# Patient Record
Sex: MALE | Race: BLACK OR AFRICAN AMERICAN | NOT HISPANIC OR LATINO | Employment: OTHER | URBAN - METROPOLITAN AREA
[De-identification: names, ages, dates, MRNs, and addresses within clinical notes are randomized per-mention and may not be internally consistent; named-entity substitution may affect disease eponyms.]

---

## 2022-12-05 ENCOUNTER — TELEPHONE (OUTPATIENT)
Dept: TRANSPLANT | Facility: CLINIC | Age: 52
End: 2022-12-05
Payer: MEDICARE

## 2022-12-05 ENCOUNTER — DOCUMENTATION ONLY (OUTPATIENT)
Dept: TRANSPLANT | Facility: CLINIC | Age: 52
End: 2022-12-05
Payer: MEDICARE

## 2022-12-05 ENCOUNTER — HOSPITAL ENCOUNTER (INPATIENT)
Facility: HOSPITAL | Age: 52
LOS: 18 days | Discharge: HOME HEALTH | DRG: 286 | End: 2022-12-23
Attending: INTERNAL MEDICINE | Admitting: INTERNAL MEDICINE
Payer: MEDICARE

## 2022-12-05 DIAGNOSIS — I50.43 ACUTE ON CHRONIC COMBINED SYSTOLIC AND DIASTOLIC CHF, NYHA CLASS 4: ICD-10-CM

## 2022-12-05 DIAGNOSIS — I50.20 HFREF (HEART FAILURE WITH REDUCED EJECTION FRACTION): ICD-10-CM

## 2022-12-05 DIAGNOSIS — I50.9 HEART FAILURE: ICD-10-CM

## 2022-12-05 DIAGNOSIS — F12.10 MARIJUANA ABUSE: ICD-10-CM

## 2022-12-05 DIAGNOSIS — I50.43 ACUTE ON CHRONIC COMBINED SYSTOLIC AND DIASTOLIC HEART FAILURE: ICD-10-CM

## 2022-12-05 DIAGNOSIS — Z12.11 COLON CANCER SCREENING: ICD-10-CM

## 2022-12-05 DIAGNOSIS — I50.9 DECOMPENSATED HEART FAILURE: ICD-10-CM

## 2022-12-05 DIAGNOSIS — I50.20 SYSTOLIC HF (HEART FAILURE): ICD-10-CM

## 2022-12-05 DIAGNOSIS — Z72.0 TOBACCO USE: ICD-10-CM

## 2022-12-05 DIAGNOSIS — I50.9 ACUTE DECOMPENSATED HEART FAILURE: ICD-10-CM

## 2022-12-05 DIAGNOSIS — Z95.810 ICD (IMPLANTABLE CARDIOVERTER-DEFIBRILLATOR), SINGLE, IN SITU: Primary | ICD-10-CM

## 2022-12-05 DIAGNOSIS — I50.9 CHF (CONGESTIVE HEART FAILURE): ICD-10-CM

## 2022-12-05 PROBLEM — D64.9 ANEMIA: Status: ACTIVE | Noted: 2022-12-05

## 2022-12-05 PROBLEM — N18.30 CKD (CHRONIC KIDNEY DISEASE), STAGE III: Status: ACTIVE | Noted: 2022-12-05

## 2022-12-05 PROBLEM — K21.9 GERD (GASTROESOPHAGEAL REFLUX DISEASE): Status: ACTIVE | Noted: 2022-12-05

## 2022-12-05 PROBLEM — D62 ACUTE BLOOD LOSS ANEMIA: Status: ACTIVE | Noted: 2022-12-05

## 2022-12-05 PROBLEM — I10 HTN (HYPERTENSION): Status: ACTIVE | Noted: 2022-12-05

## 2022-12-05 LAB
ABO + RH BLD: NORMAL
ALBUMIN SERPL BCP-MCNC: 3.8 G/DL (ref 3.5–5.2)
ALBUMIN SERPL BCP-MCNC: 4.1 G/DL (ref 3.5–5.2)
ALLENS TEST: ABNORMAL
ALP SERPL-CCNC: 196 U/L (ref 55–135)
ALP SERPL-CCNC: 196 U/L (ref 55–135)
ALT SERPL W/O P-5'-P-CCNC: 38 U/L (ref 10–44)
ALT SERPL W/O P-5'-P-CCNC: 42 U/L (ref 10–44)
ANION GAP SERPL CALC-SCNC: 10 MMOL/L (ref 8–16)
ANION GAP SERPL CALC-SCNC: 6 MMOL/L (ref 8–16)
APTT BLDCRRT: 38.2 SEC (ref 21–32)
AST SERPL-CCNC: 24 U/L (ref 10–40)
AST SERPL-CCNC: 31 U/L (ref 10–40)
BASOPHILS # BLD AUTO: 0.06 K/UL (ref 0–0.2)
BASOPHILS NFR BLD: 0.7 % (ref 0–1.9)
BILIRUB SERPL-MCNC: 5.3 MG/DL (ref 0.1–1)
BILIRUB SERPL-MCNC: 5.4 MG/DL (ref 0.1–1)
BLD GP AB SCN CELLS X3 SERPL QL: NORMAL
BUN SERPL-MCNC: 44 MG/DL (ref 6–20)
BUN SERPL-MCNC: 46 MG/DL (ref 6–20)
CALCIUM SERPL-MCNC: 8.6 MG/DL (ref 8.7–10.5)
CALCIUM SERPL-MCNC: 8.8 MG/DL (ref 8.7–10.5)
CHLORIDE SERPL-SCNC: 95 MMOL/L (ref 95–110)
CHLORIDE SERPL-SCNC: 99 MMOL/L (ref 95–110)
CHOLEST SERPL-MCNC: 78 MG/DL (ref 120–199)
CHOLEST/HDLC SERPL: 3.1 {RATIO} (ref 2–5)
CO2 SERPL-SCNC: 31 MMOL/L (ref 23–29)
CO2 SERPL-SCNC: 36 MMOL/L (ref 23–29)
CREAT SERPL-MCNC: 1.5 MG/DL (ref 0.5–1.4)
CREAT SERPL-MCNC: 1.7 MG/DL (ref 0.5–1.4)
DELSYS: ABNORMAL
DIFFERENTIAL METHOD: ABNORMAL
EOSINOPHIL # BLD AUTO: 0.2 K/UL (ref 0–0.5)
EOSINOPHIL NFR BLD: 2.3 % (ref 0–8)
ERYTHROCYTE [DISTWIDTH] IN BLOOD BY AUTOMATED COUNT: 20.4 % (ref 11.5–14.5)
ERYTHROCYTE [SEDIMENTATION RATE] IN BLOOD BY WESTERGREN METHOD: 17 MM/H
EST. GFR  (NO RACE VARIABLE): 47.3 ML/MIN/1.73 M^2
EST. GFR  (NO RACE VARIABLE): 55 ML/MIN/1.73 M^2
ESTIMATED AVG GLUCOSE: 100 MG/DL (ref 68–131)
FIO2: 21
GLUCOSE SERPL-MCNC: 142 MG/DL (ref 70–110)
GLUCOSE SERPL-MCNC: 86 MG/DL (ref 70–110)
HBA1C MFR BLD: 5.1 % (ref 4–5.6)
HCO3 UR-SCNC: 33.8 MMOL/L (ref 24–28)
HCT VFR BLD AUTO: 38.3 % (ref 40–54)
HDLC SERPL-MCNC: 25 MG/DL (ref 40–75)
HDLC SERPL: 32.1 % (ref 20–50)
HGB BLD-MCNC: 11.5 G/DL (ref 14–18)
IMM GRANULOCYTES # BLD AUTO: 0.07 K/UL (ref 0–0.04)
IMM GRANULOCYTES NFR BLD AUTO: 0.9 % (ref 0–0.5)
INR PPP: 1.4 (ref 0.8–1.2)
LACTATE SERPL-SCNC: 0.9 MMOL/L (ref 0.5–2.2)
LDLC SERPL CALC-MCNC: 39.6 MG/DL (ref 63–159)
LYMPHOCYTES # BLD AUTO: 0.7 K/UL (ref 1–4.8)
LYMPHOCYTES NFR BLD: 9 % (ref 18–48)
MAGNESIUM SERPL-MCNC: 2 MG/DL (ref 1.6–2.6)
MCH RBC QN AUTO: 27.6 PG (ref 27–31)
MCHC RBC AUTO-ENTMCNC: 30 G/DL (ref 32–36)
MCV RBC AUTO: 92 FL (ref 82–98)
MODE: ABNORMAL
MONOCYTES # BLD AUTO: 0.5 K/UL (ref 0.3–1)
MONOCYTES NFR BLD: 6.4 % (ref 4–15)
NEUTROPHILS # BLD AUTO: 6.5 K/UL (ref 1.8–7.7)
NEUTROPHILS NFR BLD: 80.7 % (ref 38–73)
NONHDLC SERPL-MCNC: 53 MG/DL
NRBC BLD-RTO: 0 /100 WBC
PCO2 BLDA: 42.3 MMHG (ref 35–45)
PH SMN: 7.51 [PH] (ref 7.35–7.45)
PLATELET # BLD AUTO: 136 K/UL (ref 150–450)
PMV BLD AUTO: 9.7 FL (ref 9.2–12.9)
PO2 BLDA: 26 MMHG (ref 40–60)
POC BE: 11 MMOL/L
POC SATURATED O2: 54 % (ref 95–100)
POC TCO2: 35 MMOL/L (ref 24–29)
POTASSIUM SERPL-SCNC: 4.2 MMOL/L (ref 3.5–5.1)
POTASSIUM SERPL-SCNC: 4.6 MMOL/L (ref 3.5–5.1)
PROT SERPL-MCNC: 6.7 G/DL (ref 6–8.4)
PROT SERPL-MCNC: 7.1 G/DL (ref 6–8.4)
PROTHROMBIN TIME: 14.4 SEC (ref 9–12.5)
RBC # BLD AUTO: 4.17 M/UL (ref 4.6–6.2)
SAMPLE: ABNORMAL
SITE: ABNORMAL
SODIUM SERPL-SCNC: 137 MMOL/L (ref 136–145)
SODIUM SERPL-SCNC: 140 MMOL/L (ref 136–145)
TRIGL SERPL-MCNC: 67 MG/DL (ref 30–150)
WBC # BLD AUTO: 8.11 K/UL (ref 3.9–12.7)

## 2022-12-05 PROCEDURE — 83605 ASSAY OF LACTIC ACID: CPT | Performed by: INTERNAL MEDICINE

## 2022-12-05 PROCEDURE — 83735 ASSAY OF MAGNESIUM: CPT | Performed by: INTERNAL MEDICINE

## 2022-12-05 PROCEDURE — 85610 PROTHROMBIN TIME: CPT | Performed by: INTERNAL MEDICINE

## 2022-12-05 PROCEDURE — 86901 BLOOD TYPING SEROLOGIC RH(D): CPT | Performed by: PHYSICIAN ASSISTANT

## 2022-12-05 PROCEDURE — 83036 HEMOGLOBIN GLYCOSYLATED A1C: CPT | Performed by: INTERNAL MEDICINE

## 2022-12-05 PROCEDURE — 93010 ELECTROCARDIOGRAM REPORT: CPT | Mod: ,,, | Performed by: INTERNAL MEDICINE

## 2022-12-05 PROCEDURE — 25000003 PHARM REV CODE 250: Performed by: INTERNAL MEDICINE

## 2022-12-05 PROCEDURE — 82803 BLOOD GASES ANY COMBINATION: CPT

## 2022-12-05 PROCEDURE — 85730 THROMBOPLASTIN TIME PARTIAL: CPT | Performed by: INTERNAL MEDICINE

## 2022-12-05 PROCEDURE — 20600001 HC STEP DOWN PRIVATE ROOM

## 2022-12-05 PROCEDURE — 36415 COLL VENOUS BLD VENIPUNCTURE: CPT | Performed by: INTERNAL MEDICINE

## 2022-12-05 PROCEDURE — 93010 EKG 12-LEAD: ICD-10-PCS | Mod: ,,, | Performed by: INTERNAL MEDICINE

## 2022-12-05 PROCEDURE — 99900035 HC TECH TIME PER 15 MIN (STAT)

## 2022-12-05 PROCEDURE — 80053 COMPREHEN METABOLIC PANEL: CPT | Performed by: INTERNAL MEDICINE

## 2022-12-05 PROCEDURE — 93005 ELECTROCARDIOGRAM TRACING: CPT

## 2022-12-05 PROCEDURE — 85025 COMPLETE CBC W/AUTO DIFF WBC: CPT | Performed by: INTERNAL MEDICINE

## 2022-12-05 PROCEDURE — 99223 PR INITIAL HOSPITAL CARE,LEVL III: ICD-10-PCS | Mod: ,,, | Performed by: INTERNAL MEDICINE

## 2022-12-05 PROCEDURE — 80061 LIPID PANEL: CPT | Performed by: INTERNAL MEDICINE

## 2022-12-05 PROCEDURE — 99223 1ST HOSP IP/OBS HIGH 75: CPT | Mod: ,,, | Performed by: INTERNAL MEDICINE

## 2022-12-05 PROCEDURE — 80053 COMPREHEN METABOLIC PANEL: CPT | Mod: 91 | Performed by: PHYSICIAN ASSISTANT

## 2022-12-05 PROCEDURE — 63600175 PHARM REV CODE 636 W HCPCS: Performed by: INTERNAL MEDICINE

## 2022-12-05 RX ORDER — FUROSEMIDE 10 MG/ML
80 INJECTION INTRAMUSCULAR; INTRAVENOUS ONCE
Status: DISCONTINUED | OUTPATIENT
Start: 2022-12-05 | End: 2022-12-07

## 2022-12-05 RX ORDER — DIGOXIN 125 MCG
0.12 TABLET ORAL DAILY
Status: DISCONTINUED | OUTPATIENT
Start: 2022-12-05 | End: 2022-12-15

## 2022-12-05 RX ORDER — SODIUM CHLORIDE 0.9 % (FLUSH) 0.9 %
10 SYRINGE (ML) INJECTION
Status: DISCONTINUED | OUTPATIENT
Start: 2022-12-05 | End: 2022-12-23 | Stop reason: HOSPADM

## 2022-12-05 RX ORDER — PANTOPRAZOLE SODIUM 40 MG/1
40 TABLET, DELAYED RELEASE ORAL DAILY
Status: DISCONTINUED | OUTPATIENT
Start: 2022-12-05 | End: 2022-12-23 | Stop reason: HOSPADM

## 2022-12-05 RX ORDER — ACETAMINOPHEN 325 MG/1
650 TABLET ORAL EVERY 4 HOURS PRN
Status: DISCONTINUED | OUTPATIENT
Start: 2022-12-05 | End: 2022-12-23

## 2022-12-05 RX ORDER — AMIODARONE HYDROCHLORIDE 200 MG/1
200 TABLET ORAL DAILY
Status: DISCONTINUED | OUTPATIENT
Start: 2022-12-05 | End: 2022-12-23 | Stop reason: HOSPADM

## 2022-12-05 RX ORDER — ENOXAPARIN SODIUM 100 MG/ML
40 INJECTION SUBCUTANEOUS EVERY 24 HOURS
Status: DISCONTINUED | OUTPATIENT
Start: 2022-12-06 | End: 2022-12-09

## 2022-12-05 RX ORDER — DOBUTAMINE HYDROCHLORIDE 400 MG/100ML
5 INJECTION INTRAVENOUS CONTINUOUS
Status: DISCONTINUED | OUTPATIENT
Start: 2022-12-05 | End: 2022-12-23 | Stop reason: HOSPADM

## 2022-12-05 RX ADMIN — APIXABAN 5 MG: 5 TABLET, FILM COATED ORAL at 09:12

## 2022-12-05 RX ADMIN — DOBUTAMINE HYDROCHLORIDE 5 MCG/KG/MIN: 400 INJECTION INTRAVENOUS at 06:12

## 2022-12-05 RX ADMIN — PANTOPRAZOLE SODIUM 40 MG: 40 TABLET, DELAYED RELEASE ORAL at 09:12

## 2022-12-05 RX ADMIN — FUROSEMIDE 20 MG/HR: 10 INJECTION, SOLUTION INTRAMUSCULAR; INTRAVENOUS at 10:12

## 2022-12-05 RX ADMIN — DOBUTAMINE HYDROCHLORIDE 5 MCG/KG/MIN: 400 INJECTION INTRAVENOUS at 01:12

## 2022-12-05 RX ADMIN — SACUBITRIL AND VALSARTAN 1 TABLET: 24; 26 TABLET, FILM COATED ORAL at 09:12

## 2022-12-05 RX ADMIN — DIGOXIN 0.12 MG: 125 TABLET ORAL at 09:12

## 2022-12-05 RX ADMIN — SACUBITRIL AND VALSARTAN 1 TABLET: 24; 26 TABLET, FILM COATED ORAL at 08:12

## 2022-12-05 RX ADMIN — AMIODARONE HYDROCHLORIDE 200 MG: 200 TABLET ORAL at 09:12

## 2022-12-05 RX ADMIN — FUROSEMIDE 20 MG/HR: 10 INJECTION, SOLUTION INTRAMUSCULAR; INTRAVENOUS at 01:12

## 2022-12-05 NOTE — SUBJECTIVE & OBJECTIVE
No past medical history on file.    No past surgical history on file.    Review of patient's allergies indicates:  Not on File    No current facility-administered medications on file prior to encounter.     No current outpatient medications on file prior to encounter.     Family History    None       Tobacco Use    Smoking status: Not on file    Smokeless tobacco: Not on file   Substance and Sexual Activity    Alcohol use: Not on file    Drug use: Not on file    Sexual activity: Not on file     Review of Systems   All other systems reviewed and are negative.  Objective:     Vital Signs (Most Recent):  Temp: 97.4 °F (36.3 °C) (12/05/22 0030)  Pulse: 88 (12/05/22 0030)  Resp: 20 (12/05/22 0030)  BP: 108/78 (12/05/22 0030)  SpO2: 99 % (12/05/22 0030) Vital Signs (24h Range):  Temp:  [97.4 °F (36.3 °C)-99.2 °F (37.3 °C)] 97.4 °F (36.3 °C)  Pulse:  [83-96] 88  Resp:  [17-20] 20  SpO2:  [98 %-100 %] 99 %  BP: (108-121)/(74-79) 108/78       Weight: 90.4 kg (199 lb 4.7 oz)  Body mass index is 32.17 kg/m².    SpO2: 99 %  O2 Device (Oxygen Therapy): room air    Physical Exam  General: NAD. AAO  HENT: EOMI  Neck: JVD  CV: RRR. S3 gallop. PICC in RUE  Resp: Rales. No increased work of breathing  Ext: cool. 3+ pitting edema      Significant Labs: All pertinent lab results from the last 24 hours have been reviewed.    Significant Imaging:  Reviewed

## 2022-12-05 NOTE — Clinical Note
The PA catheter was repositioned to the main pulmonary artery. Hemodynamics were performed. O2 saturation was measured at 47%. AO: 100%  CO: 2.87  CI: 1.55

## 2022-12-05 NOTE — PLAN OF CARE
See H & P from this morning    -will continue lasix infusion and monitor UOP.  Will likely increase to 40mg/hr of he is stable and UOP tolerates.  Will monitor UOP and change infusion this afternoon vs tomorrow am  -Patient lives in [place] with his wife and children.  His Cardiology is Dr. Miller Damon.    -He reports he has been hospitalized at least twice this year for ADHF  -Echo ordered  -will start pathway for advanced options workup.  Likely VAD only as current tobacco and THC use  -Will get PT/OT evaluation   -Will stop Eliquis (in event of procedures) On Eliquis for atrial fib. [id number] is 2 so will just use DVT prophylaxis for now.  If requires a hold for greater than 5-7 days then will consider heparin infusion

## 2022-12-05 NOTE — Clinical Note
The left neck was prepped. The site was prepped with ChloraPrep. The patient was draped. The patient was positioned supine.

## 2022-12-05 NOTE — PROGRESS NOTES
Admit Note     Met with patient and son to assess needs. Patient is a 52 y.o.  male, admitted for for heart failure.      Patient admitted from [facility] on 12/5/2022 .  At this time, patient presents as alert and oriented x 4, pleasant, good eye contact, calm, communicative, cooperative, and asking and answering questions appropriately.  At this time, patients caregiver presents as alert and oriented x 4, pleasant, good eye contact, well groomed, recall good, concentration/judgement good, average intelligence, calm, communicative, cooperative, and asking and answering questions appropriately.    Household/Family Systems     Patient resides with patient's wife and son, at [address] [place] [address].  Support system includes wife Julianne Garcia and son Bayron Garcia ([phone]).  Patient reports having 2 other adult sons who do not provide care or assistance. Patient does not have dependents that are need of being cared for.     Patients primary caregiver is Julianne Garcia, patients wife, phone number [phone].  Confirmed patients contact information is [phone] (home);   Telephone Information:   Mobile [phone]   .    During admission, patient's caregiver plans to stay in patient's room.  Confirmed patient and patients caregivers do have access to reliable transportation.    Cognitive Status/Learning     Patient reports reading ability as 12th grade and states patient does have difficulty with comprehension and memory which is recent with this extended hospitalization.  Patient reports patient learns best by written and verbal information, as well as hands on learning.   Needed: No.   Highest education level: High School (9-12) or GED    Vocation/Disability   .  Working for Income: No  If no, reason not working: Disability  Patient is disabled due to CHF since 12 years ago.  Prior to disability, patient  was employed as  for a [email or web address]    Adherence     Patient reports a medium level of adherence to patients health care regimen.  Adherence counseling and education provided. Patient verbalizes understanding.    Substance Use    Patient reports the following substance usage.    Tobacco:  Patient smoking cigarettes, down to 3-4 cigarettes a day in the last couple of months.  Pt reports cutting back.  Patient had been smoking more and admits he had never completely stopped, but was trying to stop.  Patient is worried when he is discharged he wont' be able to completely stop.  Patient voiced commitment to no tobacco use.  Patient offered smoking cessation resources, son aware .  Alcohol: none, patient denies any use. Patient reports in past a beer or daiquiri every blue moon.   Illicit Drugs/Non-prescribed Medications:  Patient reports no current drug use, but over 30 years ago he admits to trying cocaine and THC .  Patient states clear understanding of the potential impact of substance use.  Substance abstinence/cessation counseling, education and resources provided and reviewed.     Services Utilizing/ADLS    Infusion Service: Prior to admission, patient utilizing? no  Home Health: Prior to admission, patient utilizing? not current, but in past nursing with PT/OT.  Pt not sure of name of  agency.   DME: Prior to admission,  crutches and BP machine and pulse ox   Pulmonary/Cardiac Rehab: Prior to admission, no  Dialysis:  Prior to admission, no  Transplant Specialty Pharmacy:  Prior to admission, no.   Patient uses [email or web address]    Prior to admission, patient reports patient was independent with ADLS and was driving.  Patient reports patient is not able to care for self at this time due to compromised medical condition (as documented in medical record) and physical weakness..  Patient indicates a willingness to care for self once medically cleared to do so.    Insurance/Medications    Insured by   Payer/Plan Subscr  Sex Relation  Sub. Ins. ID Effective Group Num   1. [employer] MANAGE* SYLVIA ALVAREZ 1970 Male Self [id number] 1/1/20 [id number]                                   [address] [address]      Primary Insurance (for UNOS reporting): Public Insurance - Medicare FFS (Fee For Service)  Secondary Insurance (for UNOS reporting): None    Patient reports patient is able to obtain and afford medications at this time and at time of discharge.  Patient does voice concerns affording other medical out of pocket expenses and co-pays, due to multiple hospitalizations a year.     Living Will/Healthcare Power of     Patient states patient does not have a LW and/or HCPA.   provided education regarding LW and HCPA and the completion of forms.    Coping/Mental Health    Patient is coping adequately with the aid of  family members and mark.   Patient denies mental health difficulties.     Discharge Planning    At time of discharge, patient plans to return to patient's home under the care of self/son/wife.  Patients wife and son will transport patient.  Per rounds today, expected discharge date has not been medically determined at this time. Patient and patients caregiver  verbalize understanding and are involved in treatment planning and discharge process.    Additional Concerns    Patient's caretaker denies additional needs and/or concerns at this time. Patient is being followed for needs, education, resources, information, emotional support, supportive counseling, and for supportive and skilled discharge plan of care.  providing ongoing psychosocial support, education, resources and d/c planning as needed.  SW remains available. Patient's caregiver verbalizes understanding and agreement with information reviewed,  availability and how to access available resources as needed. Patient denies additional needs and/or concerns at this time. Patient verbalizes understanding and agreement with information reviewed,  social work availability, and how to access available resources as needed.

## 2022-12-05 NOTE — ASSESSMENT & PLAN NOTE
Cr baseline 1.6-2 per chart. Unable to view labs directly. Cr 2.2 on 12/2  - Check renal function  - Check electrolytes

## 2022-12-05 NOTE — H&P
[facility] - Cardiology Stepdown  Cardiac Electrophysiology  History and Physical     Admission Date: 12/5/2022  Code Status: Full Code   Attending Provider: Kelley Cruz MD   Principal Problem:Acute on chronic combined systolic and diastolic heart failure    Subjective:     Chief Complaint:  ADHF     HPI:  53 yo M who presents as a transfer from [facility] for advanced options. PMHx NICM, ICD in situ, CKD III (cr 1.6-2 per records), tobacco use, THC use, HTN, and GERD. Reports history of CHF in his 40s (around 10 years prior). LHC reportedly in 2011 negative. Multiple recent admission for ADHF. Was admitted 11/20 at [facility]. Placed on  and lasix with inability to wean  per records. LHC/RHC showed no obstructive CAD and RA 40, PA 72/38, PCWP 42, CI 1.4. He denies any history of ICD discharges (implanted 2018 for primary prevention). He currently smokes, but reports having cut back. Recreational THC use. TTE EF 10-15% (no full reports with records sent). Cr 2.2 on 12/2.      No past medical history on file.    No past surgical history on file.    Review of patient's allergies indicates:  Not on File    No current facility-administered medications on file prior to encounter.     No current outpatient medications on file prior to encounter.     Family History    None       Tobacco Use    Smoking status: Not on file    Smokeless tobacco: Not on file   Substance and Sexual Activity    Alcohol use: Not on file    Drug use: Not on file    Sexual activity: Not on file     Review of Systems   All other systems reviewed and are negative.  Objective:     Vital Signs (Most Recent):  Temp: 97.4 °F (36.3 °C) (12/05/22 0030)  Pulse: 88 (12/05/22 0030)  Resp: 20 (12/05/22 0030)  BP: 108/78 (12/05/22 0030)  SpO2: 99 % (12/05/22 0030) Vital Signs (24h Range):  Temp:  [97.4 °F (36.3 °C)-99.2 °F (37.3 °C)] 97.4 °F (36.3 °C)  Pulse:  [83-96] 88  Resp:  [17-20] 20  SpO2:  [98 %-100 %] 99 %  BP: (108-121)/(74-79) 108/78       Weight:  90.4 kg (199 lb 4.7 oz)  Body mass index is 32.17 kg/m².    SpO2: 99 %  O2 Device (Oxygen Therapy): room air    Physical Exam  General: NAD. AAO  HENT: EOMI  Neck: JVD  CV: RRR. S3 gallop. PICC in RUE  Resp: Rales. No increased work of breathing  Ext: cool. 3+ pitting edema      Significant Labs: All pertinent lab results from the last 24 hours have been reviewed.    Significant Imaging:  Reviewed    Assessment and Plan:     * Acute on chronic combined systolic and diastolic heart failure  Cool and wet on exam. On  and lasix at OSH.   - Continue  5 mcg/kg/min  - Lasix 80 mg IV x 1 then 20 mg/hr - titrate as needed  - Check CVP and SVO2 via PICC  - Follow urine output closely  - Afterload reduction    ICD (implantable cardioverter-defibrillator), single, in situ  Device check    CKD (chronic kidney disease), stage III  Cr baseline 1.6-2 per chart. Unable to view labs directly. Cr 2.2 on 12/2  - Check renal function  - Check electrolytes    Marijuana abuse  Counseled on cessation    Tobacco use  Counseled on cessation  Cessation program    GERD (gastroesophageal reflux disease)  Home PPI    HFrEF (heart failure with reduced ejection fraction)  NICM EF 10-15% (full report unavailable). Reports dx in his 40s with [facility] 2011 which was negative.   - Diuresis  - Will initiate on the advanced options pathway in the AM        Quinten Lee MD  Cardiac Electrophysiology  [facility] - Cardiology Stepdown

## 2022-12-05 NOTE — ASSESSMENT & PLAN NOTE
Cool and wet on exam. On  and lasix at OSH.   - Continue  5 mcg/kg/min  - Lasix 80 mg IV x 1 then 20 mg/hr - titrate as needed  - Check CVP and SVO2 via PICC  - Follow urine output closely  - Afterload reduction

## 2022-12-05 NOTE — PROGRESS NOTES
Rounds Report: I attended interdisciplinary rounds this afternoon with the transplant team including SW, physicians, fellows, mid-level providers, and transplant coordinators. Discussed plan of care pertaining to Advanced Heart Failure Options (VAD and/or OHT) .  Migel Garcia is a VAD only candidate at this time

## 2022-12-05 NOTE — Clinical Note
A percutaneous stick to the left internal jugular vein was performed. Ultrasound guidance was used to obtain access.

## 2022-12-05 NOTE — ASSESSMENT & PLAN NOTE
NICM EF 10-15% (full report unavailable). Reports dx in his 40s with [facility] 2011 which was negative.   - Diuresis  - Will initiate on the advanced options pathway in the AM

## 2022-12-05 NOTE — Clinical Note
The PA catheter was repositioned to the main pulmonary artery and secured in place for continuous hemodynamic monitoring. Hemodynamics were performed. Cardiac output was obtained at 3 L/min. O2 saturation was measured at 44%. C.O=2.57  C.I=1.43

## 2022-12-05 NOTE — LETTER
December 23, 2022    Migel Garcia  [address] [address]      RE: [id number]          Dear Mr.Dewayne Garcia:       Your transplant evaluation was reviewed at the Heart Transplant Selection Committee meeting on 12/19/2022.  It was the decision of the Committee to defer the option of heart transplantation at this time due to renal insufficiency and you will need to complete testing of your pulmonary nodule. Any testing not completed while hospitalized may be rescheduled as an outpatient.  You can be re-presented to the Committee once you complete your evaluation.      Attached is a letter from the [employer] Network for Organ Sharing (UNOS).  It describes the services and information offered to patients by UNOS and the Organ Procurement and Transplant Network.     Please feel free to call if you have any questions at [phone].     Sincerely,  Gin Mendes MD  Medical Director, Heart Transplant Program  Director, Advanced Heart Failure/Heart Transplant Fellowship Program  [facility]  [address] [place] [address] - [address] [place] [address]  [phone]                               The Organ Procurement and Transplantation Network   Toll-free patient services line: Your resource for organ transplant information      If you have a question regarding your own medical care, you always should call your transplant hospital first. However, for general organ transplant-related information, you can call the Organ Procurement and Transplantation Network (OPTN) toll-free patient services line at [phone].      Anyone, including potential transplant candidates, candidates, recipients, family members, friends, living donors, and donor family members, can call this number to:       Talk about organ donation, living donation, the transplant process, the donation process, and transplant policies.    Get a free patient information kit with helpful  booklets, waiting list and transplant information, and a list of all transplant hospitals.    Ask questions about the OPTN website (https://optn.transplant.hrsa.gov/), the United Network for Organ Sharings (UNOS) website (https://unos.org/), or the UNOS website for living donors and transplant recipients. (https://www.transplantliving.org/).    Learn how the OPTN can help you.    Talk about any concerns that you may have with a transplant hospital.      The nations transplant system, the OPTN, is managed under federal contract by the [employer] for Organ Sharing (UNOS), which is a non-profit charitable organization. The OPTN helps create and define organ sharing policies that make the best use of donated organs. This process continuously evaluating new advances and discoveries so policies can be adapted to best serve patients waiting for transplants. To do so, the OPTN works closely with transplant professionals, transplant patients, transplant candidates, donor families, living donors, and the public. All transplant programs and organ procurement organizations throughout the country are OPTN members and are obligated to follow the policies the OPTN creates for allocating organs.      The OPTN also is responsible for:    Providing educational material for patients, the public, and professionals.    Raising awareness of the need for donated organs and tissue.    Coordinating organ procurement, matching, and placement.    Collecting information about every organ transplant and donation that occurs in the [place].      Remember, you should contact your transplant hospital directly if you have questions or concerns about your own medical care including medical records, work-up progress, and test results.      We are not your transplant hospital, and our staff will not be able to answer questions about your case, so please keep your transplant hospitals phone number handy.   However, while you  research your transplant needs and learn as much as you can about transplantation and donation, we welcome your call to our toll-free patient services line at [phone].

## 2022-12-05 NOTE — Clinical Note
The right neck was prepped. The site was prepped with ChloraPrep. The site was clipped. The patient was draped. The patient was positioned supine.

## 2022-12-05 NOTE — PROGRESS NOTES
Patient arrive to floor , via stretcher, awake, alert and responsive. With no c/o pain or discomfort. Oriented to room surroundings and and and made comfortable. Vitals completed . Received Lasix and Debutamine drugs from ambulance staff and Charge nurse called MD to notify , he said he will be in room to see patient. MD arrived and will put in orders as soon as he finish assessing patient. Medication will be restarted after the order is placed.108/78, 88,20

## 2022-12-05 NOTE — HPI
53 yo M who presents as a transfer from [facility] for advanced options. PMHx NICM, ICD in situ, CKD III (cr 1.6-2 per records), tobacco use, THC use, HTN, and GERD. Reports history of CHF in his 40s (around 10 years prior). C reportedly in 2011 negative. Multiple recent admission for ADHF. Was admitted 11/20 at [facility]. Placed on  and lasix with inability to wean  per records. LHC/RHC showed no obstructive CAD and RA 40, PA 72/38, PCWP 42, CI 1.4. He denies any history of ICD discharges (implanted 2018 for primary prevention). He currently smokes, but reports having cut back. Recreational THC use. TTE EF 10-15% (no full reports with records sent). Cr 2.2 on 12/2.

## 2022-12-06 ENCOUNTER — TELEPHONE (OUTPATIENT)
Dept: TRANSPLANT | Facility: CLINIC | Age: 52
End: 2022-12-06
Payer: MEDICARE

## 2022-12-06 PROBLEM — I48.91 ATRIAL FIBRILLATION: Status: ACTIVE | Noted: 2022-12-06

## 2022-12-06 PROBLEM — R91.1 PULMONARY NODULE: Status: ACTIVE | Noted: 2022-12-06

## 2022-12-06 LAB
ALBUMIN SERPL BCP-MCNC: 3.8 G/DL (ref 3.5–5.2)
ALLENS TEST: ABNORMAL
ALP SERPL-CCNC: 225 U/L (ref 55–135)
ALT SERPL W/O P-5'-P-CCNC: 37 U/L (ref 10–44)
ANION GAP SERPL CALC-SCNC: 12 MMOL/L (ref 8–16)
ASCENDING AORTA: 2.79 CM
AST SERPL-CCNC: 30 U/L (ref 10–40)
AV INDEX (PROSTH): 0.65
AV MEAN GRADIENT: 3 MMHG
AV PEAK GRADIENT: 6 MMHG
AV VALVE AREA: 1.83 CM2
AV VELOCITY RATIO: 0.64
BASOPHILS # BLD AUTO: 0.05 K/UL (ref 0–0.2)
BASOPHILS NFR BLD: 0.8 % (ref 0–1.9)
BILIRUB SERPL-MCNC: 4.8 MG/DL (ref 0.1–1)
BSA FOR ECHO PROCEDURE: 2.06 M2
BUN SERPL-MCNC: 43 MG/DL (ref 6–20)
CALCIUM SERPL-MCNC: 9 MG/DL (ref 8.7–10.5)
CHLORIDE SERPL-SCNC: 94 MMOL/L (ref 95–110)
CO2 SERPL-SCNC: 34 MMOL/L (ref 23–29)
CREAT SERPL-MCNC: 1.4 MG/DL (ref 0.5–1.4)
CV ECHO LV RWT: 0.39 CM
DELSYS: ABNORMAL
DIFFERENTIAL METHOD: ABNORMAL
DOP CALC AO PEAK VEL: 1.25 M/S
DOP CALC AO VTI: 19.87 CM
DOP CALC LVOT AREA: 2.8 CM2
DOP CALC LVOT DIAMETER: 1.89 CM
DOP CALC LVOT PEAK VEL: 0.8 M/S
DOP CALC LVOT STROKE VOLUME: 36.4 CM3
DOP CALCLVOT PEAK VEL VTI: 12.98 CM
E/E' RATIO: 12.88 M/S
ECHO LV POSTERIOR WALL: 1.03 CM (ref 0.6–1.1)
EJECTION FRACTION: 25 %
EOSINOPHIL # BLD AUTO: 0.2 K/UL (ref 0–0.5)
EOSINOPHIL NFR BLD: 2.9 % (ref 0–8)
ERYTHROCYTE [DISTWIDTH] IN BLOOD BY AUTOMATED COUNT: 20.5 % (ref 11.5–14.5)
EST. GFR  (NO RACE VARIABLE): 59.7 ML/MIN/1.73 M^2
FRACTIONAL SHORTENING: 17 % (ref 28–44)
GLUCOSE SERPL-MCNC: 129 MG/DL (ref 70–110)
HCO3 UR-SCNC: 42.5 MMOL/L (ref 24–28)
HCT VFR BLD AUTO: 36.3 % (ref 40–54)
HGB BLD-MCNC: 11.1 G/DL (ref 14–18)
IMM GRANULOCYTES # BLD AUTO: 0.04 K/UL (ref 0–0.04)
IMM GRANULOCYTES NFR BLD AUTO: 0.7 % (ref 0–0.5)
INTERVENTRICULAR SEPTUM: 0.83 CM (ref 0.6–1.1)
IVRT: 57.09 MSEC
LA MAJOR: 6.24 CM
LA MINOR: 6.2 CM
LA WIDTH: 4.04 CM
LEFT ATRIUM SIZE: 4.71 CM
LEFT ATRIUM VOLUME INDEX MOD: 43.8 ML/M2
LEFT ATRIUM VOLUME INDEX: 50.3 ML/M2
LEFT ATRIUM VOLUME MOD: 87.62 CM3
LEFT ATRIUM VOLUME: 100.6 CM3
LEFT INTERNAL DIMENSION IN SYSTOLE: 4.45 CM (ref 2.1–4)
LEFT VENTRICLE DIASTOLIC VOLUME INDEX: 68.51 ML/M2
LEFT VENTRICLE DIASTOLIC VOLUME: 137.01 ML
LEFT VENTRICLE MASS INDEX: 92 G/M2
LEFT VENTRICLE SYSTOLIC VOLUME INDEX: 45.1 ML/M2
LEFT VENTRICLE SYSTOLIC VOLUME: 90.24 ML
LEFT VENTRICULAR INTERNAL DIMENSION IN DIASTOLE: 5.33 CM (ref 3.5–6)
LEFT VENTRICULAR MASS: 183.87 G
LV LATERAL E/E' RATIO: 10.3 M/S
LV SEPTAL E/E' RATIO: 17.17 M/S
LYMPHOCYTES # BLD AUTO: 0.7 K/UL (ref 1–4.8)
LYMPHOCYTES NFR BLD: 12.6 % (ref 18–48)
MCH RBC QN AUTO: 27.6 PG (ref 27–31)
MCHC RBC AUTO-ENTMCNC: 30.6 G/DL (ref 32–36)
MCV RBC AUTO: 90 FL (ref 82–98)
MONOCYTES # BLD AUTO: 0.4 K/UL (ref 0.3–1)
MONOCYTES NFR BLD: 7.5 % (ref 4–15)
MV PEAK E VEL: 1.03 M/S
NEUTROPHILS # BLD AUTO: 4.5 K/UL (ref 1.8–7.7)
NEUTROPHILS NFR BLD: 75.5 % (ref 38–73)
NRBC BLD-RTO: 0 /100 WBC
PCO2 BLDA: 64.8 MMHG (ref 35–45)
PH SMN: 7.42 [PH] (ref 7.35–7.45)
PISA TR MAX VEL: 2.85 M/S
PLATELET # BLD AUTO: 154 K/UL (ref 150–450)
PMV BLD AUTO: 10.1 FL (ref 9.2–12.9)
PO2 BLDA: 27 MMHG (ref 40–60)
POC BE: 18 MMOL/L
POC SATURATED O2: 48 % (ref 95–100)
POC TCO2: 44 MMOL/L (ref 24–29)
POTASSIUM SERPL-SCNC: 4.1 MMOL/L (ref 3.5–5.1)
PROT SERPL-MCNC: 6.7 G/DL (ref 6–8.4)
QEF: 27 %
RA MAJOR: 5.81 CM
RA PRESSURE: 15 MMHG
RA WIDTH: 5.22 CM
RBC # BLD AUTO: 4.02 M/UL (ref 4.6–6.2)
RIGHT VENTRICULAR END-DIASTOLIC DIMENSION: 4.75 CM
RV TISSUE DOPPLER FREE WALL SYSTOLIC VELOCITY 1 (APICAL 4 CHAMBER VIEW): 10.19 CM/S
SAMPLE: ABNORMAL
SINUS: 2.86 CM
SITE: ABNORMAL
SODIUM SERPL-SCNC: 140 MMOL/L (ref 136–145)
STJ: 2.43 CM
TDI LATERAL: 0.1 M/S
TDI SEPTAL: 0.06 M/S
TDI: 0.08 M/S
TR MAX PG: 32 MMHG
TRICUSPID ANNULAR PLANE SYSTOLIC EXCURSION: 1.78 CM
TV REST PULMONARY ARTERY PRESSURE: 47 MMHG
WBC # BLD AUTO: 5.89 K/UL (ref 3.9–12.7)

## 2022-12-06 PROCEDURE — 80053 COMPREHEN METABOLIC PANEL: CPT | Performed by: INTERNAL MEDICINE

## 2022-12-06 PROCEDURE — 25000003 PHARM REV CODE 250: Performed by: INTERNAL MEDICINE

## 2022-12-06 PROCEDURE — 20600001 HC STEP DOWN PRIVATE ROOM

## 2022-12-06 PROCEDURE — 99900035 HC TECH TIME PER 15 MIN (STAT)

## 2022-12-06 PROCEDURE — 63600175 PHARM REV CODE 636 W HCPCS: Performed by: PHYSICIAN ASSISTANT

## 2022-12-06 PROCEDURE — 82803 BLOOD GASES ANY COMBINATION: CPT

## 2022-12-06 PROCEDURE — 99233 PR SUBSEQUENT HOSPITAL CARE,LEVL III: ICD-10-PCS | Mod: ,,, | Performed by: INTERNAL MEDICINE

## 2022-12-06 PROCEDURE — 80307 DRUG TEST PRSMV CHEM ANLYZR: CPT | Performed by: INTERNAL MEDICINE

## 2022-12-06 PROCEDURE — 99233 SBSQ HOSP IP/OBS HIGH 50: CPT | Mod: ,,, | Performed by: INTERNAL MEDICINE

## 2022-12-06 PROCEDURE — 97530 THERAPEUTIC ACTIVITIES: CPT

## 2022-12-06 PROCEDURE — 80323 ALKALOIDS NOS: CPT | Performed by: INTERNAL MEDICINE

## 2022-12-06 PROCEDURE — 85025 COMPLETE CBC W/AUTO DIFF WBC: CPT | Performed by: INTERNAL MEDICINE

## 2022-12-06 PROCEDURE — 63600175 PHARM REV CODE 636 W HCPCS: Performed by: INTERNAL MEDICINE

## 2022-12-06 PROCEDURE — 99233 SBSQ HOSP IP/OBS HIGH 50: CPT | Mod: ,,, | Performed by: PHYSICIAN ASSISTANT

## 2022-12-06 PROCEDURE — 97165 OT EVAL LOW COMPLEX 30 MIN: CPT

## 2022-12-06 PROCEDURE — 97161 PT EVAL LOW COMPLEX 20 MIN: CPT

## 2022-12-06 PROCEDURE — 97116 GAIT TRAINING THERAPY: CPT

## 2022-12-06 PROCEDURE — 99233 PR SUBSEQUENT HOSPITAL CARE,LEVL III: ICD-10-PCS | Mod: ,,, | Performed by: PHYSICIAN ASSISTANT

## 2022-12-06 RX ORDER — MUPIROCIN 20 MG/G
OINTMENT TOPICAL 2 TIMES DAILY
Status: DISPENSED | OUTPATIENT
Start: 2022-12-06 | End: 2022-12-11

## 2022-12-06 RX ORDER — MUPIROCIN 20 MG/G
OINTMENT TOPICAL 2 TIMES DAILY
Status: CANCELLED | OUTPATIENT
Start: 2022-12-06 | End: 2022-12-11

## 2022-12-06 RX ADMIN — SACUBITRIL AND VALSARTAN 1 TABLET: 24; 26 TABLET, FILM COATED ORAL at 10:12

## 2022-12-06 RX ADMIN — MUPIROCIN: 20 OINTMENT TOPICAL at 08:12

## 2022-12-06 RX ADMIN — PANTOPRAZOLE SODIUM 40 MG: 40 TABLET, DELAYED RELEASE ORAL at 10:12

## 2022-12-06 RX ADMIN — ENOXAPARIN SODIUM 40 MG: 40 INJECTION SUBCUTANEOUS at 05:12

## 2022-12-06 RX ADMIN — FUROSEMIDE 20 MG/HR: 10 INJECTION, SOLUTION INTRAMUSCULAR; INTRAVENOUS at 12:12

## 2022-12-06 RX ADMIN — DIGOXIN 0.12 MG: 125 TABLET ORAL at 10:12

## 2022-12-06 RX ADMIN — SACUBITRIL AND VALSARTAN 1 TABLET: 24; 26 TABLET, FILM COATED ORAL at 08:12

## 2022-12-06 RX ADMIN — AMIODARONE HYDROCHLORIDE 200 MG: 200 TABLET ORAL at 10:12

## 2022-12-06 RX ADMIN — DOBUTAMINE HYDROCHLORIDE 5 MCG/KG/MIN: 400 INJECTION INTRAVENOUS at 12:12

## 2022-12-06 NOTE — CONSULTS
[facility] - Cardiology Stepdown  Cardiothoracic Surgery  Consult Note    Patient Name: iMgel Garcia  MRN: [id number]  Admission Date: 12/5/2022  Attending Physician: Brando Parada MD  Referring Provider: Miller Bell MD    Patient information was obtained from past medical records and ER records.     Inpatient consult to Cardiothoracic Surgery  Consult performed by: Mely Steward PA-C  Consult ordered by: JEFF Puentes        Subjective:     Principal Problem: Acute on chronic combined systolic and diastolic heart failure    History of Present Illness: 53 yo M who presents as a transfer from [facility] for advanced options. PMHx NICM, ICD in situ, CKD III (cr 1.6-2 per records), tobacco use, THC use, HTN, and GERD. Reports history of CHF in his 40s (around 10 years prior). C reportedly in 2011 negative. Multiple recent admission for ADHF. Was admitted 11/20 at [facility]. Placed on  and lasix with inability to wean  per records. LHC/RHC showed no obstructive CAD and RA 40, PA 72/38, PCWP 42, CI 1.4. He denies any history of ICD discharges (implanted 2018 for primary prevention). He currently smokes, but reports having cut back. Recreational THC use.  TTE EF 10-15% (no full reports with records sent). Cr 2.2 on 12/2.         No current facility-administered medications on file prior to encounter.     No current outpatient medications on file prior to encounter.       Review of patient's allergies indicates:  No Known Allergies    Past Medical History:   Diagnosis Date    A-fib     GERD (gastroesophageal reflux disease)     Heart failure, unspecified     Hypertension     V-tach      No past surgical history on file.  Family History    None       Tobacco Use    Smoking status: Not on file    Smokeless tobacco: Not on file   Substance and Sexual Activity    Alcohol use: Not Currently    Drug use: Not on file    Sexual activity: Not on file     Review of Systems   Constitutional:  Negative for activity  change, appetite change, fatigue and fever.   HENT:  Negative for nosebleeds.    Respiratory:  Negative for cough and shortness of breath.    Cardiovascular:  Positive for palpitations and leg swelling. Negative for chest pain.   Gastrointestinal:  Negative for abdominal distention, abdominal pain and nausea.   Genitourinary:  Negative for frequency.   Musculoskeletal:  Negative for arthralgias and myalgias.   Skin:  Negative for rash.   Neurological:  Negative for dizziness, seizures and numbness.   Hematological:  Does not bruise/bleed easily.   Objective:     Vital Signs (Most Recent):  Temp: 97.8 °F (36.6 °C) (12/06/22 0746)  Pulse: 90 (12/06/22 0746)  Resp: 20 (12/06/22 0746)  BP: 102/66 (12/06/22 0746)  SpO2: 99 % (12/06/22 0746) Vital Signs (24h Range):  Temp:  [97.6 °F (36.4 °C)-98.3 °F (36.8 °C)] 97.8 °F (36.6 °C)  Pulse:  [] 90  Resp:  [18-20] 20  SpO2:  [94 %-99 %] 99 %  BP: (100-118)/(56-77) 102/66     Weight: 90.7 kg (200 lb)  Body mass index is 32.28 kg/m².    SpO2: 99 %  O2 Device (Oxygen Therapy): nasal cannula     Intake/Output - Last 3 Shifts         12/04 0700  12/05 0659 12/05 0700  12/06 0659 12/06 0700  12/07 0659    P.O.  1147 118    Total Intake(mL/kg)  1147 (12.8) 118 (1.3)    Urine (mL/kg/hr) 1400 4975 (2.3) 600 (1.7)    Total Output 1400 4975 600    Net -1400 -3828 -482                    Lines/Drains/Airways       Peripherally Inserted Central Catheter Line  Duration             PICC Triple Lumen right basilic -- days                         Physical Exam  Vitals and nursing note reviewed.   Constitutional:       General: He is not in acute distress.  HENT:      Head: Normocephalic and atraumatic.   Eyes:      Pupils: Pupils are equal, round, and reactive to light.   Neck:      Vascular: JVD present.   Cardiovascular:      Rate and Rhythm: Normal rate.   Pulmonary:      Effort: Pulmonary effort is normal.   Abdominal:      General: Abdomen is flat.   Musculoskeletal:          General: Normal range of motion.      Cervical back: Normal range of motion.      Right lower leg: Edema present.      Left lower leg: Edema present.   Skin:     General: Skin is warm.      Coloration: Skin is not pale.   Neurological:      General: No focal deficit present.      Mental Status: He is alert.       Significant Labs:  ABGs:   Recent Labs   Lab 12/06/22  1044   PH 7.425   PCO2 64.8*   PO2 27*   HCO3 42.5*   POCSATURATED 48*   BE 18     Amylase: No results for input(s): AMYLASE in the last 48 hours.  BMP:   Recent Labs   Lab 12/05/22  0156 12/05/22  1600 12/06/22 0523   GLU 86   < > 129*      < > 140   K 4.6   < > 4.1   CL 99   < > 94*   CO2 31*   < > 34*   BUN 46*   < > 43*   CREATININE 1.7*   < > 1.4   CALCIUM 8.6*   < > 9.0   MG 2.0  --   --     < > = values in this interval not displayed.     Cardiac markers: No results for input(s): CKMB, CPKMB, TROPONINT, TROPONINI, MYOGLOBIN in the last 48 hours.  CBC:   Recent Labs   Lab 12/06/22 0523   WBC 5.89   RBC 4.02*   HGB 11.1*   HCT 36.3*      MCV 90   MCH 27.6   MCHC 30.6*     CMP:   Recent Labs   Lab 12/06/22  0523   *   CALCIUM 9.0   ALBUMIN 3.8   PROT 6.7      K 4.1   CO2 34*   CL 94*   BUN 43*   CREATININE 1.4   ALKPHOS 225*   ALT 37   AST 30   BILITOT 4.8*     Coagulation:   Recent Labs   Lab 12/05/22  0156   INR 1.4*   APTT 38.2*     Lactic Acid:   Recent Labs   Lab 12/05/22  0156   LACTATE 0.9     LFTs:   Recent Labs   Lab 12/06/22 0523   ALT 37   AST 30   ALKPHOS 225*   BILITOT 4.8*   PROT 6.7   ALBUMIN 3.8     Lipase: No results for input(s): LIPASE in the last 48 hours.    Significant Diagnostics: reviewed   I have reviewed and interpreted all pertinent imaging results/findings within the past 24 hours.  TTE 12/6/22  The left ventricle is mildly enlarged with severely decreased systolic function.  Moderate left atrial enlargement.  The estimated ejection fraction is 25%.  Left ventricular diastolic  dysfunction.  Small circumferential pericardial effusion.  Moderate mitral regurgitation.  Moderate tricuspid regurgitation.  Mild right ventricular enlargement with mildly reduced right ventricular systolic function.  Moderate right atrial enlargement.  The quantitatively derived ejection fraction is 27%.  Irregularly irregular rhythm  The estimated PA systolic pressure is 47 mmHg.  Elevated central venous pressure (15 mmHg).    CT c/a/p 12/5/22  Questionable 8 mm left lower lobe pulmonary nodule series 6, image 314.  This courses along the left lower lobe pulmonary artery and pulmonary aneurysm not excluded.  Dedicated CT chest with contrast suggested.     Cardiomegaly.     Large right pleural effusion.     Moderate volume abdominopelvic ascites.     Nonobstructive left nephrolithiasis.       Assessment/Plan:     NYHA Score: NYHA IV: inability to carry on any physical activity without discomfort    HFrEF (heart failure with reduced ejection fraction)  CTS consulted for advance heart failure option. 53 yo M who presents as a transfer from [facility] for advanced options. PMHx NICM, ICD in situ, CKD III (cr 1.6-2 per records), tobacco use, HTN, and GERD. Reports history of CHF in his 40s (around 10 years prior). Questionable cocaine and THC use, recommend tox screen.  Echo reviewed, small LV. Dr. Ba to review and staff.         Thank you for your consult. Please contact us if you have any additional questions.    Mely Steward PA-C  Cardiothoracic Surgery  [facility] - Cardiology Stepdown      Patient seen and pertinent studies were reviewed.  CT scan does not preclude the patient from advanced options of heart transplantation.  However, would recommend repeating the 2D echo after adequate diuresis was obtained to assess for LV size and RV function.  Currently the LV size appears to be on smaller size for MCS therapies.

## 2022-12-06 NOTE — PLAN OF CARE
Patient free from falls and injuries throughout shift.  AAO X 4 and VSS. Afib controlled on tele. Continues on  and lasix gtt. SCDs ordered. Patient denies chest pain and SOB. No acute events overnight. Patient resting well at this time.  Plan of care discussed with patient.  Patient verbalizes understanding.  Will continue to monitor.

## 2022-12-06 NOTE — PROGRESS NOTES
[facility] - Cardiology Stepdown  Heart Transplant  Progress Note    Patient Name: Migel Garcia  MRN: [id number]  Admission Date: 12/5/2022  Hospital Length of Stay: 1 days  Attending Physician: Brando Parada MD  Primary Care Provider: Anuj Banks MD  Principal Problem:Acute on chronic combined systolic and diastolic heart failure    Subjective:     Interval History: Patient responding well to IV Lasix infusion at 20mg/hr.  He is net negative 3.8L in the last 24 hours.  CVP: 20, SVO2: 48, CO: 4.13, CI: 2.0, SVR: 1142.  Will continue Lasix infusion at current rate.  He was started on pathway.  CT of chest found possible pulmonary nodule.  Will plan for CT with contrast once patient is euvolemic and creatinine stable.  Echo done and EF: 25%, LVEDD: 5.33        Continuous Infusions:   DOBUTamine IV infusion (non-titrating) 5 mcg/kg/min (12/06/22 1220)    furosemide (LASIX) 10 mg/mL infusion (non-titrating) 20 mg/hr (12/06/22 1221)     Scheduled Meds:   amiodarone  200 mg Oral Daily    digoxin  0.125 mg Oral Daily    enoxaparin  40 mg Subcutaneous Daily    furosemide (LASIX) injection  80 mg Intravenous Once    mupirocin   Nasal BID    pantoprazole  40 mg Oral Daily    sacubitriL-valsartan  1 tablet Oral BID     PRN Meds:acetaminophen, sodium chloride 0.9%    Review of patient's allergies indicates:  No Known Allergies  Objective:     Vital Signs (Most Recent):  Temp: 98.2 °F (36.8 °C) (12/06/22 1212)  Pulse: 98 (12/06/22 1212)  Resp: 18 (12/06/22 1212)  BP: 106/63 (12/06/22 1212)  SpO2: 96 % (12/06/22 1212) Vital Signs (24h Range):  Temp:  [97.6 °F (36.4 °C)-98.3 °F (36.8 °C)] 98.2 °F (36.8 °C)  Pulse:  [80-98] 98  Resp:  [18-20] 18  SpO2:  [94 %-99 %] 96 %  BP: (100-116)/(56-66) 106/63     Patient Vitals for the past 72 hrs (Last 3 readings):   Weight   12/06/22 1002 90.7 kg (200 lb)   12/06/22 0746 90.8 kg (200 lb 2.8 oz)   12/05/22 0758 89.9 kg (198 lb 3.1 oz)     Body mass index is 32.28  kg/m².      Intake/Output Summary (Last 24 hours) at 12/6/2022 1355  Last data filed at 12/6/2022 1218  Gross per 24 hour   Intake 935 ml   Output 5230 ml   Net -4295 ml       Hemodynamic Parameters:  CVP:  [20 mmHg-23 mmHg] 20 mmHg    Telemetry: reviewed  Physical Exam  Vitals reviewed.   Constitutional:       Appearance: Normal appearance.   HENT:      Head: Normocephalic.      Nose: Nose normal.      Mouth/Throat:      Mouth: Mucous membranes are moist.   Neck:      Comments: JVD elevated to jawline  Cardiovascular:      Rate and Rhythm: Tachycardia present. Rhythm irregular.      Heart sounds: Murmur heard.     Gallop present.   Pulmonary:      Effort: Pulmonary effort is normal.   Abdominal:      General: Abdomen is flat. There is no distension.      Palpations: Abdomen is soft.   Musculoskeletal:         General: Swelling present. Normal range of motion.   Skin:     General: Skin is warm.      Capillary Refill: Capillary refill takes 2 to 3 seconds.   Neurological:      Mental Status: He is alert and oriented to person, place, and time.       Significant Labs:  CBC:  Recent Labs   Lab 12/05/22  0156 12/06/22 0523   WBC 8.11 5.89   RBC 4.17* 4.02*   HGB 11.5* 11.1*   HCT 38.3* 36.3*   * 154   MCV 92 90   MCH 27.6 27.6   MCHC 30.0* 30.6*     BNP:  No results for input(s): BNP in the last 168 hours.    Invalid input(s): BNPTRIAGELBLO  CMP:  Recent Labs   Lab 12/05/22  0156 12/05/22  1600 12/06/22 0523   GLU 86 142* 129*   CALCIUM 8.6* 8.8 9.0   ALBUMIN 4.1 3.8 3.8   PROT 7.1 6.7 6.7    137 140   K 4.6 4.2 4.1   CO2 31* 36* 34*   CL 99 95 94*   BUN 46* 44* 43*   CREATININE 1.7* 1.5* 1.4   ALKPHOS 196* 196* 225*   ALT 42 38 37   AST 31 24 30   BILITOT 5.4* 5.3* 4.8*      Coagulation:   Recent Labs   Lab 12/05/22 0156   INR 1.4*   APTT 38.2*     LDH:  No results for input(s): LDH in the last 72 hours.  Microbiology:  Microbiology Results (last 7 days)       ** No results found for the last 168 hours.  **            I have reviewed all pertinent labs within the past 24 hours.    Estimated Creatinine Clearance: 63.6 mL/min (based on SCr of 1.4 mg/dL).    Diagnostic Results:  I have reviewed all pertinent imaging results/findings within the past 24 hours.    Assessment and Plan:     No notes on file    * Acute on chronic combined systolic and diastolic heart failure  -NICM  -Transferred from [place] for ADHF and possible workup.  During hospital stay there; he was briefly on Levo and started on  and they were unable to wean  off.  Presented on  5.   -Last 2D Echo 12/6/22: LVEF: 25% , LVEDD:  5.33cm  -Hypervolemic on examination today  -Current diuretic regimen: Furosemide gtt at 20mg/hr and patient is net negative 3.8L in the last 24 hours. Previous home diuretic dose was Lasix 80mg BID  -GDMT with home dose of Digoxin, Entresto 24-26.  Was previously on Toprol 100mg QHS but holding in the setting of   -Patient is currently being evaluated for OHTx/VAD  -Heart failure pathway Step 1  -2g Na dietary restriction, 1500 mL fluid restriction, strict I/Os      Pulmonary nodule  -Questionable 8 mm left lower lobe pulmonary nodule series 6, image 314.  This courses along the left lower lobe pulmonary artery and pulmonary aneurysm not excluded.  Dedicated CT chest with contrast suggested.  -Will plan for CT with contrast once patient is euvolemic and creatinine remains stable.     CKD (chronic kidney disease), stage III  -Reported baseline 1.6-2.0  -Creatinine 1.4 today and improving with diuresis  -Will monitor trend  -avoid nephrotoxins     Atrial fibrillation  -[id number] score is 2  -On Eliquis at home for anitcoagulation  -Stopped on 12/5 in anticipation of workup and possible procedures  -On Lovenox for DVT prophylaxis  -IF Eliquis remains off longer than 5-7 days then consider bridging with heparin     HTN (hypertension)  -BP controlled on current regimen      GERD (gastroesophageal reflux  disease)  -Protonix 40mg Qdaily    Tobacco use  -counseled on cessation    ICD (implantable cardioverter-defibrillator), single, in situ  -Will find out the brand of device and order interrogation         JEFF Puentes  Heart Transplant  [facility] - Cardiology Stepdown

## 2022-12-06 NOTE — HPI
53 yo M who presents as a transfer from [facility] for advanced options. PMHx NICM, ICD in situ, CKD III (cr 1.6-2 per records), tobacco use, THC use, HTN, and GERD. Reports history of CHF in his 40s (around 10 years prior). C reportedly in 2011 negative. Multiple recent admission for ADHF. Was admitted 11/20 at [facility]. Placed on  and lasix with inability to wean  per records. LHC/RHC showed no obstructive CAD and RA 40, PA 72/38, PCWP 42, CI 1.4. He denies any history of ICD discharges (implanted 2018 for primary prevention). He currently smokes, but reports having cut back. Recreational THC use.  TTE EF 10-15% (no full reports with records sent). Cr 2.2 on 12/2.

## 2022-12-06 NOTE — ASSESSMENT & PLAN NOTE
-NICM  -Transferred from [place] for ADHF and possible workup.  During hospital stay there; he was briefly on Levo and started on  and they were unable to wean  off.  Presented on  5.   -Last 2D Echo 12/6/22: LVEF: 25% , LVEDD:  5.33cm  -Hypervolemic on examination today  -Current diuretic regimen: Furosemide gtt at 20mg/hr and patient is net negative 3.8L in the last 24 hours. Previous home diuretic dose was Lasix 80mg BID  -GDMT with home dose of Digoxin, Entresto 24-26.  Was previously on Toprol 100mg QHS but holding in the setting of   -Patient is currently being evaluated for OHTx/VAD  -Heart failure pathway Step 1  -2g Na dietary restriction, 1500 mL fluid restriction, strict I/Os

## 2022-12-06 NOTE — PLAN OF CARE
POC reviewed with patient and son. Education on fluid intake. Dobutamine and lasix gtt continue at same rate. CVP 23. Denies pain or SOB. Call light in reach.  Problem: Adult Inpatient Plan of Care  Goal: Plan of Care Review  Outcome: Ongoing, Progressing  Goal: Patient-Specific Goal (Individualized)  Outcome: Ongoing, Progressing  Goal: Absence of Hospital-Acquired Illness or Injury  Outcome: Ongoing, Progressing  Goal: Optimal Comfort and Wellbeing  Outcome: Ongoing, Progressing  Goal: Readiness for Transition of Care  Outcome: Ongoing, Progressing     Problem: Infection  Goal: Absence of Infection Signs and Symptoms  Outcome: Ongoing, Progressing     Problem: Adjustment to Illness (Heart Failure)  Goal: Optimal Coping  Outcome: Ongoing, Progressing     Problem: Cardiac Output Decreased (Heart Failure)  Goal: Optimal Cardiac Output  Outcome: Ongoing, Progressing     Problem: Dysrhythmia (Heart Failure)  Goal: Stable Heart Rate and Rhythm  Outcome: Ongoing, Progressing     Problem: Fluid Imbalance (Heart Failure)  Goal: Fluid Balance  Outcome: Ongoing, Progressing     Problem: Functional Ability Impaired (Heart Failure)  Goal: Optimal Functional Ability  Outcome: Ongoing, Progressing     Problem: Oral Intake Inadequate (Heart Failure)  Goal: Optimal Nutrition Intake  Outcome: Ongoing, Progressing     Problem: Respiratory Compromise (Heart Failure)  Goal: Effective Oxygenation and Ventilation  Outcome: Ongoing, Progressing     Problem: Sleep Disordered Breathing (Heart Failure)  Goal: Effective Breathing Pattern During Sleep  Outcome: Ongoing, Progressing     Problem: Fall Injury Risk  Goal: Absence of Fall and Fall-Related Injury  Outcome: Ongoing, Progressing     Problem: Cardiac Output Decreased  Goal: Effective Cardiac Output  Outcome: Ongoing, Progressing

## 2022-12-06 NOTE — PLAN OF CARE
Problem: Occupational Therapy  Goal: Occupational Therapy Goal  Outcome: Met   OT eval completed; no goals established as pt is performing ADL safely and without A. JUAN MIGUEL Lugo

## 2022-12-06 NOTE — ASSESSMENT & PLAN NOTE
CTS consulted for advance heart failure option. 51 yo M who presents as a transfer from [facility] for advanced options. PMHx NICM, ICD in situ, CKD III (cr 1.6-2 per records), tobacco use, HTN, and GERD. Reports history of CHF in his 40s (around 10 years prior). Questionable cocaine and THC use, recommend tox screen.  Echo reviewed, small LV. Dr. Ba to review and staff.

## 2022-12-06 NOTE — PLAN OF CARE
Recommendations  1. Continue cardiac diet-fluid per MD   2. If po intake declines <50% meal consumption, add Boost TID for optimization of protein and calorie intake   3. RD following     Goals: Meet % of EEN/EPN by RD f/u date  Nutrition Goal Status: goal met  Communication of RD Recs: other (POC)

## 2022-12-06 NOTE — SUBJECTIVE & OBJECTIVE
Interval History: Patient responding well to IV Lasix infusion at 20mg/hr.  He is net negative 3.8L in the last 24 hours.  CVP: 20, SVO2: 48, CO: 4.13, CI: 2.0, SVR: 1142.  Will continue Lasix infusion at current rate.  He was started on pathway.  CT of chest found possible pulmonary nodule.  Will plan for CT with contrast once patient is euvolemic and creatinine stable.  Echo done and EF: 25%, LVEDD: 5.33        Continuous Infusions:   DOBUTamine IV infusion (non-titrating) 5 mcg/kg/min (12/06/22 1220)    furosemide (LASIX) 10 mg/mL infusion (non-titrating) 20 mg/hr (12/06/22 1221)     Scheduled Meds:   amiodarone  200 mg Oral Daily    digoxin  0.125 mg Oral Daily    enoxaparin  40 mg Subcutaneous Daily    furosemide (LASIX) injection  80 mg Intravenous Once    mupirocin   Nasal BID    pantoprazole  40 mg Oral Daily    sacubitriL-valsartan  1 tablet Oral BID     PRN Meds:acetaminophen, sodium chloride 0.9%    Review of patient's allergies indicates:  No Known Allergies  Objective:     Vital Signs (Most Recent):  Temp: 98.2 °F (36.8 °C) (12/06/22 1212)  Pulse: 98 (12/06/22 1212)  Resp: 18 (12/06/22 1212)  BP: 106/63 (12/06/22 1212)  SpO2: 96 % (12/06/22 1212) Vital Signs (24h Range):  Temp:  [97.6 °F (36.4 °C)-98.3 °F (36.8 °C)] 98.2 °F (36.8 °C)  Pulse:  [80-98] 98  Resp:  [18-20] 18  SpO2:  [94 %-99 %] 96 %  BP: (100-116)/(56-66) 106/63     Patient Vitals for the past 72 hrs (Last 3 readings):   Weight   12/06/22 1002 90.7 kg (200 lb)   12/06/22 0746 90.8 kg (200 lb 2.8 oz)   12/05/22 0758 89.9 kg (198 lb 3.1 oz)     Body mass index is 32.28 kg/m².      Intake/Output Summary (Last 24 hours) at 12/6/2022 1355  Last data filed at 12/6/2022 1218  Gross per 24 hour   Intake 935 ml   Output 5230 ml   Net -4295 ml       Hemodynamic Parameters:  CVP:  [20 mmHg-23 mmHg] 20 mmHg    Telemetry: reviewed  Physical Exam  Vitals reviewed.   Constitutional:       Appearance: Normal appearance.   HENT:      Head: Normocephalic.       Nose: Nose normal.      Mouth/Throat:      Mouth: Mucous membranes are moist.   Neck:      Comments: JVD elevated to jawline  Cardiovascular:      Rate and Rhythm: Tachycardia present. Rhythm irregular.      Heart sounds: Murmur heard.     Gallop present.   Pulmonary:      Effort: Pulmonary effort is normal.   Abdominal:      General: Abdomen is flat. There is no distension.      Palpations: Abdomen is soft.   Musculoskeletal:         General: Swelling present. Normal range of motion.   Skin:     General: Skin is warm.      Capillary Refill: Capillary refill takes 2 to 3 seconds.   Neurological:      Mental Status: He is alert and oriented to person, place, and time.       Significant Labs:  CBC:  Recent Labs   Lab 12/05/22  0156 12/06/22 0523   WBC 8.11 5.89   RBC 4.17* 4.02*   HGB 11.5* 11.1*   HCT 38.3* 36.3*   * 154   MCV 92 90   MCH 27.6 27.6   MCHC 30.0* 30.6*     BNP:  No results for input(s): BNP in the last 168 hours.    Invalid input(s): BNPTRIAGELBLO  CMP:  Recent Labs   Lab 12/05/22  0156 12/05/22  1600 12/06/22 0523   GLU 86 142* 129*   CALCIUM 8.6* 8.8 9.0   ALBUMIN 4.1 3.8 3.8   PROT 7.1 6.7 6.7    137 140   K 4.6 4.2 4.1   CO2 31* 36* 34*   CL 99 95 94*   BUN 46* 44* 43*   CREATININE 1.7* 1.5* 1.4   ALKPHOS 196* 196* 225*   ALT 42 38 37   AST 31 24 30   BILITOT 5.4* 5.3* 4.8*      Coagulation:   Recent Labs   Lab 12/05/22 0156   INR 1.4*   APTT 38.2*     LDH:  No results for input(s): LDH in the last 72 hours.  Microbiology:  Microbiology Results (last 7 days)       ** No results found for the last 168 hours. **            I have reviewed all pertinent labs within the past 24 hours.    Estimated Creatinine Clearance: 63.6 mL/min (based on SCr of 1.4 mg/dL).    Diagnostic Results:  I have reviewed all pertinent imaging results/findings within the past 24 hours.

## 2022-12-06 NOTE — PROGRESS NOTES
"[facility] - Cardiology Stepdown  Adult Nutrition  Progress Note    SUMMARY       Recommendations  1. Continue cardiac diet-fluid per MD   2. If po intake declines <50% meal consumption, add Boost TID for optimization of protein and calorie intake   3. RD following    Goals: Meet % of EEN/EPN by RD f/u date  Nutrition Goal Status: goal met  Communication of RD Recs: other (POC)    Assessment and Plan    Nutrition Problem  Increased protein/energy needs    Related to (etiology):   Physiological needs     Signs and Symptoms (as evidenced by):   HF    Interventions(treatment strategy):  Collaboration of nutrition care w/ other providers    Nutrition Diagnosis Status:   New        Reason for Assessment    Reason For Assessment: RD follow-up  Diagnosis: cardiac disease  Relevant Medical History: HTN, heart failure  Interdisciplinary Rounds: attended  General Information Comments: RD requested in rounds to see pt. Spoke w/ pt caregiver at bedside, reports a good appetite currently and PTA. Pt consuming 75% meal consumption at this time. Pt w/ no issues chewing/swallowing. No n/v/d/c. UBW unknown at this time. NFPE completed, pt w/ no s/s of malnutrition at this time. LBM noted-12/4/22  Nutrition Discharge Planning: cardiac diet    Nutrition Risk Screen    Nutrition Risk Screen: no indicators present    Nutrition/Diet History    Spiritual, Cultural Beliefs, Congregation Practices, Values that Affect Care: no  Food Allergies: NKFA    Anthropometrics    Temp: 98.2 °F (36.8 °C)  Height: 5' 6" (167.6 cm)  Height (inches): 66 in  Weight Method: Bed Scale  Weight: 90.7 kg (200 lb)  Weight (lb): 200 lb  Ideal Body Weight (IBW), Male: 142 lb  % Ideal Body Weight, Male (lb): 140.85 %  BMI (Calculated): 32.3  BMI Grade: 30 - 34.9- obesity - grade I       Lab/Procedures/Meds    Pertinent Labs Reviewed: reviewed  Pertinent Labs Comments: GFR 59.7, Glucose 129  Pertinent Medications Reviewed: reviewed  Pertinent Medications Comments: " lasix    Estimated/Assessed Needs    Weight Used For Calorie Calculations: 90.7 kg (199 lb 15.3 oz)  Energy Calorie Requirements (kcal): 1857  Energy Need Method: [place]-St Jeor (PAL 1.1)  Protein Requirements: 109 g (1.2 g/kg)  Weight Used For Protein Calculations: 90.7 kg (199 lb 15.3 oz)        RDA Method (mL): 1857         Nutrition Prescription Ordered    Current Diet Order: Cardiac diet    Evaluation of Received Nutrient/Fluid Intake    I/O: -5.2 L since admit  Energy Calories Required: meeting needs  Protein Required: meeting needs  Fluid Required: meeting needs  Total Fluid Intake (mL/kg): 1 ml or fluid per MD  Tolerance: tolerating  % Intake of Estimated Energy Needs: 75 - 100 %  % Meal Intake: 75 - 100 %    Nutrition Risk    Level of Risk/Frequency of Follow-up: low ((1 x/week))     Monitor and Evaluation    Food and Nutrient Intake: energy intake, food and beverage intake  Food and Nutrient Adminstration: diet order  Knowledge/Beliefs/Attitudes: food and nutrition knowledge/skill, beliefs and attitudes  Physical Activity and Function: nutrition-related ADLs and IADLs, factors affecting access to physical activity  Anthropometric Measurements: height/length, weight, weight change, body mass index, growth pattern indices/percentile ranks  Biochemical Data, Medical Tests and Procedures: electrolyte and renal panel, gastrointestinal profile, glucose/endocrine profile, inflammatory profile, lipid profile  Nutrition-Focused Physical Findings: overall appearance, head and eyes, extremities, muscles and bones, skin     Nutrition Follow-Up    RD Follow-up?: Yes  By Margo Cross, Registration Eligible, Provisional LDN

## 2022-12-06 NOTE — PT/OT/SLP EVAL
Occupational Therapy   Co-Evaluation and Discharge Note    Name: Migel Garcia  MRN: [id number]  Admitting Diagnosis: Acute on chronic combined systolic and diastolic heart failure  Recent Surgery: * No surgery found *      Recommendations:     Discharge Recommendations: home  Discharge Equipment Recommendations: none  Barriers to discharge:  None    Assessment:     Migel Garcia is a 52 y.o. male with a medical diagnosis of Acute on chronic combined systolic and diastolic heart failure. At this time, patient is functioning at their prior level of function and does not require further acute OT services. Pt benefits from co-treatment with PT to accommodate pt's activity tolerance and progression with therapy.    Plan:     During this hospitalization, patient does not require further acute OT services.  Please re-consult if situation changes.    Plan of Care Reviewed with: patient, spouse    Subjective     Chief Complaint: denies  Patient/Family Comments/goals: to go home    Occupational Profile:  Patient lives with their spouse and son in single story home, no steps, walk in shower.   Pt utilizes No AD for ambulation of all distances.    Prior to admission, patient was independent with ADLs.   DME owned: cane, straight.   DME not currently used: n/a.   Upon discharge, patient will have assistance from family with 24/7 assist.     Pain/Comfort:  Pain Rating 1: 0/10  Pain Rating Post-Intervention 1: 0/10    Patients cultural, spiritual, Congregation conflicts given the current situation: no    Objective:     Communicated with: RN prior to session.  Patient found sitting edge of bed with PICC line, telemetry upon OT entry to room.    General Precautions: Standard, fall  Orthopedic Precautions:    Braces:    Respiratory Status: Room air     Occupational Performance:    Bed Mobility:    NT    Functional Mobility/Transfers:  Patient completed Sit <> Stand Transfer with independence  with  no assistive device from EOB and  toilet  Functional Mobility: Supervision    Activities of Daily Living:  Feeding:  independence    Grooming: independence    Upper Body Dressing: independence    Lower Body Dressing: independence    Toileting: independence      Cognitive/Visual Perceptual:  Oriented to: Person, Place, Time and Situation  Follows Commands/attention: Follows multistep  commands  Communication: clear/fluent  Memory:  No Deficits noted  Safety awareness/insight to disability: intact  Coping skills/emotional control: Appropriate to situation    Physical Exam:  Postural examination/scapula alignment:    -       No postural abnormalities identified  Sensation:    -       Intact  Upper Extremity Range of Motion:     -       Right Upper Extremity: WFL  -       Left Upper Extremity: WFL  Upper Extremity Strength:    -       Right Upper Extremity: WFL  -       Left Upper Extremity: WFL   Strength:    -       Right Upper Extremity: WFL  -       Left Upper Extremity: WFL  Fine Motor Coordination:    -       Intact  Gross motor coordination:   WFL      AMPAC 6 Click ADL:  AMPAC Total Score: 24    Treatment & Education:  Pt ed on OT POC  Pt ed on continued ADL and mobility  Pt ed on potential sternal precautions during ADL    Patient left up in chair with all lines intact, call button in reach, RN notified, and family present    GOALS:   Multidisciplinary Problems       Occupational Therapy Goals       Not on file              Multidisciplinary Problems (Resolved)          Problem: Occupational Therapy    Goal Priority Disciplines Outcome Interventions   Occupational Therapy Goal   (Resolved)     OT, PT/OT Met                        History:     Past Medical History:   Diagnosis Date    A-fib     GERD (gastroesophageal reflux disease)     Heart failure, unspecified     Hypertension     V-tach        No past surgical history on file.    Time Tracking:     OT Date of Treatment: 12/06/22  OT Start Time: 0806  OT Stop Time: 0826  OT Total Time  (min): 20 min    Billable Minutes:Evaluation 10  Self Care/Home Management 10    12/6/2022

## 2022-12-06 NOTE — PT/OT/SLP EVAL
Physical Therapy  Evaluation and Co-Treatment    Patient Name:  Migel Garcia   MRN:  [id number]    Recommendations:     Discharge Recommendations:  home   Discharge Equipment Recommendations: none   Barriers to discharge: decreased functional mobility and fall risk    Assessment:     Migel Garcia is a 52 y.o. male admitted with a medical diagnosis of Acute on chronic combined systolic and diastolic heart failure.  Pt demonstrates the below listed impairments with decreased tolerance to functional mobility and impaired endurance being the most limiting.  Pt demonstrates fair tolerance to out of bed mobility and is willing to ambulate in the hallway. Pt willing to practice sternal precautions with 100% compliance noted.  Pt requires skilled PT due to patient's status as: a fall risk to allow safe d/c home.     Impairments and functional limitations:  impaired endurance, impaired cardiopulmonary response to activity, impaired functional mobility.  These deficits affect their roles and responsibilities in which they were able to complete prior to admit.  Rehab Prognosis:   Good ; patient would benefit from acute skilled PT services 2 x/week to address these deficits, improve quality of life, focus on recovery of impairments, provide patient/caregiver education, reduce fall risk, and reach maximum level of function.  Pt is highly  motivated to participated in skilled PT.    Recent Surgery:   * No surgery found *      Plan:     During this hospitalization, patient to be seen 2 x/week to address the identified rehab impairments via gait training, therapeutic activities, therapeutic exercises, neuromuscular re-education and progress toward the following goals:    Plan of Care Expires:  01/05/23    Subjective     Chief Complaint: decreased tolerance to functional mobility  Patient/Family Comments/Goals: Return home  Pain/Comfort:  Pain Rating 1: 0/10    Patients cultural, spiritual, Holiness conflicts given the current  situation: no    Living Environment:  Patient lives with their spouse and son in single story home, no steps, walk in shower.   Pt utilizes No AD for ambulation of all distances.    Prior to admission, patient was independent with ADLs.   DME owned: cane, straight.   DME not currently used: n/a.   Upon discharge, patient will have assistance from family with 24/7 assist.     Objective:     Communicated with nursing prior to session.  Patient found sitting EOB with telemetry, PICC line  upon PT entry to room.    General Precautions: Standard, fall   Orthopedic Precautions:N/A   Braces: N/A   Oxygen Device:      Exams:  Cognitive Exam:  Patient is oriented to Person, Place, Time, and Situation  Command following: Patient follows 100% of commands   RLE ROM: WFL  RLE Strength: WFL  LLE ROM: WFL  LLE Strength: WFL  Postural Exam:  Patient presented with the following abnormalities:    -       Rounded shoulders  Sensation:    -       Intact    Functional Mobility:  Bed Mobility:  Scooting: IND  Head of bed position: HOB elevated    Transfers:  Sit to Stand: IND with No AD    Gait: Patient ambulated 160' with No AD and Sup. Patient demonstrates decreased step length and decreased bill. All lines remained intact throughout ambulation trial, mask donned for out of room ambulation.  Some SOB noted     Balance:   Position Score Time   Static Sitting GOOD: Takes MODERATE challenges n/a   Dynamic Sitting GOOD-: Maintains balance through MODERATE excursions of active trunk motion, but inconstantly  n/a   Static Standing FAIR+: Takes MINIMAL challenges n/a   Dynamic Standing FAIR+: Maintains balance through MINIMAL excursions of active trunk motion n/a       Therapeutic Activities:  Patient educated on role of acute care PT and PT POC, safety while in hospital including calling nurse for mobility, call light usage, benefits of out of bed mobility, breathing technique, fall risk, bed mobility , transfers, gait technique,  positioning, posture, risks of prolonged bed rest, possible discharge disposition , sternal precautions, and benefits of continued PT by explanation and demonstration.    Patient demonstrates good understanding of education provided this day.   Whiteboard updated    Therapeutic Exercises:  n/a    AM-PAC 6 CLICK MOBILITY  Total Score:22     Patient left with transport with all lines intact, RN notified, and family present.    GOALS:   Multidisciplinary Problems       Physical Therapy Goals          Problem: Physical Therapy    Goal Priority Disciplines Outcome Goal Variances Interventions   Physical Therapy Goal     PT, PT/OT Ongoing, Progressing     Description: Goals to be met by: 22    Patient will increase functional independence with mobility by performin. Gait  x 300 feet with independence using LRAD as needed                         History:     Past Medical History:   Diagnosis Date    A-fib     GERD (gastroesophageal reflux disease)     Heart failure, unspecified     Hypertension     V-tach        No past surgical history on file.    Time Tracking:     PT Received On: 22  PT Start Time: 808     PT Stop Time: 826  PT Total Time (min): 18 min     Billable Minutes: Evaluation 10 and Gait Training 8    2022    Co-treatment performed for this visit due to patient need for two skilled therapists to ensure patient and staff safety, to accommodate for patient activity tolerance/pain management, and maximize functional potential.

## 2022-12-06 NOTE — TELEPHONE ENCOUNTER
Requested financial auth to proceed with LVAD evaluation.Once admission is approved, we will work on getting auth for evaluation.      12/7/2022:  Patient financially cleared for inpatient and o/p heart transplant/vad eval.

## 2022-12-06 NOTE — PLAN OF CARE
Problem: Physical Therapy  Goal: Physical Therapy Goal  Description: Goals to be met by: 22    Patient will increase functional independence with mobility by performin. Gait  x 300 feet with independence using LRAD as needed    Outcome: Ongoing, Progressing     Pt tolerated PT session well.      All needs met, all questions answered.

## 2022-12-06 NOTE — PROGRESS NOTES
At the request of Dr. Rasmussen, I have been asked to meet patient and provide VAD education. Introduced self and reason for visit. Pt and son AAAO.  Provided phase 1 written VAD education. Included in Phase 1 folder is the following:     Evaluation Eval for [facility]  [email or web address] Flyer  [employer] Text Message Education Videos  Pictures of examples of VADs     Explained the work up process including possible outcomes.     Explained to look over the entire contents and read Evaluation Eval for [facility] acknowledgement form. Encouraged patient to partake in [employer] Text Message Education Videos. Also explained that they should bring this folder with them to all clinic visits and if they are admitted to the hospital so that we can continue education as needed. Should there be any questions, please write them down and bring with you or feel free to call and we can talk on the phone. All questions answered to patients satisfaction as evidence by verbal acknowledgement.

## 2022-12-06 NOTE — ASSESSMENT & PLAN NOTE
-Questionable 8 mm left lower lobe pulmonary nodule series 6, image 314.  This courses along the left lower lobe pulmonary artery and pulmonary aneurysm not excluded.  Dedicated CT chest with contrast suggested.  -Will plan for CT with contrast once patient is euvolemic and creatinine remains stable.

## 2022-12-06 NOTE — PLAN OF CARE
Patient resting comfortably in room with wife. Patient appears in no acute distress and has no complaints at this time. Vital signs stable. Safety measures in place and call bell within reach.     Problem: Adult Inpatient Plan of Care  Goal: Plan of Care Review  Outcome: Ongoing, Progressing  Goal: Patient-Specific Goal (Individualized)  Outcome: Ongoing, Progressing  Goal: Absence of Hospital-Acquired Illness or Injury  Outcome: Ongoing, Progressing  Goal: Optimal Comfort and Wellbeing  Outcome: Ongoing, Progressing  Goal: Readiness for Transition of Care  Outcome: Ongoing, Progressing     Problem: Infection  Goal: Absence of Infection Signs and Symptoms  Outcome: Ongoing, Progressing     Problem: Adjustment to Illness (Heart Failure)  Goal: Optimal Coping  Outcome: Ongoing, Progressing     Problem: Cardiac Output Decreased (Heart Failure)  Goal: Optimal Cardiac Output  Outcome: Ongoing, Progressing     Problem: Dysrhythmia (Heart Failure)  Goal: Stable Heart Rate and Rhythm  Outcome: Ongoing, Progressing     Problem: Fluid Imbalance (Heart Failure)  Goal: Fluid Balance  Outcome: Ongoing, Progressing     Problem: Functional Ability Impaired (Heart Failure)  Goal: Optimal Functional Ability  Outcome: Ongoing, Progressing     Problem: Oral Intake Inadequate (Heart Failure)  Goal: Optimal Nutrition Intake  Outcome: Ongoing, Progressing     Problem: Respiratory Compromise (Heart Failure)  Goal: Effective Oxygenation and Ventilation  Outcome: Ongoing, Progressing     Problem: Sleep Disordered Breathing (Heart Failure)  Goal: Effective Breathing Pattern During Sleep  Outcome: Ongoing, Progressing     Problem: Fall Injury Risk  Goal: Absence of Fall and Fall-Related Injury  Outcome: Ongoing, Progressing     Problem: Cardiac Output Decreased  Goal: Effective Cardiac Output  Outcome: Ongoing, Progressing

## 2022-12-06 NOTE — ASSESSMENT & PLAN NOTE
-Reported baseline 1.6-2.0  -Creatinine 1.4 today and improving with diuresis  -Will monitor trend  -avoid nephrotoxins

## 2022-12-06 NOTE — SUBJECTIVE & OBJECTIVE
No current facility-administered medications on file prior to encounter.     No current outpatient medications on file prior to encounter.       Review of patient's allergies indicates:  No Known Allergies    Past Medical History:   Diagnosis Date    A-fib     GERD (gastroesophageal reflux disease)     Heart failure, unspecified     Hypertension     V-tach      No past surgical history on file.  Family History    None       Tobacco Use    Smoking status: Not on file    Smokeless tobacco: Not on file   Substance and Sexual Activity    Alcohol use: Not Currently    Drug use: Not on file    Sexual activity: Not on file     Review of Systems   Constitutional:  Negative for activity change, appetite change, fatigue and fever.   HENT:  Negative for nosebleeds.    Respiratory:  Negative for cough and shortness of breath.    Cardiovascular:  Positive for palpitations and leg swelling. Negative for chest pain.   Gastrointestinal:  Negative for abdominal distention, abdominal pain and nausea.   Genitourinary:  Negative for frequency.   Musculoskeletal:  Negative for arthralgias and myalgias.   Skin:  Negative for rash.   Neurological:  Negative for dizziness, seizures and numbness.   Hematological:  Does not bruise/bleed easily.   Objective:     Vital Signs (Most Recent):  Temp: 97.8 °F (36.6 °C) (12/06/22 0746)  Pulse: 90 (12/06/22 0746)  Resp: 20 (12/06/22 0746)  BP: 102/66 (12/06/22 0746)  SpO2: 99 % (12/06/22 0746) Vital Signs (24h Range):  Temp:  [97.6 °F (36.4 °C)-98.3 °F (36.8 °C)] 97.8 °F (36.6 °C)  Pulse:  [] 90  Resp:  [18-20] 20  SpO2:  [94 %-99 %] 99 %  BP: (100-118)/(56-77) 102/66     Weight: 90.7 kg (200 lb)  Body mass index is 32.28 kg/m².    SpO2: 99 %  O2 Device (Oxygen Therapy): nasal cannula     Intake/Output - Last 3 Shifts         12/04 0700 12/05 0659 12/05 0700 12/06 0659 12/06 0700 12/07 0659    P.O.  1147 118    Total Intake(mL/kg)  1147 (12.8) 118 (1.3)    Urine (mL/kg/hr) 1400 4975 (2.3)  600 (1.7)    Total Output 1400 3819 600    Net -1400 -3828 -482                    Lines/Drains/Airways       Peripherally Inserted Central Catheter Line  Duration             PICC Triple Lumen right basilic -- days                         Physical Exam  Vitals and nursing note reviewed.   Constitutional:       General: He is not in acute distress.  HENT:      Head: Normocephalic and atraumatic.   Eyes:      Pupils: Pupils are equal, round, and reactive to light.   Neck:      Vascular: JVD present.   Cardiovascular:      Rate and Rhythm: Normal rate.   Pulmonary:      Effort: Pulmonary effort is normal.   Abdominal:      General: Abdomen is flat.   Musculoskeletal:         General: Normal range of motion.      Cervical back: Normal range of motion.      Right lower leg: Edema present.      Left lower leg: Edema present.   Skin:     General: Skin is warm.      Coloration: Skin is not pale.   Neurological:      General: No focal deficit present.      Mental Status: He is alert.       Significant Labs:  ABGs:   Recent Labs   Lab 12/06/22  1044   PH 7.425   PCO2 64.8*   PO2 27*   HCO3 42.5*   POCSATURATED 48*   BE 18     Amylase: No results for input(s): AMYLASE in the last 48 hours.  BMP:   Recent Labs   Lab 12/05/22  0156 12/05/22  1600 12/06/22  0523   GLU 86   < > 129*      < > 140   K 4.6   < > 4.1   CL 99   < > 94*   CO2 31*   < > 34*   BUN 46*   < > 43*   CREATININE 1.7*   < > 1.4   CALCIUM 8.6*   < > 9.0   MG 2.0  --   --     < > = values in this interval not displayed.     Cardiac markers: No results for input(s): CKMB, CPKMB, TROPONINT, TROPONINI, MYOGLOBIN in the last 48 hours.  CBC:   Recent Labs   Lab 12/06/22  0523   WBC 5.89   RBC 4.02*   HGB 11.1*   HCT 36.3*      MCV 90   MCH 27.6   MCHC 30.6*     CMP:   Recent Labs   Lab 12/06/22  0523   *   CALCIUM 9.0   ALBUMIN 3.8   PROT 6.7      K 4.1   CO2 34*   CL 94*   BUN 43*   CREATININE 1.4   ALKPHOS 225*   ALT 37   AST 30   BILITOT  4.8*     Coagulation:   Recent Labs   Lab 12/05/22  0156   INR 1.4*   APTT 38.2*     Lactic Acid:   Recent Labs   Lab 12/05/22  0156   LACTATE 0.9     LFTs:   Recent Labs   Lab 12/06/22  0523   ALT 37   AST 30   ALKPHOS 225*   BILITOT 4.8*   PROT 6.7   ALBUMIN 3.8     Lipase: No results for input(s): LIPASE in the last 48 hours.    Significant Diagnostics: reviewed   I have reviewed and interpreted all pertinent imaging results/findings within the past 24 hours.  TTE 12/6/22  The left ventricle is mildly enlarged with severely decreased systolic function.  Moderate left atrial enlargement.  The estimated ejection fraction is 25%.  Left ventricular diastolic dysfunction.  Small circumferential pericardial effusion.  Moderate mitral regurgitation.  Moderate tricuspid regurgitation.  Mild right ventricular enlargement with mildly reduced right ventricular systolic function.  Moderate right atrial enlargement.  The quantitatively derived ejection fraction is 27%.  Irregularly irregular rhythm  The estimated PA systolic pressure is 47 mmHg.  Elevated central venous pressure (15 mmHg).    CT c/a/p 12/5/22  Questionable 8 mm left lower lobe pulmonary nodule series 6, image 314.  This courses along the left lower lobe pulmonary artery and pulmonary aneurysm not excluded.  Dedicated CT chest with contrast suggested.     Cardiomegaly.     Large right pleural effusion.     Moderate volume abdominopelvic ascites.     Nonobstructive left nephrolithiasis.

## 2022-12-06 NOTE — ASSESSMENT & PLAN NOTE
-[id number] score is 2  -On Eliquis at home for anitcoagulation  -Stopped on 12/5 in anticipation of workup and possible procedures  -On Lovenox for DVT prophylaxis  -IF Eliquis remains off longer than 5-7 days then consider bridging with heparin

## 2022-12-06 NOTE — PROGRESS NOTES
Interdisciplinary Rounds Report:   Attended interdisciplinary rounds with the [facility]/CTS services including the LVAD Coordinators, social workers, cardiologists, surgeons,  PT/OT/Speech, dietician, and unit charge nurses. Discussed patient status, plan of care, goals of care, including DC date, and post discharge needs. Plan of care will be discussed with the patient and/or family per the physician while rounding on the floor. This is a recurring meeting that is medically and socially necessary to collaborate with the interdisciplinary team to assist patient needs and safe discharge.

## 2022-12-07 ENCOUNTER — EDUCATION (OUTPATIENT)
Dept: TRANSPLANT | Facility: CLINIC | Age: 52
End: 2022-12-07
Payer: MEDICARE

## 2022-12-07 ENCOUNTER — TELEPHONE (OUTPATIENT)
Dept: TRANSPLANT | Facility: CLINIC | Age: 52
End: 2022-12-07
Payer: MEDICARE

## 2022-12-07 ENCOUNTER — TELEPHONE (OUTPATIENT)
Dept: ADMINISTRATIVE | Facility: HOSPITAL | Age: 52
End: 2022-12-07
Payer: MEDICARE

## 2022-12-07 LAB
ALBUMIN SERPL BCP-MCNC: 3.9 G/DL (ref 3.5–5.2)
ALLENS TEST: ABNORMAL
ALP SERPL-CCNC: 213 U/L (ref 55–135)
ALT SERPL W/O P-5'-P-CCNC: 35 U/L (ref 10–44)
ANION GAP SERPL CALC-SCNC: 9 MMOL/L (ref 8–16)
AST SERPL-CCNC: 28 U/L (ref 10–40)
BASOPHILS # BLD AUTO: 0.06 K/UL (ref 0–0.2)
BASOPHILS NFR BLD: 1 % (ref 0–1.9)
BILIRUB DIRECT SERPL-MCNC: 3 MG/DL (ref 0.1–0.3)
BILIRUB SERPL-MCNC: 5.2 MG/DL (ref 0.1–1)
BNP SERPL-MCNC: 1889 PG/ML (ref 0–99)
BUN SERPL-MCNC: 41 MG/DL (ref 6–20)
CALCIUM SERPL-MCNC: 9.7 MG/DL (ref 8.7–10.5)
CHLORIDE SERPL-SCNC: 90 MMOL/L (ref 95–110)
CO2 SERPL-SCNC: 39 MMOL/L (ref 23–29)
CREAT SERPL-MCNC: 1.5 MG/DL (ref 0.5–1.4)
DELSYS: ABNORMAL
DIFFERENTIAL METHOD: ABNORMAL
EOSINOPHIL # BLD AUTO: 0.2 K/UL (ref 0–0.5)
EOSINOPHIL NFR BLD: 2.7 % (ref 0–8)
ERYTHROCYTE [DISTWIDTH] IN BLOOD BY AUTOMATED COUNT: 20.9 % (ref 11.5–14.5)
EST. GFR  (NO RACE VARIABLE): 55 ML/MIN/1.73 M^2
GLUCOSE SERPL-MCNC: 139 MG/DL (ref 70–110)
HCO3 UR-SCNC: 43.4 MMOL/L (ref 24–28)
HCT VFR BLD AUTO: 38.5 % (ref 40–54)
HGB BLD-MCNC: 11.6 G/DL (ref 14–18)
IMM GRANULOCYTES # BLD AUTO: 0.05 K/UL (ref 0–0.04)
IMM GRANULOCYTES NFR BLD AUTO: 0.8 % (ref 0–0.5)
LYMPHOCYTES # BLD AUTO: 0.8 K/UL (ref 1–4.8)
LYMPHOCYTES NFR BLD: 12.5 % (ref 18–48)
MAGNESIUM SERPL-MCNC: 2 MG/DL (ref 1.6–2.6)
MCH RBC QN AUTO: 27 PG (ref 27–31)
MCHC RBC AUTO-ENTMCNC: 30.1 G/DL (ref 32–36)
MCV RBC AUTO: 90 FL (ref 82–98)
MODE: ABNORMAL
MONOCYTES # BLD AUTO: 0.4 K/UL (ref 0.3–1)
MONOCYTES NFR BLD: 7 % (ref 4–15)
NEUTROPHILS # BLD AUTO: 4.8 K/UL (ref 1.8–7.7)
NEUTROPHILS NFR BLD: 76 % (ref 38–73)
NRBC BLD-RTO: 0 /100 WBC
PCO2 BLDA: 60.5 MMHG (ref 35–45)
PH SMN: 7.46 [PH] (ref 7.35–7.45)
PLATELET # BLD AUTO: 186 K/UL (ref 150–450)
PMV BLD AUTO: 10.3 FL (ref 9.2–12.9)
PO2 BLDA: 29 MMHG (ref 40–60)
POC BE: 20 MMOL/L
POC SATURATED O2: 56 % (ref 95–100)
POC TCO2: 45 MMOL/L (ref 24–29)
POTASSIUM SERPL-SCNC: 3.8 MMOL/L (ref 3.5–5.1)
PROT SERPL-MCNC: 7.1 G/DL (ref 6–8.4)
RBC # BLD AUTO: 4.3 M/UL (ref 4.6–6.2)
SAMPLE: ABNORMAL
SITE: ABNORMAL
SODIUM SERPL-SCNC: 138 MMOL/L (ref 136–145)
WBC # BLD AUTO: 6.26 K/UL (ref 3.9–12.7)

## 2022-12-07 PROCEDURE — 82803 BLOOD GASES ANY COMBINATION: CPT

## 2022-12-07 PROCEDURE — 63600175 PHARM REV CODE 636 W HCPCS: Performed by: INTERNAL MEDICINE

## 2022-12-07 PROCEDURE — 99233 PR SUBSEQUENT HOSPITAL CARE,LEVL III: ICD-10-PCS | Mod: ,,, | Performed by: PHYSICIAN ASSISTANT

## 2022-12-07 PROCEDURE — 25000003 PHARM REV CODE 250: Performed by: INTERNAL MEDICINE

## 2022-12-07 PROCEDURE — 85025 COMPLETE CBC W/AUTO DIFF WBC: CPT | Performed by: PHYSICIAN ASSISTANT

## 2022-12-07 PROCEDURE — 80048 BASIC METABOLIC PNL TOTAL CA: CPT | Mod: 91 | Performed by: STUDENT IN AN ORGANIZED HEALTH CARE EDUCATION/TRAINING PROGRAM

## 2022-12-07 PROCEDURE — 99900035 HC TECH TIME PER 15 MIN (STAT)

## 2022-12-07 PROCEDURE — 83880 ASSAY OF NATRIURETIC PEPTIDE: CPT | Performed by: PHYSICIAN ASSISTANT

## 2022-12-07 PROCEDURE — 99233 SBSQ HOSP IP/OBS HIGH 50: CPT | Mod: ,,, | Performed by: PHYSICIAN ASSISTANT

## 2022-12-07 PROCEDURE — 20600001 HC STEP DOWN PRIVATE ROOM

## 2022-12-07 PROCEDURE — 80076 HEPATIC FUNCTION PANEL: CPT | Performed by: PHYSICIAN ASSISTANT

## 2022-12-07 PROCEDURE — 25000003 PHARM REV CODE 250: Performed by: STUDENT IN AN ORGANIZED HEALTH CARE EDUCATION/TRAINING PROGRAM

## 2022-12-07 PROCEDURE — 83735 ASSAY OF MAGNESIUM: CPT | Mod: 91 | Performed by: STUDENT IN AN ORGANIZED HEALTH CARE EDUCATION/TRAINING PROGRAM

## 2022-12-07 PROCEDURE — 83735 ASSAY OF MAGNESIUM: CPT | Performed by: PHYSICIAN ASSISTANT

## 2022-12-07 PROCEDURE — 99233 PR SUBSEQUENT HOSPITAL CARE,LEVL III: ICD-10-PCS | Mod: ,,, | Performed by: INTERNAL MEDICINE

## 2022-12-07 PROCEDURE — 99233 SBSQ HOSP IP/OBS HIGH 50: CPT | Mod: ,,, | Performed by: INTERNAL MEDICINE

## 2022-12-07 PROCEDURE — 80048 BASIC METABOLIC PNL TOTAL CA: CPT | Performed by: PHYSICIAN ASSISTANT

## 2022-12-07 PROCEDURE — 63600175 PHARM REV CODE 636 W HCPCS: Performed by: PHYSICIAN ASSISTANT

## 2022-12-07 RX ORDER — POTASSIUM CHLORIDE 20 MEQ/1
40 TABLET, EXTENDED RELEASE ORAL ONCE
Status: COMPLETED | OUTPATIENT
Start: 2022-12-07 | End: 2022-12-07

## 2022-12-07 RX ADMIN — SACUBITRIL AND VALSARTAN 1 TABLET: 24; 26 TABLET, FILM COATED ORAL at 08:12

## 2022-12-07 RX ADMIN — ENOXAPARIN SODIUM 40 MG: 40 INJECTION SUBCUTANEOUS at 05:12

## 2022-12-07 RX ADMIN — FUROSEMIDE 20 MG/HR: 10 INJECTION, SOLUTION INTRAMUSCULAR; INTRAVENOUS at 10:12

## 2022-12-07 RX ADMIN — POTASSIUM CHLORIDE 40 MEQ: 1500 TABLET, EXTENDED RELEASE ORAL at 08:12

## 2022-12-07 RX ADMIN — SACUBITRIL AND VALSARTAN 1 TABLET: 24; 26 TABLET, FILM COATED ORAL at 09:12

## 2022-12-07 RX ADMIN — AMIODARONE HYDROCHLORIDE 200 MG: 200 TABLET ORAL at 09:12

## 2022-12-07 RX ADMIN — PANTOPRAZOLE SODIUM 40 MG: 40 TABLET, DELAYED RELEASE ORAL at 09:12

## 2022-12-07 RX ADMIN — MUPIROCIN: 20 OINTMENT TOPICAL at 09:12

## 2022-12-07 RX ADMIN — DOBUTAMINE HYDROCHLORIDE 5 MCG/KG/MIN: 400 INJECTION INTRAVENOUS at 10:12

## 2022-12-07 RX ADMIN — DIGOXIN 0.12 MG: 125 TABLET ORAL at 09:12

## 2022-12-07 NOTE — ASSESSMENT & PLAN NOTE
-NICM  -Transferred from [place] for ADHF and possible workup.  During hospital stay there; he was briefly on Levo and started on  and they were unable to wean  off.  Presented on  5.   -Last 2D Echo 12/6/22: LVEF: 25% , LVEDD:  5.33cm  -Hypervolemic on examination today  -Current diuretic regimen: Furosemide gtt at 20mg/hr and patient is net negative 4.3L in the last 24 hours. Previous home diuretic dose was Lasix 80mg BID  -GDMT with home dose of Digoxin, Entresto 24-26.  Was previously on Toprol 100mg QHS but holding in the setting of   -Patient is currently being evaluated for OHTx/VAD  -Heart failure pathway Step 2  -2g Na dietary restriction, 1500 mL fluid restriction, strict I/Os

## 2022-12-07 NOTE — PROGRESS NOTES
Update    Pt presents as AAO x4, calm, cooperative, and asking and answering questions appropriately, caregivers not present. Pt states he is doing well. Pt states that his wife and son can be here for caregiver education on Monday or Tuesday of next week. Pt had no additional questions or concerns during visit at this time. LCSW offered support and encouragement. SW providing ongoing psychosocial, counseling, & emotional support, education, resources, assistance, and discharge planning as indicated.  SW to continue to follow.

## 2022-12-07 NOTE — PLAN OF CARE
Problem: Adult Inpatient Plan of Care  Goal: Plan of Care Review  Outcome: Ongoing, Progressing  Goal: Patient-Specific Goal (Individualized)  Outcome: Ongoing, Progressing  Goal: Absence of Hospital-Acquired Illness or Injury  Outcome: Ongoing, Progressing  Goal: Optimal Comfort and Wellbeing  Outcome: Ongoing, Progressing  Goal: Readiness for Transition of Care  Outcome: Ongoing, Progressing     Problem: Infection  Goal: Absence of Infection Signs and Symptoms  Outcome: Ongoing, Progressing     Problem: Adjustment to Illness (Heart Failure)  Goal: Optimal Coping  Outcome: Ongoing, Progressing     Problem: Cardiac Output Decreased (Heart Failure)  Goal: Optimal Cardiac Output  Outcome: Ongoing, Progressing     Problem: Dysrhythmia (Heart Failure)  Goal: Stable Heart Rate and Rhythm  Outcome: Ongoing, Progressing     Problem: Fluid Imbalance (Heart Failure)  Goal: Fluid Balance  Outcome: Ongoing, Progressing     Problem: Functional Ability Impaired (Heart Failure)  Goal: Optimal Functional Ability  Outcome: Ongoing, Progressing     Problem: Oral Intake Inadequate (Heart Failure)  Goal: Optimal Nutrition Intake  Outcome: Ongoing, Progressing     Problem: Respiratory Compromise (Heart Failure)  Goal: Effective Oxygenation and Ventilation  Outcome: Ongoing, Progressing     Problem: Sleep Disordered Breathing (Heart Failure)  Goal: Effective Breathing Pattern During Sleep  Outcome: Ongoing, Progressing     Problem: Fall Injury Risk  Goal: Absence of Fall and Fall-Related Injury  Outcome: Ongoing, Progressing     Problem: Cardiac Output Decreased  Goal: Effective Cardiac Output  Outcome: Ongoing, Progressing

## 2022-12-07 NOTE — SUBJECTIVE & OBJECTIVE
Interval History: Diuresing well on lasix gtt at 20mg/hr. Net negative 4.3L/24h. Still has a ways to go volume wise, will continue diuresis. SVO2 56, CO 4.6, CI 2.3, SVR 1056 on  5mcg/kg/min. On step 2 of pathway.      Continuous Infusions:   DOBUTamine IV infusion (non-titrating) 5 mcg/kg/min (12/07/22 1017)    furosemide (LASIX) 10 mg/mL infusion (non-titrating) 20 mg/hr (12/07/22 1016)     Scheduled Meds:   amiodarone  200 mg Oral Daily    digoxin  0.125 mg Oral Daily    enoxaparin  40 mg Subcutaneous Daily    mupirocin   Nasal BID    pantoprazole  40 mg Oral Daily    sacubitriL-valsartan  1 tablet Oral BID     PRN Meds:acetaminophen, sodium chloride 0.9%    Review of patient's allergies indicates:  No Known Allergies  Objective:     Vital Signs (Most Recent):  Temp: 97.5 °F (36.4 °C) (12/07/22 1139)  Pulse: 88 (12/07/22 1139)  Resp: 18 (12/07/22 1139)  BP: (!) 104/56 (12/07/22 1139)  SpO2: 98 % (12/07/22 1139) Vital Signs (24h Range):  Temp:  [97.5 °F (36.4 °C)-98.4 °F (36.9 °C)] 97.5 °F (36.4 °C)  Pulse:  [] 88  Resp:  [17-18] 18  SpO2:  [92 %-99 %] 98 %  BP: (102-123)/(56-85) 104/56     Patient Vitals for the past 72 hrs (Last 3 readings):   Weight   12/07/22 0550 83.1 kg (183 lb 3.2 oz)   12/06/22 1002 90.7 kg (200 lb)   12/06/22 0746 90.8 kg (200 lb 2.8 oz)       Body mass index is 29.57 kg/m².      Intake/Output Summary (Last 24 hours) at 12/7/2022 1440  Last data filed at 12/7/2022 1351  Gross per 24 hour   Intake 600 ml   Output 6750 ml   Net -6150 ml         Hemodynamic Parameters:  CVP:  [20 mmHg-21 mmHg] 20 mmHg    Telemetry: reviewed  Physical Exam  Vitals and nursing note reviewed.   Constitutional:       Appearance: Normal appearance.   HENT:      Head: Normocephalic.      Nose: Nose normal.      Mouth/Throat:      Mouth: Mucous membranes are moist.   Neck:      Comments: JVD elevated to jawline  Cardiovascular:      Rate and Rhythm: Tachycardia present. Rhythm irregular.      Heart sounds:  Murmur heard.     Gallop present.   Pulmonary:      Effort: Pulmonary effort is normal.   Abdominal:      General: Abdomen is flat. There is no distension.      Palpations: Abdomen is soft.   Musculoskeletal:         General: Swelling present. Normal range of motion.   Skin:     General: Skin is warm.      Capillary Refill: Capillary refill takes 2 to 3 seconds.   Neurological:      Mental Status: He is alert and oriented to person, place, and time.       Significant Labs:  CBC:  Recent Labs   Lab 12/05/22  0156 12/06/22 0523 12/07/22  0936   WBC 8.11 5.89 6.26   RBC 4.17* 4.02* 4.30*   HGB 11.5* 11.1* 11.6*   HCT 38.3* 36.3* 38.5*   * 154 186   MCV 92 90 90   MCH 27.6 27.6 27.0   MCHC 30.0* 30.6* 30.1*       BNP:  Recent Labs   Lab 12/07/22  0936   BNP 1,889*     CMP:  Recent Labs   Lab 12/05/22  1600 12/06/22 0523 12/07/22  0936   * 129* 139*   CALCIUM 8.8 9.0 9.7   ALBUMIN 3.8 3.8 3.9   PROT 6.7 6.7 7.1    140 138   K 4.2 4.1 3.8   CO2 36* 34* 39*   CL 95 94* 90*   BUN 44* 43* 41*   CREATININE 1.5* 1.4 1.5*   ALKPHOS 196* 225* 213*   ALT 38 37 35   AST 24 30 28   BILITOT 5.3* 4.8* 5.2*        Coagulation:   Recent Labs   Lab 12/05/22 0156   INR 1.4*   APTT 38.2*       LDH:  No results for input(s): LDH in the last 72 hours.  Microbiology:  Microbiology Results (last 7 days)       ** No results found for the last 168 hours. **            I have reviewed all pertinent labs within the past 24 hours.    Estimated Creatinine Clearance: 56.9 mL/min (A) (based on SCr of 1.5 mg/dL (H)).    Diagnostic Results:  I have reviewed all pertinent imaging results/findings within the past 24 hours.

## 2022-12-07 NOTE — PROGRESS NOTES
Advanced Heart Failure Options: Transplant/LVAD  PRE-EDUCATION BOOKLET NOTE:    Met with Migel Garcia and had a brief discussion regarding the advanced heart failure evaluation process including options for heart transplantation and/or left ventricular assist device (LVAD) implantation.     Heart Transplant Educational Booklet reviewed and given to patient, which included the following handouts:  My Advanced Heart Failure Treatment Guide: Module 1  Pre Heart Transplant Coordinator Team Contact Information   Recipient Informed Consent   HIV Testing Information  Wellness Contract  [facility] Multiple Listing and Waiting Time Transfer  [facility] Heart Allocation   [facility] Toll Free Phone numbers    Significant History:  Tobacco history: yes, current user up until this admit.  1pk over 3 days, last smoked about 3 wks ago  Stroke history:  no  Thromboembolism history:  no  Prior sternotomies: no  ICD (if yes, indicate brand of device): Yes: [employer]onic  Angiography (if yes, enter date and location): Yes: [facility]  Blood transfusions (if yes, enter date and location): No  Cancer screenings -   Colonoscopy (if yes, enter date and location): unsure, maybe [facility]   Pap smear (if yes, enter date and location): N/A   Mammogram (if yes, enter date and location): N/A  COVID Vaccination: yes Pfizer x2  Dentist visit within the last year: yes  Local Cardiologist: Ray    Question and answer session was conducted.    Pt notified that Testing for communicable diseases will be completed as part of the Advanced Options evaluation.   Patient notified that HIV Testing is required for transplant evaluation and repeated at scheduled intervals before and after transplant.   Explained that education will be provided, results will be discussed, and the appropriate consults will be scheduled if needed.    Patient was advised to bring the educational packet to future appointments and/or admissions.   Patient was encouraged to contact the coordinator team  with any questions or concerns.    Understanding verbalized.

## 2022-12-07 NOTE — PROGRESS NOTES
[facility] - Cardiology Step[facility]  Heart Transplant  Progress Note    Patient Name: Migel Garcia  MRN: [id number]  Admission Date: 12/5/2022  Hospital Length of Stay: 2 days  Attending Physician: Brando Parada MD  Primary Care Provider: Anuj Banks MD  Principal Problem:Acute on chronic combined systolic and diastolic heart failure    Subjective:     Interval History: Diuresing well on lasix gtt at 20mg/hr. Net negative 4.3L/24h. Still has a ways to go volume wise, will continue diuresis. SVO2 56, CO 4.6, CI 2.3, SVR 1056 on  5mcg/kg/min. On step 2 of pathway.      Continuous Infusions:   DOBUTamine IV infusion (non-titrating) 5 mcg/kg/min (12/07/22 1017)    furosemide (LASIX) 10 mg/mL infusion (non-titrating) 20 mg/hr (12/07/22 1016)     Scheduled Meds:   amiodarone  200 mg Oral Daily    digoxin  0.125 mg Oral Daily    enoxaparin  40 mg Subcutaneous Daily    mupirocin   Nasal BID    pantoprazole  40 mg Oral Daily    sacubitriL-valsartan  1 tablet Oral BID     PRN Meds:acetaminophen, sodium chloride 0.9%    Review of patient's allergies indicates:  No Known Allergies  Objective:     Vital Signs (Most Recent):  Temp: 97.5 °F (36.4 °C) (12/07/22 1139)  Pulse: 88 (12/07/22 1139)  Resp: 18 (12/07/22 1139)  BP: (!) 104/56 (12/07/22 1139)  SpO2: 98 % (12/07/22 1139) Vital Signs (24h Range):  Temp:  [97.5 °F (36.4 °C)-98.4 °F (36.9 °C)] 97.5 °F (36.4 °C)  Pulse:  [] 88  Resp:  [17-18] 18  SpO2:  [92 %-99 %] 98 %  BP: (102-123)/(56-85) 104/56     Patient Vitals for the past 72 hrs (Last 3 readings):   Weight   12/07/22 0550 83.1 kg (183 lb 3.2 oz)   12/06/22 1002 90.7 kg (200 lb)   12/06/22 0746 90.8 kg (200 lb 2.8 oz)       Body mass index is 29.57 kg/m².      Intake/Output Summary (Last 24 hours) at 12/7/2022 1440  Last data filed at 12/7/2022 1351  Gross per 24 hour   Intake 600 ml   Output 6750 ml   Net -6150 ml         Hemodynamic Parameters:  CVP:  [20 mmHg-21 mmHg] 20 mmHg    Telemetry:  reviewed  Physical Exam  Vitals and nursing note reviewed.   Constitutional:       Appearance: Normal appearance.   HENT:      Head: Normocephalic.      Nose: Nose normal.      Mouth/Throat:      Mouth: Mucous membranes are moist.   Neck:      Comments: JVD elevated to jawline  Cardiovascular:      Rate and Rhythm: Tachycardia present. Rhythm irregular.      Heart sounds: Murmur heard.     Gallop present.   Pulmonary:      Effort: Pulmonary effort is normal.   Abdominal:      General: Abdomen is flat. There is no distension.      Palpations: Abdomen is soft.   Musculoskeletal:         General: Swelling present. Normal range of motion.   Skin:     General: Skin is warm.      Capillary Refill: Capillary refill takes 2 to 3 seconds.   Neurological:      Mental Status: He is alert and oriented to person, place, and time.       Significant Labs:  CBC:  Recent Labs   Lab 12/05/22  0156 12/06/22  0523 12/07/22  0936   WBC 8.11 5.89 6.26   RBC 4.17* 4.02* 4.30*   HGB 11.5* 11.1* 11.6*   HCT 38.3* 36.3* 38.5*   * 154 186   MCV 92 90 90   MCH 27.6 27.6 27.0   MCHC 30.0* 30.6* 30.1*       BNP:  Recent Labs   Lab 12/07/22  0936   BNP 1,889*     CMP:  Recent Labs   Lab 12/05/22  1600 12/06/22  0523 12/07/22  0936   * 129* 139*   CALCIUM 8.8 9.0 9.7   ALBUMIN 3.8 3.8 3.9   PROT 6.7 6.7 7.1    140 138   K 4.2 4.1 3.8   CO2 36* 34* 39*   CL 95 94* 90*   BUN 44* 43* 41*   CREATININE 1.5* 1.4 1.5*   ALKPHOS 196* 225* 213*   ALT 38 37 35   AST 24 30 28   BILITOT 5.3* 4.8* 5.2*        Coagulation:   Recent Labs   Lab 12/05/22  0156   INR 1.4*   APTT 38.2*       LDH:  No results for input(s): LDH in the last 72 hours.  Microbiology:  Microbiology Results (last 7 days)       ** No results found for the last 168 hours. **            I have reviewed all pertinent labs within the past 24 hours.    Estimated Creatinine Clearance: 56.9 mL/min (A) (based on SCr of 1.5 mg/dL (H)).    Diagnostic Results:  I have reviewed all  pertinent imaging results/findings within the past 24 hours.    Assessment and Plan:     No notes on file    * Acute on chronic combined systolic and diastolic heart failure  -NICM  -Transferred from [place] for ADHF and possible workup.  During hospital stay there; he was briefly on Levo and started on  and they were unable to wean  off.  Presented on  5.   -Last 2D Echo 12/6/22: LVEF: 25% , LVEDD:  5.33cm  -Hypervolemic on examination today  -Current diuretic regimen: Furosemide gtt at 20mg/hr and patient is net negative 4.3L in the last 24 hours. Previous home diuretic dose was Lasix 80mg BID  -GDMT with home dose of Digoxin, Entresto 24-26.  Was previously on Toprol 100mg QHS but holding in the setting of   -Patient is currently being evaluated for OHTx/VAD  -Heart failure pathway Step 2  -2g Na dietary restriction, 1500 mL fluid restriction, strict I/Os      Pulmonary nodule  -Questionable 8 mm left lower lobe pulmonary nodule series 6, image 314.  This courses along the left lower lobe pulmonary artery and pulmonary aneurysm not excluded.  Dedicated CT chest with contrast suggested.  -Will plan for CT with contrast once patient is euvolemic and creatinine remains stable.     Atrial fibrillation  -[id number] score is 2  -On Eliquis at home for anitcoagulation  -Stopped on 12/5 in anticipation of workup and possible procedures  -On Lovenox for DVT prophylaxis  -IF Eliquis remains off longer than 5-7 days then consider bridging with heparin     ICD (implantable cardioverter-defibrillator), single, in situ  -Will find out the brand of device and order interrogation     HTN (hypertension)  -BP controlled on current regimen      CKD (chronic kidney disease), stage III  -Reported baseline 1.6-2.0  -Creatinine 1.4 today and improving with diuresis  -Will monitor trend  -avoid nephrotoxins     Tobacco use  -counseled on cessation    GERD (gastroesophageal reflux disease)  -Protonix 40mg  Baltazar Barnes PA-C  Heart Transplant  [facility] - Cardiology Stepdown

## 2022-12-08 ENCOUNTER — EDUCATION (OUTPATIENT)
Dept: TRANSPLANT | Facility: CLINIC | Age: 52
End: 2022-12-08
Payer: MEDICARE

## 2022-12-08 LAB
ANION GAP SERPL CALC-SCNC: 8 MMOL/L (ref 8–16)
ANION GAP SERPL CALC-SCNC: 9 MMOL/L (ref 8–16)
BASOPHILS # BLD AUTO: 0.05 K/UL (ref 0–0.2)
BASOPHILS NFR BLD: 1 % (ref 0–1.9)
BUN SERPL-MCNC: 41 MG/DL (ref 6–20)
BUN SERPL-MCNC: 45 MG/DL (ref 6–20)
CALCIUM SERPL-MCNC: 8.1 MG/DL (ref 8.7–10.5)
CALCIUM SERPL-MCNC: 9.1 MG/DL (ref 8.7–10.5)
CHLORIDE SERPL-SCNC: 94 MMOL/L (ref 95–110)
CHLORIDE SERPL-SCNC: 98 MMOL/L (ref 95–110)
CO2 SERPL-SCNC: 34 MMOL/L (ref 23–29)
CO2 SERPL-SCNC: 36 MMOL/L (ref 23–29)
COTININE SERPL-MCNC: 6.3 NG/ML
CREAT SERPL-MCNC: 1.6 MG/DL (ref 0.5–1.4)
CREAT SERPL-MCNC: 1.6 MG/DL (ref 0.5–1.4)
DIFFERENTIAL METHOD: ABNORMAL
EOSINOPHIL # BLD AUTO: 0.2 K/UL (ref 0–0.5)
EOSINOPHIL NFR BLD: 3.1 % (ref 0–8)
ERYTHROCYTE [DISTWIDTH] IN BLOOD BY AUTOMATED COUNT: 20.7 % (ref 11.5–14.5)
EST. GFR  (NO RACE VARIABLE): 50.9 ML/MIN/1.73 M^2
EST. GFR  (NO RACE VARIABLE): 50.9 ML/MIN/1.73 M^2
GLUCOSE SERPL-MCNC: 103 MG/DL (ref 70–110)
GLUCOSE SERPL-MCNC: 89 MG/DL (ref 70–110)
HCT VFR BLD AUTO: 30.9 % (ref 40–54)
HGB BLD-MCNC: 9 G/DL (ref 14–18)
IMM GRANULOCYTES # BLD AUTO: 0.04 K/UL (ref 0–0.04)
IMM GRANULOCYTES NFR BLD AUTO: 0.8 % (ref 0–0.5)
LYMPHOCYTES # BLD AUTO: 0.8 K/UL (ref 1–4.8)
LYMPHOCYTES NFR BLD: 14.8 % (ref 18–48)
MAGNESIUM SERPL-MCNC: 2 MG/DL (ref 1.6–2.6)
MAGNESIUM SERPL-MCNC: 2 MG/DL (ref 1.6–2.6)
MCH RBC QN AUTO: 26.8 PG (ref 27–31)
MCHC RBC AUTO-ENTMCNC: 29.1 G/DL (ref 32–36)
MCV RBC AUTO: 92 FL (ref 82–98)
MONOCYTES # BLD AUTO: 0.4 K/UL (ref 0.3–1)
MONOCYTES NFR BLD: 8.3 % (ref 4–15)
NEUTROPHILS # BLD AUTO: 3.7 K/UL (ref 1.8–7.7)
NEUTROPHILS NFR BLD: 72 % (ref 38–73)
NICOTINE SERPL-MCNC: <3 NG/ML
NRBC BLD-RTO: 0 /100 WBC
PLATELET # BLD AUTO: 153 K/UL (ref 150–450)
PMV BLD AUTO: 9.9 FL (ref 9.2–12.9)
POTASSIUM SERPL-SCNC: 3.5 MMOL/L (ref 3.5–5.1)
POTASSIUM SERPL-SCNC: 4.2 MMOL/L (ref 3.5–5.1)
RBC # BLD AUTO: 3.36 M/UL (ref 4.6–6.2)
SODIUM SERPL-SCNC: 139 MMOL/L (ref 136–145)
SODIUM SERPL-SCNC: 140 MMOL/L (ref 136–145)
WBC # BLD AUTO: 5.08 K/UL (ref 3.9–12.7)

## 2022-12-08 PROCEDURE — 36415 COLL VENOUS BLD VENIPUNCTURE: CPT | Performed by: PHYSICIAN ASSISTANT

## 2022-12-08 PROCEDURE — 63600175 PHARM REV CODE 636 W HCPCS: Performed by: PHYSICIAN ASSISTANT

## 2022-12-08 PROCEDURE — 63600175 PHARM REV CODE 636 W HCPCS: Performed by: INTERNAL MEDICINE

## 2022-12-08 PROCEDURE — 99233 PR SUBSEQUENT HOSPITAL CARE,LEVL III: ICD-10-PCS | Mod: ,,, | Performed by: INTERNAL MEDICINE

## 2022-12-08 PROCEDURE — 25000003 PHARM REV CODE 250: Performed by: STUDENT IN AN ORGANIZED HEALTH CARE EDUCATION/TRAINING PROGRAM

## 2022-12-08 PROCEDURE — 99233 SBSQ HOSP IP/OBS HIGH 50: CPT | Mod: ,,, | Performed by: INTERNAL MEDICINE

## 2022-12-08 PROCEDURE — 20600001 HC STEP DOWN PRIVATE ROOM

## 2022-12-08 PROCEDURE — 83735 ASSAY OF MAGNESIUM: CPT | Performed by: PHYSICIAN ASSISTANT

## 2022-12-08 PROCEDURE — 85025 COMPLETE CBC W/AUTO DIFF WBC: CPT | Performed by: PHYSICIAN ASSISTANT

## 2022-12-08 PROCEDURE — 25000003 PHARM REV CODE 250: Performed by: INTERNAL MEDICINE

## 2022-12-08 PROCEDURE — 80048 BASIC METABOLIC PNL TOTAL CA: CPT | Performed by: PHYSICIAN ASSISTANT

## 2022-12-08 RX ORDER — POTASSIUM CHLORIDE 20 MEQ/1
40 TABLET, EXTENDED RELEASE ORAL ONCE
Status: DISCONTINUED | OUTPATIENT
Start: 2022-12-08 | End: 2022-12-08

## 2022-12-08 RX ORDER — POTASSIUM CHLORIDE 20 MEQ/1
40 TABLET, EXTENDED RELEASE ORAL ONCE
Status: COMPLETED | OUTPATIENT
Start: 2022-12-08 | End: 2022-12-08

## 2022-12-08 RX ADMIN — MUPIROCIN: 20 OINTMENT TOPICAL at 10:12

## 2022-12-08 RX ADMIN — PANTOPRAZOLE SODIUM 40 MG: 40 TABLET, DELAYED RELEASE ORAL at 08:12

## 2022-12-08 RX ADMIN — DOBUTAMINE HYDROCHLORIDE 5 MCG/KG/MIN: 400 INJECTION INTRAVENOUS at 05:12

## 2022-12-08 RX ADMIN — AMIODARONE HYDROCHLORIDE 200 MG: 200 TABLET ORAL at 08:12

## 2022-12-08 RX ADMIN — SACUBITRIL AND VALSARTAN 1 TABLET: 24; 26 TABLET, FILM COATED ORAL at 08:12

## 2022-12-08 RX ADMIN — SACUBITRIL AND VALSARTAN 1 TABLET: 24; 26 TABLET, FILM COATED ORAL at 10:12

## 2022-12-08 RX ADMIN — MUPIROCIN: 20 OINTMENT TOPICAL at 08:12

## 2022-12-08 RX ADMIN — ENOXAPARIN SODIUM 40 MG: 40 INJECTION SUBCUTANEOUS at 04:12

## 2022-12-08 RX ADMIN — FUROSEMIDE 20 MG/HR: 10 INJECTION, SOLUTION INTRAMUSCULAR; INTRAVENOUS at 05:12

## 2022-12-08 RX ADMIN — DIGOXIN 0.12 MG: 125 TABLET ORAL at 08:12

## 2022-12-08 RX ADMIN — POTASSIUM CHLORIDE 40 MEQ: 1500 TABLET, EXTENDED RELEASE ORAL at 02:12

## 2022-12-08 NOTE — SUBJECTIVE & OBJECTIVE
Interval History: no acute events overnight        Intake/Output Summary (Last 24 hours) at 12/8/2022 1147  Last data filed at 12/8/2022 0951  Gross per 24 hour   Intake 582 ml   Output 5770 ml   Net -5188 ml           Continuous Infusions:   DOBUTamine IV infusion (non-titrating) 5 mcg/kg/min (12/08/22 0511)    furosemide (LASIX) 10 mg/mL infusion (non-titrating) 20 mg/hr (12/08/22 0511)     Scheduled Meds:   amiodarone  200 mg Oral Daily    digoxin  0.125 mg Oral Daily    enoxaparin  40 mg Subcutaneous Daily    mupirocin   Nasal BID    pantoprazole  40 mg Oral Daily    sacubitriL-valsartan  1 tablet Oral BID     PRN Meds:acetaminophen, sodium chloride 0.9%    Review of patient's allergies indicates:  No Known Allergies  Objective:     Vital Signs (Most Recent):  Temp: 97.9 °F (36.6 °C) (12/08/22 1123)  Pulse: 95 (12/08/22 1123)  Resp: 20 (12/08/22 1123)  BP: (!) 89/52 (12/08/22 1123)  SpO2: 96 % (12/08/22 1123) Vital Signs (24h Range):  Temp:  [97.8 °F (36.6 °C)-98.1 °F (36.7 °C)] 97.9 °F (36.6 °C)  Pulse:  [] 95  Resp:  [18-20] 20  SpO2:  [95 %-99 %] 96 %  BP: ()/(52-74) 89/52     Patient Vitals for the past 72 hrs (Last 3 readings):   Weight   12/08/22 0731 83.7 kg (184 lb 8.4 oz)   12/07/22 0550 83.1 kg (183 lb 3.2 oz)   12/06/22 1002 90.7 kg (200 lb)       Body mass index is 29.78 kg/m².      Intake/Output Summary (Last 24 hours) at 12/8/2022 1147  Last data filed at 12/8/2022 0951  Gross per 24 hour   Intake 582 ml   Output 5770 ml   Net -5188 ml         Hemodynamic Parameters:  CVP:  [20 mmHg] 20 mmHg    Telemetry: reviewed  Physical Exam  Vitals and nursing note reviewed.   Constitutional:       Appearance: Normal appearance.   HENT:      Head: Normocephalic.      Nose: Nose normal.      Mouth/Throat:      Mouth: Mucous membranes are moist.   Neck:      Comments: JVD elevated to jawline  Cardiovascular:      Rate and Rhythm: Tachycardia present. Rhythm irregular.      Heart sounds: Murmur heard.      Gallop present.   Pulmonary:      Effort: Pulmonary effort is normal.   Abdominal:      General: Abdomen is flat. There is no distension.      Palpations: Abdomen is soft.   Musculoskeletal:         General: Swelling present. Normal range of motion.   Skin:     General: Skin is warm.      Capillary Refill: Capillary refill takes 2 to 3 seconds.   Neurological:      Mental Status: He is alert and oriented to person, place, and time.       Significant Labs:  CBC:  Recent Labs   Lab 12/06/22  0523 12/07/22  0936 12/08/22  0545   WBC 5.89 6.26 5.08   RBC 4.02* 4.30* 3.36*   HGB 11.1* 11.6* 9.0*   HCT 36.3* 38.5* 30.9*    186 153   MCV 90 90 92   MCH 27.6 27.0 26.8*   MCHC 30.6* 30.1* 29.1*       BNP:  Recent Labs   Lab 12/07/22  0936   BNP 1,889*       CMP:  Recent Labs   Lab 12/05/22  1600 12/06/22  0523 12/07/22  0936 12/07/22  2311 12/08/22  0439   * 129* 139* 89 103   CALCIUM 8.8 9.0 9.7 8.1* 9.1   ALBUMIN 3.8 3.8 3.9  --   --    PROT 6.7 6.7 7.1  --   --     140 138 140 139   K 4.2 4.1 3.8 3.5 4.2   CO2 36* 34* 39* 34* 36*   CL 95 94* 90* 98 94*   BUN 44* 43* 41* 41* 45*   CREATININE 1.5* 1.4 1.5* 1.6* 1.6*   ALKPHOS 196* 225* 213*  --   --    ALT 38 37 35  --   --    AST 24 30 28  --   --    BILITOT 5.3* 4.8* 5.2*  --   --         Coagulation:   Recent Labs   Lab 12/05/22  0156   INR 1.4*   APTT 38.2*       LDH:  No results for input(s): LDH in the last 72 hours.  Microbiology:  Microbiology Results (last 7 days)       ** No results found for the last 168 hours. **            I have reviewed all pertinent labs within the past 24 hours.    Estimated Creatinine Clearance: 53.6 mL/min (A) (based on SCr of 1.6 mg/dL (H)).    Diagnostic Results:  I have reviewed all pertinent imaging results/findings within the past 24 hours.

## 2022-12-08 NOTE — PROGRESS NOTES
EDUCATION NOTE:    Migelfreda Garcia was seen today for pre-heart transplant education.  Patient signed wellness contract and informed consent to undergo heart transplant evaluation work-up.  Thorough pre-transplant education conducted.      Information presented included:  Evaluation process  Members of the transplant team  Selection committee members and role of the committee  Listing process for transplant  Different listing designations, including status 7  1-year graft survival statistics  LVAD as bridge to transplant or DT  Need to reach patient within 15 minutes of donor offer  PHS Donors with Risk Factors  Blood transfusions  Process for matching donor with recipient  Need for weight loss and how it relates to the wait time  Post-transplant immunosuppression for life with need to be able to afford post-transplant medications  Need for a caregiver to be with them at all times beginning with discharge from ICU, through at least the first 6 weeks post-transplant  Need to find local housing for the first 6 weeks post-transplant  How to reach team members at any time  OS website with written instructions regarding how to look up information specific to [facility]'s transplant program  Use of Hepatitis C organs    Patient was unaccompanied.  Patient asked pertinent questions, which were answered to their satisfaction.  Patient was also given a copy of the wellness contract and informed consent to undergo evaluation work-up.

## 2022-12-08 NOTE — ASSESSMENT & PLAN NOTE
-Reported baseline 1.6-2.0  -Creatinine 1.6 today and improving with diuresis  -Will monitor trend  -avoid nephrotoxins

## 2022-12-08 NOTE — ASSESSMENT & PLAN NOTE
-NICM  -Transferred from [place] for ADHF and possible workup.  During hospital stay there; he was briefly on Levo and started on  and they were unable to wean  off.  Presented on  5.   -Last 2D Echo 12/6/22: LVEF: 25% , LVEDD:  5.33cm  -Hypervolemic on examination today  -Current diuretic regimen: Furosemide gtt at 20mg/hr and patient is net negative 6 liters in the last 24 hours. Previous home diuretic dose was Lasix 80mg BID  -GDMT with home dose of Digoxin, Entresto 24-26.  Was previously on Toprol 100mg QHS but holding in the setting of   -Patient is currently being evaluated for OHTx/VAD  -Heart failure pathway Step 2  -2g Na dietary restriction, 1500 mL fluid restriction, strict I/Os

## 2022-12-08 NOTE — PROGRESS NOTES
Interdisciplinary Rounds Report:   Attended interdisciplinary rounds with the [facility]/CTS services including the LVAD Coordinators, social workers, cardiologists, surgeons,  PT/OT/Speech, dietician, and unit charge nurses. Discussed patient status, plan of care, goals of care, including DC date, and post discharge needs. Plan of care will be discussed with the patient and/or family per the physician while rounding on the floor. This is a recurring meeting that is medically and socially necessary to collaborate with the interdisciplinary team to assist patient needs and safe discharge.

## 2022-12-08 NOTE — ASSESSMENT & PLAN NOTE
-[id number] score is 2  -On Eliquis at home for anitcoagulation  -Stopped on 12/5 in anticipation of workup and possible procedures  -On Lovenox for DVT prophylaxis  -continue amio/dig

## 2022-12-08 NOTE — HPI
52 year old man with a history of non-ischemic cardiomyopathy with ICD placement, CKD 3, atrial fibrillation presents from  to [facility] [place], [place] for advanced options, on dobutamine 5. Echo 12/6 with EF of 25%. Pathway for LVAD/heart transplant started

## 2022-12-08 NOTE — PROGRESS NOTES
[facility] - [facility]iology Stepdown  Heart Transplant  Progress Note    Patient Name: Migel Garcia  MRN: [id number]  Admission Date: 12/5/2022  Hospital Length of Stay: 3 days  Attending Physician: Brando Parada MD  Primary Care Provider: Anuj Banks MD  Principal Problem:Acute on chronic combined systolic and diastolic heart failure    Subjective:     Interval History: no acute events overnight. Continued on lasix 20 gtt.         Intake/Output Summary (Last 24 hours) at 12/8/2022 1147  Last data filed at 12/8/2022 0951  Gross per 24 hour   Intake 582 ml   Output 5770 ml   Net -5188 ml           Continuous Infusions:   DOBUTamine IV infusion (non-titrating) 5 mcg/kg/min (12/08/22 0511)    furosemide (LASIX) 10 mg/mL infusion (non-titrating) 20 mg/hr (12/08/22 0511)     Scheduled Meds:   amiodarone  200 mg Oral Daily    digoxin  0.125 mg Oral Daily    enoxaparin  40 mg Subcutaneous Daily    mupirocin   Nasal BID    pantoprazole  40 mg Oral Daily    sacubitriL-valsartan  1 tablet Oral BID     PRN Meds:acetaminophen, sodium chloride 0.9%    Review of patient's allergies indicates:  No Known Allergies  Objective:     Vital Signs (Most Recent):  Temp: 97.9 °F (36.6 °C) (12/08/22 1123)  Pulse: 95 (12/08/22 1123)  Resp: 20 (12/08/22 1123)  BP: (!) 89/52 (12/08/22 1123)  SpO2: 96 % (12/08/22 1123) Vital Signs (24h Range):  Temp:  [97.8 °F (36.6 °C)-98.1 °F (36.7 °C)] 97.9 °F (36.6 °C)  Pulse:  [] 95  Resp:  [18-20] 20  SpO2:  [95 %-99 %] 96 %  BP: ()/(52-74) 89/52     Patient Vitals for the past 72 hrs (Last 3 readings):   Weight   12/08/22 0731 83.7 kg (184 lb 8.4 oz)   12/07/22 0550 83.1 kg (183 lb 3.2 oz)   12/06/22 1002 90.7 kg (200 lb)       Body mass index is 29.78 kg/m².      Intake/Output Summary (Last 24 hours) at 12/8/2022 1147  Last data filed at 12/8/2022 0951  Gross per 24 hour   Intake 582 ml   Output 5770 ml   Net -5188 ml         Hemodynamic Parameters:  CVP:  [20 mmHg] 20  mmHg    Telemetry: reviewed  Physical Exam  Vitals and nursing note reviewed.   Constitutional:       Appearance: Normal appearance.   HENT:      Head: Normocephalic.      Nose: Nose normal.      Mouth/Throat:      Mouth: Mucous membranes are moist.   Neck:      Comments: JVD elevated to jawline  Cardiovascular:      Rate and Rhythm: Tachycardia present. Rhythm irregular.      Heart sounds: Murmur heard.     Gallop present.   Pulmonary:      Effort: Pulmonary effort is normal.   Abdominal:      General: Abdomen is flat. There is no distension.      Palpations: Abdomen is soft.   Musculoskeletal:         General: Swelling present. Normal range of motion.   Skin:     General: Skin is warm.      Capillary Refill: Capillary refill takes 2 to 3 seconds.   Neurological:      Mental Status: He is alert and oriented to person, place, and time.       Significant Labs:  CBC:  Recent Labs   Lab 12/06/22  0523 12/07/22  0936 12/08/22  0545   WBC 5.89 6.26 5.08   RBC 4.02* 4.30* 3.36*   HGB 11.1* 11.6* 9.0*   HCT 36.3* 38.5* 30.9*    186 153   MCV 90 90 92   MCH 27.6 27.0 26.8*   MCHC 30.6* 30.1* 29.1*       BNP:  Recent Labs   Lab 12/07/22  0936   BNP 1,889*       CMP:  Recent Labs   Lab 12/05/22  1600 12/06/22  0523 12/07/22  0936 12/07/22  2311 12/08/22  0439   * 129* 139* 89 103   CALCIUM 8.8 9.0 9.7 8.1* 9.1   ALBUMIN 3.8 3.8 3.9  --   --    PROT 6.7 6.7 7.1  --   --     140 138 140 139   K 4.2 4.1 3.8 3.5 4.2   CO2 36* 34* 39* 34* 36*   CL 95 94* 90* 98 94*   BUN 44* 43* 41* 41* 45*   CREATININE 1.5* 1.4 1.5* 1.6* 1.6*   ALKPHOS 196* 225* 213*  --   --    ALT 38 37 35  --   --    AST 24 30 28  --   --    BILITOT 5.3* 4.8* 5.2*  --   --         Coagulation:   Recent Labs   Lab 12/05/22  0156   INR 1.4*   APTT 38.2*       LDH:  No results for input(s): LDH in the last 72 hours.  Microbiology:  Microbiology Results (last 7 days)       ** No results found for the last 168 hours. **            I have  reviewed all pertinent labs within the past 24 hours.    Estimated Creatinine Clearance: 53.6 mL/min (A) (based on SCr of 1.6 mg/dL (H)).    Diagnostic Results:  I have reviewed all pertinent imaging results/findings within the past 24 hours.    Assessment and Plan:     52 year old man with a history of non-ischemic cardiomyopathy with ICD placement, CKD 3, [place] [facility] [place], on dobutamine 5. Echo 12/6 with EF of 25%. Pathway for LVAD/heart transplant started      * Acute on chronic combined systolic and diastolic heart failure  -NICM  -Transferred from [place] for ADHF and possible workup.  During hospital stay there; he was briefly on Levo and started on  and they were unable to wean  off.  Presented on  5.   -Last 2D Echo 12/6/22: LVEF: 25% , LVEDD:  5.33cm  -Hypervolemic on examination today  -Current diuretic regimen: Furosemide gtt at 20mg/hr and patient is net negative 6 liters in the last 24 hours. Previous home diuretic dose was Lasix 80mg BID  -GDMT with home dose of Digoxin, Entresto 24-26.  Was previously on Toprol 100mg QHS but holding in the setting of   -Patient is currently being evaluated for OHTx/VAD  -Heart failure pathway Step 2  -2g Na dietary restriction, 1500 mL fluid restriction, strict I/Os      Pulmonary nodule  -Questionable 8 mm left lower lobe pulmonary nodule series 6, image 314.  This courses along the left lower lobe pulmonary artery and pulmonary aneurysm not excluded.  Dedicated CT chest with contrast suggested.  -Will plan for CT with contrast once patient is euvolemic and creatinine remains stable.     Atrial fibrillation  -[id number] score is 2  -On Eliquis at home for anitcoagulation  -Stopped on 12/5 in anticipation of workup and possible procedures  -On Lovenox for DVT prophylaxis  -continue amio/dig    ICD (implantable cardioverter-defibrillator), single, in situ  -Will find out the brand of device and order  interrogation     HTN (hypertension)  Continue entresto    CKD (chronic kidney disease), stage III  -Reported baseline 1.6-2.0  -Creatinine 1.6 today and improving with diuresis  -Will monitor trend  -avoid nephrotoxins     Tobacco use  -counseled on cessation    GERD (gastroesophageal reflux disease)  -Protonix 40mg Qdmanuel Valero MD  Heart Transplant  [facility] - [facility]iology Stepdown

## 2022-12-09 LAB
ABO + RH BLD: NORMAL
ALBUMIN SERPL BCP-MCNC: 3.7 G/DL (ref 3.5–5.2)
ALP SERPL-CCNC: 231 U/L (ref 55–135)
ALT SERPL W/O P-5'-P-CCNC: 33 U/L (ref 10–44)
AMPHETAMINES SERPL QL: NEGATIVE
ANION GAP SERPL CALC-SCNC: 11 MMOL/L (ref 8–16)
ANION GAP SERPL CALC-SCNC: 12 MMOL/L (ref 8–16)
APTT BLDCRRT: 33.4 SEC (ref 21–32)
AST SERPL-CCNC: 31 U/L (ref 10–40)
BARBITURATES SERPL QL SCN: NEGATIVE
BASOPHILS # BLD AUTO: 0.08 K/UL (ref 0–0.2)
BASOPHILS NFR BLD: 1.4 % (ref 0–1.9)
BENZODIAZ SERPL QL SCN: NEGATIVE
BILIRUB DIRECT SERPL-MCNC: 2.1 MG/DL (ref 0.1–0.3)
BILIRUB DIRECT SERPL-MCNC: 2.4 MG/DL (ref 0.1–0.3)
BILIRUB SERPL-MCNC: 3.8 MG/DL (ref 0.1–1)
BILIRUB UR QL STRIP: NEGATIVE
BNP SERPL-MCNC: 1344 PG/ML (ref 0–99)
BUN SERPL-MCNC: 44 MG/DL (ref 6–20)
BUN SERPL-MCNC: 46 MG/DL (ref 6–20)
BZE SERPL QL: NEGATIVE
CALCIUM SERPL-MCNC: 9.3 MG/DL (ref 8.7–10.5)
CALCIUM SERPL-MCNC: 9.4 MG/DL (ref 8.7–10.5)
CARBOXYTHC SERPL QL SCN: NEGATIVE
CHLORIDE SERPL-SCNC: 91 MMOL/L (ref 95–110)
CHLORIDE SERPL-SCNC: 91 MMOL/L (ref 95–110)
CHOLEST SERPL-MCNC: 111 MG/DL (ref 120–199)
CHOLEST/HDLC SERPL: 3.4 {RATIO} (ref 2–5)
CLARITY UR REFRACT.AUTO: CLEAR
CO2 SERPL-SCNC: 34 MMOL/L (ref 23–29)
CO2 SERPL-SCNC: 36 MMOL/L (ref 23–29)
COLOR UR AUTO: YELLOW
COMPLEXED PSA SERPL-MCNC: 0.84 NG/ML (ref 0–4)
CREAT SERPL-MCNC: 1.8 MG/DL (ref 0.5–1.4)
CREAT SERPL-MCNC: 1.8 MG/DL (ref 0.5–1.4)
DIFFERENTIAL METHOD: ABNORMAL
EOSINOPHIL # BLD AUTO: 0.2 K/UL (ref 0–0.5)
EOSINOPHIL NFR BLD: 3.4 % (ref 0–8)
ERYTHROCYTE [DISTWIDTH] IN BLOOD BY AUTOMATED COUNT: 21.2 % (ref 11.5–14.5)
EST. GFR  (NO RACE VARIABLE): 44.2 ML/MIN/1.73 M^2
EST. GFR  (NO RACE VARIABLE): 44.2 ML/MIN/1.73 M^2
ETHANOL SERPL QL SCN: NEGATIVE
FERRITIN SERPL-MCNC: 294 NG/ML (ref 20–300)
GLUCOSE SERPL-MCNC: 117 MG/DL (ref 70–110)
GLUCOSE SERPL-MCNC: 125 MG/DL (ref 70–110)
GLUCOSE UR QL STRIP: NEGATIVE
HAV IGG SER QL IA: NORMAL
HBV CORE AB SERPL QL IA: NORMAL
HBV SURFACE AB SER-ACNC: <3 MIU/ML
HBV SURFACE AB SER-ACNC: NORMAL M[IU]/ML
HCT VFR BLD AUTO: 37.4 % (ref 40–54)
HCV AB SERPL QL IA: NORMAL
HDLC SERPL-MCNC: 33 MG/DL (ref 40–75)
HDLC SERPL: 29.7 % (ref 20–50)
HGB BLD-MCNC: 11.3 G/DL (ref 14–18)
HGB UR QL STRIP: NEGATIVE
HIV 1+2 AB+HIV1 P24 AG SERPL QL IA: NORMAL
IMM GRANULOCYTES # BLD AUTO: 0.05 K/UL (ref 0–0.04)
IMM GRANULOCYTES NFR BLD AUTO: 0.9 % (ref 0–0.5)
IRON SERPL-MCNC: 64 UG/DL (ref 45–160)
KETONES UR QL STRIP: NEGATIVE
LDH SERPL L TO P-CCNC: 305 U/L (ref 110–260)
LDLC SERPL CALC-MCNC: 67.2 MG/DL (ref 63–159)
LEUKOCYTE ESTERASE UR QL STRIP: NEGATIVE
LYMPHOCYTES # BLD AUTO: 1 K/UL (ref 1–4.8)
LYMPHOCYTES NFR BLD: 17.5 % (ref 18–48)
MAGNESIUM SERPL-MCNC: 2.2 MG/DL (ref 1.6–2.6)
MCH RBC QN AUTO: 27.3 PG (ref 27–31)
MCHC RBC AUTO-ENTMCNC: 30.2 G/DL (ref 32–36)
MCV RBC AUTO: 90 FL (ref 82–98)
METHADONE SERPL QL SCN: NEGATIVE
MONOCYTES # BLD AUTO: 0.5 K/UL (ref 0.3–1)
MONOCYTES NFR BLD: 9.2 % (ref 4–15)
NEUTROPHILS # BLD AUTO: 3.8 K/UL (ref 1.8–7.7)
NEUTROPHILS NFR BLD: 67.6 % (ref 38–73)
NITRITE UR QL STRIP: NEGATIVE
NONHDLC SERPL-MCNC: 78 MG/DL
NRBC BLD-RTO: 0 /100 WBC
OPIATES SERPL QL SCN: NEGATIVE
PCP SERPL QL SCN: NEGATIVE
PH UR STRIP: 7 [PH] (ref 5–8)
PLATELET # BLD AUTO: 223 K/UL (ref 150–450)
PMV BLD AUTO: 9.8 FL (ref 9.2–12.9)
POTASSIUM SERPL-SCNC: 4 MMOL/L (ref 3.5–5.1)
POTASSIUM SERPL-SCNC: 4 MMOL/L (ref 3.5–5.1)
PREALB SERPL-MCNC: 10 MG/DL (ref 20–43)
PROPOXYPH SERPL QL: NEGATIVE
PROT SERPL-MCNC: 7 G/DL (ref 6–8.4)
PROT UR QL STRIP: NEGATIVE
RBC # BLD AUTO: 4.14 M/UL (ref 4.6–6.2)
SODIUM SERPL-SCNC: 137 MMOL/L (ref 136–145)
SODIUM SERPL-SCNC: 138 MMOL/L (ref 136–145)
SP GR UR STRIP: 1.01 (ref 1–1.03)
TRANSFERRIN SERPL-MCNC: 204 MG/DL (ref 200–375)
TRIGL SERPL-MCNC: 54 MG/DL (ref 30–150)
URN SPEC COLLECT METH UR: NORMAL
WBC # BLD AUTO: 5.55 K/UL (ref 3.9–12.7)

## 2022-12-09 PROCEDURE — 84153 ASSAY OF PSA TOTAL: CPT | Performed by: PHYSICIAN ASSISTANT

## 2022-12-09 PROCEDURE — 81241 F5 GENE: CPT | Performed by: PHYSICIAN ASSISTANT

## 2022-12-09 PROCEDURE — 80076 HEPATIC FUNCTION PANEL: CPT | Performed by: PHYSICIAN ASSISTANT

## 2022-12-09 PROCEDURE — 80048 BASIC METABOLIC PNL TOTAL CA: CPT | Performed by: PHYSICIAN ASSISTANT

## 2022-12-09 PROCEDURE — 80048 BASIC METABOLIC PNL TOTAL CA: CPT | Mod: 91 | Performed by: STUDENT IN AN ORGANIZED HEALTH CARE EDUCATION/TRAINING PROGRAM

## 2022-12-09 PROCEDURE — 83540 ASSAY OF IRON: CPT | Performed by: PHYSICIAN ASSISTANT

## 2022-12-09 PROCEDURE — 99233 PR SUBSEQUENT HOSPITAL CARE,LEVL III: ICD-10-PCS | Mod: ,,, | Performed by: PHYSICIAN ASSISTANT

## 2022-12-09 PROCEDURE — 86833 HLA CLASS II HIGH DEFIN QUAL: CPT | Performed by: PHYSICIAN ASSISTANT

## 2022-12-09 PROCEDURE — 86696 HERPES SIMPLEX TYPE 2 TEST: CPT | Performed by: PHYSICIAN ASSISTANT

## 2022-12-09 PROCEDURE — 86777 TOXOPLASMA ANTIBODY: CPT | Performed by: PHYSICIAN ASSISTANT

## 2022-12-09 PROCEDURE — 36415 COLL VENOUS BLD VENIPUNCTURE: CPT | Performed by: PHYSICIAN ASSISTANT

## 2022-12-09 PROCEDURE — 25000003 PHARM REV CODE 250: Performed by: INTERNAL MEDICINE

## 2022-12-09 PROCEDURE — 99233 SBSQ HOSP IP/OBS HIGH 50: CPT | Mod: ,,, | Performed by: INTERNAL MEDICINE

## 2022-12-09 PROCEDURE — 80061 LIPID PANEL: CPT | Performed by: PHYSICIAN ASSISTANT

## 2022-12-09 PROCEDURE — 86901 BLOOD TYPING SEROLOGIC RH(D): CPT | Performed by: PHYSICIAN ASSISTANT

## 2022-12-09 PROCEDURE — 84134 ASSAY OF PREALBUMIN: CPT | Performed by: PHYSICIAN ASSISTANT

## 2022-12-09 PROCEDURE — 86682 HELMINTH ANTIBODY: CPT | Performed by: PHYSICIAN ASSISTANT

## 2022-12-09 PROCEDURE — 63600175 PHARM REV CODE 636 W HCPCS: Performed by: PHYSICIAN ASSISTANT

## 2022-12-09 PROCEDURE — 87389 HIV-1 AG W/HIV-1&-2 AB AG IA: CPT | Performed by: PHYSICIAN ASSISTANT

## 2022-12-09 PROCEDURE — 63600175 PHARM REV CODE 636 W HCPCS: Performed by: INTERNAL MEDICINE

## 2022-12-09 PROCEDURE — 83615 LACTATE (LD) (LDH) ENZYME: CPT | Performed by: PHYSICIAN ASSISTANT

## 2022-12-09 PROCEDURE — 83735 ASSAY OF MAGNESIUM: CPT | Performed by: PHYSICIAN ASSISTANT

## 2022-12-09 PROCEDURE — 86977 RBC SERUM PRETX INCUBJ/INHIB: CPT | Mod: 59 | Performed by: PHYSICIAN ASSISTANT

## 2022-12-09 PROCEDURE — 85730 THROMBOPLASTIN TIME PARTIAL: CPT | Performed by: INTERNAL MEDICINE

## 2022-12-09 PROCEDURE — 82728 ASSAY OF FERRITIN: CPT | Performed by: PHYSICIAN ASSISTANT

## 2022-12-09 PROCEDURE — 86787 VARICELLA-ZOSTER ANTIBODY: CPT | Performed by: PHYSICIAN ASSISTANT

## 2022-12-09 PROCEDURE — 86832 HLA CLASS I HIGH DEFIN QUAL: CPT | Performed by: PHYSICIAN ASSISTANT

## 2022-12-09 PROCEDURE — 84466 ASSAY OF TRANSFERRIN: CPT | Performed by: PHYSICIAN ASSISTANT

## 2022-12-09 PROCEDURE — 86790 VIRUS ANTIBODY NOS: CPT | Performed by: PHYSICIAN ASSISTANT

## 2022-12-09 PROCEDURE — 81003 URINALYSIS AUTO W/O SCOPE: CPT | Performed by: PHYSICIAN ASSISTANT

## 2022-12-09 PROCEDURE — 82248 BILIRUBIN DIRECT: CPT | Performed by: PHYSICIAN ASSISTANT

## 2022-12-09 PROCEDURE — 81240 F2 GENE: CPT | Performed by: PHYSICIAN ASSISTANT

## 2022-12-09 PROCEDURE — 83880 ASSAY OF NATRIURETIC PEPTIDE: CPT | Performed by: PHYSICIAN ASSISTANT

## 2022-12-09 PROCEDURE — 86803 HEPATITIS C AB TEST: CPT | Performed by: PHYSICIAN ASSISTANT

## 2022-12-09 PROCEDURE — 85397 CLOTTING FUNCT ACTIVITY: CPT | Performed by: PHYSICIAN ASSISTANT

## 2022-12-09 PROCEDURE — 86665 EPSTEIN-BARR CAPSID VCA: CPT | Performed by: PHYSICIAN ASSISTANT

## 2022-12-09 PROCEDURE — 99233 PR SUBSEQUENT HOSPITAL CARE,LEVL III: ICD-10-PCS | Mod: ,,, | Performed by: INTERNAL MEDICINE

## 2022-12-09 PROCEDURE — 80307 DRUG TEST PRSMV CHEM ANLYZR: CPT | Performed by: PHYSICIAN ASSISTANT

## 2022-12-09 PROCEDURE — 20600001 HC STEP DOWN PRIVATE ROOM

## 2022-12-09 PROCEDURE — 86704 HEP B CORE ANTIBODY TOTAL: CPT | Performed by: PHYSICIAN ASSISTANT

## 2022-12-09 PROCEDURE — 99233 SBSQ HOSP IP/OBS HIGH 50: CPT | Mod: ,,, | Performed by: THORACIC SURGERY (CARDIOTHORACIC VASCULAR SURGERY)

## 2022-12-09 PROCEDURE — 85025 COMPLETE CBC W/AUTO DIFF WBC: CPT | Performed by: PHYSICIAN ASSISTANT

## 2022-12-09 PROCEDURE — 80323 ALKALOIDS NOS: CPT | Performed by: PHYSICIAN ASSISTANT

## 2022-12-09 PROCEDURE — 86706 HEP B SURFACE ANTIBODY: CPT | Mod: 91 | Performed by: PHYSICIAN ASSISTANT

## 2022-12-09 PROCEDURE — 99233 SBSQ HOSP IP/OBS HIGH 50: CPT | Mod: ,,, | Performed by: PHYSICIAN ASSISTANT

## 2022-12-09 PROCEDURE — 97116 GAIT TRAINING THERAPY: CPT | Mod: CQ

## 2022-12-09 PROCEDURE — 99233 PR SUBSEQUENT HOSPITAL CARE,LEVL III: ICD-10-PCS | Mod: ,,, | Performed by: THORACIC SURGERY (CARDIOTHORACIC VASCULAR SURGERY)

## 2022-12-09 RX ORDER — HEPARIN SODIUM,PORCINE/D5W 25000/250
0-40 INTRAVENOUS SOLUTION INTRAVENOUS CONTINUOUS
Status: DISCONTINUED | OUTPATIENT
Start: 2022-12-09 | End: 2022-12-23

## 2022-12-09 RX ORDER — FUROSEMIDE 10 MG/ML
80 INJECTION INTRAMUSCULAR; INTRAVENOUS 2 TIMES DAILY
Status: DISCONTINUED | OUTPATIENT
Start: 2022-12-09 | End: 2022-12-11

## 2022-12-09 RX ADMIN — FUROSEMIDE 80 MG: 10 INJECTION, SOLUTION INTRAMUSCULAR; INTRAVENOUS at 06:12

## 2022-12-09 RX ADMIN — PANTOPRAZOLE SODIUM 40 MG: 40 TABLET, DELAYED RELEASE ORAL at 08:12

## 2022-12-09 RX ADMIN — DOBUTAMINE HYDROCHLORIDE 5 MCG/KG/MIN: 400 INJECTION INTRAVENOUS at 06:12

## 2022-12-09 RX ADMIN — AMIODARONE HYDROCHLORIDE 200 MG: 200 TABLET ORAL at 08:12

## 2022-12-09 RX ADMIN — SACUBITRIL AND VALSARTAN 1 TABLET: 24; 26 TABLET, FILM COATED ORAL at 08:12

## 2022-12-09 RX ADMIN — DOBUTAMINE HYDROCHLORIDE 5 MCG/KG/MIN: 400 INJECTION INTRAVENOUS at 12:12

## 2022-12-09 RX ADMIN — HEPARIN SODIUM 12 UNITS/KG/HR: 10000 INJECTION, SOLUTION INTRAVENOUS at 06:12

## 2022-12-09 RX ADMIN — DIGOXIN 0.12 MG: 125 TABLET ORAL at 08:12

## 2022-12-09 RX ADMIN — MUPIROCIN: 20 OINTMENT TOPICAL at 12:12

## 2022-12-09 RX ADMIN — FUROSEMIDE 20 MG/HR: 10 INJECTION, SOLUTION INTRAMUSCULAR; INTRAVENOUS at 12:12

## 2022-12-09 RX ADMIN — MUPIROCIN: 20 OINTMENT TOPICAL at 08:12

## 2022-12-09 NOTE — PLAN OF CARE
Patient remained free from falls and injury throughout shift. No acute events overnight. Patient alert and oriented x4; vital signs stable. Patient denies chest pain and shortness of breath. Safety measures addressed --bed locked and in low position with siderails up x2 and call light/personal items within reach. Dobutamine and lasix infusing as ordered. Plan of care reviewed with patient; all questions and concerns addressed. Patient verbalizes understanding. Continuing plan of care.      CVP: 22

## 2022-12-09 NOTE — ASSESSMENT & PLAN NOTE
-NICM  -Transferred from [place] for ADHF and possible workup.  During hospital stay there; he was briefly on Levo and started on  and they were unable to wean  off.  Presented on  5.   -Last 2D Echo 12/6/22: LVEF: 25% , LVEDD:  5.33cm  -Hypervolemic on examination today  -Current diuretic regimen: Furosemide gtt at 20mg/hr and patient is net negative 5 liters in the last 24 hours. Previous home diuretic dose was Lasix 80mg BID. Given robust response to infusion, will transition to IVP lasix first dose tonight.  -GDMT with home dose of Digoxin, Entresto 24-26.  Was previously on Toprol 100mg QHS but holding in the setting of   -Patient is currently being evaluated for OHTx/VAD  -Heart failure pathway Step 2  -2g Na dietary restriction, 1500 mL fluid restriction, strict I/Os

## 2022-12-09 NOTE — PT/OT/SLP PROGRESS
"Physical Therapy Treatment    Patient Name:  Migel Garcia   MRN:  [id number]    Recommendations:     Discharge Recommendations: home  Discharge Equipment Recommendations: none  Barriers to discharge: None    Assessment:     Migel Garcia is a 52 y.o. male admitted with a medical diagnosis of Acute on chronic combined systolic and diastolic heart failure.  He presents with the following impairments/functional limitations: weakness, impaired endurance, impaired functional mobility.  Pt was agreeable and tolerated session well. Pt completed therapy session while practicing sternal precautions. Pt ambulated the hallway with SBA and noAD, demonstrating increase endurance as seen with increased gait distance today. Pt is progressing and continues to benefit to achieve highest level of independence prior to discharge.     Rehab Prognosis: Good; patient would benefit from acute skilled PT services to address these deficits and reach maximum level of function.    Recent Surgery: * No surgery found *      Plan:     During this hospitalization, patient to be seen 2 x/week to address the identified rehab impairments via gait training, therapeutic activities, therapeutic exercises, neuromuscular re-education and progress toward the following goals:    Plan of Care Expires:  01/05/23    Subjective     Chief Complaint: fluid in legs  Patient/Family Comments/goals: "I walked the block yesterday:"  Pain/Comfort:  Pain Rating 1: 0/10  Pain Rating Post-Intervention 1: 0/10      Objective:     Communicated with Rn prior to session.  Patient found sitting edge of bed with telemetry, PICC line upon PT entry to room.     General Precautions: Standard, fall  Orthopedic Precautions: N/A  Braces: N/A  Respiratory Status: Room air     Functional Mobility:  Bed Mobility:   Not assessed, pt found and returned to sitting EOB  Transfers:   Sit <> Stand Transfer: supervision with no assistive device   Pt able to complete while practicing sternal " precautions.   Gait:  Pt ambulated ~300ft with stand by assistance and no assistive device. Therapist managed IV pole   Gait Pattern observed: reciprocal gait  Gait Deviation(s): most steady, slow gait  Verbal/tactile cues for pacing and upright posture  Donned mask and gown for backside prior to ambulating the hallway  Denies chest pain and dizziness, reports slight SOB but easily recovers  No LOB and no rest break     AM-PAC 6 CLICK MOBILITY  Turning over in bed (including adjusting bedclothes, sheets and blankets)?: 4  Sitting down on and standing up from a chair with arms (e.g., wheelchair, bedside commode, etc.): 4  Moving from lying on back to sitting on the side of the bed?: 4  Moving to and from a bed to a chair (including a wheelchair)?: 4  Need to walk in hospital room?: 3  Climbing 3-5 steps with a railing?: 3  Basic Mobility Total Score: 22     Treatment & Education:  Pt willing to practice sternal precautions and educated about sternal precautions and able to maintained  Pt with ROXIE hose donned and educated about elevating LE to assist with swelling.   Patient educated on role of therapy, goals of session, and benefits of out of bed mobility.   Instructed on use of call button and importance of calling nursing staff for assistance with mobility   Questions/concerns addressed within PTA scope of practice  Pt verbalized understanding.  Whiteboard Updated    Patient left sitting edge of bed with all lines intact, call button in reach, and spouse present..    GOALS:   Multidisciplinary Problems       Physical Therapy Goals          Problem: Physical Therapy    Goal Priority Disciplines Outcome Goal Variances Interventions   Physical Therapy Goal     PT, PT/OT Ongoing, Progressing     Description: Goals to be met by: 22    Patient will increase functional independence with mobility by performin. Gait  x 300 feet with independence using LRAD as needed                         Time Tracking:     PT  Received On: 12/09/22  PT Start Time: 1113     PT Stop Time: 1129  PT Total Time (min): 16 min     Billable Minutes: Gait Training 16    Treatment Type: Treatment  PT/PTA: PTA     PTA Visit Number: 1     12/09/2022

## 2022-12-09 NOTE — ASSESSMENT & PLAN NOTE
-POP2JN9-PLCw score is 2  -On Eliquis at home for anticoagulation  -Stopped on 12/5 in anticipation of workup and possible procedures  -On Lovenox for DVT prophylaxis  -continue amio/dig

## 2022-12-09 NOTE — SUBJECTIVE & OBJECTIVE
Interval History: Net negative another 5L/24h on lasix 20mg/hr. Will transition to IVP with first dose tonight (remained on lasix gtt through noon today).         Intake/Output Summary (Last 24 hours) at 12/9/2022 1206  Last data filed at 12/9/2022 1202  Gross per 24 hour   Intake 1080 ml   Output 5420 ml   Net -4340 ml             Continuous Infusions:   DOBUTamine IV infusion (non-titrating) 5 mcg/kg/min (12/09/22 0035)     Scheduled Meds:   amiodarone  200 mg Oral Daily    digoxin  0.125 mg Oral Daily    enoxaparin  40 mg Subcutaneous Daily    furosemide (LASIX) injection  80 mg Intravenous BID    mupirocin   Nasal BID    pantoprazole  40 mg Oral Daily    sacubitriL-valsartan  1 tablet Oral BID     PRN Meds:acetaminophen, sodium chloride 0.9%    Review of patient's allergies indicates:  No Known Allergies  Objective:     Vital Signs (Most Recent):  Temp: 97.1 °F (36.2 °C) (12/09/22 1114)  Pulse: 77 (12/09/22 1156)  Resp: 16 (12/09/22 1114)  BP: (!) 93/58 (12/09/22 1114)  SpO2: 95 % (12/09/22 1114) Vital Signs (24h Range):  Temp:  [97.1 °F (36.2 °C)-98.7 °F (37.1 °C)] 97.1 °F (36.2 °C)  Pulse:  [] 77  Resp:  [16-20] 16  SpO2:  [95 %-99 %] 95 %  BP: ()/(57-64) 93/58     Patient Vitals for the past 72 hrs (Last 3 readings):   Weight   12/09/22 0723 78.5 kg (173 lb 1 oz)   12/08/22 0731 83.7 kg (184 lb 8.4 oz)   12/07/22 0550 83.1 kg (183 lb 3.2 oz)       Body mass index is 27.93 kg/m².      Intake/Output Summary (Last 24 hours) at 12/9/2022 1206  Last data filed at 12/9/2022 1202  Gross per 24 hour   Intake 1080 ml   Output 5420 ml   Net -4340 ml         Hemodynamic Parameters:  CVP:  [22 mmHg] 22 mmHg    Telemetry: reviewed  Physical Exam  Vitals and nursing note reviewed.   Constitutional:       Appearance: Normal appearance.   HENT:      Head: Normocephalic.      Nose: Nose normal.      Mouth/Throat:      Mouth: Mucous membranes are moist.   Neck:      Comments: JVD elevated to  jawline  Cardiovascular:      Rate and Rhythm: Tachycardia present. Rhythm irregular.      Heart sounds: Murmur heard.     Gallop present.   Pulmonary:      Effort: Pulmonary effort is normal.   Abdominal:      General: Abdomen is flat. There is no distension.      Palpations: Abdomen is soft.   Musculoskeletal:         General: Swelling present. Normal range of motion.   Skin:     General: Skin is warm.      Capillary Refill: Capillary refill takes 2 to 3 seconds.   Neurological:      Mental Status: He is alert and oriented to person, place, and time.       Significant Labs:  CBC:  Recent Labs   Lab 12/07/22  0936 12/08/22  0545 12/09/22  0547   WBC 6.26 5.08 5.55   RBC 4.30* 3.36* 4.14*   HGB 11.6* 9.0* 11.3*   HCT 38.5* 30.9* 37.4*    153 223   MCV 90 92 90   MCH 27.0 26.8* 27.3   MCHC 30.1* 29.1* 30.2*       BNP:  Recent Labs   Lab 12/07/22  0936 12/09/22  0547   BNP 1,889* 1,344*       CMP:  Recent Labs   Lab 12/06/22  0523 12/07/22  0936 12/07/22  2311 12/08/22  0439 12/09/22  0547   * 139* 89 103 117*   CALCIUM 9.0 9.7 8.1* 9.1 9.4   ALBUMIN 3.8 3.9  --   --  3.7   PROT 6.7 7.1  --   --  7.0    138 140 139 138   K 4.1 3.8 3.5 4.2 4.0   CO2 34* 39* 34* 36* 36*   CL 94* 90* 98 94* 91*   BUN 43* 41* 41* 45* 44*   CREATININE 1.4 1.5* 1.6* 1.6* 1.8*   ALKPHOS 225* 213*  --   --  231*   ALT 37 35  --   --  33   AST 30 28  --   --  31   BILITOT 4.8* 5.2*  --   --  3.8*        Coagulation:   Recent Labs   Lab 12/05/22  0156   INR 1.4*   APTT 38.2*       LDH:  No results for input(s): LDH in the last 72 hours.  Microbiology:  Microbiology Results (last 7 days)       ** No results found for the last 168 hours. **            I have reviewed all pertinent labs within the past 24 hours.    Estimated Creatinine Clearance: 46.3 mL/min (A) (based on SCr of 1.8 mg/dL (H)).    Diagnostic Results:  I have reviewed all pertinent imaging results/findings within the past 24 hours.

## 2022-12-09 NOTE — PROGRESS NOTES
[facility] - Cardiology Stepdown  Heart Transplant  Progress Note    Patient Name: Migel Garcia  MRN: [id number]  Admission Date: 12/5/2022  Hospital Length of Stay: 4 days  Attending Physician: Brando Parada MD  Primary Care Provider: Anuj Banks MD  Principal Problem:Acute on chronic combined systolic and diastolic heart failure    Subjective:     Interval History: Net negative another 5L/24h on lasix 20mg/hr. Will transition to IVP with first dose tonight (remained on lasix gtt through noon today).         Intake/Output Summary (Last 24 hours) at 12/9/2022 1206  Last data filed at 12/9/2022 1202  Gross per 24 hour   Intake 1080 ml   Output 5420 ml   Net -4340 ml             Continuous Infusions:   DOBUTamine IV infusion (non-titrating) 5 mcg/kg/min (12/09/22 0035)     Scheduled Meds:   amiodarone  200 mg Oral Daily    digoxin  0.125 mg Oral Daily    enoxaparin  40 mg Subcutaneous Daily    furosemide (LASIX) injection  80 mg Intravenous BID    mupirocin   Nasal BID    pantoprazole  40 mg Oral Daily    sacubitriL-valsartan  1 tablet Oral BID     PRN Meds:acetaminophen, sodium chloride 0.9%    Review of patient's allergies indicates:  No Known Allergies  Objective:     Vital Signs (Most Recent):  Temp: 97.1 °F (36.2 °C) (12/09/22 1114)  Pulse: 77 (12/09/22 1156)  Resp: 16 (12/09/22 1114)  BP: (!) 93/58 (12/09/22 1114)  SpO2: 95 % (12/09/22 1114) Vital Signs (24h Range):  Temp:  [97.1 °F (36.2 °C)-98.7 °F (37.1 °C)] 97.1 °F (36.2 °C)  Pulse:  [] 77  Resp:  [16-20] 16  SpO2:  [95 %-99 %] 95 %  BP: ()/(57-64) 93/58     Patient Vitals for the past 72 hrs (Last 3 readings):   Weight   12/09/22 0723 78.5 kg (173 lb 1 oz)   12/08/22 0731 83.7 kg (184 lb 8.4 oz)   12/07/22 0550 83.1 kg (183 lb 3.2 oz)       Body mass index is 27.93 kg/m².      Intake/Output Summary (Last 24 hours) at 12/9/2022 1206  Last data filed at 12/9/2022 1202  Gross per 24 hour   Intake 1080 ml   Output 5420 ml   Net  -4340 ml         Hemodynamic Parameters:  CVP:  [22 mmHg] 22 mmHg    Telemetry: reviewed  Physical Exam  Vitals and nursing note reviewed.   Constitutional:       Appearance: Normal appearance.   HENT:      Head: Normocephalic.      Nose: Nose normal.      Mouth/Throat:      Mouth: Mucous membranes are moist.   Neck:      Comments: JVD elevated to jawline  Cardiovascular:      Rate and Rhythm: Tachycardia present. Rhythm irregular.      Heart sounds: Murmur heard.     Gallop present.   Pulmonary:      Effort: Pulmonary effort is normal.   Abdominal:      General: Abdomen is flat. There is no distension.      Palpations: Abdomen is soft.   Musculoskeletal:         General: Swelling present. Normal range of motion.   Skin:     General: Skin is warm.      Capillary Refill: Capillary refill takes 2 to 3 seconds.   Neurological:      Mental Status: He is alert and oriented to person, place, and time.       Significant Labs:  CBC:  Recent Labs   Lab 12/07/22  0936 12/08/22  0545 12/09/22  0547   WBC 6.26 5.08 5.55   RBC 4.30* 3.36* 4.14*   HGB 11.6* 9.0* 11.3*   HCT 38.5* 30.9* 37.4*    153 223   MCV 90 92 90   MCH 27.0 26.8* 27.3   MCHC 30.1* 29.1* 30.2*       BNP:  Recent Labs   Lab 12/07/22  0936 12/09/22  0547   BNP 1,889* 1,344*       CMP:  Recent Labs   Lab 12/06/22  0523 12/07/22  0936 12/07/22  2311 12/08/22  0439 12/09/22  0547   * 139* 89 103 117*   CALCIUM 9.0 9.7 8.1* 9.1 9.4   ALBUMIN 3.8 3.9  --   --  3.7   PROT 6.7 7.1  --   --  7.0    138 140 139 138   K 4.1 3.8 3.5 4.2 4.0   CO2 34* 39* 34* 36* 36*   CL 94* 90* 98 94* 91*   BUN 43* 41* 41* 45* 44*   CREATININE 1.4 1.5* 1.6* 1.6* 1.8*   ALKPHOS 225* 213*  --   --  231*   ALT 37 35  --   --  33   AST 30 28  --   --  31   BILITOT 4.8* 5.2*  --   --  3.8*        Coagulation:   Recent Labs   Lab 12/05/22  0156   INR 1.4*   APTT 38.2*       LDH:  No results for input(s): LDH in the last 72 hours.  Microbiology:  Microbiology Results (last 7  days)       ** No results found for the last 168 hours. **            I have reviewed all pertinent labs within the past 24 hours.    Estimated Creatinine Clearance: 46.3 mL/min (A) (based on SCr of 1.8 mg/dL (H)).    Diagnostic Results:  I have reviewed all pertinent imaging results/findings within the past 24 hours.    Assessment and Plan:     52 year old man with a history of non-ischemic cardiomyopathy with ICD placement, CKD 3, atrial fibrillation [place]  [place] [facility] [place], on dobutamine 5. Echo 12/6 with EF of 25%. Pathway for LVAD/heart transplant started      * Acute on chronic combined systolic and diastolic heart failure  -NICM  -Transferred from [place] for ADHF and possible workup.  During hospital stay there; he was briefly on Levo and started on  and they were unable to wean  off.  Presented on  5.   -Last 2D Echo 12/6/22: LVEF: 25% , LVEDD:  5.33cm  -Hypervolemic on examination today  -Current diuretic regimen: Furosemide gtt at 20mg/hr and patient is net negative 5 liters in the last 24 hours. Previous home diuretic dose was Lasix 80mg BID. Given robust response to infusion, will transition to IVP lasix first dose tonight.  -GDMT with home dose of Digoxin, Entresto 24-26.  Was previously on Toprol 100mg QHS but holding in the setting of   -Patient is currently being evaluated for OHTx/VAD  -Heart failure pathway Step 2  -2g Na dietary restriction, 1500 mL fluid restriction, strict I/Os      Pulmonary nodule  -Questionable 8 mm left lower lobe pulmonary nodule series 6, image 314.  This courses along the left lower lobe pulmonary artery and pulmonary aneurysm not excluded.  Dedicated CT chest with contrast suggested.  -Will plan for CT with contrast once patient is euvolemic and creatinine remains stable.     Atrial fibrillation  -[id number]-CBSa score is 2  -On Eliquis at home for anticoagulation  -Stopped on 12/5 in anticipation of workup and possible procedures  -On  Lovenox for DVT prophylaxis  -continue amio/dig    ICD (implantable cardioverter-defibrillator), single, in situ  -Will find out the brand of device and order interrogation     HTN (hypertension)  Continue entresto    CKD (chronic kidney disease), stage III  -Reported baseline 1.6-2.0  -Will monitor trend  -avoid nephrotoxins     Tobacco use  -counseled on cessation    GERD (gastroesophageal reflux disease)  -Protonix 40mg Qdaily      Yenni Barnes PA-C  Heart Transplant  [facility] - Cardiology Stepdown

## 2022-12-10 LAB
ANION GAP SERPL CALC-SCNC: 12 MMOL/L (ref 8–16)
APTT BLDCRRT: 32.9 SEC (ref 21–32)
APTT BLDCRRT: 33.3 SEC (ref 21–32)
APTT BLDCRRT: 33.6 SEC (ref 21–32)
APTT BLDCRRT: 33.6 SEC (ref 21–32)
APTT BLDCRRT: 35 SEC (ref 21–32)
BASOPHILS # BLD AUTO: 0.08 K/UL (ref 0–0.2)
BASOPHILS NFR BLD: 1.3 % (ref 0–1.9)
BUN SERPL-MCNC: 48 MG/DL (ref 6–20)
CALCIUM SERPL-MCNC: 8.9 MG/DL (ref 8.7–10.5)
CHLORIDE SERPL-SCNC: 94 MMOL/L (ref 95–110)
CO2 SERPL-SCNC: 33 MMOL/L (ref 23–29)
CREAT SERPL-MCNC: 1.8 MG/DL (ref 0.5–1.4)
CRP SERPL-MCNC: 7.3 MG/L (ref 0–8.2)
DIFFERENTIAL METHOD: ABNORMAL
EOSINOPHIL # BLD AUTO: 0.2 K/UL (ref 0–0.5)
EOSINOPHIL NFR BLD: 3.3 % (ref 0–8)
ERYTHROCYTE [DISTWIDTH] IN BLOOD BY AUTOMATED COUNT: 21.1 % (ref 11.5–14.5)
EST. GFR  (NO RACE VARIABLE): 44.2 ML/MIN/1.73 M^2
FIBRINOGEN PPP-MCNC: 312 MG/DL (ref 182–400)
GLUCOSE SERPL-MCNC: 74 MG/DL (ref 70–110)
HCT VFR BLD AUTO: 36.5 % (ref 40–54)
HCYS SERPL-SCNC: 20.4 UMOL/L (ref 4–16.5)
HGB BLD-MCNC: 11.3 G/DL (ref 14–18)
IMM GRANULOCYTES # BLD AUTO: 0.07 K/UL (ref 0–0.04)
IMM GRANULOCYTES NFR BLD AUTO: 1.2 % (ref 0–0.5)
INR PPP: 1.2 (ref 0.8–1.2)
LYMPHOCYTES # BLD AUTO: 1.1 K/UL (ref 1–4.8)
LYMPHOCYTES NFR BLD: 18 % (ref 18–48)
MAGNESIUM SERPL-MCNC: 2.2 MG/DL (ref 1.6–2.6)
MCH RBC QN AUTO: 27.7 PG (ref 27–31)
MCHC RBC AUTO-ENTMCNC: 31 G/DL (ref 32–36)
MCV RBC AUTO: 90 FL (ref 82–98)
MONOCYTES # BLD AUTO: 0.5 K/UL (ref 0.3–1)
MONOCYTES NFR BLD: 8.3 % (ref 4–15)
NEUTROPHILS # BLD AUTO: 4.1 K/UL (ref 1.8–7.7)
NEUTROPHILS NFR BLD: 67.9 % (ref 38–73)
NRBC BLD-RTO: 0 /100 WBC
PLATELET # BLD AUTO: 235 K/UL (ref 150–450)
PMV BLD AUTO: 9.6 FL (ref 9.2–12.9)
POTASSIUM SERPL-SCNC: 3.9 MMOL/L (ref 3.5–5.1)
PROTHROMBIN TIME: 12.3 SEC (ref 9–12.5)
RBC # BLD AUTO: 4.08 M/UL (ref 4.6–6.2)
SODIUM SERPL-SCNC: 139 MMOL/L (ref 136–145)
T4 FREE SERPL-MCNC: 0.89 NG/DL (ref 0.71–1.51)
TSH SERPL DL<=0.005 MIU/L-ACNC: 6.48 UIU/ML (ref 0.4–4)
WBC # BLD AUTO: 6.04 K/UL (ref 3.9–12.7)

## 2022-12-10 PROCEDURE — 99233 SBSQ HOSP IP/OBS HIGH 50: CPT | Mod: GC,,, | Performed by: PHYSICIAN ASSISTANT

## 2022-12-10 PROCEDURE — 85732 THROMBOPLASTIN TIME PARTIAL: CPT | Performed by: PHYSICIAN ASSISTANT

## 2022-12-10 PROCEDURE — 85302 CLOT INHIBIT PROT C ANTIGEN: CPT | Performed by: PHYSICIAN ASSISTANT

## 2022-12-10 PROCEDURE — 25000003 PHARM REV CODE 250: Performed by: INTERNAL MEDICINE

## 2022-12-10 PROCEDURE — 84439 ASSAY OF FREE THYROXINE: CPT | Performed by: PHYSICIAN ASSISTANT

## 2022-12-10 PROCEDURE — 85301 ANTITHROMBIN III ANTIGEN: CPT | Performed by: PHYSICIAN ASSISTANT

## 2022-12-10 PROCEDURE — 85730 THROMBOPLASTIN TIME PARTIAL: CPT | Mod: 91 | Performed by: PHYSICIAN ASSISTANT

## 2022-12-10 PROCEDURE — 85384 FIBRINOGEN ACTIVITY: CPT | Performed by: PHYSICIAN ASSISTANT

## 2022-12-10 PROCEDURE — 82955 ASSAY OF G6PD ENZYME: CPT | Performed by: PHYSICIAN ASSISTANT

## 2022-12-10 PROCEDURE — 99233 SBSQ HOSP IP/OBS HIGH 50: CPT | Mod: ,,, | Performed by: INTERNAL MEDICINE

## 2022-12-10 PROCEDURE — 63600175 PHARM REV CODE 636 W HCPCS: Performed by: INTERNAL MEDICINE

## 2022-12-10 PROCEDURE — 85730 THROMBOPLASTIN TIME PARTIAL: CPT | Mod: 91 | Performed by: INTERNAL MEDICINE

## 2022-12-10 PROCEDURE — 85025 COMPLETE CBC W/AUTO DIFF WBC: CPT | Performed by: PHYSICIAN ASSISTANT

## 2022-12-10 PROCEDURE — 86140 C-REACTIVE PROTEIN: CPT | Performed by: PHYSICIAN ASSISTANT

## 2022-12-10 PROCEDURE — 20600001 HC STEP DOWN PRIVATE ROOM

## 2022-12-10 PROCEDURE — 99223 1ST HOSP IP/OBS HIGH 75: CPT | Mod: GC,,, | Performed by: INTERNAL MEDICINE

## 2022-12-10 PROCEDURE — 85610 PROTHROMBIN TIME: CPT | Performed by: PHYSICIAN ASSISTANT

## 2022-12-10 PROCEDURE — 99233 PR SUBSEQUENT HOSPITAL CARE,LEVL III: ICD-10-PCS | Mod: GC,,, | Performed by: PHYSICIAN ASSISTANT

## 2022-12-10 PROCEDURE — 83735 ASSAY OF MAGNESIUM: CPT | Performed by: PHYSICIAN ASSISTANT

## 2022-12-10 PROCEDURE — 84443 ASSAY THYROID STIM HORMONE: CPT | Performed by: PHYSICIAN ASSISTANT

## 2022-12-10 PROCEDURE — 85305 CLOT INHIBIT PROT S TOTAL: CPT | Performed by: PHYSICIAN ASSISTANT

## 2022-12-10 PROCEDURE — 85670 THROMBIN TIME PLASMA: CPT | Performed by: PHYSICIAN ASSISTANT

## 2022-12-10 PROCEDURE — 99233 PR SUBSEQUENT HOSPITAL CARE,LEVL III: ICD-10-PCS | Mod: ,,, | Performed by: INTERNAL MEDICINE

## 2022-12-10 PROCEDURE — 80048 BASIC METABOLIC PNL TOTAL CA: CPT | Performed by: PHYSICIAN ASSISTANT

## 2022-12-10 PROCEDURE — 85730 THROMBOPLASTIN TIME PARTIAL: CPT | Performed by: PHYSICIAN ASSISTANT

## 2022-12-10 PROCEDURE — 99223 PR INITIAL HOSPITAL CARE,LEVL III: ICD-10-PCS | Mod: GC,,, | Performed by: INTERNAL MEDICINE

## 2022-12-10 PROCEDURE — 85306 CLOT INHIBIT PROT S FREE: CPT | Performed by: PHYSICIAN ASSISTANT

## 2022-12-10 PROCEDURE — 83090 ASSAY OF HOMOCYSTEINE: CPT | Performed by: PHYSICIAN ASSISTANT

## 2022-12-10 PROCEDURE — 63600175 PHARM REV CODE 636 W HCPCS: Performed by: PHYSICIAN ASSISTANT

## 2022-12-10 RX ORDER — POLYETHYLENE GLYCOL 3350, SODIUM SULFATE ANHYDROUS, SODIUM BICARBONATE, SODIUM CHLORIDE, POTASSIUM CHLORIDE 236; 22.74; 6.74; 5.86; 2.97 G/4L; G/4L; G/4L; G/4L; G/4L
4000 POWDER, FOR SOLUTION ORAL ONCE
Status: COMPLETED | OUTPATIENT
Start: 2022-12-11 | End: 2022-12-11

## 2022-12-10 RX ADMIN — AMIODARONE HYDROCHLORIDE 200 MG: 200 TABLET ORAL at 09:12

## 2022-12-10 RX ADMIN — DOBUTAMINE HYDROCHLORIDE 5 MCG/KG/MIN: 400 INJECTION INTRAVENOUS at 03:12

## 2022-12-10 RX ADMIN — MUPIROCIN: 20 OINTMENT TOPICAL at 09:12

## 2022-12-10 RX ADMIN — FUROSEMIDE 80 MG: 10 INJECTION, SOLUTION INTRAMUSCULAR; INTRAVENOUS at 06:12

## 2022-12-10 RX ADMIN — DIGOXIN 0.12 MG: 125 TABLET ORAL at 09:12

## 2022-12-10 RX ADMIN — HEPARIN SODIUM 20 UNITS/KG/HR: 10000 INJECTION, SOLUTION INTRAVENOUS at 06:12

## 2022-12-10 RX ADMIN — FUROSEMIDE 80 MG: 10 INJECTION, SOLUTION INTRAMUSCULAR; INTRAVENOUS at 09:12

## 2022-12-10 RX ADMIN — PANTOPRAZOLE SODIUM 40 MG: 40 TABLET, DELAYED RELEASE ORAL at 09:12

## 2022-12-10 RX ADMIN — SACUBITRIL AND VALSARTAN 1 TABLET: 24; 26 TABLET, FILM COATED ORAL at 09:12

## 2022-12-10 NOTE — CONSULTS
[facility]  Gastroenterology  Consult Note    Patient Name: Migel Garcia  MRN: [id number]  Admission Date: 12/5/2022  Hospital Length of Stay: 5 days  Code Status: Full Code   Attending Provider:  Dr. Trisha Alvarado  Consulting Provider: Chuck Cruz MD  Primary Care Physician: Anuj Banks MD  Principal Problem:Acute on chronic combined systolic and diastolic heart failure    Inpatient consult to Gastroenterology  Consult performed by: Chuck Cruz MD  Consult ordered by: Grecia Gillis PA-C      Subjective:     HPI: Migel Garcia is a 52 y.o. male with history of non-ischemic cardiomyopathy with ICD placement, CKD 3, atrial fibrillation presents from [place] for Acute on chronic combined systolic and diastolic heart failure & possible workup for heart transplant.  Was initially hypovolemic requiring vasopressor support. GI has been consulted for colorectal cancer screening as part of his workup.     He has never had a colonoscopy. No family history of colon or rectal cancer. Does not report rectal bleeding, change in bowel habits or unexplained weight loss. Outpatient, he was on Eliquis for atrial fibrillation. Currently on heparin gtt.         Past Medical History:   Diagnosis Date    A-fib     GERD (gastroesophageal reflux disease)     Heart failure, unspecified     Hypertension     V-tach        No past surgical history on file.    No family history on file.    Social History     Socioeconomic History    Marital status:    Substance and Sexual Activity    Alcohol use: Not Currently       No current facility-administered medications on file prior to encounter.     No current outpatient medications on file prior to encounter.       Review of patient's allergies indicates:  No Known Allergies    Review of Systems   Constitutional:  Negative for chills, fever and weight loss.   HENT:  Negative for hearing loss and tinnitus.    Eyes:  Negative for discharge and redness.    Respiratory:  Positive for shortness of breath. Negative for cough.    Cardiovascular:  Positive for orthopnea and leg swelling.   Gastrointestinal:  Negative for blood in stool, constipation and diarrhea.   Genitourinary:  Negative for hematuria.   Musculoskeletal:  Negative for falls.   Neurological:  Negative for seizures.   Psychiatric/Behavioral:  Negative for hallucinations and substance abuse.       Objective:     Vitals:    12/10/22 0408   BP: (!) 112/52   Pulse: 87   Resp: 18   Temp: 97.7 °F (36.5 °C)       Physical Exam  Vitals and nursing note reviewed.   Constitutional:       Appearance: Normal appearance.   HENT:      Head: Normocephalic.      Nose: Nose normal.      Mouth/Throat:      Mouth: Mucous membranes are moist.   Neck:      Comments: JVD elevated to jawline  Cardiovascular:      Rate and Rhythm: Tachycardia present. Rhythm irregular.      Heart sounds: Murmur heard.     Gallop present.   Pulmonary:      Effort: Pulmonary effort is normal.   Abdominal:      General: Abdomen is flat. There is no distension.      Palpations: Abdomen is soft.   Musculoskeletal:         General: Swelling present. Normal range of motion.   Skin:     General: Skin is warm.      Capillary Refill: Capillary refill takes 2 to 3 seconds.   Neurological:      Mental Status: He is alert and oriented to person, place, and time.        Significant Labs:  Recent Labs   Lab 12/08/22  0545 12/09/22  0547 12/10/22  0423   HGB 9.0* 11.3* 11.3*       Lab Results   Component Value Date    WBC 6.04 12/10/2022    HGB 11.3 (L) 12/10/2022    HCT 36.5 (L) 12/10/2022    MCV 90 12/10/2022     12/10/2022       Lab Results   Component Value Date     12/10/2022    K 3.9 12/10/2022    CL 94 (L) 12/10/2022    CO2 33 (H) 12/10/2022    BUN 48 (H) 12/10/2022    CREATININE 1.8 (H) 12/10/2022    CALCIUM 8.9 12/10/2022    ANIONGAP 12 12/10/2022       Lab Results   Component Value Date    ALT 33 12/09/2022    AST 31 12/09/2022     ALKPHOS 231 (H) 12/09/2022    BILITOT 3.8 (H) 12/09/2022       Lab Results   Component Value Date    INR 1.2 12/10/2022    INR 1.4 (H) 12/05/2022       Significant Imaging:  Reviewed pertinent radiology findings.       Assessment/Plan:     Migel Garcia is a 52 y.o. male with history of non-ischemic cardiomyopathy with ICD placement, CKD 3, atrial fibrillation presents from [place] for Acute on chronic combined systolic and diastolic heart failure & possible workup for heart transplant. GI consulted for CRC screening. He has never had a colonoscopy. No family history of colon or rectal cancer. Does not report rectal bleeding, change in bowel habits or unexplained weight loss.    Problem List:  Acute on chronic combined systolic and diastolic heart failure  Screening for colorectal cancer        Recommendations:  - Plan for colonoscopy on Monday  - Clear liquid diet tomorrow and NPO at midnight before procedure  - Stop heparin gtt 4-6 hours before procedure.   - Golytely prep ordered for tomorrow  - Transfuse to keep Hgb >7, plts >50  - Hold anticoagulants if safe to do so per primary team      Thank you for involving us in the care of Migel Garcia. Please call with any additional questions, concerns or changes in the patient's clinical status. We will continue to follow.     Chuck Cruz MD  Gastroenterology Fellow PGY IV  [facility]

## 2022-12-10 NOTE — PROGRESS NOTES
[facility] - Cardiology Stepdown  Heart Transplant  Progress Note    Patient Name: Migel Garcia  MRN: [id number]  Admission Date: 12/5/2022  Hospital Length of Stay: 5 days  Attending Physician: Brando Parada MD  Primary Care Provider: Anuj Banks MD  Principal Problem:Acute on chronic combined systolic and diastolic heart failure    Subjective:     Interval History: Net negative another 650cc over the last 24 hours. Stable kidney function. Still volume up on exam so continuing the same.         Intake/Output Summary (Last 24 hours) at 12/10/2022 1454  Last data filed at 12/10/2022 0543  Gross per 24 hour   Intake 480 ml   Output 470 ml   Net 10 ml             Continuous Infusions:   DOBUTamine IV infusion (non-titrating) 5 mcg/kg/min (12/09/22 1836)    heparin (porcine) in D5W 16 Units/kg/hr (12/10/22 0956)     Scheduled Meds:   amiodarone  200 mg Oral Daily    digoxin  0.125 mg Oral Daily    furosemide (LASIX) injection  80 mg Intravenous BID    mupirocin   Nasal BID    pantoprazole  40 mg Oral Daily    [START ON 12/11/2022] polyethylene glycol  4,000 mL Oral Once    sacubitriL-valsartan  1 tablet Oral BID     PRN Meds:acetaminophen, sodium chloride 0.9%    Review of patient's allergies indicates:  No Known Allergies  Objective:     Vital Signs (Most Recent):  Temp: 98 °F (36.7 °C) (12/10/22 0731)  Pulse: 78 (12/10/22 1204)  Resp: 16 (12/10/22 0731)  BP: 103/64 (12/10/22 0731)  SpO2: (!) 91 % (12/10/22 0731) Vital Signs (24h Range):  Temp:  [97.4 °F (36.3 °C)-98 °F (36.7 °C)] 98 °F (36.7 °C)  Pulse:  [74-98] 78  Resp:  [16-18] 16  SpO2:  [91 %-100 %] 91 %  BP: ()/(50-64) 103/64     Patient Vitals for the past 72 hrs (Last 3 readings):   Weight   12/10/22 0742 78.7 kg (173 lb 8 oz)   12/09/22 0723 78.5 kg (173 lb 1 oz)   12/08/22 0731 83.7 kg (184 lb 8.4 oz)       Body mass index is 28 kg/m².      Intake/Output Summary (Last 24 hours) at 12/10/2022 1454  Last data filed at 12/10/2022  0543  Gross per 24 hour   Intake 480 ml   Output 470 ml   Net 10 ml         Hemodynamic Parameters:  CVP:  [14 mmHg] 14 mmHg    Telemetry: reviewed  Physical Exam  Vitals and nursing note reviewed.   Constitutional:       Appearance: Normal appearance.   HENT:      Head: Normocephalic.      Nose: Nose normal.      Mouth/Throat:      Mouth: Mucous membranes are moist.   Neck:      Comments: JVD to mid neck.   Cardiovascular:      Rate and Rhythm: Tachycardia present. Rhythm irregular.      Heart sounds: Murmur heard.     Gallop present.   Pulmonary:      Effort: Pulmonary effort is normal.   Abdominal:      General: Abdomen is flat. There is no distension.      Palpations: Abdomen is soft.   Musculoskeletal:         General: Swelling present. Normal range of motion.   Skin:     General: Skin is warm.      Capillary Refill: Capillary refill takes 2 to 3 seconds.   Neurological:      Mental Status: He is alert and oriented to person, place, and time.       Significant Labs:  CBC:  Recent Labs   Lab 12/08/22  0545 12/09/22  0547 12/10/22  0423   WBC 5.08 5.55 6.04   RBC 3.36* 4.14* 4.08*   HGB 9.0* 11.3* 11.3*   HCT 30.9* 37.4* 36.5*    223 235   MCV 92 90 90   MCH 26.8* 27.3 27.7   MCHC 29.1* 30.2* 31.0*       BNP:  Recent Labs   Lab 12/07/22  0936 12/09/22  0547   BNP 1,889* 1,344*       CMP:  Recent Labs   Lab 12/06/22  0523 12/07/22  0936 12/07/22  2311 12/09/22  0547 12/09/22  1711 12/10/22  0423   * 139*   < > 117* 125* 74   CALCIUM 9.0 9.7   < > 9.4 9.3 8.9   ALBUMIN 3.8 3.9  --  3.7  --   --    PROT 6.7 7.1  --  7.0  --   --     138   < > 138 137 139   K 4.1 3.8   < > 4.0 4.0 3.9   CO2 34* 39*   < > 36* 34* 33*   CL 94* 90*   < > 91* 91* 94*   BUN 43* 41*   < > 44* 46* 48*   CREATININE 1.4 1.5*   < > 1.8* 1.8* 1.8*   ALKPHOS 225* 213*  --  231*  --   --    ALT 37 35  --  33  --   --    AST 30 28  --  31  --   --    BILITOT 4.8* 5.2*  --  3.8*  --   --     < > = values in this interval not  displayed.        Coagulation:   Recent Labs   Lab 12/05/22  0156 12/09/22  1711 12/10/22  0103 12/10/22  0423 12/10/22  0816   INR 1.4*  --   --  1.2  --    APTT 38.2*   < > 32.9* 33.6*  33.6* 35.0*    < > = values in this interval not displayed.       LDH:  Recent Labs   Lab 12/09/22  1711   *     Microbiology:  Microbiology Results (last 7 days)       ** No results found for the last 168 hours. **            I have reviewed all pertinent labs within the past 24 hours.    Estimated Creatinine Clearance: 46.3 mL/min (A) (based on SCr of 1.8 mg/dL (H)).    Diagnostic Results:  I have reviewed all pertinent imaging results/findings within the past 24 hours.    Assessment and Plan:     52 year old man with a history of non-ischemic cardiomyopathy with ICD placement, CKD 3, atrial fibrillation presents from  to [facility] for advanced options, on dobutamine 5. Echo 12/6 with EF of 25%. Pathway for LVAD/heart transplant started      * Acute on chronic combined systolic and diastolic heart failure  -NICM  -Transferred from [place] for ADHF and possible workup.  During hospital stay there; he was briefly on Levo and started on  and they were unable to wean  off.  Presented on  5.   -Last 2D Echo 12/6/22: LVEF: 25% , LVEDD:  5.33cm  -Hypervolemic on examination today  -Current diuretic regimen: Furosemide gtt at 20mg/hr and patient is net negative 5 liters in the last 24 hours. Previous home diuretic dose was Lasix 80mg BID. Given robust response to infusion, will transition to IVP lasix first dose tonight.  -GDMT with home dose of Digoxin, Entresto 24-26.  Was previously on Toprol 100mg QHS but holding in the setting of   -Patient is currently being evaluated for OHTx/VAD  -Heart failure pathway Step 2  -2g Na dietary restriction, 1500 mL fluid restriction, strict I/Os      Pulmonary nodule  -Questionable 8 mm left lower lobe pulmonary nodule series 6, image 314.  This courses along the left lower lobe  pulmonary artery and pulmonary aneurysm not excluded.  Dedicated CT chest with contrast suggested.  -Will plan for CT with contrast once patient is euvolemic and creatinine remains stable.     Atrial fibrillation  -[id number]-MYXs score is 2  -On Eliquis at home for anticoagulation  -Stopped on 12/5 in anticipation of workup and possible procedures  -On Lovenox for DVT prophylaxis  -continue amio/dig    ICD (implantable cardioverter-defibrillator), single, in situ  -Will find out the brand of device and order interrogation     HTN (hypertension)  Continue entresto    CKD (chronic kidney disease), stage III  -Reported baseline 1.6-2.0  -Will monitor trend  -avoid nephrotoxins     Tobacco use  -counseled on cessation    GERD (gastroesophageal reflux disease)  -Protonix 40mg Qdaily        Seven Aranda PA-C  Heart Transplant  [facility] - Cardiology Stepdown

## 2022-12-10 NOTE — CONSULTS
"[facility] - Cardiology Stepdown  Adult Nutrition  Consult Note    SUMMARY     Recommendations    1. Continue Cardiac diet. Fluid restriction per MD.     2. RD following.     Goals: Meet % of EEN/EPN by RD f/u date  Nutrition Goal Status: goal met  Communication of RD Recs: other (comment)    Assessment and Plan    Nutrition Problem  Increased protein/energy needs     Related to (etiology):   Physiological needs      Signs and Symptoms (as evidenced by):   HF     Interventions(treatment strategy):  Collaboration of nutrition care w/ other providers     Nutrition Diagnosis Status:   Continues     Reason for Assessment    Reason For Assessment: consult  Diagnosis: cardiac disease  Relevant Medical History: HTN, heart failure  Interdisciplinary Rounds: did not attend  General Information Comments: Pt reports good appetite, eating all his meals. Denies N/V/C/D. Pt reports no concerns with diet education at this time. NFPE not indicated, Pt appears nourished. RD to monitor.  Nutrition Discharge Planning: Cardiac, low sodium diet.    Nutrition Risk Screen    Nutrition Risk Screen: no indicators present    Nutrition/Diet History    Spiritual, Cultural Beliefs, Sikhism Practices, Values that Affect Care: no  Food Allergies: NKFA  Factors Affecting Nutritional Intake: None identified at this time    Anthropometrics    Temp: 98 °F (36.7 °C)  Height: 5' 6" (167.6 cm)  Height (inches): 66 in  Weight Method: Standard Scale  Weight: 78.7 kg (173 lb 8 oz)  Weight (lb): 173.5 lb  Ideal Body Weight (IBW), Male: 142 lb  % Ideal Body Weight, Male (lb): 140.85 %  BMI (Calculated): 28  BMI Grade: 30 - 34.9- obesity - grade I       Lab/Procedures/Meds    Pertinent Labs Reviewed: reviewed  Pertinent Labs Comments: BUN 48, Cr 1.8  Pertinent Medications Reviewed: reviewed  Pertinent Medications Comments: furosemide    Physical Findings/Assessment         Estimated/Assessed Needs    Weight Used For Calorie Calculations: 90.7 kg (199 " lb 15.3 oz)  Energy Calorie Requirements (kcal): 1857  Energy Need Method: [place]-St Jeor (PAL 1.1)  Protein Requirements: 109 g (1.2 g/kg)  Weight Used For Protein Calculations: 90.7 kg (199 lb 15.3 oz)        RDA Method (mL): 1857         Nutrition Prescription Ordered    Current Diet Order: Cardiac    Evaluation of Received Nutrient/Fluid Intake    I/O: -21,7000  Energy Calories Required: meeting needs  Protein Required: meeting needs  Fluid Required: meeting needs  Total Fluid Intake (mL/kg): 1 ml or fluid per MD  Comments: LBM 12/9  Tolerance: tolerating  % Intake of Estimated Energy Needs: 75 - 100 %  % Meal Intake: 75 - 100 %    Nutrition Risk    Level of Risk/Frequency of Follow-up: low       Monitor and Evaluation    Food and Nutrient Intake: energy intake, food and beverage intake  Food and Nutrient Adminstration: diet order  Knowledge/Beliefs/Attitudes: food and nutrition knowledge/skill, beliefs and attitudes  Physical Activity and Function: nutrition-related ADLs and IADLs, factors affecting access to physical activity  Anthropometric Measurements: height/length, weight, weight change, body mass index, growth pattern indices/percentile ranks  Biochemical Data, Medical Tests and Procedures: electrolyte and renal panel, gastrointestinal profile, glucose/endocrine profile, inflammatory profile, lipid profile  Nutrition-Focused Physical Findings: overall appearance, head and eyes, extremities, muscles and bones, skin       Nutrition Follow-Up    RD Follow-up?: Yes

## 2022-12-10 NOTE — SUBJECTIVE & OBJECTIVE
Interval History: Net negative another 650cc over the last 24 hours. Stable kidney function. Still volume up on exam so continuing the same.         Intake/Output Summary (Last 24 hours) at 12/10/2022 1454  Last data filed at 12/10/2022 0543  Gross per 24 hour   Intake 480 ml   Output 470 ml   Net 10 ml             Continuous Infusions:   DOBUTamine IV infusion (non-titrating) 5 mcg/kg/min (12/09/22 1836)    heparin (porcine) in D5W 16 Units/kg/hr (12/10/22 0956)     Scheduled Meds:   amiodarone  200 mg Oral Daily    digoxin  0.125 mg Oral Daily    furosemide (LASIX) injection  80 mg Intravenous BID    mupirocin   Nasal BID    pantoprazole  40 mg Oral Daily    [START ON 12/11/2022] polyethylene glycol  4,000 mL Oral Once    sacubitriL-valsartan  1 tablet Oral BID     PRN Meds:acetaminophen, sodium chloride 0.9%    Review of patient's allergies indicates:  No Known Allergies  Objective:     Vital Signs (Most Recent):  Temp: 98 °F (36.7 °C) (12/10/22 0731)  Pulse: 78 (12/10/22 1204)  Resp: 16 (12/10/22 0731)  BP: 103/64 (12/10/22 0731)  SpO2: (!) 91 % (12/10/22 0731) Vital Signs (24h Range):  Temp:  [97.4 °F (36.3 °C)-98 °F (36.7 °C)] 98 °F (36.7 °C)  Pulse:  [74-98] 78  Resp:  [16-18] 16  SpO2:  [91 %-100 %] 91 %  BP: ()/(50-64) 103/64     Patient Vitals for the past 72 hrs (Last 3 readings):   Weight   12/10/22 0742 78.7 kg (173 lb 8 oz)   12/09/22 0723 78.5 kg (173 lb 1 oz)   12/08/22 0731 83.7 kg (184 lb 8.4 oz)       Body mass index is 28 kg/m².      Intake/Output Summary (Last 24 hours) at 12/10/2022 1454  Last data filed at 12/10/2022 0543  Gross per 24 hour   Intake 480 ml   Output 470 ml   Net 10 ml         Hemodynamic Parameters:  CVP:  [14 mmHg] 14 mmHg    Telemetry: reviewed  Physical Exam  Vitals and nursing note reviewed.   Constitutional:       Appearance: Normal appearance.   HENT:      Head: Normocephalic.      Nose: Nose normal.      Mouth/Throat:      Mouth: Mucous membranes are moist.   Neck:       Comments: JVD to mid neck.   Cardiovascular:      Rate and Rhythm: Tachycardia present. Rhythm irregular.      Heart sounds: Murmur heard.     Gallop present.   Pulmonary:      Effort: Pulmonary effort is normal.   Abdominal:      General: Abdomen is flat. There is no distension.      Palpations: Abdomen is soft.   Musculoskeletal:         General: Swelling present. Normal range of motion.   Skin:     General: Skin is warm.      Capillary Refill: Capillary refill takes 2 to 3 seconds.   Neurological:      Mental Status: He is alert and oriented to person, place, and time.       Significant Labs:  CBC:  Recent Labs   Lab 12/08/22  0545 12/09/22  0547 12/10/22  0423   WBC 5.08 5.55 6.04   RBC 3.36* 4.14* 4.08*   HGB 9.0* 11.3* 11.3*   HCT 30.9* 37.4* 36.5*    223 235   MCV 92 90 90   MCH 26.8* 27.3 27.7   MCHC 29.1* 30.2* 31.0*       BNP:  Recent Labs   Lab 12/07/22  0936 12/09/22  0547   BNP 1,889* 1,344*       CMP:  Recent Labs   Lab 12/06/22  0523 12/07/22  0936 12/07/22  2311 12/09/22  0547 12/09/22  1711 12/10/22  0423   * 139*   < > 117* 125* 74   CALCIUM 9.0 9.7   < > 9.4 9.3 8.9   ALBUMIN 3.8 3.9  --  3.7  --   --    PROT 6.7 7.1  --  7.0  --   --     138   < > 138 137 139   K 4.1 3.8   < > 4.0 4.0 3.9   CO2 34* 39*   < > 36* 34* 33*   CL 94* 90*   < > 91* 91* 94*   BUN 43* 41*   < > 44* 46* 48*   CREATININE 1.4 1.5*   < > 1.8* 1.8* 1.8*   ALKPHOS 225* 213*  --  231*  --   --    ALT 37 35  --  33  --   --    AST 30 28  --  31  --   --    BILITOT 4.8* 5.2*  --  3.8*  --   --     < > = values in this interval not displayed.        Coagulation:   Recent Labs   Lab 12/05/22  0156 12/09/22  1711 12/10/22  0103 12/10/22  0423 12/10/22  0816   INR 1.4*  --   --  1.2  --    APTT 38.2*   < > 32.9* 33.6*  33.6* 35.0*    < > = values in this interval not displayed.       LDH:  Recent Labs   Lab 12/09/22  1711   *     Microbiology:  Microbiology Results (last 7 days)       ** No results  found for the last 168 hours. **            I have reviewed all pertinent labs within the past 24 hours.    Estimated Creatinine Clearance: 46.3 mL/min (A) (based on SCr of 1.8 mg/dL (H)).    Diagnostic Results:  I have reviewed all pertinent imaging results/findings within the past 24 hours.

## 2022-12-10 NOTE — ASSESSMENT & PLAN NOTE
-NICM  -Transferred from [place] for ADHF and possible workup.  During hospital stay there; he was briefly on Levo and started on  and they were unable to wean  off.  Presented on  5.   -Last 2D Echo 12/6/22: LVEF: 25% , LVEDD:  5.33cm  -Hypervolemic on examination today  -Current diuretic regimen: Furosemide gtt at 20mg/hr and patient is net negative 5 liters in the last 24 hours. Previous home diuretic dose was Lasix 80mg BID. Given robust response to infusion, will transition to IVP lasix first dose tonight.  -GDMT with home dose of Digoxin, Entresto 24-26.  Was previously on Toprol 100mg QHS but holding in the setting of   -Patient is currently being evaluated for OHTx/VAD  -Heart failure pathway Step 2  -2g Na dietary restriction, 1500 mL fluid restriction, strict I/Os

## 2022-12-10 NOTE — PLAN OF CARE
Patient remained free from falls and injury throughout shift. No acute events overnight. Patient alert and oriented x4; vital signs stable. Patient denies chest pain and shortness of breath. Safety measures addressed --bed locked and in low position with siderails up x2 and call light/personal items within reach. Heparin infusing at 14 units. Dobutamine infusing at 5mcg. Lasix gtt discontinued in favor of IVP lasix BID. Plan of care reviewed with patient; all questions and concerns addressed. Patient verbalizes understanding. Continuing plan of care.      CVP 14

## 2022-12-10 NOTE — ASSESSMENT & PLAN NOTE
-[id number] score is 2  -On Eliquis at home for anticoagulation  -Stopped on 12/5 in anticipation of workup and possible procedures  -On Lovenox for DVT prophylaxis  -continue amio/dig

## 2022-12-10 NOTE — NURSING
Patient PTT resulted at 32.9. RN contacted [facility] because patient has no bolus ordered. Per the heparin nomogram, for a PTT of 32.9, patient should receive bolus and increase rate by 2 untis. Per tho Wilson to increase rate by 2 units without bolus. Heparin currently infusing at 12 units, will increase to 14 units without bolusing patient and redraw PTT in 6 hours.

## 2022-12-11 LAB
ANION GAP SERPL CALC-SCNC: 10 MMOL/L (ref 8–16)
APTT BLDCRRT: 128.8 SEC (ref 21–32)
APTT BLDCRRT: 31.8 SEC (ref 21–32)
APTT BLDCRRT: 44.7 SEC (ref 21–32)
BASOPHILS # BLD AUTO: 0.09 K/UL (ref 0–0.2)
BASOPHILS NFR BLD: 1.6 % (ref 0–1.9)
BUN SERPL-MCNC: 44 MG/DL (ref 6–20)
CALCIUM SERPL-MCNC: 9 MG/DL (ref 8.7–10.5)
CHLORIDE SERPL-SCNC: 94 MMOL/L (ref 95–110)
CO2 SERPL-SCNC: 33 MMOL/L (ref 23–29)
CREAT SERPL-MCNC: 1.6 MG/DL (ref 0.5–1.4)
DIFFERENTIAL METHOD: ABNORMAL
EOSINOPHIL # BLD AUTO: 0.2 K/UL (ref 0–0.5)
EOSINOPHIL NFR BLD: 3 % (ref 0–8)
ERYTHROCYTE [DISTWIDTH] IN BLOOD BY AUTOMATED COUNT: 21.5 % (ref 11.5–14.5)
EST. GFR  (NO RACE VARIABLE): 50.9 ML/MIN/1.73 M^2
GLUCOSE SERPL-MCNC: 110 MG/DL (ref 70–110)
HCT VFR BLD AUTO: 35.6 % (ref 40–54)
HGB BLD-MCNC: 10.8 G/DL (ref 14–18)
IMM GRANULOCYTES # BLD AUTO: 0.1 K/UL (ref 0–0.04)
IMM GRANULOCYTES NFR BLD AUTO: 1.8 % (ref 0–0.5)
LYMPHOCYTES # BLD AUTO: 1.1 K/UL (ref 1–4.8)
LYMPHOCYTES NFR BLD: 18.4 % (ref 18–48)
MAGNESIUM SERPL-MCNC: 2.3 MG/DL (ref 1.6–2.6)
MCH RBC QN AUTO: 27.7 PG (ref 27–31)
MCHC RBC AUTO-ENTMCNC: 30.3 G/DL (ref 32–36)
MCV RBC AUTO: 91 FL (ref 82–98)
MONOCYTES # BLD AUTO: 0.5 K/UL (ref 0.3–1)
MONOCYTES NFR BLD: 9.1 % (ref 4–15)
NEUTROPHILS # BLD AUTO: 3.8 K/UL (ref 1.8–7.7)
NEUTROPHILS NFR BLD: 66.1 % (ref 38–73)
NRBC BLD-RTO: 0 /100 WBC
PLATELET # BLD AUTO: 225 K/UL (ref 150–450)
PMV BLD AUTO: 9.8 FL (ref 9.2–12.9)
POTASSIUM SERPL-SCNC: 3.8 MMOL/L (ref 3.5–5.1)
RBC # BLD AUTO: 3.9 M/UL (ref 4.6–6.2)
SODIUM SERPL-SCNC: 137 MMOL/L (ref 136–145)
WBC # BLD AUTO: 5.7 K/UL (ref 3.9–12.7)

## 2022-12-11 PROCEDURE — 85730 THROMBOPLASTIN TIME PARTIAL: CPT | Mod: 91 | Performed by: INTERNAL MEDICINE

## 2022-12-11 PROCEDURE — 36415 COLL VENOUS BLD VENIPUNCTURE: CPT | Performed by: PHYSICIAN ASSISTANT

## 2022-12-11 PROCEDURE — 85730 THROMBOPLASTIN TIME PARTIAL: CPT | Performed by: INTERNAL MEDICINE

## 2022-12-11 PROCEDURE — 63600175 PHARM REV CODE 636 W HCPCS: Performed by: STUDENT IN AN ORGANIZED HEALTH CARE EDUCATION/TRAINING PROGRAM

## 2022-12-11 PROCEDURE — 36415 COLL VENOUS BLD VENIPUNCTURE: CPT | Performed by: INTERNAL MEDICINE

## 2022-12-11 PROCEDURE — 20600001 HC STEP DOWN PRIVATE ROOM

## 2022-12-11 PROCEDURE — 63600175 PHARM REV CODE 636 W HCPCS: Performed by: INTERNAL MEDICINE

## 2022-12-11 PROCEDURE — 25000003 PHARM REV CODE 250: Performed by: STUDENT IN AN ORGANIZED HEALTH CARE EDUCATION/TRAINING PROGRAM

## 2022-12-11 PROCEDURE — 25000003 PHARM REV CODE 250: Performed by: INTERNAL MEDICINE

## 2022-12-11 PROCEDURE — 85025 COMPLETE CBC W/AUTO DIFF WBC: CPT | Performed by: PHYSICIAN ASSISTANT

## 2022-12-11 PROCEDURE — 99233 PR SUBSEQUENT HOSPITAL CARE,LEVL III: ICD-10-PCS | Mod: ,,, | Performed by: INTERNAL MEDICINE

## 2022-12-11 PROCEDURE — 63600175 PHARM REV CODE 636 W HCPCS: Performed by: PHYSICIAN ASSISTANT

## 2022-12-11 PROCEDURE — 83735 ASSAY OF MAGNESIUM: CPT | Performed by: PHYSICIAN ASSISTANT

## 2022-12-11 PROCEDURE — 99233 SBSQ HOSP IP/OBS HIGH 50: CPT | Mod: ,,, | Performed by: INTERNAL MEDICINE

## 2022-12-11 PROCEDURE — 80048 BASIC METABOLIC PNL TOTAL CA: CPT | Performed by: PHYSICIAN ASSISTANT

## 2022-12-11 RX ORDER — FUROSEMIDE 10 MG/ML
80 INJECTION INTRAMUSCULAR; INTRAVENOUS 3 TIMES DAILY
Status: DISCONTINUED | OUTPATIENT
Start: 2022-12-11 | End: 2022-12-13

## 2022-12-11 RX ORDER — METOLAZONE 5 MG/1
5 TABLET ORAL ONCE
Status: COMPLETED | OUTPATIENT
Start: 2022-12-11 | End: 2022-12-11

## 2022-12-11 RX ADMIN — DIGOXIN 0.12 MG: 125 TABLET ORAL at 09:12

## 2022-12-11 RX ADMIN — AMIODARONE HYDROCHLORIDE 200 MG: 200 TABLET ORAL at 08:12

## 2022-12-11 RX ADMIN — POLYETHYLENE GLYCOL 3350, SODIUM SULFATE ANHYDROUS, SODIUM BICARBONATE, SODIUM CHLORIDE, POTASSIUM CHLORIDE 4000 ML: 236; 22.74; 6.74; 5.86; 2.97 POWDER, FOR SOLUTION ORAL at 10:12

## 2022-12-11 RX ADMIN — HEPARIN SODIUM 16 UNITS/KG/HR: 10000 INJECTION, SOLUTION INTRAVENOUS at 05:12

## 2022-12-11 RX ADMIN — PANTOPRAZOLE SODIUM 40 MG: 40 TABLET, DELAYED RELEASE ORAL at 08:12

## 2022-12-11 RX ADMIN — METOLAZONE 5 MG: 5 TABLET ORAL at 01:12

## 2022-12-11 RX ADMIN — FUROSEMIDE 80 MG: 10 INJECTION, SOLUTION INTRAMUSCULAR; INTRAVENOUS at 03:12

## 2022-12-11 RX ADMIN — SACUBITRIL AND VALSARTAN 1 TABLET: 24; 26 TABLET, FILM COATED ORAL at 08:12

## 2022-12-11 RX ADMIN — FUROSEMIDE 80 MG: 10 INJECTION, SOLUTION INTRAMUSCULAR; INTRAVENOUS at 08:12

## 2022-12-11 NOTE — ASSESSMENT & PLAN NOTE
-NICM  -Transferred from [place] for ADHF and possible workup.  During hospital stay there; he was briefly on Levo and started on  and they were unable to wean  off.  Presented on  5.   -Last 2D Echo 12/6/22: LVEF: 25% , LVEDD:  5.33cm  -Hypervolemic on examination today  -Current diuretic regimen: IVP lasix 80 bid. Looks more volume up today so giving extra IV push and giving metolozone.   -GDMT with home dose of Digoxin, Entresto 24-26.  Was previously on Toprol 100mg QHS but holding in the setting of   -Patient is currently being evaluated for OHTx/VAD  -Heart failure pathway Step 3. Getting C scope janna.   -2g Na dietary restriction, 1500 mL fluid restriction, strict I/Os

## 2022-12-11 NOTE — PROGRESS NOTES
[facility] - Cardiology Stepdown  Heart Transplant  Progress Note    Patient Name: Migel Garcia  MRN: [id number]  Admission Date: 12/5/2022  Hospital Length of Stay: 6 days  Attending Physician: Brando Parada MD  Primary Care Provider: Anuj Banks MD  Principal Problem:Acute on chronic combined systolic and diastolic heart failure    Subjective:     Interval History: Appears volume up today. No complaints though. Doing his colonoscopy prep for tmw. Giving metolozone and extra IVP of lasix.         Intake/Output Summary (Last 24 hours) at 12/11/2022 1601  Last data filed at 12/11/2022 1043  Gross per 24 hour   Intake 1000 ml   Output 1300 ml   Net -300 ml             Continuous Infusions:   DOBUTamine IV infusion (non-titrating) 5 mcg/kg/min (12/10/22 1517)    heparin (porcine) in D5W 20 Units/kg/hr (12/10/22 1801)     Scheduled Meds:   amiodarone  200 mg Oral Daily    digoxin  0.125 mg Oral Daily    furosemide (LASIX) injection  80 mg Intravenous TID    mupirocin   Nasal BID    pantoprazole  40 mg Oral Daily    sacubitriL-valsartan  1 tablet Oral BID     PRN Meds:acetaminophen, sodium chloride 0.9%    Review of patient's allergies indicates:  No Known Allergies  Objective:     Vital Signs (Most Recent):  Temp: 97.3 °F (36.3 °C) (12/11/22 1532)  Pulse: 74 (12/11/22 1532)  Resp: 18 (12/11/22 1532)  BP: 106/66 (12/11/22 1532)  SpO2: 98 % (12/11/22 1532) Vital Signs (24h Range):  Temp:  [97.3 °F (36.3 °C)-98 °F (36.7 °C)] 97.3 °F (36.3 °C)  Pulse:  [] 74  Resp:  [16-18] 18  SpO2:  [93 %-98 %] 98 %  BP: ()/(58-71) 106/66     Patient Vitals for the past 72 hrs (Last 3 readings):   Weight   12/11/22 0733 79.2 kg (174 lb 9.7 oz)   12/10/22 0742 78.7 kg (173 lb 8 oz)   12/09/22 0723 78.5 kg (173 lb 1 oz)       Body mass index is 28.18 kg/m².      Intake/Output Summary (Last 24 hours) at 12/11/2022 1601  Last data filed at 12/11/2022 1043  Gross per 24 hour   Intake 1000 ml   Output 1300 ml    Net -300 ml         Hemodynamic Parameters:  CVP:  [16 mmHg-18 mmHg] 18 mmHg    Telemetry: reviewed  Physical Exam  Vitals and nursing note reviewed.   Constitutional:       Appearance: Normal appearance.   HENT:      Head: Normocephalic.      Nose: Nose normal.      Mouth/Throat:      Mouth: Mucous membranes are moist.   Neck:      Comments: JVD to mid neck.   Cardiovascular:      Rate and Rhythm: Tachycardia present. Rhythm irregular.      Heart sounds: Murmur heard.     Gallop present.   Pulmonary:      Effort: Pulmonary effort is normal.   Abdominal:      General: Abdomen is flat. There is no distension.      Palpations: Abdomen is soft.   Musculoskeletal:         General: Swelling present. Normal range of motion.   Skin:     General: Skin is warm.      Capillary Refill: Capillary refill takes 2 to 3 seconds.   Neurological:      Mental Status: He is alert and oriented to person, place, and time.       Significant Labs:  CBC:  Recent Labs   Lab 12/09/22  0547 12/10/22  0423 12/11/22  0632   WBC 5.55 6.04 5.70   RBC 4.14* 4.08* 3.90*   HGB 11.3* 11.3* 10.8*   HCT 37.4* 36.5* 35.6*    235 225   MCV 90 90 91   MCH 27.3 27.7 27.7   MCHC 30.2* 31.0* 30.3*       BNP:  Recent Labs   Lab 12/07/22  0936 12/09/22  0547   BNP 1,889* 1,344*       CMP:  Recent Labs   Lab 12/06/22  0523 12/07/22  0936 12/07/22  2311 12/09/22  0547 12/09/22  1711 12/10/22  0423 12/11/22  0632   * 139*   < > 117* 125* 74 110   CALCIUM 9.0 9.7   < > 9.4 9.3 8.9 9.0   ALBUMIN 3.8 3.9  --  3.7  --   --   --    PROT 6.7 7.1  --  7.0  --   --   --     138   < > 138 137 139 137   K 4.1 3.8   < > 4.0 4.0 3.9 3.8   CO2 34* 39*   < > 36* 34* 33* 33*   CL 94* 90*   < > 91* 91* 94* 94*   BUN 43* 41*   < > 44* 46* 48* 44*   CREATININE 1.4 1.5*   < > 1.8* 1.8* 1.8* 1.6*   ALKPHOS 225* 213*  --  231*  --   --   --    ALT 37 35  --  33  --   --   --    AST 30 28  --  31  --   --   --    BILITOT 4.8* 5.2*  --  3.8*  --   --   --     < > =  values in this interval not displayed.        Coagulation:   Recent Labs   Lab 12/05/22  0156 12/09/22  1711 12/10/22  0423 12/10/22  0816 12/11/22  0028 12/11/22  0632 12/11/22  1508   INR 1.4*  --  1.2  --   --   --   --    APTT 38.2*   < > 33.6*  33.6*   < > 44.7* 128.8* 31.8    < > = values in this interval not displayed.       LDH:  Recent Labs   Lab 12/09/22  1711   *       Microbiology:  Microbiology Results (last 7 days)       ** No results found for the last 168 hours. **            I have reviewed all pertinent labs within the past 24 hours.    Estimated Creatinine Clearance: 52.3 mL/min (A) (based on SCr of 1.6 mg/dL (H)).    Diagnostic Results:  I have reviewed all pertinent imaging results/findings within the past 24 hours.    Assessment and Plan:     52 year old man with a history of non-ischemic cardiomyopathy with ICD placement, CKD 3, atrial fibrillation presents from  to [facility] [place] options, on dobutamine 5. Echo 12/6 with EF of 25%. Pathway for LVAD/heart transplant started      * Acute on chronic combined systolic and diastolic heart failure  -NICM  -Transferred from [place] for ADHF and possible workup.  During hospital stay there; he was briefly on Levo and started on  and they were unable to wean  off.  Presented on  5.   -Last 2D Echo 12/6/22: LVEF: 25% , LVEDD:  5.33cm  -Hypervolemic on examination today  -Current diuretic regimen: IVP lasix 80 bid. Looks more volume up today so giving extra IV push and giving metolozone.   -GDMT with home dose of Digoxin, Entresto 24-26.  Was previously on Toprol 100mg QHS but holding in the setting of   -Patient is currently being evaluated for OHTx/VAD  -Heart failure pathway Step 3. Getting C scope tomoorrow.   -2g Na dietary restriction, 1500 mL fluid restriction, strict I/Os      Pulmonary nodule  -Questionable 8 mm left lower lobe pulmonary nodule series 6, image 314.  This courses along the left lower lobe pulmonary  artery and pulmonary aneurysm not excluded.  Dedicated CT chest with contrast suggested.  -Will plan for CT with contrast once patient is euvolemic and creatinine remains stable.     Atrial fibrillation  -[id number] score is 2  -On Eliquis at home for anticoagulation  -Stopped on 12/5 in anticipation of workup and possible procedures  -On Lovenox for DVT prophylaxis  -continue amio/dig    ICD (implantable cardioverter-defibrillator), single, in situ  -Will find out the brand of device and order interrogation     HTN (hypertension)  Continue entresto    CKD (chronic kidney disease), stage III  -Reported baseline 1.6-2.0  -Will monitor trend  -avoid nephrotoxins     Tobacco use  -counseled on cessation    GERD (gastroesophageal reflux disease)  -Protonix 40mg Qdaily        Seven Aranda PA-C  Heart Transplant  [facility] - Cardiology Stepdown

## 2022-12-11 NOTE — SUBJECTIVE & OBJECTIVE
Interval History: Appears volume up today. No complaints though. Doing his colonoscopy prep for tmw. Giving metolozone and extra IVP of lasix.         Intake/Output Summary (Last 24 hours) at 12/11/2022 1601  Last data filed at 12/11/2022 1043  Gross per 24 hour   Intake 1000 ml   Output 1300 ml   Net -300 ml             Continuous Infusions:   DOBUTamine IV infusion (non-titrating) 5 mcg/kg/min (12/10/22 1517)    heparin (porcine) in D5W 20 Units/kg/hr (12/10/22 1801)     Scheduled Meds:   amiodarone  200 mg Oral Daily    digoxin  0.125 mg Oral Daily    furosemide (LASIX) injection  80 mg Intravenous TID    mupirocin   Nasal BID    pantoprazole  40 mg Oral Daily    sacubitriL-valsartan  1 tablet Oral BID     PRN Meds:acetaminophen, sodium chloride 0.9%    Review of patient's allergies indicates:  No Known Allergies  Objective:     Vital Signs (Most Recent):  Temp: 97.3 °F (36.3 °C) (12/11/22 1532)  Pulse: 74 (12/11/22 1532)  Resp: 18 (12/11/22 1532)  BP: 106/66 (12/11/22 1532)  SpO2: 98 % (12/11/22 1532) Vital Signs (24h Range):  Temp:  [97.3 °F (36.3 °C)-98 °F (36.7 °C)] 97.3 °F (36.3 °C)  Pulse:  [] 74  Resp:  [16-18] 18  SpO2:  [93 %-98 %] 98 %  BP: ()/(58-71) 106/66     Patient Vitals for the past 72 hrs (Last 3 readings):   Weight   12/11/22 0733 79.2 kg (174 lb 9.7 oz)   12/10/22 0742 78.7 kg (173 lb 8 oz)   12/09/22 0723 78.5 kg (173 lb 1 oz)       Body mass index is 28.18 kg/m².      Intake/Output Summary (Last 24 hours) at 12/11/2022 1601  Last data filed at 12/11/2022 1043  Gross per 24 hour   Intake 1000 ml   Output 1300 ml   Net -300 ml         Hemodynamic Parameters:  CVP:  [16 mmHg-18 mmHg] 18 mmHg    Telemetry: reviewed  Physical Exam  Vitals and nursing note reviewed.   Constitutional:       Appearance: Normal appearance.   HENT:      Head: Normocephalic.      Nose: Nose normal.      Mouth/Throat:      Mouth: Mucous membranes are moist.   Neck:      Comments: JVD to mid neck.    Cardiovascular:      Rate and Rhythm: Tachycardia present. Rhythm irregular.      Heart sounds: Murmur heard.     Gallop present.   Pulmonary:      Effort: Pulmonary effort is normal.   Abdominal:      General: Abdomen is flat. There is no distension.      Palpations: Abdomen is soft.   Musculoskeletal:         General: Swelling present. Normal range of motion.   Skin:     General: Skin is warm.      Capillary Refill: Capillary refill takes 2 to 3 seconds.   Neurological:      Mental Status: He is alert and oriented to person, place, and time.       Significant Labs:  CBC:  Recent Labs   Lab 12/09/22  0547 12/10/22  0423 12/11/22  0632   WBC 5.55 6.04 5.70   RBC 4.14* 4.08* 3.90*   HGB 11.3* 11.3* 10.8*   HCT 37.4* 36.5* 35.6*    235 225   MCV 90 90 91   MCH 27.3 27.7 27.7   MCHC 30.2* 31.0* 30.3*       BNP:  Recent Labs   Lab 12/07/22  0936 12/09/22  0547   BNP 1,889* 1,344*       CMP:  Recent Labs   Lab 12/06/22  0523 12/07/22  0936 12/07/22  2311 12/09/22  0547 12/09/22  1711 12/10/22  0423 12/11/22  0632   * 139*   < > 117* 125* 74 110   CALCIUM 9.0 9.7   < > 9.4 9.3 8.9 9.0   ALBUMIN 3.8 3.9  --  3.7  --   --   --    PROT 6.7 7.1  --  7.0  --   --   --     138   < > 138 137 139 137   K 4.1 3.8   < > 4.0 4.0 3.9 3.8   CO2 34* 39*   < > 36* 34* 33* 33*   CL 94* 90*   < > 91* 91* 94* 94*   BUN 43* 41*   < > 44* 46* 48* 44*   CREATININE 1.4 1.5*   < > 1.8* 1.8* 1.8* 1.6*   ALKPHOS 225* 213*  --  231*  --   --   --    ALT 37 35  --  33  --   --   --    AST 30 28  --  31  --   --   --    BILITOT 4.8* 5.2*  --  3.8*  --   --   --     < > = values in this interval not displayed.        Coagulation:   Recent Labs   Lab 12/05/22  0156 12/09/22  1711 12/10/22  0423 12/10/22  0816 12/11/22  0028 12/11/22  0632 12/11/22  1508   INR 1.4*  --  1.2  --   --   --   --    APTT 38.2*   < > 33.6*  33.6*   < > 44.7* 128.8* 31.8    < > = values in this interval not displayed.       LDH:  Recent Labs   Lab  12/09/22  1711   *       Microbiology:  Microbiology Results (last 7 days)       ** No results found for the last 168 hours. **            I have reviewed all pertinent labs within the past 24 hours.    Estimated Creatinine Clearance: 52.3 mL/min (A) (based on SCr of 1.6 mg/dL (H)).    Diagnostic Results:  I have reviewed all pertinent imaging results/findings within the past 24 hours.

## 2022-12-11 NOTE — NURSING
Seven with [facility] notified of PTT results, low intensity nomogram without bolus dose ordered.  Ordered to increase rate per nomogram orders without bolus.  PTt in 6 hours from change.  Orders carried out.

## 2022-12-11 NOTE — NURSING
Dr Quinteros with [facility] notified of PTT results, low intensity nomogram without bolus dose ordered.  Ordered to increase rate per nomogram orders without bolus.  PTT in 6 hours from change.  Orders carried out

## 2022-12-11 NOTE — PLAN OF CARE
Patient remains free from fall with injury   Problem: Adult Inpatient Plan of Care  Goal: Plan of Care Review  Outcome: Ongoing, Progressing     Problem: Adult Inpatient Plan of Care  Goal: Patient-Specific Goal (Individualized)  Outcome: Ongoing, Progressing     Problem: Adult Inpatient Plan of Care  Goal: Absence of Hospital-Acquired Illness or Injury  Outcome: Ongoing, Progressing

## 2022-12-11 NOTE — PLAN OF CARE
Pt educated on fall risk overnight,pt remained free from falls/trauma/injury. Denies chest pain, SOB, palpitations, dizziness, pain, or discomfort. Plan of care reviewed with pt, all questions answered. Bed locked in lowest position, call bell within reach, no acute distress noted, will continue to monitor. Pt on heparin and dobutamine gtt. AM labs drawn for aptt. CVP 18 this AM, increase from 16 yesterday possibly d/t sluggish flow in PICC. AM RN aware will continue to monitor.    Problem: Adult Inpatient Plan of Care  Goal: Plan of Care Review  Outcome: Ongoing, Progressing  Goal: Patient-Specific Goal (Individualized)  Outcome: Ongoing, Progressing  Goal: Absence of Hospital-Acquired Illness or Injury  Outcome: Ongoing, Progressing  Goal: Optimal Comfort and Wellbeing  Outcome: Ongoing, Progressing  Goal: Readiness for Transition of Care  Outcome: Ongoing, Progressing     Problem: Infection  Goal: Absence of Infection Signs and Symptoms  Outcome: Ongoing, Progressing     Problem: Adjustment to Illness (Heart Failure)  Goal: Optimal Coping  Outcome: Ongoing, Progressing     Problem: Cardiac Output Decreased (Heart Failure)  Goal: Optimal Cardiac Output  Outcome: Ongoing, Progressing     Problem: Dysrhythmia (Heart Failure)  Goal: Stable Heart Rate and Rhythm  Outcome: Ongoing, Progressing     Problem: Fluid Imbalance (Heart Failure)  Goal: Fluid Balance  Outcome: Ongoing, Progressing     Problem: Functional Ability Impaired (Heart Failure)  Goal: Optimal Functional Ability  Outcome: Ongoing, Progressing     Problem: Oral Intake Inadequate (Heart Failure)  Goal: Optimal Nutrition Intake  Outcome: Ongoing, Progressing     Problem: Respiratory Compromise (Heart Failure)  Goal: Effective Oxygenation and Ventilation  Outcome: Ongoing, Progressing  .

## 2022-12-12 ENCOUNTER — ANESTHESIA EVENT (OUTPATIENT)
Dept: ENDOSCOPY | Facility: HOSPITAL | Age: 52
DRG: 286 | End: 2022-12-12
Payer: MEDICARE

## 2022-12-12 ENCOUNTER — ANESTHESIA (OUTPATIENT)
Dept: ENDOSCOPY | Facility: HOSPITAL | Age: 52
DRG: 286 | End: 2022-12-12
Payer: MEDICARE

## 2022-12-12 LAB
ALBUMIN SERPL BCP-MCNC: 3.6 G/DL (ref 3.5–5.2)
ALP SERPL-CCNC: 210 U/L (ref 55–135)
ALT SERPL W/O P-5'-P-CCNC: 25 U/L (ref 10–44)
AMPHETAMINES SERPL QL: NEGATIVE
ANION GAP SERPL CALC-SCNC: 13 MMOL/L (ref 8–16)
APTT BLDCRRT: 33.5 SEC (ref 21–32)
APTT BLDCRRT: 35.2 SEC (ref 21–32)
APTT BLDCRRT: 35.3 SEC (ref 21–32)
AST SERPL-CCNC: 22 U/L (ref 10–40)
AT III AG ACT/NOR PPP IA: 58 % (ref 80–120)
BARBITURATES SERPL QL SCN: NEGATIVE
BASOPHILS # BLD AUTO: 0.07 K/UL (ref 0–0.2)
BASOPHILS NFR BLD: 1.4 % (ref 0–1.9)
BENZODIAZ SERPL QL SCN: NEGATIVE
BILIRUB DIRECT SERPL-MCNC: 2.3 MG/DL (ref 0.1–0.3)
BILIRUB SERPL-MCNC: 3.8 MG/DL (ref 0.1–1)
BNP SERPL-MCNC: 1518 PG/ML (ref 0–99)
BUN SERPL-MCNC: 33 MG/DL (ref 6–20)
BZE SERPL QL: NEGATIVE
CALCIUM SERPL-MCNC: 9.3 MG/DL (ref 8.7–10.5)
CARBOXYTHC SERPL QL SCN: NEGATIVE
CHLORIDE SERPL-SCNC: 94 MMOL/L (ref 95–110)
CO2 SERPL-SCNC: 30 MMOL/L (ref 23–29)
CREAT SERPL-MCNC: 1.4 MG/DL (ref 0.5–1.4)
DIFFERENTIAL METHOD: ABNORMAL
EOSINOPHIL # BLD AUTO: 0.2 K/UL (ref 0–0.5)
EOSINOPHIL NFR BLD: 3.3 % (ref 0–8)
ERYTHROCYTE [DISTWIDTH] IN BLOOD BY AUTOMATED COUNT: 21.6 % (ref 11.5–14.5)
EST. GFR  (NO RACE VARIABLE): 59.7 ML/MIN/1.73 M^2
ETHANOL SERPL QL SCN: NEGATIVE
F2 C.20210G>A GENO BLD/T: NEGATIVE
F5 P.R506Q BLD/T QL: NEGATIVE
G6PD RBC-CCNT: 1.3 U/G HB (ref 8–11.9)
GLUCOSE SERPL-MCNC: 70 MG/DL (ref 70–110)
HCT VFR BLD AUTO: 34.1 % (ref 40–54)
HGB BLD-MCNC: 10.7 G/DL (ref 14–18)
HSV1 GG IGG SER-ACNC: 55.2 IV
HSV1 GG IGG SER-ACNC: 55.2 IV
HSV2 GG IGG SER-ACNC: 0.24 IV
HSV2 GG IGG SER-ACNC: 0.24 IV
IMM GRANULOCYTES # BLD AUTO: 0.07 K/UL (ref 0–0.04)
IMM GRANULOCYTES NFR BLD AUTO: 1.4 % (ref 0–0.5)
INR PPP: 1.2 (ref 0.8–1.2)
LYMPHOCYTES # BLD AUTO: 1.1 K/UL (ref 1–4.8)
LYMPHOCYTES NFR BLD: 20.5 % (ref 18–48)
MAGNESIUM SERPL-MCNC: 2.1 MG/DL (ref 1.6–2.6)
MCH RBC QN AUTO: 27.7 PG (ref 27–31)
MCHC RBC AUTO-ENTMCNC: 31.4 G/DL (ref 32–36)
MCV RBC AUTO: 88 FL (ref 82–98)
METHADONE SERPL QL SCN: NEGATIVE
MONOCYTES # BLD AUTO: 0.5 K/UL (ref 0.3–1)
MONOCYTES NFR BLD: 9.7 % (ref 4–15)
NEUTROPHILS # BLD AUTO: 3.3 K/UL (ref 1.8–7.7)
NEUTROPHILS NFR BLD: 63.7 % (ref 38–73)
NRBC BLD-RTO: 0 /100 WBC
OPIATES SERPL QL SCN: NEGATIVE
PCP SERPL QL SCN: NEGATIVE
PLATELET # BLD AUTO: 222 K/UL (ref 150–450)
PMV BLD AUTO: 10.1 FL (ref 9.2–12.9)
POTASSIUM SERPL-SCNC: 3.7 MMOL/L (ref 3.5–5.1)
PROPOXYPH SERPL QL: NEGATIVE
PROT S AG ACT/NOR PPP IA: 79 % (ref 50–140)
PROT SERPL-MCNC: 6.6 G/DL (ref 6–8.4)
PROTHROMBIN TIME: 12.2 SEC (ref 9–12.5)
RBC # BLD AUTO: 3.86 M/UL (ref 4.6–6.2)
SODIUM SERPL-SCNC: 137 MMOL/L (ref 136–145)
STRONGYLOIDES ANTIBODY IGG: NEGATIVE
VARICELLA INTERPRETATION: POSITIVE
VARICELLA ZOSTER IGG: 3.12 ISR (ref 0–0.9)
VWF:AC ACT/NOR PPP IA: 100 % (ref 55–200)
WBC # BLD AUTO: 5.18 K/UL (ref 3.9–12.7)

## 2022-12-12 PROCEDURE — 85730 THROMBOPLASTIN TIME PARTIAL: CPT | Mod: 91 | Performed by: INTERNAL MEDICINE

## 2022-12-12 PROCEDURE — G0121 COLON CA SCRN NOT HI RSK IND: HCPCS | Mod: 53,GC,, | Performed by: INTERNAL MEDICINE

## 2022-12-12 PROCEDURE — 99233 PR SUBSEQUENT HOSPITAL CARE,LEVL III: ICD-10-PCS | Mod: GC,,, | Performed by: PHYSICIAN ASSISTANT

## 2022-12-12 PROCEDURE — 37000008 HC ANESTHESIA 1ST 15 MINUTES: Performed by: INTERNAL MEDICINE

## 2022-12-12 PROCEDURE — 63600175 PHARM REV CODE 636 W HCPCS: Performed by: INTERNAL MEDICINE

## 2022-12-12 PROCEDURE — 25000003 PHARM REV CODE 250: Performed by: STUDENT IN AN ORGANIZED HEALTH CARE EDUCATION/TRAINING PROGRAM

## 2022-12-12 PROCEDURE — D9220A PRA ANESTHESIA: ICD-10-PCS | Mod: ANES,NTX,, | Performed by: ANESTHESIOLOGY

## 2022-12-12 PROCEDURE — 25000003 PHARM REV CODE 250: Mod: NTX | Performed by: STUDENT IN AN ORGANIZED HEALTH CARE EDUCATION/TRAINING PROGRAM

## 2022-12-12 PROCEDURE — 63600175 PHARM REV CODE 636 W HCPCS: Performed by: STUDENT IN AN ORGANIZED HEALTH CARE EDUCATION/TRAINING PROGRAM

## 2022-12-12 PROCEDURE — 80076 HEPATIC FUNCTION PANEL: CPT | Performed by: PHYSICIAN ASSISTANT

## 2022-12-12 PROCEDURE — 83735 ASSAY OF MAGNESIUM: CPT | Performed by: PHYSICIAN ASSISTANT

## 2022-12-12 PROCEDURE — 85730 THROMBOPLASTIN TIME PARTIAL: CPT | Performed by: INTERNAL MEDICINE

## 2022-12-12 PROCEDURE — G0121 COLON CA SCRN NOT HI RSK IND: ICD-10-PCS | Mod: 53,GC,, | Performed by: INTERNAL MEDICINE

## 2022-12-12 PROCEDURE — 94761 N-INVAS EAR/PLS OXIMETRY MLT: CPT

## 2022-12-12 PROCEDURE — 83880 ASSAY OF NATRIURETIC PEPTIDE: CPT | Performed by: PHYSICIAN ASSISTANT

## 2022-12-12 PROCEDURE — 37000009 HC ANESTHESIA EA ADD 15 MINS: Performed by: INTERNAL MEDICINE

## 2022-12-12 PROCEDURE — 99233 SBSQ HOSP IP/OBS HIGH 50: CPT | Mod: GC,,, | Performed by: PHYSICIAN ASSISTANT

## 2022-12-12 PROCEDURE — 63600175 PHARM REV CODE 636 W HCPCS: Mod: NTX | Performed by: STUDENT IN AN ORGANIZED HEALTH CARE EDUCATION/TRAINING PROGRAM

## 2022-12-12 PROCEDURE — 36415 COLL VENOUS BLD VENIPUNCTURE: CPT | Performed by: INTERNAL MEDICINE

## 2022-12-12 PROCEDURE — G0121 COLON CA SCRN NOT HI RSK IND: HCPCS | Mod: 74,GC | Performed by: INTERNAL MEDICINE

## 2022-12-12 PROCEDURE — 85610 PROTHROMBIN TIME: CPT | Performed by: INTERNAL MEDICINE

## 2022-12-12 PROCEDURE — D9220A PRA ANESTHESIA: Mod: ANES,NTX,, | Performed by: ANESTHESIOLOGY

## 2022-12-12 PROCEDURE — D9220A PRA ANESTHESIA: ICD-10-PCS | Mod: CRNA,NTX,, | Performed by: STUDENT IN AN ORGANIZED HEALTH CARE EDUCATION/TRAINING PROGRAM

## 2022-12-12 PROCEDURE — 20600001 HC STEP DOWN PRIVATE ROOM

## 2022-12-12 PROCEDURE — 80048 BASIC METABOLIC PNL TOTAL CA: CPT | Performed by: PHYSICIAN ASSISTANT

## 2022-12-12 PROCEDURE — 85025 COMPLETE CBC W/AUTO DIFF WBC: CPT | Performed by: PHYSICIAN ASSISTANT

## 2022-12-12 PROCEDURE — 25000003 PHARM REV CODE 250: Performed by: INTERNAL MEDICINE

## 2022-12-12 PROCEDURE — D9220A PRA ANESTHESIA: Mod: CRNA,NTX,, | Performed by: STUDENT IN AN ORGANIZED HEALTH CARE EDUCATION/TRAINING PROGRAM

## 2022-12-12 PROCEDURE — 99900035 HC TECH TIME PER 15 MIN (STAT)

## 2022-12-12 RX ORDER — PROPOFOL 10 MG/ML
VIAL (ML) INTRAVENOUS CONTINUOUS PRN
Status: DISCONTINUED | OUTPATIENT
Start: 2022-12-12 | End: 2022-12-12

## 2022-12-12 RX ORDER — ETOMIDATE 2 MG/ML
INJECTION INTRAVENOUS
Status: DISCONTINUED | OUTPATIENT
Start: 2022-12-12 | End: 2022-12-12

## 2022-12-12 RX ORDER — PROPOFOL 10 MG/ML
VIAL (ML) INTRAVENOUS
Status: DISCONTINUED | OUTPATIENT
Start: 2022-12-12 | End: 2022-12-12

## 2022-12-12 RX ORDER — SODIUM CHLORIDE 0.9 % (FLUSH) 0.9 %
10 SYRINGE (ML) INJECTION
Status: DISCONTINUED | OUTPATIENT
Start: 2022-12-12 | End: 2022-12-21 | Stop reason: HOSPADM

## 2022-12-12 RX ORDER — LIDOCAINE HYDROCHLORIDE 20 MG/ML
INJECTION INTRAVENOUS
Status: DISCONTINUED | OUTPATIENT
Start: 2022-12-12 | End: 2022-12-12

## 2022-12-12 RX ORDER — POLYETHYLENE GLYCOL 3350, SODIUM SULFATE ANHYDROUS, SODIUM BICARBONATE, SODIUM CHLORIDE, POTASSIUM CHLORIDE 236; 22.74; 6.74; 5.86; 2.97 G/4L; G/4L; G/4L; G/4L; G/4L
4000 POWDER, FOR SOLUTION ORAL ONCE
Status: COMPLETED | OUTPATIENT
Start: 2022-12-12 | End: 2022-12-12

## 2022-12-12 RX ADMIN — LIDOCAINE HYDROCHLORIDE 20 MG: 20 INJECTION INTRAVENOUS at 01:12

## 2022-12-12 RX ADMIN — AMIODARONE HYDROCHLORIDE 200 MG: 200 TABLET ORAL at 08:12

## 2022-12-12 RX ADMIN — DIGOXIN 0.12 MG: 125 TABLET ORAL at 08:12

## 2022-12-12 RX ADMIN — PROPOFOL 20 MG: 10 INJECTION, EMULSION INTRAVENOUS at 01:12

## 2022-12-12 RX ADMIN — FUROSEMIDE 80 MG: 10 INJECTION, SOLUTION INTRAMUSCULAR; INTRAVENOUS at 09:12

## 2022-12-12 RX ADMIN — ETOMIDATE 6 MG: 2 INJECTION INTRAVENOUS at 01:12

## 2022-12-12 RX ADMIN — SODIUM CHLORIDE: 0.9 INJECTION, SOLUTION INTRAVENOUS at 12:12

## 2022-12-12 RX ADMIN — FUROSEMIDE 80 MG: 10 INJECTION, SOLUTION INTRAMUSCULAR; INTRAVENOUS at 03:12

## 2022-12-12 RX ADMIN — SACUBITRIL AND VALSARTAN 1 TABLET: 24; 26 TABLET, FILM COATED ORAL at 08:12

## 2022-12-12 RX ADMIN — POLYETHYLENE GLYCOL 3350, SODIUM SULFATE ANHYDROUS, SODIUM BICARBONATE, SODIUM CHLORIDE, POTASSIUM CHLORIDE 4000 ML: 236; 22.74; 6.74; 5.86; 2.97 POWDER, FOR SOLUTION ORAL at 05:12

## 2022-12-12 RX ADMIN — Medication 75 MCG/KG/MIN: at 01:12

## 2022-12-12 RX ADMIN — HEPARIN SODIUM 18 UNITS/KG/HR: 10000 INJECTION, SOLUTION INTRAVENOUS at 10:12

## 2022-12-12 RX ADMIN — SACUBITRIL AND VALSARTAN 1 TABLET: 24; 26 TABLET, FILM COATED ORAL at 09:12

## 2022-12-12 RX ADMIN — DOBUTAMINE HYDROCHLORIDE 5 MCG/KG/MIN: 400 INJECTION INTRAVENOUS at 12:12

## 2022-12-12 RX ADMIN — PANTOPRAZOLE SODIUM 40 MG: 40 TABLET, DELAYED RELEASE ORAL at 08:12

## 2022-12-12 NOTE — TREATMENT PLAN
GI Post-Procedure Treatment Plan    Colonoscopy attempted, prep quality is poor, limits visualization    Will need to keep on clear liquid diet and re-prep tonight 12/12 for repeat colonoscopy 12/13    Ronnie Terrell  Gastroenterology Fellow, PGY-IV

## 2022-12-12 NOTE — TRANSFER OF CARE
"Anesthesia Transfer of Care Note    Patient: Migel Garcia    Procedure(s) Performed: Procedure(s) (LRB):  COLONOSCOPY (N/A)    Patient location: PACU    Anesthesia Type: general    Transport from OR: Transported from OR on room air with adequate spontaneous ventilation    Post pain: adequate analgesia    Post assessment: no apparent anesthetic complications    Post vital signs: stable    Level of consciousness: awake, alert and oriented    Nausea/Vomiting: no nausea/vomiting    Complications: none    Transfer of care protocol was followed      Last vitals:   Visit Vitals  /78 (BP Location: Left arm, Patient Position: Lying)   Pulse 82   Temp 36.8 °C (98.2 °F) (Temporal)   Resp 16   Ht 5' 6" (1.676 m)   Wt 77.9 kg (171 lb 11.8 oz)   SpO2 99%   BMI 27.72 kg/m²     "

## 2022-12-12 NOTE — PLAN OF CARE
Pt educated on fall risk overnight,pt remained free from falls/trauma/injury. Denies chest pain, SOB, palpitations, dizziness, pain, or discomfort. Plan of care reviewed with pt, all questions answered. Bed locked in lowest position, call bell within reach, no acute distress noted, will continue to monitor.   Pt NPO after MN, VSS, CVP of 14 this AM. Heparin gtt and dobutamine gtt infusing. Will continue to monitor    Problem: Adult Inpatient Plan of Care  Goal: Plan of Care Review  Outcome: Ongoing, Progressing  Goal: Patient-Specific Goal (Individualized)  Outcome: Ongoing, Progressing  Goal: Absence of Hospital-Acquired Illness or Injury  Outcome: Ongoing, Progressing  Goal: Optimal Comfort and Wellbeing  Outcome: Ongoing, Progressing  Goal: Readiness for Transition of Care  Outcome: Ongoing, Progressing     Problem: Infection  Goal: Absence of Infection Signs and Symptoms  Outcome: Ongoing, Progressing     Problem: Adjustment to Illness (Heart Failure)  Goal: Optimal Coping  Outcome: Ongoing, Progressing     Problem: Dysrhythmia (Heart Failure)  Goal: Stable Heart Rate and Rhythm  Outcome: Ongoing, Progressing     Problem: Fluid Imbalance (Heart Failure)  Goal: Fluid Balance  Outcome: Ongoing, Progressing     Problem: Functional Ability Impaired (Heart Failure)  Goal: Optimal Functional Ability  Outcome: Ongoing, Progressing     Problem: Oral Intake Inadequate (Heart Failure)  Goal: Optimal Nutrition Intake  Outcome: Ongoing, Progressing

## 2022-12-12 NOTE — H&P
Short Stay Endoscopy History and Physical    PCP - Anuj Banks MD  Referring Physician - Miller Bell MD  [address] [place] [address]    Procedure - Colonoscopy  ASA - per anesthesia  Mallampati - per anesthesia  History of Anesthesia problems - no  Family history Anesthesia problems -  no   Plan of anesthesia - General    HPI  52 y.o. male  Reason for procedure:   Screening colonoscopy for transplant eval    ROS:  Constitutional: No fevers, chills, No weight loss  CV: No chest pain  Pulm: No cough, No shortness of breath  GI: see HPI    Medical History:  has a past medical history of A-fib, GERD (gastroesophageal reflux disease), Heart failure, unspecified, Hypertension, and V-tach.    Surgical History:  has no past surgical history on file.    Family History: family history is not on file..    Social History:  reports that he does not currently use alcohol.    Review of patient's allergies indicates:  No Known Allergies    Medications:   No medications prior to admission.       Physical Exam:    Vital Signs:   Vitals:    12/12/22 1108   BP:    Pulse: 74   Resp:    Temp:        General Appearance: Well appearing in no acute distress  Abdomen: Soft, non tender, non distended with normal bowel sounds, no masses    Labs:  Lab Results   Component Value Date    WBC 5.18 12/12/2022    HGB 10.7 (L) 12/12/2022    HCT 34.1 (L) 12/12/2022     12/12/2022    CHOL 111 (L) 12/09/2022    TRIG 54 12/09/2022    HDL 33 (L) 12/09/2022    ALT 25 12/12/2022    AST 22 12/12/2022     12/12/2022    K 3.7 12/12/2022    CL 94 (L) 12/12/2022    CREATININE 1.4 12/12/2022    BUN 33 (H) 12/12/2022    CO2 30 (H) 12/12/2022    TSH 6.480 (H) 12/10/2022    PSA 0.84 12/09/2022    INR 1.2 12/10/2022    HGBA1C 5.1 12/05/2022       I have explained the risks and benefits of this endoscopic procedure to the patient including but not limited to bleeding, inflammation, infection, perforation, and death.      Wojciech  MD Rico

## 2022-12-12 NOTE — PROVATION PATIENT INSTRUCTIONS
Discharge Summary/Instructions after an Endoscopic Procedure  Patient Name: Migel Garcia  Patient MRN: [id number]  Patient YOB: 1970  Monday, December 12, 2022  Geovany Cole MD  Dear patient,  As a result of recent federal legislation (The Federal Cures Act), you may   receive lab or pathology results from your procedure in your [facility]   account before your physician is able to contact you. Your physician or   their representative will relay the results to you with their   recommendations at their soonest availability.  Thank you,  RESTRICTIONS:  During your procedure today, you received medications for sedation.  These   medications may affect your judgment, balance and coordination.  Therefore,   for 24 hours, you have the following restrictions:   - DO NOT drive a car, operate machinery, make legal/financial decisions,   sign important papers or drink alcohol.    ACTIVITY:  Today: no heavy lifting, straining or running due to procedural   sedation/anesthesia.  The following day: return to full activity including work.  DIET:  Eat and drink normally unless instructed otherwise.     TREATMENT FOR COMMON SIDE EFFECTS:  - Mild abdominal pain, nausea, belching, bloating or excessive gas:  rest,   eat lightly and use a heating pad.  - Sore Throat: treat with throat lozenges and/or gargle with warm salt   water.  - Because air was used during the procedure, expelling large amounts of air   from your rectum or belching is normal.  - If a bowel prep was taken, you may not have a bowel movement for 1-3 days.    This is normal.  SYMPTOMS TO WATCH FOR AND REPORT TO YOUR PHYSICIAN:  1. Abdominal pain or bloating, other than gas cramps.  2. Chest pain.  3. Back pain.  4. Signs of infection such as: chills or fever occurring within 24 hours   after the procedure.  5. Rectal bleeding, which would show as bright red, maroon, or black stools.   (A tablespoon of blood from the rectum is not serious, especially  if   hemorrhoids are present.)  6. Vomiting.  7. Weakness or dizziness.  GO DIRECTLY TO THE NEAREST EMERGENCY ROOM IF YOU HAVE ANY OF THE FOLLOWING:      Difficulty breathing              Chills and/or fever over 101 F   Persistent vomiting and/or vomiting blood   Severe abdominal pain   Severe chest pain   Black, tarry stools   Bleeding- more than one tablespoon   Any other symptom or condition that you feel may need urgent attention  Your doctor recommends these additional instructions:  If any biopsies were taken, your doctors clinic will contact you in 1 to 2   weeks with any results.  - [facility].   - Repeat colonoscopy tomorrow because the bowel preparation was suboptimal.     - Clear liquid diet.   - Resume heparin at prior dose today.  For questions, problems or results please call your physician - Geovany Cole MD at Work:  [phone].  [facility], EMERGENCY ROOM PHONE NUMBER: [phone]  IF A COMPLICATION OR EMERGENCY SITUATION ARISES AND YOU ARE UNABLE TO REACH   YOUR PHYSICIAN - GO DIRECTLY TO THE EMERGENCY ROOM.  Geovany Cole MD  12/12/2022 1:26:34 PM  This report has been verified and signed electronically.  Dear patient,  As a result of recent federal legislation (The Federal Cures Act), you may   receive lab or pathology results from your procedure in your [facility]   account before your physician is able to contact you. Your physician or   their representative will relay the results to you with their   recommendations at their soonest availability.  Thank you,  PROVATION

## 2022-12-12 NOTE — PROGRESS NOTES
[facility] - Cardiology Stepdown  Heart Transplant  Progress Note    Patient Name: Migel Garcia  MRN: [id number]  Admission Date: 12/5/2022  Hospital Length of Stay: 7 days  Attending Physician: Gin Mendes MD  Primary Care Provider: Anuj Banks MD  Principal Problem:Acute on chronic combined systolic and diastolic heart failure    Subjective:     Interval History: Patient got C scope today for transplant workup and was unfortunately inadequate so will need to repeat tomorrow.       Intake/Output Summary (Last 24 hours) at 12/12/2022 1511  Last data filed at 12/12/2022 1312  Gross per 24 hour   Intake 250 ml   Output 1300 ml   Net -1050 ml             Continuous Infusions:   DOBUTamine IV infusion (non-titrating) 5 mcg/kg/min (12/12/22 1207)    heparin (porcine) in D5W 0 Units/kg/hr (12/12/22 0724)     Scheduled Meds:   amiodarone  200 mg Oral Daily    digoxin  0.125 mg Oral Daily    furosemide (LASIX) injection  80 mg Intravenous TID    pantoprazole  40 mg Oral Daily    polyethylene glycol  4,000 mL Oral Once    sacubitriL-valsartan  1 tablet Oral BID     PRN Meds:acetaminophen, sodium chloride 0.9%, sodium chloride 0.9%    Review of patient's allergies indicates:  No Known Allergies  Objective:     Vital Signs (Most Recent):  Temp: 98.2 °F (36.8 °C) (12/12/22 1146)  Pulse: 75 (12/12/22 1400)  Resp: 20 (12/12/22 1400)  BP: 107/70 (12/12/22 1400)  SpO2: 100 % (12/12/22 1400) Vital Signs (24h Range):  Temp:  [97.3 °F (36.3 °C)-98.2 °F (36.8 °C)] 98.2 °F (36.8 °C)  Pulse:  [] 75  Resp:  [13-20] 20  SpO2:  [97 %-100 %] 100 %  BP: ()/(58-78) 107/70     Patient Vitals for the past 72 hrs (Last 3 readings):   Weight   12/12/22 0700 77.9 kg (171 lb 11.8 oz)   12/11/22 0733 79.2 kg (174 lb 9.7 oz)   12/10/22 0742 78.7 kg (173 lb 8 oz)       Body mass index is 27.72 kg/m².      Intake/Output Summary (Last 24 hours) at 12/12/2022 1511  Last data filed at 12/12/2022 1312  Gross per 24 hour   Intake  250 ml   Output 1300 ml   Net -1050 ml         Hemodynamic Parameters:  CVP:  [14 mmHg-17 mmHg] 14 mmHg    Telemetry: reviewed  Physical Exam  Vitals and nursing note reviewed.   Constitutional:       Appearance: Normal appearance.   HENT:      Head: Normocephalic.      Nose: Nose normal.      Mouth/Throat:      Mouth: Mucous membranes are moist.   Neck:      Vascular: JVD present.      Comments: JVP elevated.   Cardiovascular:      Rate and Rhythm: Tachycardia present. Rhythm irregular.      Heart sounds: Murmur heard.     Gallop present.   Pulmonary:      Effort: Pulmonary effort is normal.   Abdominal:      General: Abdomen is flat. There is no distension.      Palpations: Abdomen is soft.   Musculoskeletal:         General: Swelling present. Normal range of motion.   Skin:     General: Skin is warm.      Capillary Refill: Capillary refill takes 2 to 3 seconds.   Neurological:      Mental Status: He is alert and oriented to person, place, and time.       Significant Labs:  CBC:  Recent Labs   Lab 12/10/22  0423 12/11/22  0632 12/12/22  0442   WBC 6.04 5.70 5.18   RBC 4.08* 3.90* 3.86*   HGB 11.3* 10.8* 10.7*   HCT 36.5* 35.6* 34.1*    225 222   MCV 90 91 88   MCH 27.7 27.7 27.7   MCHC 31.0* 30.3* 31.4*       BNP:  Recent Labs   Lab 12/07/22  0936 12/09/22  0547 12/12/22  0442   BNP 1,889* 1,344* 1,518*       CMP:  Recent Labs   Lab 12/07/22  0936 12/07/22  2311 12/09/22  0547 12/09/22  1711 12/10/22  0423 12/11/22  0632 12/12/22  0442   *   < > 117*   < > 74 110 70   CALCIUM 9.7   < > 9.4   < > 8.9 9.0 9.3   ALBUMIN 3.9  --  3.7  --   --   --  3.6   PROT 7.1  --  7.0  --   --   --  6.6      < > 138   < > 139 137 137   K 3.8   < > 4.0   < > 3.9 3.8 3.7   CO2 39*   < > 36*   < > 33* 33* 30*   CL 90*   < > 91*   < > 94* 94* 94*   BUN 41*   < > 44*   < > 48* 44* 33*   CREATININE 1.5*   < > 1.8*   < > 1.8* 1.6* 1.4   ALKPHOS 213*  --  231*  --   --   --  210*   ALT 35  --  33  --   --   --  25    AST 28  --  31  --   --   --  22   BILITOT 5.2*  --  3.8*  --   --   --  3.8*    < > = values in this interval not displayed.        Coagulation:   Recent Labs   Lab 12/10/22  0423 12/10/22  0816 12/11/22  1508 12/11/22  2352 12/12/22  0745   INR 1.2  --   --   --   --    APTT 33.6*  33.6*   < > 31.8 33.5* 35.2*    < > = values in this interval not displayed.       LDH:  Recent Labs   Lab 12/09/22  1711   *       Microbiology:  Microbiology Results (last 7 days)       ** No results found for the last 168 hours. **            I have reviewed all pertinent labs within the past 24 hours.    Estimated Creatinine Clearance: 59.2 mL/min (based on SCr of 1.4 mg/dL).    Diagnostic Results:  I have reviewed all pertinent imaging results/findings within the past 24 hours.    Assessment and Plan:     52 year old man with a history of non-ischemic cardiomyopathy with ICD placement, CKD 3, atrial fibrillation [place] [facility] [place], on dobutamine 5. Echo 12/6 with EF of 25%. Pathway for LVAD/heart transplant started      * Acute on chronic combined systolic and diastolic heart failure  -NICM  -Transferred from [place] for ADHF and possible workup.  During hospital stay there; he was briefly on Levo and started on  and they were unable to wean  off.  Presented on  5.   -Last 2D Echo 12/6/22: LVEF: 25% , LVEDD:  5.33cm  -Hypervolemic on examination today  -Current diuretic regimen: IVP lasix 80 bid. Looks more volume up today so giving extra IV push and giving metolozone.   -GDMT with home dose of Digoxin, Entresto 24-26.  Was previously on Toprol 100mg QHS but holding in the setting of   -Patient is currently being evaluated for OHTx/VAD  -Heart failure pathway Step 3. Unfortunately C scope today had poor prep so repeating tomorrow.   -2g Na dietary restriction, 1500 mL fluid restriction, strict I/Os      Pulmonary nodule  -Questionable 8 mm left lower lobe pulmonary nodule series  6, image 314.  This courses along the left lower lobe pulmonary artery and pulmonary aneurysm not excluded.  Dedicated CT chest with contrast suggested.  -Will plan for CT with contrast once patient is euvolemic and creatinine remains stable.     Atrial fibrillation  -[id number] score is 2  -On Eliquis at home for anticoagulation  -Stopped on 12/5 in anticipation of workup and possible procedures  -On Lovenox for DVT prophylaxis  -continue amio/dig    ICD (implantable cardioverter-defibrillator), single, in situ  -Will find out the brand of device and order interrogation     HTN (hypertension)  Continue entresto    CKD (chronic kidney disease), stage III  -Reported baseline 1.6-2.0  -Will monitor trend  -avoid nephrotoxins     Tobacco use  -counseled on cessation    GERD (gastroesophageal reflux disease)  -Protonix 40mg Qdaily        ED [facility]  Heart Transplant  [facility] - Cardiology Stepdown

## 2022-12-12 NOTE — ASSESSMENT & PLAN NOTE
-NICM  -Transferred from [place] for ADHF and possible workup.  During hospital stay there; he was briefly on Levo and started on  and they were unable to wean  off.  Presented on  5.   -Last 2D Echo 12/6/22: LVEF: 25% , LVEDD:  5.33cm  -Hypervolemic on examination today  -Current diuretic regimen: IVP lasix 80 bid. Looks more volume up today so giving extra IV push and giving metolozone.   -GDMT with home dose of Digoxin, Entresto 24-26.  Was previously on Toprol 100mg QHS but holding in the setting of   -Patient is currently being evaluated for OHTx/VAD  -Heart failure pathway Step 3. Unfortunately C scope today had poor prep so repeating tomorrow.   -2g Na dietary restriction, 1500 mL fluid restriction, strict I/Os

## 2022-12-12 NOTE — NURSING TRANSFER
Nursing Transfer Note      12/12/2022     Reason patient is being transferred: postop    Transfer To:     Transfer via stretcher    Transfer with cardiac monitoring    Transported by    Medicines sent: Dobutamine    Any special needs or follow-up needed: n/a    Chart send with patient: Yes    Notified:     Patient reassessed at: 12/12/22    Upon arrival to floor: bed in lowest position  Report given to Christiano Styles

## 2022-12-12 NOTE — ASSESSMENT & PLAN NOTE
-[id number] score is 2  -On Eliquis at home for anticoagulation  -Stopped on 12/5 in anticipation of workup and possible procedures  -On Lovenox for DVT prophylaxis  -continue amio/dig

## 2022-12-12 NOTE — ANESTHESIA PREPROCEDURE EVALUATION
12/12/2022  Migel Garcia is a 52 y.o., male with Afib and ischemic cardiomyopathy with EF of 25% transferred for workup for either transplant or LVAD. He is currently on dobutamine and has been diuresed but per note may still be volume up today. He also has an AICD that has never fired. He is here for colonoscopy    Pre-operative evaluation for Procedure(s) (LRB):  COLONOSCOPY (N/A)    Migel Garcia is a 52 y.o. male     Patient Active Problem List   Diagnosis    HFrEF (heart failure with reduced ejection fraction)    Acute on chronic combined systolic and diastolic heart failure    GERD (gastroesophageal reflux disease)    Tobacco use    Marijuana abuse    CKD (chronic kidney disease), stage III    HTN (hypertension)    Anemia    ICD (implantable cardioverter-defibrillator), single, in situ    Atrial fibrillation    Pulmonary nodule       Review of patient's allergies indicates:  No Known Allergies    No current facility-administered medications on file prior to encounter.     No current outpatient medications on file prior to encounter.       History reviewed. No pertinent surgical history.    Social History     Socioeconomic History    Marital status:    Tobacco Use    Smoking status: Some Days     Types: Cigarettes   Substance and Sexual Activity    Alcohol use: Not Currently         CBC:   Recent Labs     12/11/22  0632 12/12/22  0442   WBC 5.70 5.18   RBC 3.90* 3.86*   HGB 10.8* 10.7*   HCT 35.6* 34.1*    222   MCV 91 88   MCH 27.7 27.7   MCHC 30.3* 31.4*       CMP:   Recent Labs     12/11/22  0632 12/12/22  0442    137   K 3.8 3.7   CL 94* 94*   CO2 33* 30*   BUN 44* 33*   CREATININE 1.6* 1.4    70   MG 2.3 2.1   CALCIUM 9.0 9.3   ALBUMIN  --  3.6   PROT  --  6.6   ALKPHOS  --  210*   ALT  --  25   AST  --  22   BILITOT  --  3.8*       INR  Recent Labs      12/10/22  0423 12/10/22  0816 12/11/22  1508 12/11/22  2352 12/12/22  0745   INR 1.2  --   --   --   --    APTT 33.6*  33.6*   < > 31.8 33.5* 35.2*    < > = values in this interval not displayed.     EKG:Atrial fibrillation with rapid ventricular response   Low voltage QRS   Septal infarct ,age undetermined   Lateral infarct ,age undetermined vs due to lead reversal   Abnormal ECG     2D Echo:12/6/22  Summary     The left ventricle is mildly enlarged with severely decreased systolic function.   Moderate left atrial enlargement.   The estimated ejection fraction is 25%.   Left ventricular diastolic dysfunction.   Small circumferential pericardial effusion.   Moderate mitral regurgitation.   Moderate tricuspid regurgitation.   Mild right ventricular enlargement with mildly reduced right ventricular systolic function.   Moderate right atrial enlargement.   The quantitatively derived ejection fraction is 27%.   Irregularly irregular rhythm   The estimated PA systolic pressure is 47 mmHg.   Elevated central venous pressure (15 mmHg).      Pre-op Assessment    I have reviewed the Patient Summary Reports.    I have reviewed the NPO Status.   I have reviewed the Medications.     Review of Systems  Anesthesia Hx:  History of prior surgery of interest to airway management or planning:  Denies Personal Hx of Anesthesia complications.   Social:  Smoker    Cardiovascular:   Exercise tolerance: poor Pacemaker Hypertension Dysrhythmias atrial fibrillation CHF ECG has been reviewed.    Pulmonary:  Pulmonary Normal    Hepatic/GI:   Bowel Prep. GERD    Neurological:  Neurology Normal    Endocrine:  Endocrine Normal        Physical Exam  General: Well nourished, Cooperative, Alert and Oriented    Airway:  Mallampati: III   Mouth Opening: Normal  TM Distance: Normal  Tongue: Large  Neck ROM: Normal ROM    Dental:  Intact        Anesthesia Plan  Type of Anesthesia, risks & benefits discussed:    Anesthesia Type: Gen  Natural Airway  Intra-op Monitoring Plan: Standard ASA Monitors  Post Op Pain Control Plan: multimodal analgesia  Induction:  IV  Airway Plan: Direct, Post-Induction  Informed Consent: Informed consent signed with the Patient and all parties understand the risks and agree with anesthesia plan.  All questions answered.   ASA Score: 4    Ready For Surgery From Anesthesia Perspective.     .

## 2022-12-12 NOTE — ANESTHESIA PREPROCEDURE EVALUATION
[facility]  Anesthesia Pre-Operative Evaluation         Patient Name: Migel Garcia  YOB: 1970  MRN: [id number]    SUBJECTIVE:     Pre-operative evaluation for Procedure(s) (LRB):  COLONOSCOPY (N/A)     12/12/2022    Migel Garcia is a 52 y.o. male w/ a significant PMHx of HFrEF (25%), CKD3, GERD, HTN, Afib, tobacco use, marijuana abuse, and ICD who presented as a transfer for either transplant or LVAD.    Patient now presents for the above procedure(s).    TTE Summary 12/6/2022     The left ventricle is mildly enlarged with severely decreased systolic function.   Moderate left atrial enlargement.   The estimated ejection fraction is 25%.   Left ventricular diastolic dysfunction.   Small circumferential pericardial effusion.   Moderate mitral regurgitation.   Moderate tricuspid regurgitation.   Mild right ventricular enlargement with mildly reduced right ventricular systolic function.   Moderate right atrial enlargement.   The quantitatively derived ejection fraction is 27%.   Irregularly irregular rhythm   The estimated PA systolic pressure is 47 mmHg.   Elevated central venous pressure (15 mmHg).      LDA:  PICC Triple Lumen right basilic (Active)   Line Necessity Review Hemodynamic instability 12/11/22 2000   Verification by X-ray No 12/11/22 2000   Site Assessment Not assessed;No warmth;No swelling;No redness;No drainage 12/12/22 1325   Extremity Assessment Distal to IV No warmth;No swelling;No redness;No abnormal discoloration 12/12/22 1325   Line Securement Device Secured with sutureless device 12/12/22 1325   Dressing Type Biopatch in place 12/11/22 2000   Dressing Status Clean;Dry;Intact 12/12/22 1325   Dressing Intervention Integrity maintained 12/12/22 1325   Date on Dressing 12/07/22 12/10/22 2000   Dressing Due to be Changed 12/14/22 12/11/22 2000   Distal Patency/Care flushed w/o difficulty 12/11/22 2000   Medial 1 Patency/Care infusing 12/11/22 2000    Proximal 1 Patency/Care infusing 12/11/22 2000   Number of days:        Prev airway: None documented.    Drips:    DOBUTamine IV infusion (non-titrating) 5 mcg/kg/min (12/12/22 1207)    heparin (porcine) in D5W 0 Units/kg/hr (12/12/22 0724)       Patient Active Problem List   Diagnosis    HFrEF (heart failure with reduced ejection fraction)    Acute on chronic combined systolic and diastolic heart failure    GERD (gastroesophageal reflux disease)    Tobacco use    Marijuana abuse    CKD (chronic kidney disease), stage III    HTN (hypertension)    Anemia    ICD (implantable cardioverter-defibrillator), single, in situ    Atrial fibrillation    Pulmonary nodule       Review of patient's allergies indicates:  No Known Allergies    Current Inpatient Medications:   amiodarone  200 mg Oral Daily    digoxin  0.125 mg Oral Daily    furosemide (LASIX) injection  80 mg Intravenous TID    pantoprazole  40 mg Oral Daily    polyethylene glycol  4,000 mL Oral Once    sacubitriL-valsartan  1 tablet Oral BID       No current facility-administered medications on file prior to encounter.     No current outpatient medications on file prior to encounter.       History reviewed. No pertinent surgical history.    Social History     Socioeconomic History    Marital status:    Tobacco Use    Smoking status: Some Days     Types: Cigarettes   Substance and Sexual Activity    Alcohol use: Not Currently       OBJECTIVE:     Vital Signs Range (Last 24H):  Temp:  [36.3 °C (97.3 °F)-36.8 °C (98.2 °F)]   Pulse:  []   Resp:  [13-20]   BP: ()/(58-78)   SpO2:  [97 %-100 %]       Significant Labs:  Lab Results   Component Value Date    WBC 5.18 12/12/2022    HGB 10.7 (L) 12/12/2022    HCT 34.1 (L) 12/12/2022     12/12/2022    CHOL 111 (L) 12/09/2022    TRIG 54 12/09/2022    HDL 33 (L) 12/09/2022    ALT 25 12/12/2022    AST 22 12/12/2022     12/12/2022    K 3.7 12/12/2022    CL 94 (L) 12/12/2022     CREATININE 1.4 12/12/2022    BUN 33 (H) 12/12/2022    CO2 30 (H) 12/12/2022    TSH 6.480 (H) 12/10/2022    PSA 0.84 12/09/2022    INR 1.2 12/10/2022    HGBA1C 5.1 12/05/2022       Diagnostic Studies: No relevant studies.    EKG:   Results for orders placed or performed during the hospital encounter of 12/05/22   EKG 12-LEAD    Collection Time: 12/05/22  1:23 AM    Narrative    Test Reason : I50.9,    Vent. Rate : 112 BPM     Atrial Rate : 000 BPM     P-R Int : 000 ms          QRS Dur : 078 ms      QT Int : 290 ms       P-R-T Axes : 000 160 -19 degrees     QTc Int : 395 ms     Suspect arm lead reversal, interpretation assumes no reversal  Atrial fibrillation with rapid ventricular response  Low voltage QRS  Septal infarct ,age undetermined  Lateral infarct ,age undetermined vs due to lead reversal  Abnormal ECG  No previous ECGs available  Confirmed by Dread CROSS MD ([id number]) on 12/5/2022 4:27:00 PM    Referred By: LIZZY MAYA           Confirmed By:Dread CROSS MD       2D ECHO:  TTE:  Results for orders placed or performed during the hospital encounter of 12/05/22   Echo   Result Value Ref Range    Ascending aorta 2.79 cm    STJ 2.43 cm    AV mean gradient 3 mmHg    Ao peak alex 1.25 m/s    Ao VTI 19.87 cm    IVRT 57.09 msec    IVS 0.83 0.6 - 1.1 cm    LA size 4.71 cm    Left Atrium Major Axis 6.24 cm    Left Atrium Minor Axis 6.20 cm    LVIDd 5.33 3.5 - 6.0 cm    LVIDs 4.45 (A) 2.1 - 4.0 cm    LVOT diameter 1.89 cm    LVOT peak VTI 12.98 cm    Posterior Wall 1.03 0.6 - 1.1 cm    MV Peak E Alex 1.03 m/s    RA Major Axis 5.81 cm    RA Width 5.22 cm    RVDD 4.75 cm    Sinus 2.86 cm    TAPSE 1.78 cm    TR Max Alex 2.85 m/s    TDI LATERAL 0.10 m/s    TDI SEPTAL 0.06 m/s    LA WIDTH 4.04 cm    LV Diastolic Volume 137.01 mL    LV Systolic Volume 90.24 mL    RV S' 10.19 cm/s    LVOT peak alex 0.80 m/s    LA volume (mod) 87.62 cm3    LV LATERAL E/E' RATIO 10.30 m/s    LV SEPTAL E/E' RATIO 17.17 m/s    FS 17 %    LA volume  100.60 cm3    LV mass 183.87 g    Left Ventricle Relative Wall Thickness 0.39 cm    AV valve area 1.83 cm2    AV Velocity Ratio 0.64     AV index (prosthetic) 0.65     Mean e' 0.08 m/s    LVOT area 2.8 cm2    LVOT stroke volume 36.40 cm3    AV peak gradient 6 mmHg    E/E' ratio 12.88 m/s    LV Systolic Volume Index 45.1 mL/m2    LV Diastolic Volume Index 68.51 mL/m2    LA Volume Index 50.3 mL/m2    LV Mass Index 92 g/m2    Triscuspid Valve Regurgitation Peak Gradient 32 mmHg    LA Volume Index (Mod) 43.8 mL/m2    BSA 2.06 m2    Right Atrial Pressure (from IVC) 15 mmHg    QEF 27 %    EF 25 %    TV rest pulmonary artery pressure 47 mmHg    Narrative    · The left ventricle is mildly enlarged with severely decreased systolic   function.  · Moderate left atrial enlargement.  · The estimated ejection fraction is 25%.  · Left ventricular diastolic dysfunction.  · Small circumferential pericardial effusion.  · Moderate mitral regurgitation.  · Moderate tricuspid regurgitation.  · Mild right ventricular enlargement with mildly reduced right ventricular   systolic function.  · Moderate right atrial enlargement.  · The quantitatively derived ejection fraction is 27%.  · Irregularly irregular rhythm  · The estimated PA systolic pressure is 47 mmHg.  · Elevated central venous pressure (15 mmHg).          DOMITILA:  No results found for this or any previous visit.    ASSESSMENT/PLAN:                                                                                                                  12/12/2022  Migel Garcia is a 52 y.o., male.      Pre-op Assessment    I have reviewed the Patient Summary Reports.     I have reviewed the Nursing Notes. I have reviewed the NPO Status.   I have reviewed the Medications.     Review of Systems  Anesthesia Hx:  History of prior surgery of interest to airway management or planning:  Denies Personal Hx of Anesthesia complications.   Social:  Smoker    Cardiovascular:   Exercise tolerance: poor  Pacemaker Hypertension Dysrhythmias atrial fibrillation CHF ECG has been reviewed.    Pulmonary:  Pulmonary Normal    Hepatic/GI:   Bowel Prep. GERD    Neurological:  Neurology Normal    Endocrine:  Endocrine Normal        Physical Exam  General: Well nourished, Cooperative, Alert and Oriented    Airway:  Mallampati: III   Mouth Opening: Normal  TM Distance: Normal  Tongue: Large  Neck ROM: Normal ROM    Dental:  Intact        Anesthesia Plan  Type of Anesthesia, risks & benefits discussed:    Anesthesia Type: MAC  Intra-op Monitoring Plan: Standard ASA Monitors  Post Op Pain Control Plan: multimodal analgesia  Induction:  IV  Airway Plan: Direct, Post-Induction  Informed Consent: Informed consent signed with the Patient and all parties understand the risks and agree with anesthesia plan.  All questions answered.   ASA Score: 4  Day of Surgery Review of History & Physical: H&P Update referred to the surgeon/provider.    Ready For Surgery From Anesthesia Perspective.     .

## 2022-12-12 NOTE — SUBJECTIVE & OBJECTIVE
Interval History: Patient got C scope today for transplant workup and was unfortunately inadequate so will need to repeat tomorrow.       Intake/Output Summary (Last 24 hours) at 12/12/2022 1511  Last data filed at 12/12/2022 1312  Gross per 24 hour   Intake 250 ml   Output 1300 ml   Net -1050 ml             Continuous Infusions:   DOBUTamine IV infusion (non-titrating) 5 mcg/kg/min (12/12/22 1207)    heparin (porcine) in D5W 0 Units/kg/hr (12/12/22 0724)     Scheduled Meds:   amiodarone  200 mg Oral Daily    digoxin  0.125 mg Oral Daily    furosemide (LASIX) injection  80 mg Intravenous TID    pantoprazole  40 mg Oral Daily    polyethylene glycol  4,000 mL Oral Once    sacubitriL-valsartan  1 tablet Oral BID     PRN Meds:acetaminophen, sodium chloride 0.9%, sodium chloride 0.9%    Review of patient's allergies indicates:  No Known Allergies  Objective:     Vital Signs (Most Recent):  Temp: 98.2 °F (36.8 °C) (12/12/22 1146)  Pulse: 75 (12/12/22 1400)  Resp: 20 (12/12/22 1400)  BP: 107/70 (12/12/22 1400)  SpO2: 100 % (12/12/22 1400) Vital Signs (24h Range):  Temp:  [97.3 °F (36.3 °C)-98.2 °F (36.8 °C)] 98.2 °F (36.8 °C)  Pulse:  [] 75  Resp:  [13-20] 20  SpO2:  [97 %-100 %] 100 %  BP: ()/(58-78) 107/70     Patient Vitals for the past 72 hrs (Last 3 readings):   Weight   12/12/22 0700 77.9 kg (171 lb 11.8 oz)   12/11/22 0733 79.2 kg (174 lb 9.7 oz)   12/10/22 0742 78.7 kg (173 lb 8 oz)       Body mass index is 27.72 kg/m².      Intake/Output Summary (Last 24 hours) at 12/12/2022 1511  Last data filed at 12/12/2022 1312  Gross per 24 hour   Intake 250 ml   Output 1300 ml   Net -1050 ml         Hemodynamic Parameters:  CVP:  [14 mmHg-17 mmHg] 14 mmHg    Telemetry: reviewed  Physical Exam  Vitals and nursing note reviewed.   Constitutional:       Appearance: Normal appearance.   HENT:      Head: Normocephalic.      Nose: Nose normal.      Mouth/Throat:      Mouth: Mucous membranes are moist.   Neck:       Vascular: JVD present.      Comments: JVP elevated.   Cardiovascular:      Rate and Rhythm: Tachycardia present. Rhythm irregular.      Heart sounds: Murmur heard.     Gallop present.   Pulmonary:      Effort: Pulmonary effort is normal.   Abdominal:      General: Abdomen is flat. There is no distension.      Palpations: Abdomen is soft.   Musculoskeletal:         General: Swelling present. Normal range of motion.   Skin:     General: Skin is warm.      Capillary Refill: Capillary refill takes 2 to 3 seconds.   Neurological:      Mental Status: He is alert and oriented to person, place, and time.       Significant Labs:  CBC:  Recent Labs   Lab 12/10/22  0423 12/11/22  0632 12/12/22  0442   WBC 6.04 5.70 5.18   RBC 4.08* 3.90* 3.86*   HGB 11.3* 10.8* 10.7*   HCT 36.5* 35.6* 34.1*    225 222   MCV 90 91 88   MCH 27.7 27.7 27.7   MCHC 31.0* 30.3* 31.4*       BNP:  Recent Labs   Lab 12/07/22  0936 12/09/22  0547 12/12/22  0442   BNP 1,889* 1,344* 1,518*       CMP:  Recent Labs   Lab 12/07/22  0936 12/07/22  2311 12/09/22  0547 12/09/22  1711 12/10/22  0423 12/11/22  0632 12/12/22  0442   *   < > 117*   < > 74 110 70   CALCIUM 9.7   < > 9.4   < > 8.9 9.0 9.3   ALBUMIN 3.9  --  3.7  --   --   --  3.6   PROT 7.1  --  7.0  --   --   --  6.6      < > 138   < > 139 137 137   K 3.8   < > 4.0   < > 3.9 3.8 3.7   CO2 39*   < > 36*   < > 33* 33* 30*   CL 90*   < > 91*   < > 94* 94* 94*   BUN 41*   < > 44*   < > 48* 44* 33*   CREATININE 1.5*   < > 1.8*   < > 1.8* 1.6* 1.4   ALKPHOS 213*  --  231*  --   --   --  210*   ALT 35  --  33  --   --   --  25   AST 28  --  31  --   --   --  22   BILITOT 5.2*  --  3.8*  --   --   --  3.8*    < > = values in this interval not displayed.        Coagulation:   Recent Labs   Lab 12/10/22  0423 12/10/22  0816 12/11/22  1508 12/11/22  2352 12/12/22  0745   INR 1.2  --   --   --   --    APTT 33.6*  33.6*   < > 31.8 33.5* 35.2*    < > = values in this interval not displayed.        LDH:  Recent Labs   Lab 12/09/22  1711   *       Microbiology:  Microbiology Results (last 7 days)       ** No results found for the last 168 hours. **            I have reviewed all pertinent labs within the past 24 hours.    Estimated Creatinine Clearance: 59.2 mL/min (based on SCr of 1.4 mg/dL).    Diagnostic Results:  I have reviewed all pertinent imaging results/findings within the past 24 hours.

## 2022-12-12 NOTE — NURSING
Pt aPTT resulted subtherpeutic at 33.5. Units were increased by 2 per nomogram, no bolus was given MD Barnard aware. Will continue to monitor

## 2022-12-12 NOTE — PROGRESS NOTES
Update    Pt presents as AAO x4, calm, cooperative, and asking and answering questions appropriately, Pt's wife and son at bedside. Pt states she is doing well but having issues with bowel prep. LCSW completed caregiver education and answered all questions. Psychosocial to follow. LCSW offered support and encouragement. SW providing ongoing psychosocial, counseling, & emotional support, education, resources, assistance, and discharge planning as indicated.  SW to continue to follow.

## 2022-12-13 ENCOUNTER — ANESTHESIA (OUTPATIENT)
Dept: ENDOSCOPY | Facility: HOSPITAL | Age: 52
DRG: 286 | End: 2022-12-13
Payer: MEDICARE

## 2022-12-13 PROBLEM — I50.9 ACUTE DECOMPENSATED HEART FAILURE: Status: ACTIVE | Noted: 2022-12-13

## 2022-12-13 LAB
ALLENS TEST: ABNORMAL
ANION GAP SERPL CALC-SCNC: 12 MMOL/L (ref 8–16)
APTT BLDCRRT: 31.7 SEC (ref 21–32)
APTT BLDCRRT: 60.9 SEC (ref 21–32)
BASOPHILS # BLD AUTO: 0.06 K/UL (ref 0–0.2)
BASOPHILS NFR BLD: 1.3 % (ref 0–1.9)
BUN SERPL-MCNC: 27 MG/DL (ref 6–20)
CALCIUM SERPL-MCNC: 9.2 MG/DL (ref 8.7–10.5)
CHLORIDE SERPL-SCNC: 93 MMOL/L (ref 95–110)
CO2 SERPL-SCNC: 33 MMOL/L (ref 23–29)
COTININE SERPL-MCNC: NORMAL NG/ML
CREAT SERPL-MCNC: 1.5 MG/DL (ref 0.5–1.4)
DELSYS: ABNORMAL
DIFFERENTIAL METHOD: ABNORMAL
EBV VCA IGG SER QL IA: POSITIVE
EOSINOPHIL # BLD AUTO: 0.1 K/UL (ref 0–0.5)
EOSINOPHIL NFR BLD: 3.1 % (ref 0–8)
ERYTHROCYTE [DISTWIDTH] IN BLOOD BY AUTOMATED COUNT: 21.5 % (ref 11.5–14.5)
EST. GFR  (NO RACE VARIABLE): 55 ML/MIN/1.73 M^2
FIO2: 21
GLUCOSE SERPL-MCNC: 61 MG/DL (ref 70–110)
HCO3 UR-SCNC: 36.9 MMOL/L (ref 24–28)
HCT VFR BLD AUTO: 33.7 % (ref 40–54)
HGB BLD-MCNC: 10.6 G/DL (ref 14–18)
IMM GRANULOCYTES # BLD AUTO: 0.06 K/UL (ref 0–0.04)
IMM GRANULOCYTES NFR BLD AUTO: 1.3 % (ref 0–0.5)
LYMPHOCYTES # BLD AUTO: 1 K/UL (ref 1–4.8)
LYMPHOCYTES NFR BLD: 21.8 % (ref 18–48)
MAGNESIUM SERPL-MCNC: 2 MG/DL (ref 1.6–2.6)
MCH RBC QN AUTO: 27.7 PG (ref 27–31)
MCHC RBC AUTO-ENTMCNC: 31.5 G/DL (ref 32–36)
MCV RBC AUTO: 88 FL (ref 82–98)
MODE: ABNORMAL
MONOCYTES # BLD AUTO: 0.5 K/UL (ref 0.3–1)
MONOCYTES NFR BLD: 10.6 % (ref 4–15)
NEUTROPHILS # BLD AUTO: 2.8 K/UL (ref 1.8–7.7)
NEUTROPHILS NFR BLD: 61.9 % (ref 38–73)
NICOTINE SERPL-MCNC: <3 NG/ML
NRBC BLD-RTO: 0 /100 WBC
PCO2 BLDA: 52.4 MMHG (ref 35–45)
PH SMN: 7.46 [PH] (ref 7.35–7.45)
PLATELET # BLD AUTO: 199 K/UL (ref 150–450)
PMV BLD AUTO: 9.8 FL (ref 9.2–12.9)
PO2 BLDA: 28 MMHG (ref 40–60)
POC BE: 13 MMOL/L
POC SATURATED O2: 55 % (ref 95–100)
POC TCO2: 38 MMOL/L (ref 24–29)
POTASSIUM SERPL-SCNC: 3.6 MMOL/L (ref 3.5–5.1)
PROT S FREE AG ACT/NOR PPP IA: 115 % (ref 57–171)
RBC # BLD AUTO: 3.82 M/UL (ref 4.6–6.2)
SAMPLE: ABNORMAL
SITE: ABNORMAL
SODIUM SERPL-SCNC: 138 MMOL/L (ref 136–145)
WBC # BLD AUTO: 4.45 K/UL (ref 3.9–12.7)

## 2022-12-13 PROCEDURE — 20600001 HC STEP DOWN PRIVATE ROOM

## 2022-12-13 PROCEDURE — 94761 N-INVAS EAR/PLS OXIMETRY MLT: CPT

## 2022-12-13 PROCEDURE — 99900035 HC TECH TIME PER 15 MIN (STAT)

## 2022-12-13 PROCEDURE — 25000003 PHARM REV CODE 250: Performed by: INTERNAL MEDICINE

## 2022-12-13 PROCEDURE — G0121 COLON CA SCRN NOT HI RSK IND: HCPCS | Mod: GC,,, | Performed by: INTERNAL MEDICINE

## 2022-12-13 PROCEDURE — 37000009 HC ANESTHESIA EA ADD 15 MINS: Performed by: INTERNAL MEDICINE

## 2022-12-13 PROCEDURE — 25000003 PHARM REV CODE 250: Mod: NTX | Performed by: NURSE ANESTHETIST, CERTIFIED REGISTERED

## 2022-12-13 PROCEDURE — 99233 PR SUBSEQUENT HOSPITAL CARE,LEVL III: ICD-10-PCS | Mod: ,,, | Performed by: PHYSICIAN ASSISTANT

## 2022-12-13 PROCEDURE — D9220A PRA ANESTHESIA: ICD-10-PCS | Mod: CRNA,NTX,, | Performed by: NURSE ANESTHETIST, CERTIFIED REGISTERED

## 2022-12-13 PROCEDURE — D9220A PRA ANESTHESIA: Mod: ANES,NTX,, | Performed by: ANESTHESIOLOGY

## 2022-12-13 PROCEDURE — 63600175 PHARM REV CODE 636 W HCPCS: Performed by: INTERNAL MEDICINE

## 2022-12-13 PROCEDURE — 99223 1ST HOSP IP/OBS HIGH 75: CPT | Mod: GC,,, | Performed by: EMERGENCY MEDICINE

## 2022-12-13 PROCEDURE — 37000008 HC ANESTHESIA 1ST 15 MINUTES: Performed by: INTERNAL MEDICINE

## 2022-12-13 PROCEDURE — 85730 THROMBOPLASTIN TIME PARTIAL: CPT | Mod: 91 | Performed by: INTERNAL MEDICINE

## 2022-12-13 PROCEDURE — 82803 BLOOD GASES ANY COMBINATION: CPT

## 2022-12-13 PROCEDURE — G0121 COLON CA SCRN NOT HI RSK IND: ICD-10-PCS | Mod: GC,,, | Performed by: INTERNAL MEDICINE

## 2022-12-13 PROCEDURE — 85025 COMPLETE CBC W/AUTO DIFF WBC: CPT | Performed by: PHYSICIAN ASSISTANT

## 2022-12-13 PROCEDURE — 63600175 PHARM REV CODE 636 W HCPCS: Performed by: PHYSICIAN ASSISTANT

## 2022-12-13 PROCEDURE — 83735 ASSAY OF MAGNESIUM: CPT | Performed by: PHYSICIAN ASSISTANT

## 2022-12-13 PROCEDURE — 99223 PR INITIAL HOSPITAL CARE,LEVL III: ICD-10-PCS | Mod: GC,,, | Performed by: EMERGENCY MEDICINE

## 2022-12-13 PROCEDURE — 99233 SBSQ HOSP IP/OBS HIGH 50: CPT | Mod: ,,, | Performed by: PHYSICIAN ASSISTANT

## 2022-12-13 PROCEDURE — D9220A PRA ANESTHESIA: Mod: CRNA,NTX,, | Performed by: NURSE ANESTHETIST, CERTIFIED REGISTERED

## 2022-12-13 PROCEDURE — 63600175 PHARM REV CODE 636 W HCPCS: Performed by: STUDENT IN AN ORGANIZED HEALTH CARE EDUCATION/TRAINING PROGRAM

## 2022-12-13 PROCEDURE — 80048 BASIC METABOLIC PNL TOTAL CA: CPT | Performed by: PHYSICIAN ASSISTANT

## 2022-12-13 PROCEDURE — 97116 GAIT TRAINING THERAPY: CPT

## 2022-12-13 PROCEDURE — G0121 COLON CA SCRN NOT HI RSK IND: HCPCS | Performed by: INTERNAL MEDICINE

## 2022-12-13 PROCEDURE — D9220A PRA ANESTHESIA: ICD-10-PCS | Mod: ANES,NTX,, | Performed by: ANESTHESIOLOGY

## 2022-12-13 PROCEDURE — 63600175 PHARM REV CODE 636 W HCPCS: Mod: NTX | Performed by: NURSE ANESTHETIST, CERTIFIED REGISTERED

## 2022-12-13 RX ORDER — ETOMIDATE 2 MG/ML
INJECTION INTRAVENOUS
Status: DISCONTINUED | OUTPATIENT
Start: 2022-12-13 | End: 2022-12-13

## 2022-12-13 RX ORDER — PROPOFOL 10 MG/ML
VIAL (ML) INTRAVENOUS CONTINUOUS PRN
Status: DISCONTINUED | OUTPATIENT
Start: 2022-12-13 | End: 2022-12-13

## 2022-12-13 RX ORDER — LIDOCAINE HYDROCHLORIDE 20 MG/ML
INJECTION, SOLUTION EPIDURAL; INFILTRATION; INTRACAUDAL; PERINEURAL
Status: DISCONTINUED | OUTPATIENT
Start: 2022-12-13 | End: 2022-12-13

## 2022-12-13 RX ORDER — FUROSEMIDE 10 MG/ML
80 INJECTION INTRAMUSCULAR; INTRAVENOUS ONCE
Status: COMPLETED | OUTPATIENT
Start: 2022-12-13 | End: 2022-12-13

## 2022-12-13 RX ORDER — PHENYLEPHRINE HCL IN 0.9% NACL 1 MG/10 ML
SYRINGE (ML) INTRAVENOUS
Status: DISCONTINUED | OUTPATIENT
Start: 2022-12-13 | End: 2022-12-13

## 2022-12-13 RX ADMIN — AMIODARONE HYDROCHLORIDE 200 MG: 200 TABLET ORAL at 09:12

## 2022-12-13 RX ADMIN — FUROSEMIDE 20 MG/HR: 10 INJECTION, SOLUTION INTRAMUSCULAR; INTRAVENOUS at 07:12

## 2022-12-13 RX ADMIN — PROPOFOL 100 MCG/KG/MIN: 10 INJECTION, EMULSION INTRAVENOUS at 01:12

## 2022-12-13 RX ADMIN — FUROSEMIDE 80 MG: 10 INJECTION, SOLUTION INTRAMUSCULAR; INTRAVENOUS at 09:12

## 2022-12-13 RX ADMIN — Medication 100 MCG: at 02:12

## 2022-12-13 RX ADMIN — LIDOCAINE HYDROCHLORIDE 100 MG: 20 INJECTION, SOLUTION EPIDURAL; INFILTRATION; INTRACAUDAL; PERINEURAL at 01:12

## 2022-12-13 RX ADMIN — FUROSEMIDE 20 MG/HR: 10 INJECTION, SOLUTION INTRAMUSCULAR; INTRAVENOUS at 12:12

## 2022-12-13 RX ADMIN — ETOMIDATE 20 MG: 2 INJECTION INTRAVENOUS at 01:12

## 2022-12-13 RX ADMIN — PANTOPRAZOLE SODIUM 40 MG: 40 TABLET, DELAYED RELEASE ORAL at 09:12

## 2022-12-13 RX ADMIN — SACUBITRIL AND VALSARTAN 1 TABLET: 24; 26 TABLET, FILM COATED ORAL at 09:12

## 2022-12-13 RX ADMIN — DOBUTAMINE HYDROCHLORIDE 5 MCG/KG/MIN: 400 INJECTION INTRAVENOUS at 11:12

## 2022-12-13 RX ADMIN — FUROSEMIDE 80 MG: 10 INJECTION, SOLUTION INTRAMUSCULAR; INTRAVENOUS at 12:12

## 2022-12-13 RX ADMIN — HEPARIN SODIUM 20 UNITS/KG/HR: 10000 INJECTION, SOLUTION INTRAVENOUS at 03:12

## 2022-12-13 RX ADMIN — SODIUM CHLORIDE: 9 INJECTION, SOLUTION INTRAVENOUS at 01:12

## 2022-12-13 RX ADMIN — DIGOXIN 0.12 MG: 125 TABLET ORAL at 09:12

## 2022-12-13 NOTE — H&P
Short Stay Endoscopy History and Physical    PCP - Anuj Banks MD  Referring Physician - Miller Bell MD  [address] [place] [address]    Procedure - Colonoscopy  ASA - per anesthesia  Mallampati - per anesthesia  History of Anesthesia problems - no  Family history Anesthesia problems -  no   Plan of anesthesia - General    HPI  52 y.o. male  Reason for procedure:   Average risk screening colonoscopy for heart transplant evaluation    ROS:  Constitutional: No fevers, chills, No weight loss  CV: No chest pain  Pulm: No cough, No shortness of breath  GI: see HPI    Medical History:  has a past medical history of A-fib, GERD (gastroesophageal reflux disease), Heart failure, unspecified, Hypertension, and V-tach.    Surgical History:  has a past surgical history that includes Colonoscopy (N/A, 12/12/2022).    Family History: family history is not on file..    Social History:  reports that he has been smoking cigarettes. He does not have any smokeless tobacco history on file. He reports that he does not currently use alcohol.    Review of patient's allergies indicates:  No Known Allergies    Medications:   No medications prior to admission.       Physical Exam:    Vital Signs:   Vitals:    12/13/22 1247   BP:    Pulse: 74   Resp:    Temp:        General Appearance: Well appearing in no acute distress  Abdomen: Soft, non tender, non distended with normal bowel sounds, no masses    Labs:  Lab Results   Component Value Date    WBC 4.45 12/13/2022    HGB 10.6 (L) 12/13/2022    HCT 33.7 (L) 12/13/2022     12/13/2022    CHOL 111 (L) 12/09/2022    TRIG 54 12/09/2022    HDL 33 (L) 12/09/2022    ALT 25 12/12/2022    AST 22 12/12/2022     12/13/2022    K 3.6 12/13/2022    CL 93 (L) 12/13/2022    CREATININE 1.5 (H) 12/13/2022    BUN 27 (H) 12/13/2022    CO2 33 (H) 12/13/2022    TSH 6.480 (H) 12/10/2022    PSA 0.84 12/09/2022    INR 1.2 12/10/2022    HGBA1C 5.1 12/05/2022       I have explained the  risks and benefits of this endoscopic procedure to the patient including but not limited to bleeding, inflammation, infection, perforation, and death.      Wojciech Gómez MD

## 2022-12-13 NOTE — PROGRESS NOTES
[facility] - Cardiology Step[facility]  Heart Transplant  Progress Note    Patient Name: Migel Garcia  MRN: [id number]  Admission Date: 12/5/2022  Hospital Length of Stay: 8 days  Attending Physician: Gni Mendes MD  Primary Care Provider: Anuj Banks MD  Principal Problem:Acute on chronic combined systolic and diastolic heart failure    Subjective:     Interval History: NAEON. No complaints. CVP 16 this morning, will transition from IVP lasix 80 mg TID to IV lasix gtt 20 mg/hr after 80 mg IVP. Colonoscopy today. Chest CTA tomorrow for further evaluation of pulmonary nodules.       Intake/Output Summary (Last 24 hours) at 12/13/2022 1128  Last data filed at 12/13/2022 1111  Gross per 24 hour   Intake 742 ml   Output 1900 ml   Net -1158 ml             Continuous Infusions:   DOBUTamine IV infusion (non-titrating) 5 mcg/kg/min (12/13/22 1110)    furosemide (LASIX) 10 mg/mL infusion (non-titrating)      heparin (porcine) in D5W Stopped (12/13/22 0803)     Scheduled Meds:   amiodarone  200 mg Oral Daily    digoxin  0.125 mg Oral Daily    furosemide (LASIX) injection  80 mg Intravenous Once    pantoprazole  40 mg Oral Daily    sacubitriL-valsartan  1 tablet Oral BID     PRN Meds:acetaminophen, sodium chloride 0.9%, sodium chloride 0.9%    Review of patient's allergies indicates:  No Known Allergies  Objective:     Vital Signs (Most Recent):  Temp: 97.6 °F (36.4 °C) (12/13/22 0808)  Pulse: 81 (12/13/22 0808)  Resp: 18 (12/13/22 0808)  BP: 103/67 (12/13/22 0808)  SpO2: 98 % (12/13/22 0808) Vital Signs (24h Range):  Temp:  [96.8 °F (36 °C)-98.2 °F (36.8 °C)] 97.6 °F (36.4 °C)  Pulse:  [66-82] 81  Resp:  [13-20] 18  SpO2:  [98 %-100 %] 98 %  BP: ()/(59-78) 103/67     Patient Vitals for the past 72 hrs (Last 3 readings):   Weight   12/13/22 0808 78.1 kg (172 lb 2.9 oz)   12/12/22 0700 77.9 kg (171 lb 11.8 oz)   12/11/22 0733 79.2 kg (174 lb 9.7 oz)       Body mass index is 27.79 kg/m².      Intake/Output Summary  (Last 24 hours) at 12/13/2022 1128  Last data filed at 12/13/2022 1111  Gross per 24 hour   Intake 742 ml   Output 1900 ml   Net -1158 ml         Hemodynamic Parameters:  CVP:  [16 mmHg-18 mmHg] 16 mmHg    Telemetry: reviewed  Physical Exam  Vitals and nursing note reviewed.   Constitutional:       Appearance: Normal appearance.   HENT:      Head: Normocephalic.      Nose: Nose normal.      Mouth/Throat:      Mouth: Mucous membranes are moist.   Neck:      Vascular: JVD present.      Comments: JVP elevated.   Cardiovascular:      Rate and Rhythm: Tachycardia present. Rhythm irregular.      Heart sounds: Murmur heard.     Gallop present.   Pulmonary:      Effort: Pulmonary effort is normal.   Abdominal:      General: Abdomen is flat. There is no distension.      Palpations: Abdomen is soft.   Musculoskeletal:         General: Swelling present. Normal range of motion.   Skin:     General: Skin is warm.      Capillary Refill: Capillary refill takes 2 to 3 seconds.   Neurological:      Mental Status: He is alert and oriented to person, place, and time.       Significant Labs:  CBC:  Recent Labs   Lab 12/11/22  0632 12/12/22 0442 12/13/22 0522   WBC 5.70 5.18 4.45   RBC 3.90* 3.86* 3.82*   HGB 10.8* 10.7* 10.6*   HCT 35.6* 34.1* 33.7*    222 199   MCV 91 88 88   MCH 27.7 27.7 27.7   MCHC 30.3* 31.4* 31.5*       BNP:  Recent Labs   Lab 12/07/22  0936 12/09/22  0547 12/12/22 0442   BNP 1,889* 1,344* 1,518*       CMP:  Recent Labs   Lab 12/07/22  0936 12/07/22  2311 12/09/22  0547 12/09/22  1711 12/11/22  0632 12/12/22 0442 12/13/22 0522   *   < > 117*   < > 110 70 61*   CALCIUM 9.7   < > 9.4   < > 9.0 9.3 9.2   ALBUMIN 3.9  --  3.7  --   --  3.6  --    PROT 7.1  --  7.0  --   --  6.6  --       < > 138   < > 137 137 138   K 3.8   < > 4.0   < > 3.8 3.7 3.6   CO2 39*   < > 36*   < > 33* 30* 33*   CL 90*   < > 91*   < > 94* 94* 93*   BUN 41*   < > 44*   < > 44* 33* 27*   CREATININE 1.5*   < > 1.8*   <  > 1.6* 1.4 1.5*   ALKPHOS 213*  --  231*  --   --  210*  --    ALT 35  --  33  --   --  25  --    AST 28  --  31  --   --  22  --    BILITOT 5.2*  --  3.8*  --   --  3.8*  --     < > = values in this interval not displayed.        Coagulation:   Recent Labs   Lab 12/10/22  0423 12/10/22  0816 12/12/22  0745 12/12/22  2200 12/13/22  0522   INR 1.2  --   --   --   --    APTT 33.6*  33.6*   < > 35.2* 35.3* 60.9*    < > = values in this interval not displayed.       LDH:  No results for input(s): LDH in the last 72 hours.    Microbiology:  Microbiology Results (last 7 days)       ** No results found for the last 168 hours. **            I have reviewed all pertinent labs within the past 24 hours.    Estimated Creatinine Clearance: 55.3 mL/min (A) (based on SCr of 1.5 mg/dL (H)).    Diagnostic Results:  I have reviewed all pertinent imaging results/findings within the past 24 hours.    Assessment and Plan:     52 year old man with a history of non-ischemic cardiomyopathy with ICD placement, CKD 3, atrial fibrillation presents from  to [facility] for advanced options, on dobutamine 5. Echo 12/6 with EF of 25%. Pathway for LVAD/heart transplant started      * Acute on chronic combined systolic and diastolic heart failure  -NICM  -Transferred from [facility] for ADHF and possible workup.  During hospital stay there; he was briefly on Levo and started on  and they were unable to wean  off.  Presented on  5.   -Last 2D Echo 12/6/22: LVEF: 25% , LVEDD:  5.33cm  -Hypervolemic on examination today  -CVP 16, SVO2 55, CO 4.4, CI 2.3, SVR 1363  -Current diuretic regimen: Transition from IVP lasix 80 TID to IV Lasix gtt at 20 mg/hr.   -GDMT with home dose of Digoxin, Entresto 24-26. Was previously on Toprol 100mg QHS but holding in the setting of   -Patient is currently being evaluated for OHTx/VAD  -Heart failure pathway Step 3  Colonoscopy today  -2g Na dietary restriction, 1500 mL fluid restriction, strict  I/Os      Pulmonary nodule  -Questionable 8 mm left lower lobe pulmonary nodule series 6, image 314.  This courses along the left lower lobe pulmonary artery and pulmonary aneurysm not excluded.  Dedicated CT chest with contrast suggested.  -Will plan for CTA tomorrow     Atrial fibrillation  -DEI3BF8-RETi score is 2  -On Eliquis at home for anticoagulation  -Stopped on 12/5 in anticipation of workup and possible procedures  -On Lovenox for DVT prophylaxis  -continue amio/dig    ICD (implantable cardioverter-defibrillator), single, in situ  -Will find out the brand of device and order interrogation     HTN (hypertension)  Continue entresto    CKD (chronic kidney disease), stage III  -Reported baseline 1.6-2.0  -Will monitor trend  -avoid nephrotoxins     Tobacco use  -counseled on cessation    GERD (gastroesophageal reflux disease)  -Protonix 40mg Qdaily        Grecia Gillis PA-C  Heart Transplant  [facility] - Cardiology Stepdown

## 2022-12-13 NOTE — ASSESSMENT & PLAN NOTE
-LTK1DR6-AWLx score is 2  -On Eliquis at home for anticoagulation  -Stopped on 12/5 in anticipation of workup and possible procedures  -On Lovenox for DVT prophylaxis  -continue amio/dig

## 2022-12-13 NOTE — ASSESSMENT & PLAN NOTE
-Questionable 8 mm left lower lobe pulmonary nodule series 6, image 314.  This courses along the left lower lobe pulmonary artery and pulmonary aneurysm not excluded.  Dedicated CT chest with contrast suggested.  -Will plan for CTA tomorrow

## 2022-12-13 NOTE — ASSESSMENT & PLAN NOTE
-NICM  -Transferred from [place] for ADHF and possible workup.  During hospital stay there; he was briefly on Levo and started on  and they were unable to wean  off.  Presented on  5.   -Last 2D Echo 12/6/22: LVEF: 25% , LVEDD:  5.33cm  -Hypervolemic on examination today  -CVP 16, SVO2 55, CO 4.4, CI 2.3, SVR 1363  -Current diuretic regimen: Transition from IVP lasix 80 TID to IV Lasix gtt at 20 mg/hr.   -GDMT with home dose of Digoxin, Entresto 24-26. Was previously on Toprol 100mg QHS but holding in the setting of   -Patient is currently being evaluated for OHTx/VAD  -Heart failure pathway Step 3  Colonoscopy today  -2g Na dietary restriction, 1500 mL fluid restriction, strict I/Os

## 2022-12-13 NOTE — PLAN OF CARE
Problem: Physical Therapy  Goal: Physical Therapy Goal  Description: Goals to be met by: 22, revised: 22    Patient will increase functional independence with mobility by performin. Gait  x 300 feet with independence using LRAD as needed -not met     Outcome: Ongoing, Progressing     Pt tolerated PT session well.   POC reduced to 1 x per week.     All needs met, all questions answered.

## 2022-12-13 NOTE — [employer]ATION PATIENT INSTRUCTIONS
Discharge Summary/Instructions after an Endoscopic Procedure  Patient Name: Migel Garcia  Patient MRN: [id number]  Patient YOB: 1970  Tuesday, December 13, 2022  Geovany Cole MD  Dear patient,  As a result of recent federal legislation (The Federal Cures Act), you may   receive lab or pathology results from your procedure in your [facility]   account before your physician is able to contact you. Your physician or   their representative will relay the results to you with their   recommendations at their soonest availability.  Thank you,  RESTRICTIONS:  During your procedure today, you received medications for sedation.  These   medications may affect your judgment, balance and coordination.  Therefore,   for 24 hours, you have the following restrictions:   - DO NOT drive a car, operate machinery, make legal/financial decisions,   sign important papers or drink alcohol.    ACTIVITY:  Today: no heavy lifting, straining or running due to procedural   sedation/anesthesia.  The following day: return to full activity including work.  DIET:  Eat and drink normally unless instructed otherwise.     TREATMENT FOR COMMON SIDE EFFECTS:  - Mild abdominal pain, nausea, belching, bloating or excessive gas:  rest,   eat lightly and use a heating pad.  - Sore Throat: treat with throat lozenges and/or gargle with warm salt   water.  - Because air was used during the procedure, expelling large amounts of air   from your rectum or belching is normal.  - If a bowel prep was taken, you may not have a bowel movement for 1-3 days.    This is normal.  SYMPTOMS TO WATCH FOR AND REPORT TO YOUR PHYSICIAN:  1. Abdominal pain or bloating, other than gas cramps.  2. Chest pain.  3. Back pain.  4. Signs of infection such as: chills or fever occurring within 24 hours   after the procedure.  5. Rectal bleeding, which would show as bright red, maroon, or black stools.   (A tablespoon of blood from the rectum is not serious, especially  if   hemorrhoids are present.)  6. Vomiting.  7. Weakness or dizziness.  GO DIRECTLY TO THE NEAREST EMERGENCY ROOM IF YOU HAVE ANY OF THE FOLLOWING:      Difficulty breathing              Chills and/or fever over 101 F   Persistent vomiting and/or vomiting blood   Severe abdominal pain   Severe chest pain   Black, tarry stools   Bleeding- more than one tablespoon   Any other symptom or condition that you feel may need urgent attention  Your doctor recommends these additional instructions:  If any biopsies were taken, your doctors clinic will contact you in 1 to 2   weeks with any results.  - Return patient to [facility] for [facility] care.   - Resume previous diet.   - Resume heparin at prior dose today.   - Repeat colonoscopy (date not yet determined) for surveillance.  Best done   after heart transplant and recovery from transplant.  For questions, problems or results please call your physician - Geovany Cole MD at Work:  [phone].  [facility], EMERGENCY ROOM PHONE NUMBER: [phone]  IF A COMPLICATION OR EMERGENCY SITUATION ARISES AND YOU ARE UNABLE TO REACH   YOUR PHYSICIAN - GO DIRECTLY TO THE EMERGENCY ROOM.  Geovany Cole MD  12/13/2022 2:31:24 PM  This report has been verified and signed electronically.  Dear patient,  As a result of recent federal legislation (The Federal Cures Act), you may   receive lab or pathology results from your procedure in your [facility]   account before your physician is able to contact you. Your physician or   their representative will relay the results to you with their   recommendations at their soonest availability.  Thank you,  [employer]ATION

## 2022-12-13 NOTE — ASSESSMENT & PLAN NOTE
- noted on CT C/A/P. Questionable 8mm LLL nodule however could be vascular.  - has a smoking history (10 pack year)      Recommendations:  - Obtain a CT chest with contrast to evaluate, if it truly is an 8mm nodule he would still be at low risk and should not preclude him from transplant.  - At that size, would be difficult to sample and would recommend repeat imaging in 3-6 month if it does come back as a nodule as well

## 2022-12-13 NOTE — SUBJECTIVE & OBJECTIVE
Past Medical History:   Diagnosis Date    A-fib     GERD (gastroesophageal reflux disease)     Heart failure, unspecified     Hypertension     V-tach        Past Surgical History:   Procedure Laterality Date    COLONOSCOPY N/A 12/12/2022    Procedure: COLONOSCOPY;  Surgeon: Geovany Cole MD;  Location: [address];  Service: Endoscopy;  Laterality: N/A;       Review of patient's allergies indicates:  No Known Allergies    Family History    None       Tobacco Use    Smoking status: Some Days     Types: Cigarettes    Smokeless tobacco: Not on file   Substance and Sexual Activity    Alcohol use: Not Currently    Drug use: Not on file    Sexual activity: Not on file         Review of Systems   Constitutional:  Positive for appetite change and fatigue. Negative for activity change, chills and fever.   HENT:  Negative for congestion, postnasal drip, rhinorrhea and sinus pain.    Eyes:  Negative for discharge and itching.   Respiratory:  Positive for shortness of breath. Negative for cough and wheezing.    Cardiovascular:  Negative for chest pain and leg swelling.   Gastrointestinal:  Negative for abdominal pain, diarrhea, nausea and vomiting.   Endocrine: Negative for cold intolerance and heat intolerance.   Genitourinary:  Negative for dysuria, frequency and hematuria.   Musculoskeletal:  Negative for arthralgias and myalgias.   Skin:  Negative for color change and rash.   Allergic/Immunologic: Negative for environmental allergies and food allergies.   Neurological:  Negative for dizziness and syncope.   Hematological:  Negative for adenopathy. Does not bruise/bleed easily.   Psychiatric/Behavioral:  Negative for agitation and decreased concentration. The patient is not nervous/anxious.    Objective:     Vital Signs (Most Recent):  Temp: 97.6 °F (36.4 °C) (12/13/22 0808)  Pulse: 81 (12/13/22 0808)  Resp: 18 (12/13/22 0808)  BP: 103/67 (12/13/22 0808)  SpO2: 98 % (12/13/22 0808) Vital Signs (24h Range):  Temp:   [96.8 °F (36 °C)-98.2 °F (36.8 °C)] 97.6 °F (36.4 °C)  Pulse:  [66-82] 81  Resp:  [13-20] 18  SpO2:  [98 %-100 %] 98 %  BP: ()/(59-78) 103/67     Weight: 78.1 kg (172 lb 2.9 oz)  Body mass index is 27.79 kg/m².      Intake/Output Summary (Last 24 hours) at 12/13/2022 1007  Last data filed at 12/13/2022 0944  Gross per 24 hour   Intake 742 ml   Output 1550 ml   Net -808 ml       Physical Exam  Constitutional:       General: He is not in acute distress.     Appearance: Normal appearance. He is normal weight. He is not ill-appearing.   HENT:      Head: Normocephalic and atraumatic.      Nose: Nose normal. No congestion.      Mouth/Throat:      Mouth: Mucous membranes are moist.      Pharynx: Oropharynx is clear. No oropharyngeal exudate.   Eyes:      Extraocular Movements: Extraocular movements intact.      Conjunctiva/sclera: Conjunctivae normal.      Pupils: Pupils are equal, round, and reactive to light.   Cardiovascular:      Rate and Rhythm: Normal rate.      Pulses: Normal pulses.      Heart sounds: Murmur heard.   Pulmonary:      Effort: Pulmonary effort is normal. No respiratory distress.      Breath sounds: Normal breath sounds. No wheezing.   Abdominal:      General: Abdomen is flat. Bowel sounds are normal. There is no distension.      Palpations: Abdomen is soft.      Tenderness: There is no abdominal tenderness.   Musculoskeletal:         General: Normal range of motion.      Cervical back: Normal range of motion and neck supple.      Right lower leg: No edema.      Left lower leg: No edema.   Skin:     General: Skin is warm and dry.      Capillary Refill: Capillary refill takes less than 2 seconds.   Neurological:      General: No focal deficit present.      Mental Status: He is alert and oriented to person, place, and time. Mental status is at baseline.      Cranial Nerves: No cranial nerve deficit.   Psychiatric:         Mood and Affect: Mood normal.         Behavior: Behavior normal.          Thought Content: Thought content normal.         Judgment: Judgment normal.       Vents:       Lines/Drains/Airways       Peripherally Inserted Central Catheter Line  Duration             PICC Triple Lumen right basilic -- days                    Significant Labs:    CBC/Anemia Profile:  Recent Labs   Lab 12/12/22 0442 12/13/22  0522   WBC 5.18 4.45   HGB 10.7* 10.6*   HCT 34.1* 33.7*    199   MCV 88 88   RDW 21.6* 21.5*        Chemistries:  Recent Labs   Lab 12/12/22 0442 12/13/22  0522    138   K 3.7 3.6   CL 94* 93*   CO2 30* 33*   BUN 33* 27*   CREATININE 1.4 1.5*   CALCIUM 9.3 9.2   ALBUMIN 3.6  --    PROT 6.6  --    BILITOT 3.8*  --    ALKPHOS 210*  --    ALT 25  --    AST 22  --    MG 2.1 2.0       All pertinent labs within the past 24 hours have been reviewed.    Significant Imaging:   I have reviewed all pertinent imaging results/findings within the past 24 hours.

## 2022-12-13 NOTE — NURSING TRANSFER
Nursing Transfer Note      12/13/2022     Reason patient is being transferred: per order    Transfer To: [id number]    Transfer via stretcher    Transfer with cardiac monitoring    Transported by escort    Medicines sent: lasix & dobutmaine gtt infusing    Any special needs or follow-up needed: N/A    Chart send with patient: Yes    Notified: spouse    Patient reassessed at: 12/13/2022 @ 9361

## 2022-12-13 NOTE — SUBJECTIVE & OBJECTIVE
Interval History: NAEON. No complaints. CVP 16 this morning, will transition from IVP lasix 80 mg TID to IV lasix gtt 20 mg/hr after 80 mg IVP. Colonoscopy today.       Intake/Output Summary (Last 24 hours) at 12/13/2022 1128  Last data filed at 12/13/2022 1111  Gross per 24 hour   Intake 742 ml   Output 1900 ml   Net -1158 ml             Continuous Infusions:   DOBUTamine IV infusion (non-titrating) 5 mcg/kg/min (12/13/22 1110)    furosemide (LASIX) 10 mg/mL infusion (non-titrating)      heparin (porcine) in D5W Stopped (12/13/22 0803)     Scheduled Meds:   amiodarone  200 mg Oral Daily    digoxin  0.125 mg Oral Daily    furosemide (LASIX) injection  80 mg Intravenous Once    pantoprazole  40 mg Oral Daily    sacubitriL-valsartan  1 tablet Oral BID     PRN Meds:acetaminophen, sodium chloride 0.9%, sodium chloride 0.9%    Review of patient's allergies indicates:  No Known Allergies  Objective:     Vital Signs (Most Recent):  Temp: 97.6 °F (36.4 °C) (12/13/22 0808)  Pulse: 81 (12/13/22 0808)  Resp: 18 (12/13/22 0808)  BP: 103/67 (12/13/22 0808)  SpO2: 98 % (12/13/22 0808) Vital Signs (24h Range):  Temp:  [96.8 °F (36 °C)-98.2 °F (36.8 °C)] 97.6 °F (36.4 °C)  Pulse:  [66-82] 81  Resp:  [13-20] 18  SpO2:  [98 %-100 %] 98 %  BP: ()/(59-78) 103/67     Patient Vitals for the past 72 hrs (Last 3 readings):   Weight   12/13/22 0808 78.1 kg (172 lb 2.9 oz)   12/12/22 0700 77.9 kg (171 lb 11.8 oz)   12/11/22 0733 79.2 kg (174 lb 9.7 oz)       Body mass index is 27.79 kg/m².      Intake/Output Summary (Last 24 hours) at 12/13/2022 1128  Last data filed at 12/13/2022 1111  Gross per 24 hour   Intake 742 ml   Output 1900 ml   Net -1158 ml         Hemodynamic Parameters:  CVP:  [16 mmHg-18 mmHg] 16 mmHg    Telemetry: reviewed  Physical Exam  Vitals and nursing note reviewed.   Constitutional:       Appearance: Normal appearance.   HENT:      Head: Normocephalic.      Nose: Nose normal.      Mouth/Throat:      Mouth: Mucous  membranes are moist.   Neck:      Vascular: JVD present.      Comments: JVP elevated.   Cardiovascular:      Rate and Rhythm: Tachycardia present. Rhythm irregular.      Heart sounds: Murmur heard.     Gallop present.   Pulmonary:      Effort: Pulmonary effort is normal.   Abdominal:      General: Abdomen is flat. There is no distension.      Palpations: Abdomen is soft.   Musculoskeletal:         General: Swelling present. Normal range of motion.   Skin:     General: Skin is warm.      Capillary Refill: Capillary refill takes 2 to 3 seconds.   Neurological:      Mental Status: He is alert and oriented to person, place, and time.       Significant Labs:  CBC:  Recent Labs   Lab 12/11/22  0632 12/12/22  0442 12/13/22  0522   WBC 5.70 5.18 4.45   RBC 3.90* 3.86* 3.82*   HGB 10.8* 10.7* 10.6*   HCT 35.6* 34.1* 33.7*    222 199   MCV 91 88 88   MCH 27.7 27.7 27.7   MCHC 30.3* 31.4* 31.5*       BNP:  Recent Labs   Lab 12/07/22  0936 12/09/22  0547 12/12/22  0442   BNP 1,889* 1,344* 1,518*       CMP:  Recent Labs   Lab 12/07/22  0936 12/07/22  2311 12/09/22  0547 12/09/22  1711 12/11/22  0632 12/12/22  0442 12/13/22  0522   *   < > 117*   < > 110 70 61*   CALCIUM 9.7   < > 9.4   < > 9.0 9.3 9.2   ALBUMIN 3.9  --  3.7  --   --  3.6  --    PROT 7.1  --  7.0  --   --  6.6  --       < > 138   < > 137 137 138   K 3.8   < > 4.0   < > 3.8 3.7 3.6   CO2 39*   < > 36*   < > 33* 30* 33*   CL 90*   < > 91*   < > 94* 94* 93*   BUN 41*   < > 44*   < > 44* 33* 27*   CREATININE 1.5*   < > 1.8*   < > 1.6* 1.4 1.5*   ALKPHOS 213*  --  231*  --   --  210*  --    ALT 35  --  33  --   --  25  --    AST 28  --  31  --   --  22  --    BILITOT 5.2*  --  3.8*  --   --  3.8*  --     < > = values in this interval not displayed.        Coagulation:   Recent Labs   Lab 12/10/22  0423 12/10/22  0816 12/12/22  0745 12/12/22  2200 12/13/22  0522   INR 1.2  --   --   --   --    APTT 33.6*  33.6*   < > 35.2* 35.3* 60.9*    < > =  values in this interval not displayed.       LDH:  No results for input(s): LDH in the last 72 hours.    Microbiology:  Microbiology Results (last 7 days)       ** No results found for the last 168 hours. **            I have reviewed all pertinent labs within the past 24 hours.    Estimated Creatinine Clearance: 55.3 mL/min (A) (based on SCr of 1.5 mg/dL (H)).    Diagnostic Results:  I have reviewed all pertinent imaging results/findings within the past 24 hours.

## 2022-12-13 NOTE — PT/OT/SLP PROGRESS
Physical Therapy  Treatment    Patient Name:  Migel Garcia   MRN:  [id number]    Recommendations:     Discharge Recommendations:  home   Discharge Equipment Recommendations: none   Barriers to discharge: decreased functional mobility    Assessment:     Migel Garcia is a 52 y.o. male admitted with a medical diagnosis of Acute on chronic combined systolic and diastolic heart failure.  Pt demonstrates the below listed impairments with decreased tolerance to functional mobility and impaired endurance being the most limiting.  Pt demonstrates improved tolerance to out of bed mobility, states he is not at his baseline.  POC reduced to 1x per week.  Pt requires skilled PT due to patient's status as: a fall risk to allow safe d/c home.     Impairments and functional limitations:  impaired endurance, gait instability, impaired cardiopulmonary response to activity.  These deficits affect their roles and responsibilities in which they were able to complete prior to admit.  Rehab Prognosis:   Good ; patient would benefit from acute skilled PT services 1 x/week to address these deficits, improve quality of life, focus on recovery of impairments, provide patient/caregiver education, reduce fall risk, and reach maximum level of function.  Pt is highly  motivated to participated in skilled PT.    Recent Surgery:   Procedure(s) (LRB):  COLONOSCOPY (N/A) 1 Day Post-Op    Plan:     During this hospitalization, patient to be seen 1 x/week to address the identified rehab impairments via gait training, therapeutic activities, therapeutic exercises, neuromuscular re-education and progress toward the following goals:    Plan of Care Expires:  01/05/23    Subjective     Chief Complaint: decreased tolerance to functional mobility  Patient/Family Comments/Goals: Return home  Pain/Comfort:  Pain Rating 1: 0/10    Objective:     Communicated with RN prior to session.  Patient found HOB elevated with telemetry, PICC line upon PT entry to room.      General Precautions: Standard, fall   Orthopedic Precautions:N/A   Braces: N/A  Oxygen Device:      Functional Mobility:  Bed Mobility:  Rolling Left: MOD I  Scooting: MOD I  Supine to Sit: MOD I  Head of bed position: HOB elevated    Transfers:  Sit to Stand: IND with No AD    Gait: Patient ambulated 450' with No AD and Sup. Patient demonstrates occasional unsteady gait, decreased step length, flexed posture, and increased lateral sway. All lines remained intact throughout ambulation trial, mask donned for out of room ambulation.  Some stepping strategy used for balance corrections    Balance:   Position Score Time   Static Sitting GOOD: Takes MODERATE challenges n/a   Dynamic Sitting GOOD-: Maintains balance through MODERATE excursions of active trunk motion, but inconstantly  n/a   Static Standing FAIR+: Takes MINIMAL challenges n/a   Dynamic Standing FAIR+: Maintains balance through MINIMAL excursions of active trunk motion n/a       AM-PAC 6 CLICK MOBILITY  Turning over in bed (including adjusting bedclothes, sheets and blankets)?: 4  Sitting down on and standing up from a chair with arms (e.g., wheelchair, bedside commode, etc.): 4  Moving from lying on back to sitting on the side of the bed?: 4  Moving to and from a bed to a chair (including a wheelchair)?: 4  Need to walk in hospital room?: 3  Climbing 3-5 steps with a railing?: 3  Basic Mobility Total Score: 22     Therapeutic Activities:  Patient educated on role of acute care PT and PT POC, safety while in hospital including calling nurse for mobility, call light usage, benefits of out of bed mobility, breathing technique, fall risk, gait technique, positioning, posture, risks of prolonged bed rest, possible discharge disposition , and benefits of continued PT by explanation and demonstration.    Patient demonstrates good understanding of education provided this day.   Whiteboard updated    Therapeutic Exercises:  n/a    Patient left sitting EOB with  all lines intact, call button in reach, and RN notified.    GOALS:   Multidisciplinary Problems       Physical Therapy Goals          Problem: Physical Therapy    Goal Priority Disciplines Outcome Goal Variances Interventions   Physical Therapy Goal     PT, PT/OT Ongoing, Progressing     Description: Goals to be met by: 22, revised: 22    Patient will increase functional independence with mobility by performin. Gait  x 300 feet with independence using LRAD as needed -not met                          Time Tracking:     PT Received On: 22  PT Start Time: 1048     PT Stop Time: 1100  PT Total Time (min): 12 min     Billable Minutes: Gait Training 12    Treatment Type: Treatment  PT/PTA: PT     PTA Visit Number: 0     2022

## 2022-12-13 NOTE — PLAN OF CARE
Pt educated on fall risk overnight,pt remained free from falls/trauma/injury. Denies chest pain, SOB, palpitations, dizziness, pain, or discomfort. Plan of care reviewed with pt, all questions answered. Bed locked in lowest position, call bell within reach, no acute distress noted, will continue to monitor.   Pt completed golytely prep, output is clean, NPO overnight    Problem: Adult Inpatient Plan of Care  Goal: Plan of Care Review  Outcome: Ongoing, Progressing  Goal: Patient-Specific Goal (Individualized)  Outcome: Ongoing, Progressing  Goal: Absence of Hospital-Acquired Illness or Injury  Outcome: Ongoing, Progressing  Goal: Optimal Comfort and Wellbeing  Outcome: Ongoing, Progressing  Goal: Readiness for Transition of Care  Outcome: Ongoing, Progressing     Problem: Infection  Goal: Absence of Infection Signs and Symptoms  Outcome: Ongoing, Progressing     Problem: Adjustment to Illness (Heart Failure)  Goal: Optimal Coping  Outcome: Ongoing, Progressing     Problem: Cardiac Output Decreased (Heart Failure)  Goal: Optimal Cardiac Output  Outcome: Ongoing, Progressing     Problem: Dysrhythmia (Heart Failure)  Goal: Stable Heart Rate and Rhythm  Outcome: Ongoing, Progressing     Problem: Fluid Imbalance (Heart Failure)  Goal: Fluid Balance  Outcome: Ongoing, Progressing

## 2022-12-13 NOTE — ANESTHESIA POSTPROCEDURE EVALUATION
Anesthesia Post Evaluation    Patient: Migel Garcia    Procedure(s) Performed: Procedure(s) (LRB):  COLONOSCOPY (N/A)    Final Anesthesia Type: general      Level of consciousness: awake and alert  Post-procedure vital signs: reviewed and stable  Pain control: Pain has been treated.  Airway patency: patent    PONV status: Absent or treated.  Anesthetic complications: no      Cardiovascular status: hemodynamically stable  Respiratory status: unassisted  Hydration status: euvolemic            Vitals Value Taken Time   /83 12/13/22 1459   Temp 36.3 °C (97.3 °F) 12/13/22 1420   Pulse 76 12/13/22 1522   Resp 29 12/13/22 1459   SpO2 94 % 12/13/22 1459         Event Time   Out of Recovery 15:11:00         Pain/Jagdish Score: Jagdish Score: 9 (12/13/2022  2:45 PM)

## 2022-12-13 NOTE — CONSULTS
[facility]  Pulmonology  Consult Note    Patient Name: Migel Garcia  MRN: [id number]  Admission Date: 12/5/2022  Hospital Length of Stay: 8 days  Code Status: Full Code  Attending Physician: Gin Mendes MD  Primary Care Provider: Anuj Banks MD   Principal Problem: Acute on chronic combined systolic and diastolic heart failure    Inpatient consult to Pulmonology  Consult performed by: Nirmal Bergeron MD  Consult ordered by: Seven Aranda PA-C        Subjective:     HPI:  Mr. Migel Garcia is a 52 year old man with Chronic Combined Heart Failure 2/2 NICM, ICD in situ, CKD3, Tobacco Use, HTN, GERD who presented to an OSH for worsening dyspnea and fatigue. Found to be in cardiogenic shock. Over the past few months, more recently been admitted for heart failure exacerbations and was admitted since 11/17 for it. Since that time, he was started on  and lasix and unable to be weaned; he underwent a LHC/RHC and was transferred here for further care. While here, has clinically felt improved however is still on dobutamine. He is currently undergoing a VAD/transplant evaluation and pulmonology was consulted. Smoked for ~10 pack year history (anywhere from 1/2 to 1 Pack per week since 14). He quit a month ago when he was dyspneic. Never had any functional complications. No weight loss, fevers, chills, night sweats, rigors, productive sputum. CT imaging found a questionable 8mm nodule. We discussed repeat imaging while here and continued surveillance if it was truly a  nodule, he is aware.      Past Medical History:   Diagnosis Date    A-fib     GERD (gastroesophageal reflux disease)     Heart failure, unspecified     Hypertension     V-tach        Past Surgical History:   Procedure Laterality Date    COLONOSCOPY N/A 12/12/2022    Procedure: COLONOSCOPY;  Surgeon: Geovany Cole MD;  Location: [facility] ([address] [place] [address]);  Service: Endoscopy;  Laterality: N/A;       Review of patient's allergies  indicates:  No Known Allergies    Family History    None       Tobacco Use    Smoking status: Some Days     Types: Cigarettes    Smokeless tobacco: Not on file   Substance and Sexual Activity    Alcohol use: Not Currently    Drug use: Not on file    Sexual activity: Not on file         Review of Systems   Constitutional:  Positive for appetite change and fatigue. Negative for activity change, chills and fever.   HENT:  Negative for congestion, postnasal drip, rhinorrhea and sinus pain.    Eyes:  Negative for discharge and itching.   Respiratory:  Positive for shortness of breath. Negative for cough and wheezing.    Cardiovascular:  Negative for chest pain and leg swelling.   Gastrointestinal:  Negative for abdominal pain, diarrhea, nausea and vomiting.   Endocrine: Negative for cold intolerance and heat intolerance.   Genitourinary:  Negative for dysuria, frequency and hematuria.   Musculoskeletal:  Negative for arthralgias and myalgias.   Skin:  Negative for color change and rash.   Allergic/Immunologic: Negative for environmental allergies and food allergies.   Neurological:  Negative for dizziness and syncope.   Hematological:  Negative for adenopathy. Does not bruise/bleed easily.   Psychiatric/Behavioral:  Negative for agitation and decreased concentration. The patient is not nervous/anxious.    Objective:     Vital Signs (Most Recent):  Temp: 97.6 °F (36.4 °C) (12/13/22 0808)  Pulse: 81 (12/13/22 0808)  Resp: 18 (12/13/22 0808)  BP: 103/67 (12/13/22 0808)  SpO2: 98 % (12/13/22 0808) Vital Signs (24h Range):  Temp:  [96.8 °F (36 °C)-98.2 °F (36.8 °C)] 97.6 °F (36.4 °C)  Pulse:  [66-82] 81  Resp:  [13-20] 18  SpO2:  [98 %-100 %] 98 %  BP: ()/(59-78) 103/67     Weight: 78.1 kg (172 lb 2.9 oz)  Body mass index is 27.79 kg/m².      Intake/Output Summary (Last 24 hours) at 12/13/2022 1007  Last data filed at 12/13/2022 0944  Gross per 24 hour   Intake 742 ml   Output 1550 ml   Net -808 ml       Physical  Exam  Constitutional:       General: He is not in acute distress.     Appearance: Normal appearance. He is normal weight. He is not ill-appearing.   HENT:      Head: Normocephalic and atraumatic.      Nose: Nose normal. No congestion.      Mouth/Throat:      Mouth: Mucous membranes are moist.      Pharynx: Oropharynx is clear. No oropharyngeal exudate.   Eyes:      Extraocular Movements: Extraocular movements intact.      Conjunctiva/sclera: Conjunctivae normal.      Pupils: Pupils are equal, round, and reactive to light.   Cardiovascular:      Rate and Rhythm: Normal rate.      Pulses: Normal pulses.      Heart sounds: Murmur heard.   Pulmonary:      Effort: Pulmonary effort is normal. No respiratory distress.      Breath sounds: Normal breath sounds. No wheezing.   Abdominal:      General: Abdomen is flat. Bowel sounds are normal. There is no distension.      Palpations: Abdomen is soft.      Tenderness: There is no abdominal tenderness.   Musculoskeletal:         General: Normal range of motion.      Cervical back: Normal range of motion and neck supple.      Right lower leg: No edema.      Left lower leg: No edema.   Skin:     General: Skin is warm and dry.      Capillary Refill: Capillary refill takes less than 2 seconds.   Neurological:      General: No focal deficit present.      Mental Status: He is alert and oriented to person, place, and time. Mental status is at baseline.      Cranial Nerves: No cranial nerve deficit.   Psychiatric:         Mood and Affect: Mood normal.         Behavior: Behavior normal.         Thought Content: Thought content normal.         Judgment: Judgment normal.       Vents:       Lines/Drains/Airways       Peripherally Inserted Central Catheter Line  Duration             PICC Triple Lumen right basilic -- days                    Significant Labs:    CBC/Anemia Profile:  Recent Labs   Lab 12/12/22  0442 12/13/22  0522   WBC 5.18 4.45   HGB 10.7* 10.6*   HCT 34.1* 33.7*     199   MCV 88 88   RDW 21.6* 21.5*        Chemistries:  Recent Labs   Lab 12/12/22  0442 12/13/22  0522    138   K 3.7 3.6   CL 94* 93*   CO2 30* 33*   BUN 33* 27*   CREATININE 1.4 1.5*   CALCIUM 9.3 9.2   ALBUMIN 3.6  --    PROT 6.6  --    BILITOT 3.8*  --    ALKPHOS 210*  --    ALT 25  --    AST 22  --    MG 2.1 2.0       All pertinent labs within the past 24 hours have been reviewed.    Significant Imaging:   I have reviewed all pertinent imaging results/findings within the past 24 hours.    Assessment/Plan:     Pulmonary nodule  - noted on CT C/A/P. Questionable 8mm LLL nodule however could be vascular.  - has a smoking history (10 pack year)      Recommendations:  - Obtain a CT chest with contrast to evaluate, if it truly is an 8mm nodule he would still be at low risk and should not preclude him from transplant.  - At that size, would be difficult to sample and would recommend repeat imaging in 3-6 month if it does come back as a nodule as well    Tobacco use  - needs to abstain from use; ~10 pack year history  - referral to smoking cessation at discharge    Thank you for your consult. I will follow-up with patient. Please contact us if you have any additional questions.     Nirmal Bergeron MD  Pulmonology  [facility]

## 2022-12-13 NOTE — HPI
Mr. Migel Garcia is a 52 year old man with Chronic Combined Heart Failure 2/2 NICM, ICD in situ, CKD3, Tobacco Use, HTN, GERD who presented to an OSH for worsening dyspnea and fatigue. Found to be in cardiogenic shock. Over the past few months, more recently been admitted for heart failure exacerbations and was admitted since 11/17 for it. Since that time, he was started on  and lasix and unable to be weaned; he underwent a LHC/RHC and was transferred here for further care. While here, has clinically felt improved however is still on dobutamine. He is currently undergoing a VAD/transplant evaluation and pulmonology was consulted. Smoked for ~10 pack year history (anywhere from 1/2 to 1 Pack per week since 14). He quit a month ago when he was dyspneic. Never had any functional complications. No weight loss, fevers, chills, night sweats, rigors, productive sputum. CT imaging found a questionable 8mm nodule. We discussed repeat imaging while here and continued surveillance if it was truly a  nodule, he is aware.

## 2022-12-13 NOTE — TRANSFER OF CARE
"Anesthesia Transfer of Care Note    Patient: Migel Garcia    Procedure(s) Performed: Procedure(s) (LRB):  COLONOSCOPY (N/A)    Patient location: PACU    Anesthesia Type: general    Transport from OR: Transported from OR on room air with adequate spontaneous ventilation    Post pain: adequate analgesia    Post assessment: no apparent anesthetic complications    Post vital signs: stable    Level of consciousness: awake    Nausea/Vomiting: no nausea/vomiting    Complications: none    Transfer of care protocol was followed      Last vitals:   Visit Vitals  /71 (Patient Position: Lying)   Pulse 90   Temp 36.3 °C (97.3 °F) (Temporal)   Resp 18   Ht 5' 6" (1.676 m)   Wt 78.1 kg (172 lb 2.9 oz)   SpO2 99%   BMI 27.79 kg/m²     "

## 2022-12-14 ENCOUNTER — COMMITTEE REVIEW (OUTPATIENT)
Dept: TRANSPLANT | Facility: CLINIC | Age: 52
End: 2022-12-14
Payer: MEDICARE

## 2022-12-14 LAB
ALBUMIN SERPL BCP-MCNC: 2.6 G/DL (ref 3.5–5.2)
ALLENS TEST: ABNORMAL
ALP SERPL-CCNC: 149 U/L (ref 55–135)
ALT SERPL W/O P-5'-P-CCNC: 15 U/L (ref 10–44)
ANION GAP SERPL CALC-SCNC: 10 MMOL/L (ref 8–16)
APTT BLDCRRT: 35.6 SEC (ref 21–32)
APTT BLDCRRT: 41.1 SEC (ref 21–32)
APTT IMM NP PPP: 39 SEC (ref 32–48)
APTT P HEP NEUT PPP: ABNORMAL SEC (ref 32–48)
AST SERPL-CCNC: 13 U/L (ref 10–40)
BASOPHILS # BLD AUTO: 0.06 K/UL (ref 0–0.2)
BASOPHILS NFR BLD: 1.2 % (ref 0–1.9)
BILIRUB DIRECT SERPL-MCNC: 1.4 MG/DL (ref 0.1–0.3)
BILIRUB SERPL-MCNC: 2.2 MG/DL (ref 0.1–1)
BNP SERPL-MCNC: 1846 PG/ML (ref 0–99)
BUN SERPL-MCNC: 29 MG/DL (ref 6–20)
CALCIUM SERPL-MCNC: 9.3 MG/DL (ref 8.7–10.5)
CHLORIDE SERPL-SCNC: 96 MMOL/L (ref 95–110)
CO2 SERPL-SCNC: 31 MMOL/L (ref 23–29)
CONFIRM APTT STACLOT: ABNORMAL
CREAT SERPL-MCNC: 1.9 MG/DL (ref 0.5–1.4)
DIFFERENTIAL METHOD: ABNORMAL
DRVVT SCREEN TO CONFIRM RATIO: ABNORMAL RATIO
EOSINOPHIL # BLD AUTO: 0.2 K/UL (ref 0–0.5)
EOSINOPHIL NFR BLD: 3 % (ref 0–8)
ERYTHROCYTE [DISTWIDTH] IN BLOOD BY AUTOMATED COUNT: 22 % (ref 11.5–14.5)
EST. GFR  (NO RACE VARIABLE): 41.4 ML/MIN/1.73 M^2
GLUCOSE SERPL-MCNC: 106 MG/DL (ref 70–110)
HCO3 UR-SCNC: 35.5 MMOL/L (ref 24–28)
HCT VFR BLD AUTO: 35.3 % (ref 40–54)
HGB BLD-MCNC: 10.8 G/DL (ref 14–18)
IMM GRANULOCYTES # BLD AUTO: 0.05 K/UL (ref 0–0.04)
IMM GRANULOCYTES NFR BLD AUTO: 1 % (ref 0–0.5)
LA 3 SCREEN W REFLEX-IMP: ABNORMAL
LA NT DPL PPP QL: ABNORMAL
LEFT ABI: 0.93
LEFT ARM BP: 109 MMHG
LEFT CBA DIAS: 12 CM/S
LEFT CBA SYS: 65 CM/S
LEFT CCA DIST DIAS: 15 CM/S
LEFT CCA DIST SYS: 83 CM/S
LEFT CCA MID DIAS: 19 CM/S
LEFT CCA MID SYS: 112 CM/S
LEFT CCA PROX DIAS: 33 CM/S
LEFT CCA PROX SYS: 132 CM/S
LEFT DORSALIS PEDIS: 101 MMHG
LEFT ECA DIAS: 13 CM/S
LEFT ECA SYS: 103 CM/S
LEFT ICA DIST DIAS: 25 CM/S
LEFT ICA DIST SYS: 72 CM/S
LEFT ICA MID DIAS: 30 CM/S
LEFT ICA MID SYS: 80 CM/S
LEFT ICA PROX DIAS: 20 CM/S
LEFT ICA PROX SYS: 67 CM/S
LEFT POSTERIOR TIBIAL: 96 MMHG
LEFT VERTEBRAL DIAS: 16 CM/S
LEFT VERTEBRAL SYS: 46 CM/S
LYMPHOCYTES # BLD AUTO: 1 K/UL (ref 1–4.8)
LYMPHOCYTES NFR BLD: 20.7 % (ref 18–48)
MAGNESIUM SERPL-MCNC: 2 MG/DL (ref 1.6–2.6)
MCH RBC QN AUTO: 27.9 PG (ref 27–31)
MCHC RBC AUTO-ENTMCNC: 30.6 G/DL (ref 32–36)
MCV RBC AUTO: 91 FL (ref 82–98)
MIXING DRVVT: ABNORMAL SEC (ref 33–44)
MONOCYTES # BLD AUTO: 0.5 K/UL (ref 0.3–1)
MONOCYTES NFR BLD: 10.6 % (ref 4–15)
NEUTROPHILS # BLD AUTO: 3.1 K/UL (ref 1.8–7.7)
NEUTROPHILS NFR BLD: 63.5 % (ref 38–73)
NRBC BLD-RTO: 0 /100 WBC
OHS CV CAROTID RIGHT ICA EDV HIGHEST: 27
OHS CV CAROTID ULTRASOUND LEFT ICA/CCA RATIO: 0.96
OHS CV CAROTID ULTRASOUND RIGHT ICA/CCA RATIO: 0.82
OHS CV PV CAROTID LEFT HIGHEST CCA: 132
OHS CV PV CAROTID LEFT HIGHEST ICA: 80
OHS CV PV CAROTID RIGHT HIGHEST CCA: 141
OHS CV PV CAROTID RIGHT HIGHEST ICA: 89
OHS CV US CAROTID LEFT HIGHEST EDV: 30
PCO2 BLDA: 55.5 MMHG (ref 35–45)
PH SMN: 7.41 [PH] (ref 7.35–7.45)
PLATELET # BLD AUTO: 199 K/UL (ref 150–450)
PMV BLD AUTO: 9.7 FL (ref 9.2–12.9)
PO2 BLDA: 27 MMHG (ref 40–60)
POC BE: 11 MMOL/L
POC SATURATED O2: 50 % (ref 95–100)
POC TCO2: 37 MMOL/L (ref 24–29)
POCT GLUCOSE: 110 MG/DL (ref 70–110)
POTASSIUM SERPL-SCNC: 3.7 MMOL/L (ref 3.5–5.1)
PROT SERPL-MCNC: 5 G/DL (ref 6–8.4)
PROTHROMBIN TIME: 14.9 SEC (ref 12–15.5)
RBC # BLD AUTO: 3.87 M/UL (ref 4.6–6.2)
REPTILASE TIME: ABNORMAL SEC
RIGHT ABI: 1.06
RIGHT CBA DIAS: 25 CM/S
RIGHT CBA SYS: 69 CM/S
RIGHT CCA DIST DIAS: 20 CM/S
RIGHT CCA DIST SYS: 109 CM/S
RIGHT CCA MID DIAS: 22 CM/S
RIGHT CCA MID SYS: 127 CM/S
RIGHT CCA PROX DIAS: 37 CM/S
RIGHT CCA PROX SYS: 141 CM/S
RIGHT ECA DIAS: 17 CM/S
RIGHT ECA SYS: 154 CM/S
RIGHT ICA DIST DIAS: 27 CM/S
RIGHT ICA DIST SYS: 82 CM/S
RIGHT ICA MID DIAS: 23 CM/S
RIGHT ICA MID SYS: 64 CM/S
RIGHT ICA PROX DIAS: 15 CM/S
RIGHT ICA PROX SYS: 89 CM/S
RIGHT POSTERIOR TIBIAL: 115 MMHG
RIGHT VERTEBRAL DIAS: 15 CM/S
RIGHT VERTEBRAL SYS: 50 CM/S
SAMPLE: ABNORMAL
SCREEN APTT: 49 SEC (ref 32–48)
SCREEN DRVVT: 30 SEC (ref 33–44)
SITE: ABNORMAL
SODIUM SERPL-SCNC: 137 MMOL/L (ref 136–145)
T GONDII IGG SER QL IA: NORMAL
T GONDII IGG SERPL IA-ACNC: <5 IU/ML (ref 0–6.4)
THROMBIN TIME: 17.8 SEC (ref 14.7–19.5)
WBC # BLD AUTO: 4.92 K/UL (ref 3.9–12.7)

## 2022-12-14 PROCEDURE — 85730 THROMBOPLASTIN TIME PARTIAL: CPT | Mod: 91 | Performed by: INTERNAL MEDICINE

## 2022-12-14 PROCEDURE — 80048 BASIC METABOLIC PNL TOTAL CA: CPT | Performed by: PHYSICIAN ASSISTANT

## 2022-12-14 PROCEDURE — 25000003 PHARM REV CODE 250: Performed by: INTERNAL MEDICINE

## 2022-12-14 PROCEDURE — 80076 HEPATIC FUNCTION PANEL: CPT | Performed by: PHYSICIAN ASSISTANT

## 2022-12-14 PROCEDURE — 83880 ASSAY OF NATRIURETIC PEPTIDE: CPT | Performed by: PHYSICIAN ASSISTANT

## 2022-12-14 PROCEDURE — 63600175 PHARM REV CODE 636 W HCPCS: Performed by: INTERNAL MEDICINE

## 2022-12-14 PROCEDURE — 63600175 PHARM REV CODE 636 W HCPCS: Performed by: PHYSICIAN ASSISTANT

## 2022-12-14 PROCEDURE — 20600001 HC STEP DOWN PRIVATE ROOM

## 2022-12-14 PROCEDURE — 99223 PR INITIAL HOSPITAL CARE,LEVL III: ICD-10-PCS | Mod: GC,,, | Performed by: INTERNAL MEDICINE

## 2022-12-14 PROCEDURE — 83735 ASSAY OF MAGNESIUM: CPT | Performed by: PHYSICIAN ASSISTANT

## 2022-12-14 PROCEDURE — 85025 COMPLETE CBC W/AUTO DIFF WBC: CPT | Performed by: PHYSICIAN ASSISTANT

## 2022-12-14 PROCEDURE — 99223 1ST HOSP IP/OBS HIGH 75: CPT | Mod: GC,,, | Performed by: INTERNAL MEDICINE

## 2022-12-14 RX ORDER — FUROSEMIDE 10 MG/ML
80 INJECTION INTRAMUSCULAR; INTRAVENOUS ONCE
Status: COMPLETED | OUTPATIENT
Start: 2022-12-14 | End: 2022-12-14

## 2022-12-14 RX ADMIN — HEPARIN SODIUM 20 UNITS/KG/HR: 10000 INJECTION, SOLUTION INTRAVENOUS at 04:12

## 2022-12-14 RX ADMIN — PANTOPRAZOLE SODIUM 40 MG: 40 TABLET, DELAYED RELEASE ORAL at 09:12

## 2022-12-14 RX ADMIN — SACUBITRIL AND VALSARTAN 1 TABLET: 24; 26 TABLET, FILM COATED ORAL at 09:12

## 2022-12-14 RX ADMIN — FUROSEMIDE 80 MG: 10 INJECTION, SOLUTION INTRAMUSCULAR; INTRAVENOUS at 12:12

## 2022-12-14 RX ADMIN — DOBUTAMINE HYDROCHLORIDE 5 MCG/KG/MIN: 400 INJECTION INTRAVENOUS at 09:12

## 2022-12-14 RX ADMIN — HEPARIN SODIUM 22 UNITS/KG/HR: 10000 INJECTION, SOLUTION INTRAVENOUS at 07:12

## 2022-12-14 RX ADMIN — DIGOXIN 0.12 MG: 125 TABLET ORAL at 09:12

## 2022-12-14 RX ADMIN — AMIODARONE HYDROCHLORIDE 200 MG: 200 TABLET ORAL at 09:12

## 2022-12-14 RX ADMIN — FUROSEMIDE 30 MG/HR: 10 INJECTION, SOLUTION INTRAMUSCULAR; INTRAVENOUS at 12:12

## 2022-12-14 RX ADMIN — HEPARIN SODIUM 22 UNITS/KG/HR: 10000 INJECTION, SOLUTION INTRAVENOUS at 05:12

## 2022-12-14 NOTE — ASSESSMENT & PLAN NOTE
-NICM  -Transferred from [place] for ADHF and possible workup.  During hospital stay there; he was briefly on Levo and started on  and they were unable to wean  off.  Presented on  5.   -Last 2D Echo 12/6/22: LVEF: 25% , LVEDD:  5.33cm  -Hypervolemic on examination today  -CVP 20, SVO2 50, CO 3.9, CI 2.1, SVR 1210  -Current diuretic regimen: IV Lasix gtt at 20 mg/hr, will increase to 30 mg/hr   -GDMT with home dose of Digoxin, Entresto 24-26. Was previously on Toprol 100mg QHS but holding in the setting of   -Patient is currently being evaluated for OHTx/VAD  -Heart failure pathway Step 4.  Colonoscopy completed yesterday, 3 polyps visualized but not resected. Per Pulm recs, will obtain chest CT with contrast to further evaluate pulmonary nodules, but will wait until Cr improves.    -2g Na dietary restriction, 1500 mL fluid restriction, strict I/Os

## 2022-12-14 NOTE — TREATMENT PLAN
GI Post-Procedure Treatment Plan    Impression:            - Stool in the entire examined colon.                          - One 4 mm polyp in the cecum. Resection not                          attempted.                          - Two 3 to 4 mm polyps in the transverse colon.                          Resection not attempted.                          - The distal rectum and anal verge are normal on                          retroflexion view.                          - Diverticulosis in the sigmoid colon, in the                          descending colon, in the transverse colon and in                          the ascending colon.                          - No specimens collected.                          - The above polyps were benign appearing. Risks of                          polyp removal outweigh the benefits at this time.     Recommendation:        - [facility] care.                          - Resume previous diet.                          - Resume heparin at prior dose today.                          - Repeat colonoscopy (date not yet determined) for                          surveillance. Best done after heart transplant and                          recovery from transplant.

## 2022-12-14 NOTE — CONSULTS
[facility] Step[facility]  Infectious Disease  Consult Note    Patient Name: Migel Garcia  MRN: [id number]  Admission Date: 12/5/2022  Hospital Length of Stay: 9 days  Attending Physician: Gin Mendes MD  Primary Care Provider: Anuj Banks MD     Isolation Status: No active isolations    Patient information was obtained from patient, spouse/SO, past medical records and ER records.      Inpatient consult to Infectious Diseases  Consult performed by: Kandice Narvaez MD  Consult ordered by: Grecia Gillis PA-C        Assessment/Plan:     * Acute on chronic combined systolic and diastolic heart failure  Patient with acute on chronic heart failure being evaluated for advanced options (VAD/[facility]).     1. Risks of Infection: Available serologies were reviewed. No unusual risks of infection or significant barriers to transplantation were identified from the infectious disease standpoint.   - Pending serologies: Quantiferon gold / T-spot, toxoplasma, CMV    2. Immunizations:  Based on the patient's immunization history and serologies, the following immunizations are recommended:  - Hepatitis A    Patient does not have immunity to hepatitis A    Vaccination required: Yes   - Hepatitis B    Patient does not have immunity to hepatitis B    Vaccination ordered today: Yes    If not ordered, reason for not vaccinating: Other (specify)Patient wants time to consider   - COVID    Current CDC vaccination recommendations were discussed with the patient   - Influenza     Annual, high dose preferred    Vaccination required: Yes   - Prevnar 20    Vaccination required: Yes   - Tdap    Vaccination required: Yes   - Shingrix    Vaccination required: Yes    Recommended Pre-Transplant Immunization Schedule  Vaccine  0m 1m 2m 6m   Pneumococcal conjugate vaccine (Prevnar 20) X      Tetanus-diphtheria-pertussis (Tdap)* X      Hepatitis A Vaccine (Havrix)** X   X   Hepatitis B Vaccine (Heplisav)** X X     Influenza X      Zoster  Recombinant Vaccine (Shingrix) X  X           *Administer booster every 10 years.       **Administer if no immunity demonstrated on serologies                 3. Counseling:   I discussed with the patient the risk for increased susceptibility to infections following transplantation including increased risk for infection right after transplant and if rejection should occur.  The patient has been counseled on the importance of vaccinations including but not limited to a yearly flu vaccine. Patient was also instructed to encourage that family/caretakers receive their flu vaccine yearly. The patient was encouraged to contact us about any problems that may develop after immunizations and possible side effects were reviewed. Patient and his wife were also advised that failure to receive recommended vaccinations may result in a change in his transplant candidacy.     Specific guidance has been provided to the patient regarding the patient's occupation, hobbies and activities to avoid future infectious complications. These include but are not limited to: avoiding raw/undercooked meats and seafood, avoiding unpasteurized milk/cheeses, proper (hand) hygiene, contact with animals and appropriate vaccination of animals, use of mosquito/tick precautions, avoiding walking barefoot, avoiding sick contacts, and seeking medical advice prior to foreign travel (specifically developing countries).     4. Transplant Candidacy: Based on available information, there are no identified significant barriers to transplantation from an infectious disease standpoint.  Final determination of transplant candidacy will be made once evaluation is complete and reviewed by the Selection Committee.      Follow up with infectious disease as needed. Please call if any pending serologic testing is positive.        Thank you for your consult. I will sign off. Please contact us if you have any additional questions.    Kandice Narvaez MD  Infectious  Disease  [facility] - [facility]    Subjective:     Principal Problem: Acute on chronic combined systolic and diastolic heart failure    HPI: History of Present Illness:    52 y.o. male with a history of HFrEF-10-15%, NICM,  HTN, s/p ICD, and CKD 3 who presented for management of cardiogenic shock with acute heart failure.Infectious Diseases was consulted for pre-heart transplant evaluation.    Infectious History:   Recent hospital admissions: Yes   Recent infections: No   Recent or current antibiotic use: No   History of recurrent infections *(sinus / pneumonia / UTI / SBP)*: No   Recent dental infections, issues or procedures: No   History of chicken pox or shingles: No   History of STI: No   History of COVID infection: No    History of Immunosuppression:   Prior chemotherapy / immunosuppression: No   Prior transplant: No   History of splenectomy: No    Tuberculosis:   Prior screening for latent TB: Yes   Prior diagnosis of latent TB: No   Risk factors for TB *known exposure, incarceration, homelessness*: Yes    Geographical exposures:   Currently lives in [place] with wife and sone   Lived in the following states: [place]   Lived in [facility] [place]: No   International travel: Yes   Travel-associated illness: No    Social/Environmental:   Occupation:  Retired. Previously worked in medical field in the OR.    Pets: No   Livestock: No   Fishing / hunting: No   Hobbies: none   Water: [place] water   Consumption of raw/undercooked meat or seafood?  No   Tobacco: Yes. Quit 3 months prior   Alcohol: No   Recreational drug use:  Yes. Marijuana   Sexual partners: wife      Past Histories:   Past Medical History:   Diagnosis Date    A-fib     GERD (gastroesophageal reflux disease)     Heart failure, unspecified     Hypertension     V-tach      Past Surgical History:   Procedure Laterality Date    COLONOSCOPY N/A 12/12/2022    Procedure: COLONOSCOPY;  Surgeon: Geovany Cole MD;   Location: [address]);  Service: Endoscopy;  Laterality: N/A;    COLONOSCOPY N/A 12/13/2022    Procedure: COLONOSCOPY;  Surgeon: Geovany Cole MD;  Location: [address]);  Service: Endoscopy;  Laterality: N/A;     History reviewed. No pertinent family history.  Social History     Tobacco Use    Smoking status: Some Days     Types: Cigarettes   Substance Use Topics    Alcohol use: Not Currently     Review of patient's allergies indicates:  No Known Allergies    Immunization History:  Received all childhood vaccines: Yes  All household members receive annual flu vaccine: No  All household members are up to date on COVID vaccine: No      There is no immunization history on file for this patient.     MELD: *liver transplant only*  MELD-Na score: 17 at 12/12/2022  4:42 AM  MELD score: 17 at 12/12/2022  4:42 AM  Calculated from:  Serum Creatinine: 1.4 mg/dL at 12/12/2022  4:42 AM  Serum Sodium: 137 mmol/L at 12/12/2022  4:42 AM  Total Bilirubin: 3.8 mg/dL at 12/12/2022  4:42 AM  INR(ratio): 1.2 at 12/10/2022  4:23 AM  Age: 52 years        Past Medical History:   Diagnosis Date    A-fib     GERD (gastroesophageal reflux disease)     Heart failure, unspecified     Hypertension     V-tach        Past Surgical History:   Procedure Laterality Date    COLONOSCOPY N/A 12/12/2022    Procedure: COLONOSCOPY;  Surgeon: Geovany Cole MD;  Location: [address]);  Service: Endoscopy;  Laterality: N/A;    COLONOSCOPY N/A 12/13/2022    Procedure: COLONOSCOPY;  Surgeon: Geovany Cole MD;  Location: [address]);  Service: Endoscopy;  Laterality: N/A;       Review of patient's allergies indicates:  No Known Allergies    Medications:  No medications prior to admission.     Antibiotics (From admission, onward)      Start     Stop Route Frequency Ordered    12/06/22 2100  mupirocin 2 % ointment         12/11 2059 Nasl 2 times daily 12/06/22 1038          Antifungals (From admission, onward)       None          Antivirals (From admission, onward)      None               There is no immunization history on file for this patient.    Family History    None       Social History     Socioeconomic History    Marital status:    Tobacco Use    Smoking status: Some Days     Types: Cigarettes   Substance and Sexual Activity    Alcohol use: Not Currently     Review of Systems   Constitutional:  Negative for chills, fatigue and fever.   HENT:  Negative for ear pain, mouth sores, nosebleeds, postnasal drip, rhinorrhea, sinus pressure, sore throat, tinnitus, trouble swallowing and voice change.    Eyes:  Negative for photophobia, pain, redness and visual disturbance.   Respiratory:  Negative for apnea, cough, chest tightness, shortness of breath and wheezing.    Cardiovascular:  Negative for chest pain, palpitations and leg swelling.   Gastrointestinal:  Negative for abdominal pain, blood in stool, constipation, diarrhea, nausea and vomiting.   Endocrine: Negative for cold intolerance, heat intolerance, polydipsia and polyuria.   Genitourinary:  Negative for decreased urine volume, difficulty urinating, dysuria, flank pain, frequency, genital sores, hematuria, penile discharge, penile pain, penile swelling, scrotal swelling, testicular pain and urgency.   Musculoskeletal:  Negative for arthralgias, back pain, joint swelling, myalgias and neck pain.   Skin:  Negative for color change and rash.   Allergic/Immunologic: Negative for environmental allergies and food allergies.   Neurological:  Negative for dizziness, seizures, syncope, weakness, light-headedness, numbness and headaches.   Hematological:  Negative for adenopathy. Does not bruise/bleed easily.   Psychiatric/Behavioral:  Negative for agitation, confusion, decreased concentration, hallucinations, self-injury, sleep disturbance and suicidal ideas. The patient is not nervous/anxious.    Objective:     Vital Signs (Most Recent):  Temp: 96.1 °F (35.6 °C)  (12/14/22 1206)  Pulse: 79 (12/14/22 1213)  Resp: 18 (12/14/22 1206)  BP: 93/61 (12/14/22 1206)  SpO2: 98 % (12/14/22 1206) Vital Signs (24h Range):  Temp:  [96.1 °F (35.6 °C)-98.2 °F (36.8 °C)] 96.1 °F (35.6 °C)  Pulse:  [57-88] 79  Resp:  [18] 18  SpO2:  [97 %-98 %] 98 %  BP: ()/(51-76) 93/61     Weight: 76.3 kg (168 lb 3.4 oz)  Body mass index is 27.15 kg/m².    Estimated Creatinine Clearance: 40.1 mL/min (A) (based on SCr of 1.9 mg/dL (H)).    Physical Exam  Vitals and nursing note reviewed.   Constitutional:       Appearance: He is well-developed.   HENT:      Head: Normocephalic and atraumatic.   Eyes:      General: No scleral icterus.        Right eye: No discharge.         Left eye: No discharge.      Conjunctiva/sclera: Conjunctivae normal.   Pulmonary:      Effort: Pulmonary effort is normal.   Musculoskeletal:         General: Normal range of motion.   Skin:     General: Skin is warm and dry.   Neurological:      Mental Status: He is alert and oriented to person, place, and time.   Psychiatric:         Behavior: Behavior normal.         Thought Content: Thought content normal.         Judgment: Judgment normal.       Significant Labs: Blood Culture: No results for input(s): LABBLOO in the last 4320 hours.  BMP:   Recent Labs   Lab 12/14/22 0527         K 3.7   CL 96   CO2 31*   BUN 29*   CREATININE 1.9*   CALCIUM 9.3   MG 2.0     CBC:   Recent Labs   Lab 12/13/22 0522 12/14/22 0527   WBC 4.45 4.92   HGB 10.6* 10.8*   HCT 33.7* 35.3*    199     Hepatitis Panel: No results for input(s): HEPBSAG, HEPAIGM, HEPCAB in the last 48 hours.    Invalid input(s): HAPBIGM  HIV 1/2 Antibodies: No results for input(s): [id number] in the last 48 hours.  Recent Lab Results         12/14/22  1204   12/14/22  0853   12/14/22  0646   12/14/22  0527        Albumin       2.6       Alkaline Phosphatase       149       Allens Test   N/A           ALT       15       ANION GAP       10       aPTT      41.1  Comment: aPTT therapeutic range = 39-69 seconds         AST       13       Baso #       0.06       Basophil %       1.2       Bilirubin, Direct       1.4       BILIRUBIN TOTAL       2.2  Comment: For infants and newborns, interpretation of results should be based  on gestational age, weight and in agreement with clinical  observations.    Premature Infant recommended reference ranges:  Up to 24 hours.............<8.0 mg/dL  Up to 48 hours............<12.0 mg/dL  3-5 days..................<15.0 mg/dL  6-29 days.................<15.0 mg/dL         BNP       1,846  Comment: Values of less than 100 pg/ml are consistent with non-CHF populations.       Site   Other           BUN       29       Calcium       9.3       Chloride       96       CO2       31       Creatinine       1.9       Differential Method       Automated       eGFR       41.4       Eos #       0.2       Eosinophil %       3.0       Glucose       106       Gran # (ANC)       3.1       Gran %       63.5       HEMATOCRIT       35.3       HEMOGLOBIN       10.8       Immature Grans (Abs)       0.05  Comment: Mild elevation in immature granulocytes is non specific and   can be seen in a variety of conditions including stress response,   acute inflammation, trauma and pregnancy. Correlation with other   laboratory and clinical findings is essential.         Immature Granulocytes       1.0       Lymph #       1.0       Lymph %       20.7       Magnesium       2.0       MCH       27.9       MCHC       30.6       MCV       91       Mono #       0.5       Mono %       10.6       MPV       9.7       nRBC       0       Platelets       199       POC BE   11           POC HCO3   35.5           POC PCO2   55.5           POC PH   7.414           POC PO2   27           POC SATURATED O2   50           POC TCO2   37           POCT Glucose 110             Potassium       3.7       PROTEIN TOTAL       5.0       RBC       3.87       RDW       22.0       Sample   VENOUS            Sodium       137       WBC       4.92               Significant Imaging: I have reviewed all pertinent imaging results/findings within the past 24 hours.

## 2022-12-14 NOTE — SUBJECTIVE & OBJECTIVE
Past Medical History:   Diagnosis Date    A-fib     GERD (gastroesophageal reflux disease)     Heart failure, unspecified     Hypertension     V-tach        Past Surgical History:   Procedure Laterality Date    COLONOSCOPY N/A 12/12/2022    Procedure: COLONOSCOPY;  Surgeon: Geovany Cole MD;  Location: [address]);  Service: Endoscopy;  Laterality: N/A;    COLONOSCOPY N/A 12/13/2022    Procedure: COLONOSCOPY;  Surgeon: Geovany Cole MD;  Location: [address]);  Service: Endoscopy;  Laterality: N/A;       Review of patient's allergies indicates:  No Known Allergies    Medications:  No medications prior to admission.     Antibiotics (From admission, onward)      Start     Stop Route Frequency Ordered    12/06/22 2100  mupirocin 2 % ointment         12/11 2059 Nasl 2 times daily 12/06/22 1038          Antifungals (From admission, onward)      None          Antivirals (From admission, onward)      None               There is no immunization history on file for this patient.    Family History    None       Social History     Socioeconomic History    Marital status:    Tobacco Use    Smoking status: Some Days     Types: Cigarettes   Substance and Sexual Activity    Alcohol use: Not Currently     Review of Systems   Constitutional:  Negative for chills, fatigue and fever.   HENT:  Negative for ear pain, mouth sores, nosebleeds, postnasal drip, rhinorrhea, sinus pressure, sore throat, tinnitus, trouble swallowing and voice change.    Eyes:  Negative for photophobia, pain, redness and visual disturbance.   Respiratory:  Negative for apnea, cough, chest tightness, shortness of breath and wheezing.    Cardiovascular:  Negative for chest pain, palpitations and leg swelling.   Gastrointestinal:  Negative for abdominal pain, blood in stool, constipation, diarrhea, nausea and vomiting.   Endocrine: Negative for cold intolerance, heat intolerance, polydipsia and polyuria.   Genitourinary:  Negative for  decreased urine volume, difficulty urinating, dysuria, flank pain, frequency, genital sores, hematuria, penile discharge, penile pain, penile swelling, scrotal swelling, testicular pain and urgency.   Musculoskeletal:  Negative for arthralgias, back pain, joint swelling, myalgias and neck pain.   Skin:  Negative for color change and rash.   Allergic/Immunologic: Negative for environmental allergies and food allergies.   Neurological:  Negative for dizziness, seizures, syncope, weakness, light-headedness, numbness and headaches.   Hematological:  Negative for adenopathy. Does not bruise/bleed easily.   Psychiatric/Behavioral:  Negative for agitation, confusion, decreased concentration, hallucinations, self-injury, sleep disturbance and suicidal ideas. The patient is not nervous/anxious.    Objective:     Vital Signs (Most Recent):  Temp: 96.1 °F (35.6 °C) (12/14/22 1206)  Pulse: 79 (12/14/22 1213)  Resp: 18 (12/14/22 1206)  BP: 93/61 (12/14/22 1206)  SpO2: 98 % (12/14/22 1206) Vital Signs (24h Range):  Temp:  [96.1 °F (35.6 °C)-98.2 °F (36.8 °C)] 96.1 °F (35.6 °C)  Pulse:  [57-88] 79  Resp:  [18] 18  SpO2:  [97 %-98 %] 98 %  BP: ()/(51-76) 93/61     Weight: 76.3 kg (168 lb 3.4 oz)  Body mass index is 27.15 kg/m².    Estimated Creatinine Clearance: 40.1 mL/min (A) (based on SCr of 1.9 mg/dL (H)).    Physical Exam  Vitals and nursing note reviewed.   Constitutional:       Appearance: He is well-developed.   HENT:      Head: Normocephalic and atraumatic.   Eyes:      General: No scleral icterus.        Right eye: No discharge.         Left eye: No discharge.      Conjunctiva/sclera: Conjunctivae normal.   Pulmonary:      Effort: Pulmonary effort is normal.   Musculoskeletal:         General: Normal range of motion.   Skin:     General: Skin is warm and dry.   Neurological:      Mental Status: He is alert and oriented to person, place, and time.   Psychiatric:         Behavior: Behavior normal.         Thought  Content: Thought content normal.         Judgment: Judgment normal.       Significant Labs: Blood Culture: No results for input(s): LABBLOO in the last 4320 hours.  BMP:   Recent Labs   Lab 12/14/22  0527         K 3.7   CL 96   CO2 31*   BUN 29*   CREATININE 1.9*   CALCIUM 9.3   MG 2.0     CBC:   Recent Labs   Lab 12/13/22  0522 12/14/22  0527   WBC 4.45 4.92   HGB 10.6* 10.8*   HCT 33.7* 35.3*    199     Hepatitis Panel: No results for input(s): HEPBSAG, HEPAIGM, HEPCAB in the last 48 hours.    Invalid input(s): HAPBIGM  HIV 1/2 Antibodies: No results for input(s): MSC42VBDV in the last 48 hours.  Recent Lab Results         12/14/22  1204   12/14/22  0853   12/14/22  0646   12/14/22  0527        Albumin       2.6       Alkaline Phosphatase       149       Allens Test   N/A           ALT       15       ANION GAP       10       aPTT     41.1  Comment: aPTT therapeutic range = 39-69 seconds         AST       13       Baso #       0.06       Basophil %       1.2       Bilirubin, Direct       1.4       BILIRUBIN TOTAL       2.2  Comment: For infants and newborns, interpretation of results should be based  on gestational age, weight and in agreement with clinical  observations.    Premature Infant recommended reference ranges:  Up to 24 hours.............<8.0 mg/dL  Up to 48 hours............<12.0 mg/dL  3-5 days..................<15.0 mg/dL  6-29 days.................<15.0 mg/dL         BNP       1,846  Comment: Values of less than 100 pg/ml are consistent with non-CHF populations.       Site   Other           BUN       29       Calcium       9.3       Chloride       96       CO2       31       Creatinine       1.9       Differential Method       Automated       eGFR       41.4       Eos #       0.2       Eosinophil %       3.0       Glucose       106       Gran # (ANC)       3.1       Gran %       63.5       HEMATOCRIT       35.3       HEMOGLOBIN       10.8       Immature Grans (Abs)        0.05  Comment: Mild elevation in immature granulocytes is non specific and   can be seen in a variety of conditions including stress response,   acute inflammation, trauma and pregnancy. Correlation with other   laboratory and clinical findings is essential.         Immature Granulocytes       1.0       Lymph #       1.0       Lymph %       20.7       Magnesium       2.0       MCH       27.9       MCHC       30.6       MCV       91       Mono #       0.5       Mono %       10.6       MPV       9.7       nRBC       0       Platelets       199       POC BE   11           POC HCO3   35.5           POC PCO2   55.5           POC PH   7.414           POC PO2   27           POC SATURATED O2   50           POC TCO2   37           POCT Glucose 110             Potassium       3.7       PROTEIN TOTAL       5.0       RBC       3.87       RDW       22.0       Sample   VENOUS           Sodium       137       WBC       4.92               Significant Imaging: I have reviewed all pertinent imaging results/findings within the past 24 hours.

## 2022-12-14 NOTE — PROGRESS NOTES
[facility] - Cardiology Step[facility]  Heart Transplant  Progress Note    Patient Name: Migel Garcia  MRN: [id number]  Admission Date: 12/5/2022  Hospital Length of Stay: 9 days  Attending Physician: Gin Mendes MD  Primary Care Provider: Anuj Banks MD  Principal Problem:Acute on chronic combined systolic and diastolic heart failure    Subjective:     Interval History: No complaints. Entresto 24-26 held last night due to SBP 95. CVP 20 this morning, SVO2 50%, CO/CI 3.9/2.1, SVR 1210 on  5 and Lasix gtt at 20 mg/hr. Will increase IV Lasix gtt to 30 mg/hr. Plan for RHC Friday. Per pulm recommendations, will obtain chest CT with contrast to further evaluate pulmonary nodules but will wait until Cr improves (1.9 today).     Intake/Output Summary (Last 24 hours) at 12/14/2022 1128  Last data filed at 12/14/2022 0928  Gross per 24 hour   Intake 820 ml   Output 2450 ml   Net -1630 ml             Continuous Infusions:   DOBUTamine IV infusion (non-titrating) 5 mcg/kg/min (12/14/22 0953)    furosemide (LASIX) 10 mg/mL infusion (non-titrating) 20 mg/hr (12/13/22 1952)    heparin (porcine) in D5W 20 Units/kg/hr (12/14/22 0437)     Scheduled Meds:   amiodarone  200 mg Oral Daily    digoxin  0.125 mg Oral Daily    furosemide (LASIX) injection  80 mg Intravenous Once    pantoprazole  40 mg Oral Daily    sacubitriL-valsartan  1 tablet Oral BID     PRN Meds:acetaminophen, sodium chloride 0.9%, sodium chloride 0.9%    Review of patient's allergies indicates:  No Known Allergies  Objective:     Vital Signs (Most Recent):  Temp: 97 °F (36.1 °C) (12/14/22 0744)  Pulse: 86 (12/14/22 0800)  Resp: 18 (12/14/22 0744)  BP: 93/71 (12/14/22 0744)  SpO2: 97 % (12/14/22 0744) Vital Signs (24h Range):  Temp:  [97 °F (36.1 °C)-98.2 °F (36.8 °C)] 97 °F (36.1 °C)  Pulse:  [57-90] 86  Resp:  [17-29] 18  SpO2:  [94 %-100 %] 97 %  BP: ()/(51-83) 93/71     Patient Vitals for the past 72 hrs (Last 3 readings):   Weight   12/14/22 0900  76.3 kg (168 lb 3.4 oz)   12/13/22 0808 78.1 kg (172 lb 2.9 oz)   12/12/22 0700 77.9 kg (171 lb 11.8 oz)       Body mass index is 27.15 kg/m².      Intake/Output Summary (Last 24 hours) at 12/14/2022 1128  Last data filed at 12/14/2022 0928  Gross per 24 hour   Intake 820 ml   Output 2450 ml   Net -1630 ml         Hemodynamic Parameters:       Telemetry: reviewed  Physical Exam  Vitals and nursing note reviewed.   Constitutional:       Appearance: Normal appearance.   HENT:      Head: Normocephalic.      Nose: Nose normal.      Mouth/Throat:      Mouth: Mucous membranes are moist.   Neck:      Vascular: JVD present.      Comments: JVP elevated.   Cardiovascular:      Rate and Rhythm: Tachycardia present. Rhythm irregular.      Heart sounds: Murmur heard.     Gallop present.   Pulmonary:      Effort: Pulmonary effort is normal.   Abdominal:      General: Abdomen is flat. There is no distension.      Palpations: Abdomen is soft.   Musculoskeletal:         General: Swelling present. Normal range of motion.   Skin:     General: Skin is warm.      Capillary Refill: Capillary refill takes 2 to 3 seconds.   Neurological:      Mental Status: He is alert and oriented to person, place, and time.       Significant Labs:  CBC:  Recent Labs   Lab 12/12/22 0442 12/13/22 0522 12/14/22  0527   WBC 5.18 4.45 4.92   RBC 3.86* 3.82* 3.87*   HGB 10.7* 10.6* 10.8*   HCT 34.1* 33.7* 35.3*    199 199   MCV 88 88 91   MCH 27.7 27.7 27.9   MCHC 31.4* 31.5* 30.6*       BNP:  Recent Labs   Lab 12/09/22  0547 12/12/22 0442 12/14/22  0527   BNP 1,344* 1,518* 1,846*       CMP:  Recent Labs   Lab 12/09/22  0547 12/09/22  1711 12/12/22 0442 12/13/22  0522 12/14/22  0527   *   < > 70 61* 106   CALCIUM 9.4   < > 9.3 9.2 9.3   ALBUMIN 3.7  --  3.6  --  2.6*   PROT 7.0  --  6.6  --  5.0*      < > 137 138 137   K 4.0   < > 3.7 3.6 3.7   CO2 36*   < > 30* 33* 31*   CL 91*   < > 94* 93* 96   BUN 44*   < > 33* 27* 29*   CREATININE  1.8*   < > 1.4 1.5* 1.9*   ALKPHOS 231*  --  210*  --  149*   ALT 33  --  25  --  15   AST 31  --  22  --  13   BILITOT 3.8*  --  3.8*  --  2.2*    < > = values in this interval not displayed.        Coagulation:   Recent Labs   Lab 12/10/22  0423 12/10/22  0816 12/13/22  0522 12/13/22  1112 12/14/22  0646   INR 1.2  --   --   --   --    APTT 33.6*  33.6*   < > 60.9* 31.7 41.1*    < > = values in this interval not displayed.       LDH:  No results for input(s): LDH in the last 72 hours.    Microbiology:  Microbiology Results (last 7 days)       ** No results found for the last 168 hours. **            I have reviewed all pertinent labs within the past 24 hours.    Estimated Creatinine Clearance: 40.1 mL/min (A) (based on SCr of 1.9 mg/dL (H)).    Diagnostic Results:  I have reviewed all pertinent imaging results/findings within the past 24 hours.    Assessment and Plan:     52 year old man with a history of non-ischemic cardiomyopathy with ICD placement, CKD 3, atrial fibrillation presents from  to [facility] for advanced options, on dobutamine 5. Echo 12/6 with EF of 25%. Pathway for LVAD/heart transplant started      * Acute on chronic combined systolic and diastolic heart failure  -NICM  -Transferred from [place] for ADHF and possible workup.  During hospital stay there; he was briefly on Levo and started on  and they were unable to wean  off.  Presented on  5.   -Last 2D Echo 12/6/22: LVEF: 25% , LVEDD:  5.33cm  -Hypervolemic on examination today  -CVP 20, SVO2 50, CO 3.9, CI 2.1, SVR 1210  -Current diuretic regimen: IV Lasix gtt at 20 mg/hr, will increase to 30 mg/hr   -GDMT with home dose of Digoxin, Entresto 24-26. Was previously on Toprol 100mg QHS but holding in the setting of   -Patient is currently being evaluated for OHTx/VAD  -Heart failure pathway Step 4.  Colonoscopy completed yesterday, 3 polyps visualized but not resected. Per Pulm recs, will obtain chest CT with contrast to further  evaluate pulmonary nodules, but will wait until Cr improves.    -2g Na dietary restriction, 1500 mL fluid restriction, strict I/Os      Pulmonary nodule  -Questionable 8 mm left lower lobe pulmonary nodule series 6, image 314.  This courses along the left lower lobe pulmonary artery and pulmonary aneurysm not excluded.  Dedicated CT chest with contrast suggested.  -Will plan for CT with contrast once Cr improves    Atrial fibrillation  -[id number]-[id number] score is 2  -On Eliquis at home for anticoagulation  -Stopped on 12/5 in anticipation of workup and possible procedures  -On Lovenox for DVT prophylaxis  -continue amio/dig    ICD (implantable cardioverter-defibrillator), single, in situ  -[employer] ICD    HTN (hypertension)  Continue entresto    CKD (chronic kidney disease), stage III  -Reported baseline 1.6-2.0  -Will monitor trend  -avoid nephrotoxins     Tobacco use  -counseled on cessation    GERD (gastroesophageal reflux disease)  -Protonix 40mg Qdaily      Grecia Gillis PA-C  Heart Transplant  [facility] - Cardiology Step[facility]

## 2022-12-14 NOTE — ASSESSMENT & PLAN NOTE
-Questionable 8 mm left lower lobe pulmonary nodule series 6, image 314.  This courses along the left lower lobe pulmonary artery and pulmonary aneurysm not excluded.  Dedicated CT chest with contrast suggested.  -Will plan for CT with contrast once Cr improves

## 2022-12-14 NOTE — HPI
History of Present Illness:    52 y.o. male with a history of HFrEF-10-15%, NICM,  HTN, s/p ICD, and CKD 3 who presented for management of cardiogenic shock with acute heart failure.Infectious Diseases was consulted for pre-heart transplant evaluation.    Infectious History:  Recent hospital admissions: Yes  Recent infections: No  Recent or current antibiotic use: No  History of recurrent infections *(sinus / pneumonia / UTI / SBP)*: No  Recent dental infections, issues or procedures: No  History of chicken pox or shingles: No  History of STI: No  History of COVID infection: No    History of Immunosuppression:  Prior chemotherapy / immunosuppression: No  Prior transplant: No  History of splenectomy: No    Tuberculosis:  Prior screening for latent TB: Yes  Prior diagnosis of latent TB: No  Risk factors for TB *known exposure, incarceration, homelessness*: Yes    Geographical exposures:  Currently lives in [place] with wife and sone  Lived in the following states: [place]  Lived in [facility] [place]: No  International travel: Yes  Travel-associated illness: No    Social/Environmental:  Occupation:  Retired. Previously worked in medical field in the OR.   Pets: No  Livestock: No  Fishing / hunting: No  Hobbies: none  Water: [place] water  Consumption of raw/undercooked meat or seafood?  No  Tobacco: Yes. Quit 3 months prior  Alcohol: No  Recreational drug use:  Yes. Marijuana  Sexual partners: wife      Past Histories:   Past Medical History:   Diagnosis Date    A-fib     GERD (gastroesophageal reflux disease)     Heart failure, unspecified     Hypertension     V-tach      Past Surgical History:   Procedure Laterality Date    COLONOSCOPY N/A 12/12/2022    Procedure: COLONOSCOPY;  Surgeon: Geovany Cole MD;  Location: [address]);  Service: Endoscopy;  Laterality: N/A;    COLONOSCOPY N/A 12/13/2022    Procedure: COLONOSCOPY;  Surgeon: Geovany Cole MD;  Location: [facility] ([address] [place] [address]);  Service: Endoscopy;   Laterality: N/A;     History reviewed. No pertinent family history.  Social History     Tobacco Use    Smoking status: Some Days     Types: Cigarettes   Substance Use Topics    Alcohol use: Not Currently     Review of patient's allergies indicates:  No Known Allergies    Immunization History:  Received all childhood vaccines: Yes  All household members receive annual flu vaccine: No  All household members are up to date on COVID vaccine: No      There is no immunization history on file for this patient.     MELD: *liver transplant only*  MELD-Na score: 17 at 12/12/2022  4:42 AM  MELD score: 17 at 12/12/2022  4:42 AM  Calculated from:  Serum Creatinine: 1.4 mg/dL at 12/12/2022  4:42 AM  Serum Sodium: 137 mmol/L at 12/12/2022  4:42 AM  Total Bilirubin: 3.8 mg/dL at 12/12/2022  4:42 AM  INR(ratio): 1.2 at 12/10/2022  4:23 AM  Age: 52 years

## 2022-12-14 NOTE — COMMITTEE REVIEW
Native Organ Dx: idiopathic, dilated    Deferred  Migel Garcia - Decision deferred until pt has completed his evaluation.     Note was written by Lydia Pena RN.    ==========================================================    I agree and attest to the decision of the committee.

## 2022-12-14 NOTE — LETTER
December 14, 2022    Migel Garcia  [address] [facility] [address]      RE: [id number]          Dear Mr.Dewayne Garcia:    Your transplant evaluation was reviewed at the Heart Transplant Selection Committee meeting on 12/14/2022.  It was the decision of the Committee to defer the option of heart transplantation at this time.  You will need to complete your transplant evaluation, including a Right Heart catheterization and other consultations.  Any testing not completed while hospitalized may be rescheduled as an outpatient.  You can be re-presented to the Committee once you complete your evaluation.     Attached is a letter from the [employer] Organ [employer]aring (UNOS).  It describes the services and information offered to patients by UNOS and the Organ Procurement and Transplant Network.     Please feel free to call if you have any questions at [phone].    Sincerely,    Gin Mendes MD  Medical Director, Heart Transplant Program  Director, Advanced Heart Failure/Heart Transplant Fellowship Program  [facility]  [address] [place] [address] - [address] [place] [address]  [phone]              The Organ Procurement and Transplantation Network   Toll-free patient services line: Your resource for organ transplant information     If you have a question regarding your own medical care, you always should call your transplant hospital first. However, for general organ transplant-related information, you can call the Organ Procurement and Transplantation Network (OPTN) toll-free patient services line at [phone].     Anyone, including potential transplant candidates, candidates, recipients, family members, friends, living donors, and donor family members, can call this number to:     · Talk about organ donation, living donation, the transplant process, the donation process, and transplant policies.   · Get a free patient information kit  with helpful booklets, waiting list and transplant information, and a list of all transplant hospitals.   · Ask questions about the OPTN website (https://optn.transplant.hrsa.gov/), the [employer] Organ [employer]arings (UNOS) website (https://unos.org/), or the UNOS website for living donors and transplant recipients. (https://www.transplantliving.org/).   · Learn how the OPTN can help you.   · Talk about any concerns that you may have with a transplant hospital.     The nations transplant system, the OPTN, is managed under federal contract by the [employer] Organ [employer]aring (UNOS), which is a non-profit charitable organization. The OPTN helps create and define organ sharing policies that make the best use of donated organs. This process continuously evaluating new advances and discoveries so policies can be adapted to best serve patients waiting for transplants. To do so, the OPTN works closely with transplant professionals, transplant patients, transplant candidates, donor families, living donors, and the public. All transplant programs and organ procurement organizations throughout the country are OPTN members and are obligated to follow the policies the OPTN creates for allocating organs.     The OPTN also is responsible for:   · Providing educational material for patients, the public, and professionals.   · Raising awareness of the need for donated organs and tissue.   · Coordinating organ procurement, matching, and placement.   · Collecting information about every organ transplant and donation that occurs in the [place].     Remember, you should contact your transplant hospital directly if you have questions or concerns about your own medical care including medical records, work-up progress, and test results.     We are not your transplant hospital, and our staff will not be able to answer questions about your case, so please keep your transplant hospitals phone number handy.   However, while  you research your transplant needs and learn as much as you can about transplantation and donation, we welcome your call to our toll-free patient services line at [phone].

## 2022-12-14 NOTE — NURSING
Patient refused blood draw due to low B/P   On call notified concerning low B/P.  And patient refusal to draw blood .B/P MEDICATION HELD.  Patient agreed to am draw.

## 2022-12-14 NOTE — ASSESSMENT & PLAN NOTE
-[id number] score is 2  -On Eliquis at home for anticoagulation  -Stopped on 12/5 in anticipation of workup and possible procedures  -On Lovenox for DVT prophylaxis  -continue amio/dig

## 2022-12-14 NOTE — ASSESSMENT & PLAN NOTE
Patient with acute on chronic heart failure being evaluated for advanced options (VAD/[facility]la[facility]).     1. Risks of Infection: Available serologies were reviewed. No unusual risks of infection or significant barriers to transplantation were identified from the infectious disease standpoint.   - Pending serologies: Quantiferon gold / T-spot, toxoplasma, CMV    2. Immunizations:  Based on the patient's immunization history and serologies, the following immunizations are recommended:  - Hepatitis A    Patient does not have immunity to hepatitis A    Vaccination required: Yes   - Hepatitis B    Patient does not have immunity to hepatitis B    Vaccination ordered today: Yes    If not ordered, reason for not vaccinating: Other (specify)Patient wants time to consider   - COVID    Current CDC vaccination recommendations were discussed with the patient   - Influenza     Annual, high dose preferred    Vaccination required: Yes   - Prevnar 20    Vaccination required: Yes   - Tdap    Vaccination required: Yes   - Shingrix    Vaccination required: Yes    Recommended Pre-Transplant Immunization Schedule  Vaccine  0m 1m 2m 6m   Pneumococcal conjugate vaccine (Prevnar 20) X      Tetanus-diphtheria-pertussis (Tdap)* X      Hepatitis A Vaccine (Havrix)** X   X   Hepatitis B Vaccine (Heplisav)** X X     Influenza X      Zoster Recombinant Vaccine (Shingrix) X  X           *Administer booster every 10 years.       **Administer if no immunity demonstrated on serologies                 3. Counseling:   I discussed with the patient the risk for increased susceptibility to infections following transplantation including increased risk for infection right after transplant and if rejection should occur.  The patient has been counseled on the importance of vaccinations including but not limited to a yearly flu vaccine. Patient was also instructed to encourage that family/caretakers receive their flu vaccine yearly. The patient was encouraged to  contact us about any problems that may develop after immunizations and possible side effects were reviewed. Patient and his wife were also advised that failure to receive recommended vaccinations may result in a change in his transplant candidacy.     Specific guidance has been provided to the patient regarding the patient's occupation, hobbies and activities to avoid future infectious complications. These include but are not limited to: avoiding raw/undercooked meats and seafood, avoiding unpasteurized milk/cheeses, proper (hand) hygiene, contact with animals and appropriate vaccination of animals, use of mosquito/tick precautions, avoiding walking barefoot, avoiding sick contacts, and seeking medical advice prior to foreign travel (specifically developing countries).     4. Transplant Candidacy: Based on available information, there are no identified significant barriers to transplantation from an infectious disease standpoint.  Final determination of transplant candidacy will be made once evaluation is complete and reviewed by the Selection Committee.      Follow up with infectious disease as needed. Please call if any pending serologic testing is positive.

## 2022-12-14 NOTE — SUBJECTIVE & OBJECTIVE
Interval History: No complaints. Entresto 24-26 held last night due to SBP 95. CVP 20 this morning, SVO2 50%, CO/CI 3.9/2.1, SVR 1210 on  5 and Lasix gtt at 20 mg/hr. Will increase IV Lasix gtt to 30 mg/hr. Plan for RHC Friday. Per pulm recommendations, will obtain chest CT with contrast to further evaluate pulmonary nodules but will wait until Cr improves (1.9 today).     Intake/Output Summary (Last 24 hours) at 12/14/2022 1128  Last data filed at 12/14/2022 0928  Gross per 24 hour   Intake 820 ml   Output 2450 ml   Net -1630 ml             Continuous Infusions:   DOBUTamine IV infusion (non-titrating) 5 mcg/kg/min (12/14/22 0953)    furosemide (LASIX) 10 mg/mL infusion (non-titrating) 20 mg/hr (12/13/22 1952)    heparin (porcine) in D5W 20 Units/kg/hr (12/14/22 0437)     Scheduled Meds:   amiodarone  200 mg Oral Daily    digoxin  0.125 mg Oral Daily    furosemide (LASIX) injection  80 mg Intravenous Once    pantoprazole  40 mg Oral Daily    sacubitriL-valsartan  1 tablet Oral BID     PRN Meds:acetaminophen, sodium chloride 0.9%, sodium chloride 0.9%    Review of patient's allergies indicates:  No Known Allergies  Objective:     Vital Signs (Most Recent):  Temp: 97 °F (36.1 °C) (12/14/22 0744)  Pulse: 86 (12/14/22 0800)  Resp: 18 (12/14/22 0744)  BP: 93/71 (12/14/22 0744)  SpO2: 97 % (12/14/22 0744) Vital Signs (24h Range):  Temp:  [97 °F (36.1 °C)-98.2 °F (36.8 °C)] 97 °F (36.1 °C)  Pulse:  [57-90] 86  Resp:  [17-29] 18  SpO2:  [94 %-100 %] 97 %  BP: ()/(51-83) 93/71     Patient Vitals for the past 72 hrs (Last 3 readings):   Weight   12/14/22 0900 76.3 kg (168 lb 3.4 oz)   12/13/22 0808 78.1 kg (172 lb 2.9 oz)   12/12/22 0700 77.9 kg (171 lb 11.8 oz)       Body mass index is 27.15 kg/m².      Intake/Output Summary (Last 24 hours) at 12/14/2022 1128  Last data filed at 12/14/2022 0928  Gross per 24 hour   Intake 820 ml   Output 2450 ml   Net -1630 ml         Hemodynamic Parameters:       Telemetry:  reviewed  Physical Exam  Vitals and nursing note reviewed.   Constitutional:       Appearance: Normal appearance.   HENT:      Head: Normocephalic.      Nose: Nose normal.      Mouth/Throat:      Mouth: Mucous membranes are moist.   Neck:      Vascular: JVD present.      Comments: JVP elevated.   Cardiovascular:      Rate and Rhythm: Tachycardia present. Rhythm irregular.      Heart sounds: Murmur heard.     Gallop present.   Pulmonary:      Effort: Pulmonary effort is normal.   Abdominal:      General: Abdomen is flat. There is no distension.      Palpations: Abdomen is soft.   Musculoskeletal:         General: Swelling present. Normal range of motion.   Skin:     General: Skin is warm.      Capillary Refill: Capillary refill takes 2 to 3 seconds.   Neurological:      Mental Status: He is alert and oriented to person, place, and time.       Significant Labs:  CBC:  Recent Labs   Lab 12/12/22 0442 12/13/22 0522 12/14/22  0527   WBC 5.18 4.45 4.92   RBC 3.86* 3.82* 3.87*   HGB 10.7* 10.6* 10.8*   HCT 34.1* 33.7* 35.3*    199 199   MCV 88 88 91   MCH 27.7 27.7 27.9   MCHC 31.4* 31.5* 30.6*       BNP:  Recent Labs   Lab 12/09/22  0547 12/12/22 0442 12/14/22  0527   BNP 1,344* 1,518* 1,846*       CMP:  Recent Labs   Lab 12/09/22  0547 12/09/22  1711 12/12/22  0442 12/13/22  0522 12/14/22  0527   *   < > 70 61* 106   CALCIUM 9.4   < > 9.3 9.2 9.3   ALBUMIN 3.7  --  3.6  --  2.6*   PROT 7.0  --  6.6  --  5.0*      < > 137 138 137   K 4.0   < > 3.7 3.6 3.7   CO2 36*   < > 30* 33* 31*   CL 91*   < > 94* 93* 96   BUN 44*   < > 33* 27* 29*   CREATININE 1.8*   < > 1.4 1.5* 1.9*   ALKPHOS 231*  --  210*  --  149*   ALT 33  --  25  --  15   AST 31  --  22  --  13   BILITOT 3.8*  --  3.8*  --  2.2*    < > = values in this interval not displayed.        Coagulation:   Recent Labs   Lab 12/10/22  0423 12/10/22  0816 12/13/22  0522 12/13/22  1112 12/14/22  0646   INR 1.2  --   --   --   --    APTT 33.6*   33.6*   < > 60.9* 31.7 41.1*    < > = values in this interval not displayed.       LDH:  No results for input(s): LDH in the last 72 hours.    Microbiology:  Microbiology Results (last 7 days)       ** No results found for the last 168 hours. **            I have reviewed all pertinent labs within the past 24 hours.    Estimated Creatinine Clearance: 40.1 mL/min (A) (based on SCr of 1.9 mg/dL (H)).    Diagnostic Results:  I have reviewed all pertinent imaging results/findings within the past 24 hours.

## 2022-12-15 ENCOUNTER — SOCIAL WORK (OUTPATIENT)
Dept: TRANSPLANT | Facility: CLINIC | Age: 52
End: 2022-12-15
Payer: MEDICARE

## 2022-12-15 LAB
ALLENS TEST: ABNORMAL
ANION GAP SERPL CALC-SCNC: 12 MMOL/L (ref 8–16)
APTT BLDCRRT: 51 SEC (ref 21–32)
APTT BLDCRRT: 54.7 SEC (ref 21–32)
APTT BLDCRRT: 78.9 SEC (ref 21–32)
APTT BLDCRRT: >150 SEC (ref 21–32)
BASOPHILS # BLD AUTO: 0.07 K/UL (ref 0–0.2)
BASOPHILS NFR BLD: 1.2 % (ref 0–1.9)
BUN SERPL-MCNC: 31 MG/DL (ref 6–20)
CALCIUM SERPL-MCNC: 10 MG/DL (ref 8.7–10.5)
CHLORIDE SERPL-SCNC: 96 MMOL/L (ref 95–110)
CO2 SERPL-SCNC: 32 MMOL/L (ref 23–29)
CREAT SERPL-MCNC: 1.7 MG/DL (ref 0.5–1.4)
DELSYS: ABNORMAL
DIFFERENTIAL METHOD: ABNORMAL
DIGOXIN SERPL-MCNC: 1.1 NG/ML (ref 0.8–2)
EOSINOPHIL # BLD AUTO: 0.2 K/UL (ref 0–0.5)
EOSINOPHIL NFR BLD: 2.9 % (ref 0–8)
ERYTHROCYTE [DISTWIDTH] IN BLOOD BY AUTOMATED COUNT: 22.2 % (ref 11.5–14.5)
EST. GFR  (NO RACE VARIABLE): 47.3 ML/MIN/1.73 M^2
GLUCOSE SERPL-MCNC: 60 MG/DL (ref 70–110)
HCO3 UR-SCNC: 28.4 MMOL/L (ref 24–28)
HCT VFR BLD AUTO: 37.6 % (ref 40–54)
HGB BLD-MCNC: 11.1 G/DL (ref 14–18)
IMM GRANULOCYTES # BLD AUTO: 0.06 K/UL (ref 0–0.04)
IMM GRANULOCYTES NFR BLD AUTO: 1 % (ref 0–0.5)
LYMPHOCYTES # BLD AUTO: 1.1 K/UL (ref 1–4.8)
LYMPHOCYTES NFR BLD: 19.4 % (ref 18–48)
MAGNESIUM SERPL-MCNC: 2.2 MG/DL (ref 1.6–2.6)
MCH RBC QN AUTO: 27.8 PG (ref 27–31)
MCHC RBC AUTO-ENTMCNC: 29.5 G/DL (ref 32–36)
MCV RBC AUTO: 94 FL (ref 82–98)
MONOCYTES # BLD AUTO: 0.6 K/UL (ref 0.3–1)
MONOCYTES NFR BLD: 10.3 % (ref 4–15)
NEUTROPHILS # BLD AUTO: 3.8 K/UL (ref 1.8–7.7)
NEUTROPHILS NFR BLD: 65.2 % (ref 38–73)
NRBC BLD-RTO: 0 /100 WBC
PCO2 BLDA: 44.7 MMHG (ref 35–45)
PH SMN: 7.41 [PH] (ref 7.35–7.45)
PLATELET # BLD AUTO: 187 K/UL (ref 150–450)
PMV BLD AUTO: 10.9 FL (ref 9.2–12.9)
PO2 BLDA: 31 MMHG (ref 40–60)
POC BE: 4 MMOL/L
POC SATURATED O2: 59 % (ref 95–100)
POC TCO2: 30 MMOL/L (ref 24–29)
POTASSIUM SERPL-SCNC: 3.6 MMOL/L (ref 3.5–5.1)
RBC # BLD AUTO: 3.99 M/UL (ref 4.6–6.2)
SAMPLE: ABNORMAL
SITE: ABNORMAL
SODIUM SERPL-SCNC: 140 MMOL/L (ref 136–145)
WBC # BLD AUTO: 5.82 K/UL (ref 3.9–12.7)

## 2022-12-15 PROCEDURE — 85730 THROMBOPLASTIN TIME PARTIAL: CPT | Performed by: INTERNAL MEDICINE

## 2022-12-15 PROCEDURE — 25000003 PHARM REV CODE 250: Performed by: INTERNAL MEDICINE

## 2022-12-15 PROCEDURE — 99233 SBSQ HOSP IP/OBS HIGH 50: CPT | Mod: ,,, | Performed by: PHYSICIAN ASSISTANT

## 2022-12-15 PROCEDURE — 63600175 PHARM REV CODE 636 W HCPCS: Performed by: INTERNAL MEDICINE

## 2022-12-15 PROCEDURE — 20600001 HC STEP DOWN PRIVATE ROOM

## 2022-12-15 PROCEDURE — 99233 PR SUBSEQUENT HOSPITAL CARE,LEVL III: ICD-10-PCS | Mod: ,,, | Performed by: PHYSICIAN ASSISTANT

## 2022-12-15 PROCEDURE — 63600175 PHARM REV CODE 636 W HCPCS: Performed by: PHYSICIAN ASSISTANT

## 2022-12-15 PROCEDURE — 85025 COMPLETE CBC W/AUTO DIFF WBC: CPT | Performed by: PHYSICIAN ASSISTANT

## 2022-12-15 PROCEDURE — 80048 BASIC METABOLIC PNL TOTAL CA: CPT | Performed by: PHYSICIAN ASSISTANT

## 2022-12-15 PROCEDURE — 80162 ASSAY OF DIGOXIN TOTAL: CPT | Performed by: INTERNAL MEDICINE

## 2022-12-15 PROCEDURE — 83735 ASSAY OF MAGNESIUM: CPT | Performed by: PHYSICIAN ASSISTANT

## 2022-12-15 RX ORDER — DIGOXIN 125 MCG
0.12 TABLET ORAL EVERY OTHER DAY
Status: DISCONTINUED | OUTPATIENT
Start: 2022-12-17 | End: 2022-12-23 | Stop reason: HOSPADM

## 2022-12-15 RX ADMIN — PANTOPRAZOLE SODIUM 40 MG: 40 TABLET, DELAYED RELEASE ORAL at 09:12

## 2022-12-15 RX ADMIN — DIGOXIN 0.12 MG: 125 TABLET ORAL at 09:12

## 2022-12-15 RX ADMIN — HEPARIN SODIUM 19 UNITS/KG/HR: 10000 INJECTION, SOLUTION INTRAVENOUS at 01:12

## 2022-12-15 RX ADMIN — FUROSEMIDE 30 MG/HR: 10 INJECTION, SOLUTION INTRAMUSCULAR; INTRAVENOUS at 05:12

## 2022-12-15 RX ADMIN — AMIODARONE HYDROCHLORIDE 200 MG: 200 TABLET ORAL at 09:12

## 2022-12-15 RX ADMIN — FUROSEMIDE 30 MG/HR: 10 INJECTION, SOLUTION INTRAMUSCULAR; INTRAVENOUS at 09:12

## 2022-12-15 RX ADMIN — SACUBITRIL AND VALSARTAN 1 TABLET: 24; 26 TABLET, FILM COATED ORAL at 09:12

## 2022-12-15 RX ADMIN — DOBUTAMINE HYDROCHLORIDE 5 MCG/KG/MIN: 400 INJECTION INTRAVENOUS at 05:12

## 2022-12-15 NOTE — ASSESSMENT & PLAN NOTE
-[id number] score is 2  -On Eliquis at home for anticoagulation  -Stopped on 12/5 in anticipation of workup and possible procedures  -On Lovenox for DVT prophylaxis  -continue amio/dig

## 2022-12-15 NOTE — NURSING
Latest Reference Range & Units 12/15/22 09:30   aPTT 21.0 - 32.0 sec 51.0 (H)   (H): Data is abnormally high    Within therapeutic range, no rate change needed. Will redraw @ 3519.

## 2022-12-15 NOTE — SUBJECTIVE & OBJECTIVE
Interval History: NAEON. No complaints. CVP 17, SVO2 59, CO 4.4, CI 2.3, SVR 1127 on  5 and IV Lasix gtt 30 mg/hr. Net -ve 2.5 L/24 hours. Cr 1.7 today. Will continue to diurese and plan for RHC tomorrow if euvolemic.         Intake/Output Summary (Last 24 hours) at 12/15/2022 1104  Last data filed at 12/15/2022 0930  Gross per 24 hour   Intake 1200 ml   Output 3800 ml   Net -2600 ml             Continuous Infusions:   DOBUTamine IV infusion (non-titrating) 5 mcg/kg/min (12/15/22 0518)    furosemide (LASIX) 10 mg/mL infusion (non-titrating) 30 mg/hr (12/15/22 0513)    heparin (porcine) in D5W 19 Units/kg/hr (12/15/22 0330)     Scheduled Meds:   amiodarone  200 mg Oral Daily    [START ON 12/17/2022] digoxin  0.125 mg Oral Every other day    pantoprazole  40 mg Oral Daily    sacubitriL-valsartan  1 tablet Oral BID     PRN Meds:acetaminophen, sodium chloride 0.9%, sodium chloride 0.9%    Review of patient's allergies indicates:  No Known Allergies  Objective:     Vital Signs (Most Recent):  Temp: 98.2 °F (36.8 °C) (12/15/22 1103)  Pulse: 89 (12/15/22 1103)  Resp: 18 (12/15/22 1103)  BP: 93/67 (12/15/22 1103)  SpO2: 98 % (12/15/22 1103) Vital Signs (24h Range):  Temp:  [96.1 °F (35.6 °C)-98.2 °F (36.8 °C)] 98.2 °F (36.8 °C)  Pulse:  [71-99] 89  Resp:  [18] 18  SpO2:  [94 %-100 %] 98 %  BP: ()/(58-74) 93/67     Patient Vitals for the past 72 hrs (Last 3 readings):   Weight   12/15/22 0803 74.8 kg (164 lb 14.5 oz)   12/14/22 0900 76.3 kg (168 lb 3.4 oz)   12/13/22 0808 78.1 kg (172 lb 2.9 oz)       Body mass index is 26.62 kg/m².      Intake/Output Summary (Last 24 hours) at 12/15/2022 1104  Last data filed at 12/15/2022 0930  Gross per 24 hour   Intake 1200 ml   Output 3800 ml   Net -2600 ml         Hemodynamic Parameters:  CVP:  [17 mmHg] 17 mmHg    Telemetry: reviewed  Physical Exam  Vitals and nursing note reviewed.   Constitutional:       Appearance: Normal appearance.   HENT:      Head: Normocephalic.       Nose: Nose normal.      Mouth/Throat:      Mouth: Mucous membranes are moist.   Neck:      Vascular: JVD present.      Comments: JVP elevated.   Cardiovascular:      Rate and Rhythm: Tachycardia present. Rhythm irregular.      Heart sounds: Murmur heard.     Gallop present.   Pulmonary:      Effort: Pulmonary effort is normal.   Abdominal:      General: Abdomen is flat. There is no distension.      Palpations: Abdomen is soft.   Musculoskeletal:         General: Swelling present. Normal range of motion.   Skin:     General: Skin is warm.      Capillary Refill: Capillary refill takes 2 to 3 seconds.   Neurological:      Mental Status: He is alert and oriented to person, place, and time.       Significant Labs:  CBC:  Recent Labs   Lab 12/13/22  0522 12/14/22  0527 12/15/22  0551   WBC 4.45 4.92 5.82   RBC 3.82* 3.87* 3.99*   HGB 10.6* 10.8* 11.1*   HCT 33.7* 35.3* 37.6*    199 187   MCV 88 91 94   MCH 27.7 27.9 27.8   MCHC 31.5* 30.6* 29.5*       BNP:  Recent Labs   Lab 12/09/22  0547 12/12/22  0442 12/14/22  0527   BNP 1,344* 1,518* 1,846*       CMP:  Recent Labs   Lab 12/09/22  0547 12/09/22  1711 12/12/22  0442 12/13/22  0522 12/14/22  0527 12/15/22  0551   *   < > 70 61* 106 60*   CALCIUM 9.4   < > 9.3 9.2 9.3 10.0   ALBUMIN 3.7  --  3.6  --  2.6*  --    PROT 7.0  --  6.6  --  5.0*  --       < > 137 138 137 140   K 4.0   < > 3.7 3.6 3.7 3.6   CO2 36*   < > 30* 33* 31* 32*   CL 91*   < > 94* 93* 96 96   BUN 44*   < > 33* 27* 29* 31*   CREATININE 1.8*   < > 1.4 1.5* 1.9* 1.7*   ALKPHOS 231*  --  210*  --  149*  --    ALT 33  --  25  --  15  --    AST 31  --  22  --  13  --    BILITOT 3.8*  --  3.8*  --  2.2*  --     < > = values in this interval not displayed.        Coagulation:   Recent Labs   Lab 12/10/22  0423 12/10/22  0816 12/14/22  2321 12/15/22  0057 12/15/22  0930   INR 1.2  --   --   --   --    APTT 33.6*  33.6*   < > >150.0* 78.9* 51.0*    < > = values in this interval not displayed.        LDH:  No results for input(s): LDH in the last 72 hours.    Microbiology:  Microbiology Results (last 7 days)       ** No results found for the last 168 hours. **            I have reviewed all pertinent labs within the past 24 hours.    Estimated Creatinine Clearance: 44.8 mL/min (A) (based on SCr of 1.7 mg/dL (H)).    Diagnostic Results:  I have reviewed all pertinent imaging results/findings within the past 24 hours.

## 2022-12-15 NOTE — PROGRESS NOTES
[facility] - [facility]iology Stepdown  Heart Transplant  Progress Note    Patient Name: Migel Garcia  MRN: [id number]  Admission Date: 12/5/2022  Hospital Length of Stay: 10 days  Attending Physician: Gin Mendes MD  Primary Care Provider: Anuj Banks MD  Principal Problem:Acute on chronic combined systolic and diastolic heart failure    Subjective:     Interval History: NAEON. No complaints. CVP 17, SVO2 59, CO 4.4, CI 2.3, SVR 1127 on  5 and IV Lasix gtt 30 mg/hr. Net -ve 2.5 L/24 hours. Cr 1.7 today. Will continue to diurese and plan for RHC tomorrow if euvolemic.         Intake/Output Summary (Last 24 hours) at 12/15/2022 1104  Last data filed at 12/15/2022 0930  Gross per 24 hour   Intake 1200 ml   Output 3800 ml   Net -2600 ml             Continuous Infusions:   DOBUTamine IV infusion (non-titrating) 5 mcg/kg/min (12/15/22 0518)    furosemide (LASIX) 10 mg/mL infusion (non-titrating) 30 mg/hr (12/15/22 0513)    heparin (porcine) in D5W 19 Units/kg/hr (12/15/22 0330)     Scheduled Meds:   amiodarone  200 mg Oral Daily    [START ON 12/17/2022] digoxin  0.125 mg Oral Every other day    pantoprazole  40 mg Oral Daily    sacubitriL-valsartan  1 tablet Oral BID     PRN Meds:acetaminophen, sodium chloride 0.9%, sodium chloride 0.9%    Review of patient's allergies indicates:  No Known Allergies  Objective:     Vital Signs (Most Recent):  Temp: 98.2 °F (36.8 °C) (12/15/22 1103)  Pulse: 89 (12/15/22 1103)  Resp: 18 (12/15/22 1103)  BP: 93/67 (12/15/22 1103)  SpO2: 98 % (12/15/22 1103) Vital Signs (24h Range):  Temp:  [96.1 °F (35.6 °C)-98.2 °F (36.8 °C)] 98.2 °F (36.8 °C)  Pulse:  [71-99] 89  Resp:  [18] 18  SpO2:  [94 %-100 %] 98 %  BP: ()/(58-74) 93/67     Patient Vitals for the past 72 hrs (Last 3 readings):   Weight   12/15/22 0803 74.8 kg (164 lb 14.5 oz)   12/14/22 0900 76.3 kg (168 lb 3.4 oz)   12/13/22 0808 78.1 kg (172 lb 2.9 oz)       Body mass index is 26.62 kg/m².      Intake/Output Summary  (Last 24 hours) at 12/15/2022 1104  Last data filed at 12/15/2022 0930  Gross per 24 hour   Intake 1200 ml   Output 3800 ml   Net -2600 ml         Hemodynamic Parameters:  CVP:  [17 mmHg] 17 mmHg    Telemetry: reviewed  Physical Exam  Vitals and nursing note reviewed.   Constitutional:       Appearance: Normal appearance.   HENT:      Head: Normocephalic.      Nose: Nose normal.      Mouth/Throat:      Mouth: Mucous membranes are moist.   Neck:      Vascular: JVD present.      Comments: JVP elevated.   Cardiovascular:      Rate and Rhythm: Tachycardia present. Rhythm irregular.      Heart sounds: Murmur heard.     Gallop present.   Pulmonary:      Effort: Pulmonary effort is normal.   Abdominal:      General: Abdomen is flat. There is no distension.      Palpations: Abdomen is soft.   Musculoskeletal:         General: Swelling present. Normal range of motion.   Skin:     General: Skin is warm.      Capillary Refill: Capillary refill takes 2 to 3 seconds.   Neurological:      Mental Status: He is alert and oriented to person, place, and time.       Significant Labs:  CBC:  Recent Labs   Lab 12/13/22  0522 12/14/22  0527 12/15/22  0551   WBC 4.45 4.92 5.82   RBC 3.82* 3.87* 3.99*   HGB 10.6* 10.8* 11.1*   HCT 33.7* 35.3* 37.6*    199 187   MCV 88 91 94   MCH 27.7 27.9 27.8   MCHC 31.5* 30.6* 29.5*       BNP:  Recent Labs   Lab 12/09/22  0547 12/12/22  0442 12/14/22  0527   BNP 1,344* 1,518* 1,846*       CMP:  Recent Labs   Lab 12/09/22  0547 12/09/22  1711 12/12/22  0442 12/13/22  0522 12/14/22  0527 12/15/22  0551   *   < > 70 61* 106 60*   CALCIUM 9.4   < > 9.3 9.2 9.3 10.0   ALBUMIN 3.7  --  3.6  --  2.6*  --    PROT 7.0  --  6.6  --  5.0*  --       < > 137 138 137 140   K 4.0   < > 3.7 3.6 3.7 3.6   CO2 36*   < > 30* 33* 31* 32*   CL 91*   < > 94* 93* 96 96   BUN 44*   < > 33* 27* 29* 31*   CREATININE 1.8*   < > 1.4 1.5* 1.9* 1.7*   ALKPHOS 231*  --  210*  --  149*  --    ALT 33  --  25  --  15   --    AST 31  --  22  --  13  --    BILITOT 3.8*  --  3.8*  --  2.2*  --     < > = values in this interval not displayed.        Coagulation:   Recent Labs   Lab 12/10/22  0423 12/10/22  0816 12/14/22  2321 12/15/22  0057 12/15/22  0930   INR 1.2  --   --   --   --    APTT 33.6*  33.6*   < > >150.0* 78.9* 51.0*    < > = values in this interval not displayed.       LDH:  No results for input(s): LDH in the last 72 hours.    Microbiology:  Microbiology Results (last 7 days)       ** No results found for the last 168 hours. **            I have reviewed all pertinent labs within the past 24 hours.    Estimated Creatinine Clearance: 44.8 mL/min (A) (based on SCr of 1.7 mg/dL (H)).    Diagnostic Results:  I have reviewed all pertinent imaging results/findings within the past 24 hours.    Assessment and Plan:     52 year old man with a history of non-ischemic cardiomyopathy with ICD placement, CKD 3, atrial fibrillation presents from  to [facility] for advanced options, on dobutamine 5. Echo 12/6 with EF of 25%. Pathway for LVAD/heart transplant started      * Acute on chronic combined systolic and diastolic heart failure  -NICM  -Transferred from [place] for ADHF and possible workup.  During hospital stay there; he was briefly on Levo and started on  and they were unable to wean  off.  Presented on  5  -Last 2D Echo 12/6/22: LVEF: 25% , LVEDD:  5.33cm  -Hypervolemic on examination today  -CVP 17, SVO2 59, CO 4.4, CI 2.3, SVR 1127  -Current diuretic regimen: IV Lasix gtt at 30 mg/hr   -GDMT with home dose of Digoxin, Entresto 24-26. Was previously on Toprol 100mg QHS but holding in the setting of   -Patient is currently being evaluated for OHTx/VAD  -Heart failure pathway Step 4. Per Pulm recs, will obtain chest CT with contrast to further evaluate pulmonary nodules, but will wait until Cr improves.    -2g Na dietary restriction, 1500 mL fluid restriction, strict I/Os      Pulmonary nodule  -Questionable 8 mm  left lower lobe pulmonary nodule series 6, image 314.  This courses along the left lower lobe pulmonary artery and pulmonary aneurysm not excluded.  Dedicated CT chest with contrast suggested  -Will plan for CT with contrast once Cr improves    Atrial fibrillation  -[id number] score is 2  -On Eliquis at home for anticoagulation  -Stopped on 12/5 in anticipation of workup and possible procedures  -On Lovenox for DVT prophylaxis  -continue amio/dig    ICD (implantable cardioverter-defibrillator), single, in situ  -[employer] ICD    HTN (hypertension)  Continue entresto    CKD (chronic kidney disease), stage III  -Reported baseline 1.6-2.0  -Will monitor trend  -avoid nephrotoxins     Tobacco use  -counseled on cessation    GERD (gastroesophageal reflux disease)  -Protonix 40mg Qdaily        Grecia Gillis PA-C  Heart Transplant  [facility] - [facility]iology Step[facility]

## 2022-12-15 NOTE — ASSESSMENT & PLAN NOTE
-NICM  -Transferred from [place] for ADHF and possible workup.  During hospital stay there; he was briefly on Levo and started on  and they were unable to wean  off.  Presented on  5  -Last 2D Echo 12/6/22: LVEF: 25% , LVEDD:  5.33cm  -Hypervolemic on examination today  -CVP 17, SVO2 59, CO 4.4, CI 2.3, SVR 1127  -Current diuretic regimen: IV Lasix gtt at 30 mg/hr   -GDMT with home dose of Digoxin, Entresto 24-26. Was previously on Toprol 100mg QHS but holding in the setting of   -Patient is currently being evaluated for OHTx/VAD  -Heart failure pathway Step 4. Per Pulm recs, will obtain chest CT with contrast to further evaluate pulmonary nodules, but will wait until Cr improves.    -2g Na dietary restriction, 1500 mL fluid restriction, strict I/Os

## 2022-12-15 NOTE — ASSESSMENT & PLAN NOTE
-Questionable 8 mm left lower lobe pulmonary nodule series 6, image 314.  This courses along the left lower lobe pulmonary artery and pulmonary aneurysm not excluded.  Dedicated CT chest with contrast suggested  -Will plan for CT with contrast once Cr improves

## 2022-12-15 NOTE — ANESTHESIA POSTPROCEDURE EVALUATION
Anesthesia Post Evaluation    Patient: Migel Garcia    Procedure(s) Performed: Procedure(s) (LRB):  COLONOSCOPY (N/A)    Final Anesthesia Type: general      Patient location during evaluation: PACU  Patient participation: Yes- Able to Participate  Level of consciousness: awake and alert  Post-procedure vital signs: reviewed and stable  Pain management: adequate  Airway patency: patent    PONV status at discharge: No PONV  Anesthetic complications: no      Cardiovascular status: stable  Respiratory status: unassisted and spontaneous ventilation  Hydration status: euvolemic  Follow-up not needed.          Vitals Value Taken Time   BP 93/67 12/15/22 1103   Temp 36.8 °C (98.2 °F) 12/15/22 1103   Pulse 96 12/15/22 1224   Resp 18 12/15/22 1103   SpO2 98 % 12/15/22 1103         Event Time   Out of Recovery 14:20:00         Pain/Jagdish Score: No data recorded

## 2022-12-15 NOTE — PROGRESS NOTES
Update    Pt presents as AAO x4, calm, cooperative, and asking and answering questions appropriately, caregivers not present. Pt states he has been doing well and feels like he understands what the medical teams are telling him. LCSW rounded with JEFF Paige to discuss. Pt states he has not smoked while in the hospital but has been having cravings, Pt states he thinks he was given a nicotine product in the past that made him sick. LCSW dicussed the importance of Pt not smoking due to his current medical condition and being on the pathway. LCSW offered a referral to the smoking cessation program for further support when at home, Pt states he will think about it. LCSW offered support and encouragement. SW providing ongoing psychosocial, counseling, & emotional support, education, resources, assistance, and discharge planning as indicated.  SW to continue to follow.

## 2022-12-15 NOTE — PROGRESS NOTES
Left Ventricular Assist Device (LVAD) and Transplant Recipient Adult Psychosocial Assessment    Migel Garcia  [address] [address]  Telephone Information:   Mobile [phone]   Home  [phone] (home)  Work  There is no work phone number on file.  E-mail  No e-mail address on record    Sex: male  YOB: 1970  Age: 52 y.o.    Encounter Date: 12/15/2022  [place] Citizen: yes  Primary Language: English   Needed: no    Emergency Contact:  Name: Julianne Garcia   Relationship: wife  Address: [place]   Phone Numbers:  [phone]    Family/Social Support:   Number of dependents/: Pt has two adult sons.   Marital history:    Other family dynamics: NA    Household Composition:  Name: Julianne   Relationship: wife  Does person drive? yes  Mrs. Whitaker works at [employer] as a , she reports that she is able to take time off from work.       Do you and your caregivers have access to reliable transportation? yes  PRIMARY CAREGIVER: Julianne Garcia will be primary caregiver, phone number [phone].      provided in-depth information to Patient and Caregiver regarding  regarding pre- and post-LVAD and pre- and post-transplant caregiver role.   strongly encourages Patient and Caregiver to have concrete plan regarding post-transplant care giving, including back-up caregiver(s) to ensure care giving needs are met as needed.    Patient and Caregiver states understanding all aspects of caregiver role/commitment and is able/willing/committed to being caregiver to the fullest extent necessary. .      Patient and Caregiver verbalizes understanding of the education provided today and caregiver responsibilities.       remains available. Patient and Caregiver agree to contact  in a timely manner if concerns arise.      Able to take time off work without financial concerns: yes.     Additional Significant Others who will  Assist with LVAD/Transplant:  Name: Bayron Alvarez   City: [place] State: [place]  Relationship: son  Does person drive? yes  Pt's Son, is an EMT and able to take time off from work if needed.         Living Will: no  Healthcare Power of : no  Advance Directives on file: <<no information> per medical record.  Verbally reviewed LW/HCPA information.   provided patient with copy of LW/HCPA documents and provided education on completion of forms    Living Donors: N/A    Highest Education Level: High School (9-12) or GED  Reading Ability: 12th grade  Reports difficulty with: comprehension and memory  Learns Best By:  written and verbal direction.      Status: no  VA Benefits: no     Working for Income: No  If no, reason not working: Disability  Spouse/Significant Other Employment: Mrs. Whitaker is a  at [employer].     Disabled: yes: date disability began: , due to: CHF.    Monthly Income:  Pt and wife state their monthly income is around $3500.   Able to afford all costs now and if transplanted or receives LVAD, including medications: yes  Pt reports secure power source? yes  Pt reports ability to afford monthly electric bill? yes  Pt reports ability to afford LVAD dressing supplies? yes  Patient and Caregiver verbalizes understanding of personal responsibilities related to LVAD and transplant costs and the importance of having a financial plan to ensure that patients LVAD and transplant costs are fully covered.       provided fundraising information/education.  Patient and Caregiver verbalizes understanding.   remains available.    Insurance:   Payer/Plan Subscr  Sex Relation Sub. Ins. ID Effective Group Num   1. [employer] MANAGE* ROSIE ALVAREZWAYNE 1970 Male Self [id number] 1/1/20 [id number]                                   [address] [address]     Primary Insurance (for UNOS reporting): Public Insurance - Medicare FFS (Fee For Service)  Secondary Insurance (for UNOS  reporting): None  Patient and Caregiver verbalizes clear understanding that patient may experience difficulty obtaining and/or be denied insurance coverage post-surgery. This includes and is not limited to disability insurance, life insurance, health insurance, burial insurance, long term care insurance, and other insurances.      Patient and Caregiver also reports understanding that future health concerns   related to or unrelated to LVAD or transplantation may not be covered by patient's insurance.  Resources and information provided and reviewed.      Patient and Caregiver provides verbal permission to release any necessary information to outside resources for patient care and discharge planning.  Resources and information provided are reviewed.      Dialysis History:  Pt denies HX.     Infusion Service: patient utilizing? no, Pt may discharge on home .   Home Health: patient utilizing? no  DME: yes, BP, [employer]   Pulmonary/Cardiac Rehab: Pt denies   ADLS:  Pt reports feeling tired and SOB.     Adherence:   Pt reports a medium level of adherence, stating all of this is now new to him.  Adherence education and counseling provided.     Per History Section:  Past Medical History:   Diagnosis Date    A-fib     GERD (gastroesophageal reflux disease)     Heart failure, unspecified     Hypertension     V-tach      Social History     Tobacco Use    Smoking status: Some Days     Types: Cigarettes    Smokeless tobacco: Not on file   Substance Use Topics    Alcohol use: Not Currently     Social History     Substance and Sexual Activity   Drug Use Not on file     Social History     Substance and Sexual Activity   Sexual Activity Not on file       Per Today's Psychosocial:  Tobacco:  Pt reports stopping tobacco during this hospitalization .Pt states before hospitalization one pack would last him 3-5 days.   Alcohol:  Pt reports having 1-2 drinks per year .  Illicit Drugs/Non-prescribed Medications:  Has medical marijuana  card .    Patient and Caregiver states clear understanding of the potential impact of substance use as it relates to LVAD and transplant candidacy and is aware of possible random substance screening.  Substance abstinence/cessation counseling, education and resources provided and reviewed.     Arrests/DWI/Treatment/Rehab: patient denies    Psychiatric History:    Mental Health:  Pt denies any mental health concerns at this time.  Psychiatrist/Counselor: Pt denies.   Medications:  Pt denies.   Suicide/Homicide Issues: Pt denies any SI/HI/AVH during visit.   Safety at home: Pt reports feeling safe at home.     Knowledge: Patient and Caregiver states having clear understanding and realistic expectations regarding the potential risks and potential benefits LVAD implantation and organ transplantation and organ donation and agrees to discuss with health care team members and support system members, as well as to utilize available resources and express questions and/or concerns in order to further facilitate the pt informed decision-making.  Resources and information provided and reviewed.     Patient and Caregiver is aware of [facility]'s affiliation and/or partnership with agencies in home health care, LTAC, SNF, [facility], and other hospitals and clinics.    Understanding: Patient and Caregiver reports having a clear understanding of the many lifetime commitments involved with being an LVAD and transplant recipient, including costs, compliance, medications, lab work, procedures, appointments, concrete and financial planning, preparedness, timely and appropriate communication of concerns, abstinence (ETOH, tobacco, illicit non-prescribed drugs), adherence to all health care team recommendations, support system and caregiver involvement, appropriate and timely resource utilization and follow-through, mental health counseling as needed/recommended, and patient and caregiver responsibilities.  Social Service Handbook, resources and  detailed educational information provided and reviewed.  Educational information provided.    Patient and Caregiver also reports current and expected compliance with health care regime and states having a clear understanding of the importance of compliance.       Patient and Caregiver reports a clear understanding that risks and benefits may be involved with LVAD heart failure treatment and organ transplantation and with organ donation.     Patient and Caregiver also reports clear understanding that psychosocial risk factors may affect patient, and include but are not limited to feelings of depression, generalized anxiety, anxiety regarding dependence on others, post traumatic stress disorder, feelings of guilt and other emotional and/or mental concerns, and/or exacerbation of existing mental health concerns.  Detailed resources provided and discussed.      Patient and Caregiver agrees to access appropriate resources in a timely manner as needed and/or as recommended, and to communicate concerns appropriately.     Patient and Caregiver also reports a clear understanding of treatment options available.      Feelings or Concerns: Pt states he would like to live as long as possible.     Coping: Identify Patient & Caregiver Strategies to [place]:   1. Currently & Pre-transplant - Family, friends, mark. Watching TV, music, and support from medical staff.     2. At the time of surgery - SAB   3. During post-Transplant & Recovery Period - SAB    Goals: To live as long as possible.  Patient referred to Vocational Rehabilitation.    Interview Behavior: Patient and Caregiver presents as alert and oriented x 4, pleasant, good eye contact, recall good, concentration/judgement good, calm, communicative, cooperative, and asking and answering questions appropriately.           Transplant Social Work - Candidacy  Assessment/Plan:     Psychosocial Suitability: Patient presents as a suitable candidate for LVAD or transplant at this  time. Based on psychosocial risk factors, patient presents as medium risk, due to history of smoking and gradual acceptance of current medical situation .Pt strengths include adequate health insurance, supportive family members, no mental health, legal concerns.    Recommendations/Additional Comments:     Discussed [facility] apts and other local accommodations - discussed need to stay locally for 4-6 weeks post -OHT and discussed rules of No Pets, No children at [facility]    Encouraged Pt & caregiver(s) to consider fundraising for OHT. Education provided on discussing setting up fundraising account with bank to protect gifts/donations.    Discussed [facility] lodging, and booking reservation once LVAD surgery date is set.      Smoking cessation referral offered.     Transplant committee to determine final decision regarding transplant eligibility.    Jah Meek LCSW

## 2022-12-16 LAB
ALBUMIN SERPL BCP-MCNC: 4 G/DL (ref 3.5–5.2)
ALP SERPL-CCNC: 243 U/L (ref 55–135)
ALT SERPL W/O P-5'-P-CCNC: 20 U/L (ref 10–44)
ANION GAP SERPL CALC-SCNC: 10 MMOL/L (ref 8–16)
APTT BLDCRRT: 50.9 SEC (ref 21–32)
AST SERPL-CCNC: 22 U/L (ref 10–40)
BASOPHILS # BLD AUTO: 0.08 K/UL (ref 0–0.2)
BASOPHILS NFR BLD: 1.2 % (ref 0–1.9)
BILIRUB DIRECT SERPL-MCNC: 2 MG/DL (ref 0.1–0.3)
BILIRUB SERPL-MCNC: 3.3 MG/DL (ref 0.1–1)
BNP SERPL-MCNC: 1542 PG/ML (ref 0–99)
BUN SERPL-MCNC: 42 MG/DL (ref 6–20)
CALCIUM SERPL-MCNC: 9.9 MG/DL (ref 8.7–10.5)
CHLORIDE SERPL-SCNC: 94 MMOL/L (ref 95–110)
CO2 SERPL-SCNC: 35 MMOL/L (ref 23–29)
CREAT SERPL-MCNC: 1.8 MG/DL (ref 0.5–1.4)
DIFFERENTIAL METHOD: ABNORMAL
EOSINOPHIL # BLD AUTO: 0.3 K/UL (ref 0–0.5)
EOSINOPHIL NFR BLD: 4.3 % (ref 0–8)
ERYTHROCYTE [DISTWIDTH] IN BLOOD BY AUTOMATED COUNT: 22.3 % (ref 11.5–14.5)
EST. GFR  (NO RACE VARIABLE): 44.2 ML/MIN/1.73 M^2
GLUCOSE SERPL-MCNC: 80 MG/DL (ref 70–110)
HCT VFR BLD AUTO: 39 % (ref 40–54)
HGB BLD-MCNC: 11.9 G/DL (ref 14–18)
IMM GRANULOCYTES # BLD AUTO: 0.07 K/UL (ref 0–0.04)
IMM GRANULOCYTES NFR BLD AUTO: 1 % (ref 0–0.5)
LYMPHOCYTES # BLD AUTO: 1.1 K/UL (ref 1–4.8)
LYMPHOCYTES NFR BLD: 15.6 % (ref 18–48)
MAGNESIUM SERPL-MCNC: 2.2 MG/DL (ref 1.6–2.6)
MCH RBC QN AUTO: 28.2 PG (ref 27–31)
MCHC RBC AUTO-ENTMCNC: 30.5 G/DL (ref 32–36)
MCV RBC AUTO: 92 FL (ref 82–98)
MONOCYTES # BLD AUTO: 0.6 K/UL (ref 0.3–1)
MONOCYTES NFR BLD: 9.2 % (ref 4–15)
NEUTROPHILS # BLD AUTO: 4.8 K/UL (ref 1.8–7.7)
NEUTROPHILS NFR BLD: 68.7 % (ref 38–73)
NRBC BLD-RTO: 0 /100 WBC
PLATELET # BLD AUTO: 177 K/UL (ref 150–450)
PMV BLD AUTO: 10.4 FL (ref 9.2–12.9)
POTASSIUM SERPL-SCNC: 4.1 MMOL/L (ref 3.5–5.1)
PROT C AG ACT/NOR PPP IA: 71 % (ref 72–160)
PROT SERPL-MCNC: 8 G/DL (ref 6–8.4)
RBC # BLD AUTO: 4.22 M/UL (ref 4.6–6.2)
SODIUM SERPL-SCNC: 139 MMOL/L (ref 136–145)
WBC # BLD AUTO: 6.93 K/UL (ref 3.9–12.7)

## 2022-12-16 PROCEDURE — 85730 THROMBOPLASTIN TIME PARTIAL: CPT | Performed by: INTERNAL MEDICINE

## 2022-12-16 PROCEDURE — C1751 CATH, INF, PER/CENT/MIDLINE: HCPCS | Performed by: INTERNAL MEDICINE

## 2022-12-16 PROCEDURE — 93451 PR RIGHT HEART CATH O2 SATURATION & CARDIAC OUTPUT: ICD-10-PCS | Mod: 26,,, | Performed by: INTERNAL MEDICINE

## 2022-12-16 PROCEDURE — 99233 SBSQ HOSP IP/OBS HIGH 50: CPT | Mod: ,,, | Performed by: PHYSICIAN ASSISTANT

## 2022-12-16 PROCEDURE — 93451 RIGHT HEART CATH: CPT | Performed by: INTERNAL MEDICINE

## 2022-12-16 PROCEDURE — 83735 ASSAY OF MAGNESIUM: CPT | Performed by: PHYSICIAN ASSISTANT

## 2022-12-16 PROCEDURE — 63600175 PHARM REV CODE 636 W HCPCS: Performed by: INTERNAL MEDICINE

## 2022-12-16 PROCEDURE — 85025 COMPLETE CBC W/AUTO DIFF WBC: CPT | Performed by: PHYSICIAN ASSISTANT

## 2022-12-16 PROCEDURE — 93451 RIGHT HEART CATH: CPT | Mod: 26,,, | Performed by: INTERNAL MEDICINE

## 2022-12-16 PROCEDURE — 99233 PR SUBSEQUENT HOSPITAL CARE,LEVL III: ICD-10-PCS | Mod: ,,, | Performed by: PHYSICIAN ASSISTANT

## 2022-12-16 PROCEDURE — 20600001 HC STEP DOWN PRIVATE ROOM

## 2022-12-16 PROCEDURE — 25000003 PHARM REV CODE 250: Performed by: INTERNAL MEDICINE

## 2022-12-16 PROCEDURE — C1894 INTRO/SHEATH, NON-LASER: HCPCS | Performed by: INTERNAL MEDICINE

## 2022-12-16 PROCEDURE — 83880 ASSAY OF NATRIURETIC PEPTIDE: CPT | Performed by: PHYSICIAN ASSISTANT

## 2022-12-16 PROCEDURE — 80048 BASIC METABOLIC PNL TOTAL CA: CPT | Performed by: PHYSICIAN ASSISTANT

## 2022-12-16 PROCEDURE — 80076 HEPATIC FUNCTION PANEL: CPT | Performed by: PHYSICIAN ASSISTANT

## 2022-12-16 RX ORDER — LIDOCAINE HYDROCHLORIDE 10 MG/ML
INJECTION INFILTRATION; PERINEURAL
Status: DISCONTINUED | OUTPATIENT
Start: 2022-12-16 | End: 2022-12-21 | Stop reason: HOSPADM

## 2022-12-16 RX ORDER — IBUPROFEN 200 MG
1 TABLET ORAL DAILY
Status: DISCONTINUED | OUTPATIENT
Start: 2022-12-17 | End: 2022-12-19

## 2022-12-16 RX ADMIN — SACUBITRIL AND VALSARTAN 1 TABLET: 24; 26 TABLET, FILM COATED ORAL at 08:12

## 2022-12-16 RX ADMIN — DOBUTAMINE HYDROCHLORIDE 5 MCG/KG/MIN: 400 INJECTION INTRAVENOUS at 08:12

## 2022-12-16 RX ADMIN — AMIODARONE HYDROCHLORIDE 200 MG: 200 TABLET ORAL at 08:12

## 2022-12-16 RX ADMIN — PANTOPRAZOLE SODIUM 40 MG: 40 TABLET, DELAYED RELEASE ORAL at 08:12

## 2022-12-16 RX ADMIN — HEPARIN SODIUM 19 UNITS/KG/HR: 10000 INJECTION, SOLUTION INTRAVENOUS at 12:12

## 2022-12-16 NOTE — INTERVAL H&P NOTE
The patient has been examined and the H&P has been reviewed:    I concur with the findings and no changes have occurred since H&P was written.    Procedure risks, benefits and alternative options discussed and understood by patient/family.          Active Hospital Problems    Diagnosis  POA    *Acute on chronic combined systolic and diastolic heart failure [I50.43]  Yes    Acute decompensated heart failure [I50.9]  Unknown    Atrial fibrillation [I48.91]  Unknown    Pulmonary nodule [R91.1]  Unknown    GERD (gastroesophageal reflux disease) [K21.9]  Yes    Tobacco use [Z72.0]  Yes    Marijuana abuse [F12.10]  Yes    CKD (chronic kidney disease), stage III [N18.30]  Yes    HTN (hypertension) [I10]  Yes    Anemia [D64.9]  Yes    ICD (implantable cardioverter-defibrillator), single, in situ [Z95.810]  Yes    HFrEF (heart failure with reduced ejection fraction) [I50.20]  Yes      Resolved Hospital Problems   No resolved problems to display.

## 2022-12-16 NOTE — ASSESSMENT & PLAN NOTE
-[id number] score is 2  -On Eliquis at home for anticoagulation  -Stopped on 12/5 in anticipation of workup and possible procedures  -On Lovenox for DVT prophylaxis  -continue amio/dig

## 2022-12-16 NOTE — PROGRESS NOTES
"[facility] - Cardiology Stepdown  Adult Nutrition  Progress Note    SUMMARY     Recommendations  1) Continue Cardiac Diet with 1500 mL fluid restriction   2) Weekly Wts   3) RD to follow    Goals: Meet % of EEN/EPN by RD f/u date  Nutrition Goal Status: met  Communication of RD Recs:  (POC)    Assessment and Plan  Nutrition Problem  Increased protein/energy needs     Related to (etiology):   Physiological needs      Signs and Symptoms (as evidenced by):   HF     Interventions(treatment strategy):  Collaboration of nutrition care w/ other providers   Fat and mineral modified diet (cardiac diet)  Fluid restricted diet    Nutrition Diagnosis Status:   Continues     Reason for Assessment  Reason For Assessment: RD follow-up  Diagnosis: cardiac disease  Relevant Medical History: HTN, heart failure  Interdisciplinary Rounds: did not attend  General Information Comments: RD remote for coverage, per chart, eating % meals, LBM 12/15. 33 lbs wt loss since admit, -30 L I/Os (fluid related losses). Had procedure today. Continues on Fluid restriction/cardiac diet. RD to follow.  Nutrition Discharge Planning: Cardiac, low sodium diet.    Nutrition Risk Screen  Nutrition Risk Screen: no indicators present    Nutrition/Diet History  Spiritual, Cultural Beliefs, Baptist Practices, Values that Affect Care: no  Food Allergies: NKFA  Factors Affecting Nutritional Intake: None identified at this time    Anthropometrics  Temp: 97.5 °F (36.4 °C)  Height: 5' 6" (167.6 cm)  Height (inches): 66 in  Weight Method: Bed Scale  Weight: 75.8 kg (167 lb 1.7 oz)  Weight (lb): 167.11 lb  Ideal Body Weight (IBW), Male: 142 lb  % Ideal Body Weight, Male (lb): 140.85 %  BMI (Calculated): 27  BMI Grade: 30 - 34.9- obesity - grade I       Lab/Procedures/Meds  Pertinent Labs Reviewed: reviewed  Pertinent Labs Comments: BUN 42, Cr 1.8, GFR 44, Alk Phos 243  Pertinent Medications Reviewed: reviewed  Pertinent Medications Comments: " pantoprazole    Estimated/Assessed Needs  Weight Used For Calorie Calculations: 90.7 kg (199 lb 15.3 oz)  Energy Calorie Requirements (kcal): 1857  Energy Need Method: [place]-St Jeor (PAL 1.1)  Protein Requirements: 109 g (1.2 g/kg)  Weight Used For Protein Calculations: 90.7 kg (199 lb 15.3 oz)        RDA Method (mL): 1857       Nutrition Prescription Ordered  Current Diet Order: Cardiac, 1500 mL Fluid restriction    Evaluation of Received Nutrient/Fluid Intake    Intake/Output Summary (Last 24 hours) at 12/16/2022 1335  Last data filed at 12/16/2022 1300  Gross per 24 hour   Intake 950 ml   Output 2400 ml   Net -1450 ml     I/O: -30 L since admit  Energy Calories Required: meeting needs  Protein Required: meeting needs  Fluid Required: meeting needs  Total Fluid Intake (mL/kg): 1 ml or fluid per MD  Comments: LBM 12/15  Tolerance: tolerating  % Intake of Estimated Energy Needs: 75 - 100 %  % Meal Intake: 75 - 100 %    Nutrition Risk  Level of Risk/Frequency of Follow-up: low 1x weekly    Monitor and Evaluation  Food and Nutrient Intake: energy intake, food and beverage intake  Food and Nutrient Adminstration: diet order  Knowledge/Beliefs/Attitudes: food and nutrition knowledge/skill, beliefs and attitudes  Physical Activity and Function: nutrition-related ADLs and IADLs, factors affecting access to physical activity  Anthropometric Measurements: height/length, weight, weight change, body mass index, growth pattern indices/percentile ranks  Biochemical Data, Medical Tests and Procedures: electrolyte and renal panel, gastrointestinal profile, glucose/endocrine profile, inflammatory profile, lipid profile  Nutrition-Focused Physical Findings: overall appearance, head and eyes, extremities, muscles and bones, skin     Nutrition Follow-Up  RD Follow-up?: Yes

## 2022-12-16 NOTE — NURSING
Latest Reference Range & Units 12/15/22 16:05   aPTT 21.0 - 32.0 sec 54.7 (H)   (H): Data is abnormally high    Within therapeutic range, no rate change needed. Will redraw daily @ 0500.

## 2022-12-16 NOTE — PLAN OF CARE
Problem: Adult Inpatient Plan of Care  Goal: Patient-Specific Goal (Individualized)  Outcome: Ongoing, Progressing     Problem: Adjustment to Illness (Heart Failure)  Goal: Optimal Coping  Outcome: Ongoing, Progressing     Problem: Cardiac Output Decreased (Heart Failure)  Goal: Optimal Cardiac Output  Outcome: Ongoing, Progressing     Problem: Dysrhythmia (Heart Failure)  Goal: Stable Heart Rate and Rhythm  Outcome: Ongoing, Progressing     Problem: Fluid Imbalance (Heart Failure)  Goal: Fluid Balance  Outcome: Ongoing, Progressing     Problem: Functional Ability Impaired (Heart Failure)  Goal: Optimal Functional Ability  Outcome: Ongoing, Progressing     Problem: Oral Intake Inadequate (Heart Failure)  Goal: Optimal Nutrition Intake  Outcome: Ongoing, Progressing     Problem: Respiratory Compromise (Heart Failure)  Goal: Effective Oxygenation and Ventilation  Outcome: Ongoing, Progressing     Problem: Sleep Disordered Breathing (Heart Failure)  Goal: Effective Breathing Pattern During Sleep  Outcome: Ongoing, Progressing     Problem: Cardiac Output Decreased  Goal: Effective Cardiac Output  Outcome: Ongoing, Progressing

## 2022-12-16 NOTE — SUBJECTIVE & OBJECTIVE
Interval History: NAEON. No complaints. CVP 13 on IV Lasix 30 mg/hr. RHC today.         Intake/Output Summary (Last 24 hours) at 12/16/2022 1101  Last data filed at 12/16/2022 0533  Gross per 24 hour   Intake 710 ml   Output 2200 ml   Net -1490 ml             Continuous Infusions:   DOBUTamine IV infusion (non-titrating) 5 mcg/kg/min (12/16/22 0817)    furosemide (LASIX) 10 mg/mL infusion (non-titrating) 30 mg/hr (12/15/22 2133)    heparin (porcine) in D5W 19 Units/kg/hr (12/15/22 1356)     Scheduled Meds:   amiodarone  200 mg Oral Daily    [START ON 12/17/2022] digoxin  0.125 mg Oral Every other day    pantoprazole  40 mg Oral Daily    sacubitriL-valsartan  1 tablet Oral BID     PRN Meds:acetaminophen, LIDOcaine HCL 10 mg/ml (1%), sodium chloride 0.9%, sodium chloride 0.9%    Review of patient's allergies indicates:  No Known Allergies  Objective:     Vital Signs (Most Recent):  Temp: 98.1 °F (36.7 °C) (12/16/22 0812)  Pulse: 85 (12/16/22 0812)  Resp: 18 (12/16/22 0812)  BP: 97/64 (12/16/22 0812)  SpO2: 97 % (12/16/22 0812) Vital Signs (24h Range):  Temp:  [98 °F (36.7 °C)-98.6 °F (37 °C)] 98.1 °F (36.7 °C)  Pulse:  [] 85  Resp:  [18] 18  SpO2:  [97 %-99 %] 97 %  BP: ()/(54-69) 97/64     Patient Vitals for the past 72 hrs (Last 3 readings):   Weight   12/16/22 0812 75.8 kg (167 lb 1.7 oz)   12/15/22 0803 74.8 kg (164 lb 14.5 oz)   12/14/22 0900 76.3 kg (168 lb 3.4 oz)       Body mass index is 26.97 kg/m².      Intake/Output Summary (Last 24 hours) at 12/16/2022 1101  Last data filed at 12/16/2022 0533  Gross per 24 hour   Intake 710 ml   Output 2200 ml   Net -1490 ml         Hemodynamic Parameters:  CVP:  [13 mmHg] 13 mmHg    Telemetry: reviewed  Physical Exam  Vitals and nursing note reviewed.   Constitutional:       Appearance: Normal appearance.   HENT:      Head: Normocephalic.      Nose: Nose normal.      Mouth/Throat:      Mouth: Mucous membranes are moist.   Neck:      Vascular: JVD present.       Comments: JVP elevated.   Cardiovascular:      Rate and Rhythm: Tachycardia present. Rhythm irregular.      Heart sounds: Murmur heard.     Gallop present.   Pulmonary:      Effort: Pulmonary effort is normal.   Abdominal:      General: Abdomen is flat. There is no distension.      Palpations: Abdomen is soft.   Musculoskeletal:         General: Swelling present. Normal range of motion.   Skin:     General: Skin is warm.      Capillary Refill: Capillary refill takes 2 to 3 seconds.   Neurological:      Mental Status: He is alert and oriented to person, place, and time.       Significant Labs:  CBC:  Recent Labs   Lab 12/14/22  0527 12/15/22  0551 12/16/22  0711   WBC 4.92 5.82 6.93   RBC 3.87* 3.99* 4.22*   HGB 10.8* 11.1* 11.9*   HCT 35.3* 37.6* 39.0*    187 177   MCV 91 94 92   MCH 27.9 27.8 28.2   MCHC 30.6* 29.5* 30.5*       BNP:  Recent Labs   Lab 12/12/22 0442 12/14/22 0527 12/16/22  0711   BNP 1,518* 1,846* 1,542*       CMP:  Recent Labs   Lab 12/12/22  0442 12/13/22  0522 12/14/22  0527 12/15/22  0551 12/16/22  0711   GLU 70   < > 106 60* 80   CALCIUM 9.3   < > 9.3 10.0 9.9   ALBUMIN 3.6  --  2.6*  --  4.0   PROT 6.6  --  5.0*  --  8.0      < > 137 140 139   K 3.7   < > 3.7 3.6 4.1   CO2 30*   < > 31* 32* 35*   CL 94*   < > 96 96 94*   BUN 33*   < > 29* 31* 42*   CREATININE 1.4   < > 1.9* 1.7* 1.8*   ALKPHOS 210*  --  149*  --  243*   ALT 25  --  15  --  20   AST 22  --  13  --  22   BILITOT 3.8*  --  2.2*  --  3.3*    < > = values in this interval not displayed.        Coagulation:   Recent Labs   Lab 12/10/22  0423 12/10/22  0816 12/15/22  0930 12/15/22  1605 12/16/22  0711   INR 1.2  --   --   --   --    APTT 33.6*  33.6*   < > 51.0* 54.7* 50.9*    < > = values in this interval not displayed.       LDH:  No results for input(s): LDH in the last 72 hours.    Microbiology:  Microbiology Results (last 7 days)       ** No results found for the last 168 hours. **            I have reviewed all  pertinent labs within the past 24 hours.    Estimated Creatinine Clearance: 42.3 mL/min (A) (based on SCr of 1.8 mg/dL (H)).    Diagnostic Results:  I have reviewed all pertinent imaging results/findings within the past 24 hours.

## 2022-12-16 NOTE — ASSESSMENT & PLAN NOTE
-NICM  -Transferred from [place] for ADHF and possible workup.  During hospital stay there; he was briefly on Levo and started on  and they were unable to wean  off.  Presented on  5  -Last 2D Echo 12/6/22: LVEF: 25% , LVEDD:  5.33 cm  -Hypervolemic on examination today  -CVP 13 On IV Lasix gtt at 30 mg/hr  -RHC today   -GDMT with home dose of Digoxin, Entresto 24-26. Was previously on Toprol 100mg QHS but holding in the setting of   -Patient is currently being evaluated for OHTx/VAD  -Heart failure pathway Step 4. Per Pulm recs, will obtain chest CT with contrast to further evaluate pulmonary nodules, but will wait until Cr improves.    -2g Na dietary restriction, 1500 mL fluid restriction, strict I/Os

## 2022-12-16 NOTE — PROGRESS NOTES
[facility] - [facility]  Heart Transplant  Progress Note    Patient Name: Migel Garica  MRN: [id number]  Admission Date: 12/5/2022  Hospital Length of Stay: 11 days  Attending Physician: Gin Mendes MD  Primary Care Provider: Anuj Banks MD  Principal Problem:Acute on chronic combined systolic and diastolic heart failure    Subjective:     Interval History: NAEON. No complaints. CVP 13 on IV Lasix 30 mg/hr. RHC today.         Intake/Output Summary (Last 24 hours) at 12/16/2022 1101  Last data filed at 12/16/2022 0533  Gross per 24 hour   Intake 710 ml   Output 2200 ml   Net -1490 ml             Continuous Infusions:   DOBUTamine IV infusion (non-titrating) 5 mcg/kg/min (12/16/22 0817)    furosemide (LASIX) 10 mg/mL infusion (non-titrating) 30 mg/hr (12/15/22 2133)    heparin (porcine) in D5W 19 Units/kg/hr (12/15/22 1356)     Scheduled Meds:   amiodarone  200 mg Oral Daily    [START ON 12/17/2022] digoxin  0.125 mg Oral Every other day    pantoprazole  40 mg Oral Daily    sacubitriL-valsartan  1 tablet Oral BID     PRN Meds:acetaminophen, LIDOcaine HCL 10 mg/ml (1%), sodium chloride 0.9%, sodium chloride 0.9%    Review of patient's allergies indicates:  No Known Allergies  Objective:     Vital Signs (Most Recent):  Temp: 98.1 °F (36.7 °C) (12/16/22 0812)  Pulse: 85 (12/16/22 0812)  Resp: 18 (12/16/22 0812)  BP: 97/64 (12/16/22 0812)  SpO2: 97 % (12/16/22 0812) Vital Signs (24h Range):  Temp:  [98 °F (36.7 °C)-98.6 °F (37 °C)] 98.1 °F (36.7 °C)  Pulse:  [] 85  Resp:  [18] 18  SpO2:  [97 %-99 %] 97 %  BP: ()/(54-69) 97/64     Patient Vitals for the past 72 hrs (Last 3 readings):   Weight   12/16/22 0812 75.8 kg (167 lb 1.7 oz)   12/15/22 0803 74.8 kg (164 lb 14.5 oz)   12/14/22 0900 76.3 kg (168 lb 3.4 oz)       Body mass index is 26.97 kg/m².      Intake/Output Summary (Last 24 hours) at 12/16/2022 1101  Last data filed at 12/16/2022 0533  Gross per 24 hour   Intake 710 ml   Output  2200 ml   Net -1490 ml         Hemodynamic Parameters:  CVP:  [13 mmHg] 13 mmHg    Telemetry: reviewed  Physical Exam  Vitals and nursing note reviewed.   Constitutional:       Appearance: Normal appearance.   HENT:      Head: Normocephalic.      Nose: Nose normal.      Mouth/Throat:      Mouth: Mucous membranes are moist.   Neck:      Vascular: JVD present.      Comments: JVP elevated.   Cardiovascular:      Rate and Rhythm: Tachycardia present. Rhythm irregular.      Heart sounds: Murmur heard.     Gallop present.   Pulmonary:      Effort: Pulmonary effort is normal.   Abdominal:      General: Abdomen is flat. There is no distension.      Palpations: Abdomen is soft.   Musculoskeletal:         General: Swelling present. Normal range of motion.   Skin:     General: Skin is warm.      Capillary Refill: Capillary refill takes 2 to 3 seconds.   Neurological:      Mental Status: He is alert and oriented to person, place, and time.       Significant Labs:  CBC:  Recent Labs   Lab 12/14/22  0527 12/15/22  0551 12/16/22  0711   WBC 4.92 5.82 6.93   RBC 3.87* 3.99* 4.22*   HGB 10.8* 11.1* 11.9*   HCT 35.3* 37.6* 39.0*    187 177   MCV 91 94 92   MCH 27.9 27.8 28.2   MCHC 30.6* 29.5* 30.5*       BNP:  Recent Labs   Lab 12/12/22 0442 12/14/22  0527 12/16/22  0711   BNP 1,518* 1,846* 1,542*       CMP:  Recent Labs   Lab 12/12/22 0442 12/13/22 0522 12/14/22  0527 12/15/22  0551 12/16/22  0711   GLU 70   < > 106 60* 80   CALCIUM 9.3   < > 9.3 10.0 9.9   ALBUMIN 3.6  --  2.6*  --  4.0   PROT 6.6  --  5.0*  --  8.0      < > 137 140 139   K 3.7   < > 3.7 3.6 4.1   CO2 30*   < > 31* 32* 35*   CL 94*   < > 96 96 94*   BUN 33*   < > 29* 31* 42*   CREATININE 1.4   < > 1.9* 1.7* 1.8*   ALKPHOS 210*  --  149*  --  243*   ALT 25  --  15  --  20   AST 22  --  13  --  22   BILITOT 3.8*  --  2.2*  --  3.3*    < > = values in this interval not displayed.        Coagulation:   Recent Labs   Lab 12/10/22  0423 12/10/22  0816  12/15/22  0930 12/15/22  1605 12/16/22  0711   INR 1.2  --   --   --   --    APTT 33.6*  33.6*   < > 51.0* 54.7* 50.9*    < > = values in this interval not displayed.       LDH:  No results for input(s): LDH in the last 72 hours.    Microbiology:  Microbiology Results (last 7 days)       ** No results found for the last 168 hours. **            I have reviewed all pertinent labs within the past 24 hours.    Estimated Creatinine Clearance: 42.3 mL/min (A) (based on SCr of 1.8 mg/dL (H)).    Diagnostic Results:  I have reviewed all pertinent imaging results/findings within the past 24 hours.    Assessment and Plan:     52 year old man with a history of non-ischemic cardiomyopathy with ICD placement, CKD 3, [place] [facility] [place] do[place] 5. Echo 12/6 with EF of 25%. Pathway for LVAD/heart transplant started      * Acute on chronic combined systolic and diastolic heart failure  -NICM  -Transferred from [place] for ADHF and possible workup.  During hospital stay there; he was briefly on Levo and started on  and they were unable to wean  off.  Presented on  5  -Last 2D Echo 12/6/22: LVEF: 25% , LVEDD:  5.33 cm  -Hypervolemic on examination today  -CVP 13 On IV Lasix gtt at 30 mg/hr  -RHC today   -GDMT with home dose of Digoxin, Entresto 24-26. Was previously on Toprol 100mg QHS but holding in the setting of   -Patient is currently being evaluated for OHTx/VAD  -Heart failure pathway Step 4. Per Pulm recs, will obtain chest CT with contrast to further evaluate pulmonary nodules, but will wait until Cr improves.    -2g Na dietary restriction, 1500 mL fluid restriction, strict I/Os      Pulmonary nodule  -Questionable 8 mm left lower lobe pulmonary nodule series 6, image 314.  This courses along the left lower lobe pulmonary artery and pulmonary aneurysm not excluded.  Dedicated CT chest with contrast suggested  -Will plan for CT with contrast once Cr  improves    Atrial fibrillation  -[id number] score is 2  -On Eliquis at home for anticoagulation  -Stopped on 12/5 in anticipation of workup and possible procedures  -On Lovenox for DVT prophylaxis  -continue amio/dig    ICD (implantable cardioverter-defibrillator), single, in situ  -Medtronic ICD    HTN (hypertension)  Continue entresto    CKD (chronic kidney disease), stage III  -Reported baseline 1.6-2.0  -Will monitor trend  -avoid nephrotoxins     Tobacco use  -counseled on cessation    GERD (gastroesophageal reflux disease)  -Protonix 40mg Qdaily      Grecia Gillis PA-C  Heart Transplant  [facility] - [facility]

## 2022-12-16 NOTE — PROGRESS NOTES
Discharge Planning    JEFF Paige dropped  orders for . LCSW sent to [employer] ([phone]). SW providing ongoing psychosocial, counseling, & emotional support, education, resources, assistance, and discharge planning as indicated.  SW to continue to follow.

## 2022-12-16 NOTE — PLAN OF CARE
[facility]   Heart Transplant Clinic  [address] [place] [address]   [phone] after hours        HOME  HEALTH ORDERS      Admit to Home Health    Diagnosis:   Patient Active Problem List   Diagnosis    HFrEF (heart failure with reduced ejection fraction)    Acute on chronic combined systolic and diastolic heart failure    GERD (gastroesophageal reflux disease)    Tobacco use    Marijuana abuse    CKD (chronic kidney disease), stage III    HTN (hypertension)    Anemia    ICD (implantable cardioverter-defibrillator), single, in situ    Atrial fibrillation    Pulmonary nodule    Acute decompensated heart failure       Patient is homebound due to:   Diet: Low Sodium  Acitivities: As Tolerated    Nursing:   SN to complete comprehensive assessment including routine vital signs. Instruct on disease process and s/s of complications to report to MD. Review/verify medication list sent home with the patient at time of discharge  and instruct patient/caregiver as needed. Frequency may be adjusted depending on start of care date.    Notify MD if SBP > 160 or < 90; DBP > 90 or < 50; HR > 120 or < 50; Temp > 101; Weight gain >3lbs in 1 day or 5lbs in 1 week.    LABS:  SN to perform labs:       LABS:  SN to perform labs: PT/INR per Coumadin clinic [phone].   Follow up INR date:   No Finger Sticks    CBC, CMP, Mag, BNP weekly, starting 12/26/22       HOME INFUSION THERAPY:   SN to perform Infusion Therapy/Central Line Care.  Review Central Line Care & Central Line Flush with patient.    Administer (drug and dose):     Continuous IV Dobutamine 5 mcg/kg/min, dosing weight 90.4 kg         Central line care:  Scrub the Hub: Prior to accessing the line, always perform a 30 second alcohol scrub  Each lumen of the central line is to be flushed at least daily with 10 mL Normal Saline and 3 mL Heparin flush (100 units/mL)  Skilled Nurse (SN) may draw blood from IV access  Blood  Draw Procedure:   - Aspirate at least 5 mL of blood   - Discard   - Obtain specimen   - Change posiflow cap   - Flush with 20 mL Normal Saline followed by a                 3-5 mL Heparin flush (100 units/mL)  Central :   - Sterile dressing changes are done weekly and as needed.   - Use chlor-hexadine scrub to cleanse site, apply Biopatch to insertion site,       apply securement device dressing   - Posi-flow caps are changed weekly and after EVERY lab draw.   - If sterile gauze is under dressing to control oozing,                 dressing change must be performed every 24 hours until gauze is not needed.      Send initial Home Health orders to [facility] attending physician on call.  Send follow up questions to [phone] or fax:                      Pre Transplant:   [phone]

## 2022-12-16 NOTE — PLAN OF CARE
Recommendations  1) Continue Cardiac Diet with 1500 mL fluid restriction   2) Weekly Wts   3) RD to follow    Goals: Meet % of EEN/EPN by RD f/u date  Nutrition Goal Status: met  Communication of RD Recs:  (POC)    Assessment and Plan  Nutrition Problem  Increased protein/energy needs     Related to (etiology):   Physiological needs      Signs and Symptoms (as evidenced by):   HF     Interventions(treatment strategy):  Collaboration of nutrition care w/ other providers   Fat and mineral modified diet (cardiac diet)  Fluid restricted diet    Nutrition Diagnosis Status:   Continues

## 2022-12-17 LAB
ANION GAP SERPL CALC-SCNC: 13 MMOL/L (ref 8–16)
APTT BLDCRRT: 33 SEC (ref 21–32)
APTT BLDCRRT: 76.3 SEC (ref 21–32)
APTT BLDCRRT: >150 SEC (ref 21–32)
BASOPHILS # BLD AUTO: 0.09 K/UL (ref 0–0.2)
BASOPHILS NFR BLD: 1.5 % (ref 0–1.9)
BUN SERPL-MCNC: 44 MG/DL (ref 6–20)
CALCIUM SERPL-MCNC: 9.2 MG/DL (ref 8.7–10.5)
CHLORIDE SERPL-SCNC: 90 MMOL/L (ref 95–110)
CO2 SERPL-SCNC: 32 MMOL/L (ref 23–29)
CREAT SERPL-MCNC: 1.9 MG/DL (ref 0.5–1.4)
DIFFERENTIAL METHOD: ABNORMAL
EOSINOPHIL # BLD AUTO: 0.3 K/UL (ref 0–0.5)
EOSINOPHIL NFR BLD: 4.9 % (ref 0–8)
ERYTHROCYTE [DISTWIDTH] IN BLOOD BY AUTOMATED COUNT: 21.5 % (ref 11.5–14.5)
EST. GFR  (NO RACE VARIABLE): 41.4 ML/MIN/1.73 M^2
GLUCOSE SERPL-MCNC: 258 MG/DL (ref 70–110)
HCT VFR BLD AUTO: 36.1 % (ref 40–54)
HGB BLD-MCNC: 10.7 G/DL (ref 14–18)
IMM GRANULOCYTES # BLD AUTO: 0.06 K/UL (ref 0–0.04)
IMM GRANULOCYTES NFR BLD AUTO: 1 % (ref 0–0.5)
LYMPHOCYTES # BLD AUTO: 1.1 K/UL (ref 1–4.8)
LYMPHOCYTES NFR BLD: 19 % (ref 18–48)
MAGNESIUM SERPL-MCNC: 2.1 MG/DL (ref 1.6–2.6)
MCH RBC QN AUTO: 27.4 PG (ref 27–31)
MCHC RBC AUTO-ENTMCNC: 29.6 G/DL (ref 32–36)
MCV RBC AUTO: 92 FL (ref 82–98)
MONOCYTES # BLD AUTO: 0.6 K/UL (ref 0.3–1)
MONOCYTES NFR BLD: 9.5 % (ref 4–15)
NEUTROPHILS # BLD AUTO: 3.8 K/UL (ref 1.8–7.7)
NEUTROPHILS NFR BLD: 64.1 % (ref 38–73)
NRBC BLD-RTO: 0 /100 WBC
PLATELET # BLD AUTO: 141 K/UL (ref 150–450)
PMV BLD AUTO: 10.4 FL (ref 9.2–12.9)
POTASSIUM SERPL-SCNC: 3.8 MMOL/L (ref 3.5–5.1)
RBC # BLD AUTO: 3.91 M/UL (ref 4.6–6.2)
SODIUM SERPL-SCNC: 135 MMOL/L (ref 136–145)
WBC # BLD AUTO: 5.9 K/UL (ref 3.9–12.7)

## 2022-12-17 PROCEDURE — 25000003 PHARM REV CODE 250: Performed by: INTERNAL MEDICINE

## 2022-12-17 PROCEDURE — 83735 ASSAY OF MAGNESIUM: CPT | Performed by: PHYSICIAN ASSISTANT

## 2022-12-17 PROCEDURE — 99233 SBSQ HOSP IP/OBS HIGH 50: CPT | Mod: ,,, | Performed by: INTERNAL MEDICINE

## 2022-12-17 PROCEDURE — 85730 THROMBOPLASTIN TIME PARTIAL: CPT | Mod: 91 | Performed by: INTERNAL MEDICINE

## 2022-12-17 PROCEDURE — 63600175 PHARM REV CODE 636 W HCPCS: Performed by: INTERNAL MEDICINE

## 2022-12-17 PROCEDURE — 85730 THROMBOPLASTIN TIME PARTIAL: CPT | Performed by: INTERNAL MEDICINE

## 2022-12-17 PROCEDURE — 80048 BASIC METABOLIC PNL TOTAL CA: CPT | Performed by: PHYSICIAN ASSISTANT

## 2022-12-17 PROCEDURE — 99233 PR SUBSEQUENT HOSPITAL CARE,LEVL III: ICD-10-PCS | Mod: ,,, | Performed by: INTERNAL MEDICINE

## 2022-12-17 PROCEDURE — 20600001 HC STEP DOWN PRIVATE ROOM

## 2022-12-17 PROCEDURE — 85025 COMPLETE CBC W/AUTO DIFF WBC: CPT | Performed by: PHYSICIAN ASSISTANT

## 2022-12-17 PROCEDURE — 63600175 PHARM REV CODE 636 W HCPCS: Performed by: PHYSICIAN ASSISTANT

## 2022-12-17 RX ORDER — FUROSEMIDE 10 MG/ML
80 INJECTION INTRAMUSCULAR; INTRAVENOUS ONCE
Status: COMPLETED | OUTPATIENT
Start: 2022-12-17 | End: 2022-12-17

## 2022-12-17 RX ADMIN — HEPARIN SODIUM 30 UNITS/KG/HR: 10000 INJECTION, SOLUTION INTRAVENOUS at 10:12

## 2022-12-17 RX ADMIN — FUROSEMIDE 80 MG: 10 INJECTION, SOLUTION INTRAMUSCULAR; INTRAVENOUS at 10:12

## 2022-12-17 RX ADMIN — PANTOPRAZOLE SODIUM 40 MG: 40 TABLET, DELAYED RELEASE ORAL at 10:12

## 2022-12-17 RX ADMIN — FUROSEMIDE 40 MG/HR: 10 INJECTION, SOLUTION INTRAMUSCULAR; INTRAVENOUS at 08:12

## 2022-12-17 RX ADMIN — AMIODARONE HYDROCHLORIDE 200 MG: 200 TABLET ORAL at 10:12

## 2022-12-17 RX ADMIN — FUROSEMIDE 30 MG/HR: 10 INJECTION, SOLUTION INTRAMUSCULAR; INTRAVENOUS at 06:12

## 2022-12-17 RX ADMIN — DOBUTAMINE HYDROCHLORIDE 5 MCG/KG/MIN: 400 INJECTION INTRAVENOUS at 06:12

## 2022-12-17 RX ADMIN — HEPARIN SODIUM 19 UNITS/KG/HR: 10000 INJECTION, SOLUTION INTRAVENOUS at 10:12

## 2022-12-17 RX ADMIN — DIGOXIN 0.12 MG: 125 TABLET ORAL at 10:12

## 2022-12-17 NOTE — SUBJECTIVE & OBJECTIVE
Interval History:   NAEON. Feels tired.. CVP 15 on IV Lasix 30 mg/hr. Positive +2.8 and Cr went up to 1.9 from 1.7 two days ago. Will give IVP 80 and up gtt to 40      Intake/Output Summary (Last 24 hours) at 12/17/2022 0938  Last data filed at 12/17/2022 0523  Gross per 24 hour   Intake 4255.34 ml   Output 1700 ml   Net 2555.34 ml             Continuous Infusions:   DOBUTamine IV infusion (non-titrating) 5 mcg/kg/min (12/17/22 0653)    furosemide (LASIX) 10 mg/mL infusion (non-titrating) 30 mg/hr (12/17/22 0650)    heparin (porcine) in D5W 19 Units/kg/hr (12/16/22 1224)     Scheduled Meds:   amiodarone  200 mg Oral Daily    digoxin  0.125 mg Oral Every other day    furosemide (LASIX) injection  80 mg Intravenous Once    nicotine  1 patch Transdermal Daily    pantoprazole  40 mg Oral Daily     PRN Meds:acetaminophen, LIDOcaine HCL 10 mg/ml (1%), sodium chloride 0.9%, sodium chloride 0.9%    Review of patient's allergies indicates:  No Known Allergies  Objective:     Vital Signs (Most Recent):  Temp: 98.2 °F (36.8 °C) (12/17/22 0910)  Pulse: 99 (12/17/22 0910)  Resp: 18 (12/17/22 0910)  BP: 99/66 (12/17/22 0910)  SpO2: 96 % (12/17/22 0910) Vital Signs (24h Range):  Temp:  [97.4 °F (36.3 °C)-98.2 °F (36.8 °C)] 98.2 °F (36.8 °C)  Pulse:  [60-99] 99  Resp:  [17-20] 18  SpO2:  [96 %-100 %] 96 %  BP: ()/(56-74) 99/66     Patient Vitals for the past 72 hrs (Last 3 readings):   Weight   12/16/22 0812 75.8 kg (167 lb 1.7 oz)   12/15/22 0803 74.8 kg (164 lb 14.5 oz)       Body mass index is 26.97 kg/m².      Intake/Output Summary (Last 24 hours) at 12/17/2022 0938  Last data filed at 12/17/2022 0523  Gross per 24 hour   Intake 4255.34 ml   Output 1700 ml   Net 2555.34 ml         Hemodynamic Parameters:  CVP:  [13 mmHg-15 mmHg] 15 mmHg    Telemetry: reviewed  Physical Exam  Vitals and nursing note reviewed.   Constitutional:       Appearance: Normal appearance.   HENT:      Head: Normocephalic.      Nose: Nose normal.       Mouth/Throat:      Mouth: Mucous membranes are moist.   Neck:      Vascular: JVD present.      Comments: JVP elevated.   Cardiovascular:      Rate and Rhythm: Normal rate.      Heart sounds: Murmur heard.     Gallop present.   Pulmonary:      Effort: Pulmonary effort is normal.   Abdominal:      General: Abdomen is flat. There is no distension.      Palpations: Abdomen is soft.   Musculoskeletal:         General: Swelling present. Normal range of motion.   Skin:     General: Skin is warm.      Capillary Refill: Capillary refill takes 2 to 3 seconds.   Neurological:      Mental Status: He is alert and oriented to person, place, and time.       Significant Labs:  CBC:  Recent Labs   Lab 12/15/22  0551 12/16/22  0711 12/17/22  0521   WBC 5.82 6.93 5.90   RBC 3.99* 4.22* 3.91*   HGB 11.1* 11.9* 10.7*   HCT 37.6* 39.0* 36.1*    177 141*   MCV 94 92 92   MCH 27.8 28.2 27.4   MCHC 29.5* 30.5* 29.6*       BNP:  Recent Labs   Lab 12/12/22 0442 12/14/22  0527 12/16/22  0711   BNP 1,518* 1,846* 1,542*       CMP:  Recent Labs   Lab 12/12/22 0442 12/13/22 0522 12/14/22  0527 12/15/22  0551 12/16/22  0711 12/17/22  0521   GLU 70   < > 106 60* 80 258*   CALCIUM 9.3   < > 9.3 10.0 9.9 9.2   ALBUMIN 3.6  --  2.6*  --  4.0  --    PROT 6.6  --  5.0*  --  8.0  --       < > 137 140 139 135*   K 3.7   < > 3.7 3.6 4.1 3.8   CO2 30*   < > 31* 32* 35* 32*   CL 94*   < > 96 96 94* 90*   BUN 33*   < > 29* 31* 42* 44*   CREATININE 1.4   < > 1.9* 1.7* 1.8* 1.9*   ALKPHOS 210*  --  149*  --  243*  --    ALT 25  --  15  --  20  --    AST 22  --  13  --  22  --    BILITOT 3.8*  --  2.2*  --  3.3*  --     < > = values in this interval not displayed.        Coagulation:   Recent Labs   Lab 12/16/22  0711 12/17/22  0521 12/17/22  0835   APTT 50.9* >150.0* 33.0*       LDH:  No results for input(s): LDH in the last 72 hours.    Microbiology:  Microbiology Results (last 7 days)       ** No results found for the last 168 hours. **             I have reviewed all pertinent labs within the past 24 hours.    Estimated Creatinine Clearance: 40.1 mL/min (A) (based on SCr of 1.9 mg/dL (H)).    Diagnostic Results:  I have reviewed all pertinent imaging results/findings within the past 24 hours.

## 2022-12-17 NOTE — PROGRESS NOTES
[facility] - [facility]  Heart Transplant  Progress Note    Patient Name: Migel Garcia  MRN: [id number]  Admission Date: 12/5/2022  Hospital Length of Stay: 12 days  Attending Physician: Gin Mendes MD  Primary Care Provider: Anuj Banks MD  Principal Problem:Acute on chronic combined systolic and diastolic heart failure    Subjective:     Interval History:   NAEON. Feels tired.. CVP 15 on IV Lasix 30 mg/hr. Positive +2.8 and Cr went up to 1.9 from 1.7 two days ago. Will give IVP 80 and up gtt to 40      Intake/Output Summary (Last 24 hours) at 12/17/2022 0938  Last data filed at 12/17/2022 0523  Gross per 24 hour   Intake 4255.34 ml   Output 1700 ml   Net 2555.34 ml             Continuous Infusions:   DOBUTamine IV infusion (non-titrating) 5 mcg/kg/min (12/17/22 0653)    furosemide (LASIX) 10 mg/mL infusion (non-titrating) 30 mg/hr (12/17/22 0650)    heparin (porcine) in D5W 19 Units/kg/hr (12/16/22 1224)     Scheduled Meds:   amiodarone  200 mg Oral Daily    digoxin  0.125 mg Oral Every other day    furosemide (LASIX) injection  80 mg Intravenous Once    nicotine  1 patch Transdermal Daily    pantoprazole  40 mg Oral Daily     PRN Meds:acetaminophen, LIDOcaine HCL 10 mg/ml (1%), sodium chloride 0.9%, sodium chloride 0.9%    Review of patient's allergies indicates:  No Known Allergies  Objective:     Vital Signs (Most Recent):  Temp: 98.2 °F (36.8 °C) (12/17/22 0910)  Pulse: 99 (12/17/22 0910)  Resp: 18 (12/17/22 0910)  BP: 99/66 (12/17/22 0910)  SpO2: 96 % (12/17/22 0910) Vital Signs (24h Range):  Temp:  [97.4 °F (36.3 °C)-98.2 °F (36.8 °C)] 98.2 °F (36.8 °C)  Pulse:  [60-99] 99  Resp:  [17-20] 18  SpO2:  [96 %-100 %] 96 %  BP: ()/(56-74) 99/66     Patient Vitals for the past 72 hrs (Last 3 readings):   Weight   12/16/22 0812 75.8 kg (167 lb 1.7 oz)   12/15/22 0803 74.8 kg (164 lb 14.5 oz)       Body mass index is 26.97 kg/m².      Intake/Output Summary (Last 24 hours) at 12/17/2022  0938  Last data filed at 12/17/2022 0523  Gross per 24 hour   Intake 4255.34 ml   Output 1700 ml   Net 2555.34 ml         Hemodynamic Parameters:  CVP:  [13 mmHg-15 mmHg] 15 mmHg    Telemetry: reviewed  Physical Exam  Vitals and nursing note reviewed.   Constitutional:       Appearance: Normal appearance.   HENT:      Head: Normocephalic.      Nose: Nose normal.      Mouth/Throat:      Mouth: Mucous membranes are moist.   Neck:      Vascular: JVD present.      Comments: JVP elevated.   Cardiovascular:      Rate and Rhythm: Normal rate.      Heart sounds: Murmur heard.     Gallop present.   Pulmonary:      Effort: Pulmonary effort is normal.   Abdominal:      General: Abdomen is flat. There is no distension.      Palpations: Abdomen is soft.   Musculoskeletal:         General: Swelling present. Normal range of motion.   Skin:     General: Skin is warm.      Capillary Refill: Capillary refill takes 2 to 3 seconds.   Neurological:      Mental Status: He is alert and oriented to person, place, and time.       Significant Labs:  CBC:  Recent Labs   Lab 12/15/22  0551 12/16/22  0711 12/17/22  0521   WBC 5.82 6.93 5.90   RBC 3.99* 4.22* 3.91*   HGB 11.1* 11.9* 10.7*   HCT 37.6* 39.0* 36.1*    177 141*   MCV 94 92 92   MCH 27.8 28.2 27.4   MCHC 29.5* 30.5* 29.6*       BNP:  Recent Labs   Lab 12/12/22 0442 12/14/22 0527 12/16/22  0711   BNP 1,518* 1,846* 1,542*       CMP:  Recent Labs   Lab 12/12/22 0442 12/13/22 0522 12/14/22  0527 12/15/22  0551 12/16/22  0711 12/17/22  0521   GLU 70   < > 106 60* 80 258*   CALCIUM 9.3   < > 9.3 10.0 9.9 9.2   ALBUMIN 3.6  --  2.6*  --  4.0  --    PROT 6.6  --  5.0*  --  8.0  --       < > 137 140 139 135*   K 3.7   < > 3.7 3.6 4.1 3.8   CO2 30*   < > 31* 32* 35* 32*   CL 94*   < > 96 96 94* 90*   BUN 33*   < > 29* 31* 42* 44*   CREATININE 1.4   < > 1.9* 1.7* 1.8* 1.9*   ALKPHOS 210*  --  149*  --  243*  --    ALT 25  --  15  --  20  --    AST 22  --  13  --  22  --     BILITOT 3.8*  --  2.2*  --  3.3*  --     < > = values in this interval not displayed.        Coagulation:   Recent Labs   Lab 12/16/22  0711 12/17/22  0521 12/17/22  0835   APTT 50.9* >150.0* 33.0*       LDH:  No results for input(s): LDH in the last 72 hours.    Microbiology:  Microbiology Results (last 7 days)       ** No results found for the last 168 hours. **            I have reviewed all pertinent labs within the past 24 hours.    Estimated Creatinine Clearance: 40.1 mL/min (A) (based on SCr of 1.9 mg/dL (H)).    Diagnostic Results:  I have reviewed all pertinent imaging results/findings within the past 24 hours.    Assessment and Plan:     52 year old man with a history of non-ischemic cardiomyopathy with ICD placement, CKD 3, atrial fibrillation [place]  [place] [facility] [place], on dobutamine 5. Echo 12/6 with EF of 25%. Pathway for LVAD/heart transplant started      * Acute on chronic combined systolic and diastolic heart failure  -NICM  -Transferred from [place] for ADHF and possible workup.  During hospital stay there; he was briefly on Levo and started on  and they were unable to wean  off.  Presented on  5  -Last 2D Echo 12/6/22: LVEF: 25% , LVEDD:  5.33 cm  -Hypervolemic on examination today  -CVP 13 On IV Lasix gtt at 30 mg/hr  -RHC today   -GDMT with home dose of Digoxin, Entresto 24-26. Was previously on Toprol 100mg QHS but holding in the setting of   -Patient is currently being evaluated for OHTx/VAD  -Heart failure pathway Step 4. Per Pulm recs, will obtain chest CT with contrast to further evaluate pulmonary nodules, but will wait until Cr improves.    -2g Na dietary restriction, 1500 mL fluid restriction, strict I/Os      Pulmonary nodule  -Questionable 8 mm left lower lobe pulmonary nodule series 6, image 314.  This courses along the left lower lobe pulmonary artery and pulmonary aneurysm not excluded.  Dedicated CT chest with contrast suggested  -Will plan for  CT with contrast once Cr improves    Atrial fibrillation  -[id number] score is 2  -On Eliquis at home for anticoagulation  -Stopped on 12/5 in anticipation of workup and possible procedures  -On Lovenox for DVT prophylaxis  -continue amio/dig    ICD (implantable cardioverter-defibrillator), single, in situ  -[employer]tronic ICD    HTN (hypertension)  Continue entresto    CKD (chronic kidney disease), stage III  -Reported baseline 1.6-2.0  -Will monitor trend  -avoid nephrotoxins     Tobacco use  -counseled on cessation    GERD (gastroesophageal reflux disease)  -Protonix 40mg Qdaily        Spears Cristino Araiza MD  Heart Transplant  [facility] - Cardiology Stepdown

## 2022-12-18 LAB
ALLENS TEST: ABNORMAL
ANION GAP SERPL CALC-SCNC: 11 MMOL/L (ref 8–16)
APTT BLDCRRT: 34.9 SEC (ref 21–32)
APTT BLDCRRT: 70.8 SEC (ref 21–32)
APTT BLDCRRT: 78.6 SEC (ref 21–32)
APTT BLDCRRT: 93.4 SEC (ref 21–32)
BASOPHILS # BLD AUTO: 0.08 K/UL (ref 0–0.2)
BASOPHILS NFR BLD: 1.4 % (ref 0–1.9)
BUN SERPL-MCNC: 50 MG/DL (ref 6–20)
CALCIUM SERPL-MCNC: 9.6 MG/DL (ref 8.7–10.5)
CHLORIDE SERPL-SCNC: 91 MMOL/L (ref 95–110)
CO2 SERPL-SCNC: 32 MMOL/L (ref 23–29)
CREAT SERPL-MCNC: 1.8 MG/DL (ref 0.5–1.4)
DELSYS: ABNORMAL
DIFFERENTIAL METHOD: ABNORMAL
EOSINOPHIL # BLD AUTO: 0.3 K/UL (ref 0–0.5)
EOSINOPHIL NFR BLD: 4.6 % (ref 0–8)
ERYTHROCYTE [DISTWIDTH] IN BLOOD BY AUTOMATED COUNT: 21 % (ref 11.5–14.5)
EST. GFR  (NO RACE VARIABLE): 44.2 ML/MIN/1.73 M^2
GLUCOSE SERPL-MCNC: 150 MG/DL (ref 70–110)
HCO3 UR-SCNC: 37.2 MMOL/L (ref 24–28)
HCT VFR BLD AUTO: 36.4 % (ref 40–54)
HGB BLD-MCNC: 11.2 G/DL (ref 14–18)
IMM GRANULOCYTES # BLD AUTO: 0.06 K/UL (ref 0–0.04)
IMM GRANULOCYTES NFR BLD AUTO: 1.1 % (ref 0–0.5)
LYMPHOCYTES # BLD AUTO: 1.2 K/UL (ref 1–4.8)
LYMPHOCYTES NFR BLD: 21.5 % (ref 18–48)
MAGNESIUM SERPL-MCNC: 2.2 MG/DL (ref 1.6–2.6)
MCH RBC QN AUTO: 28.1 PG (ref 27–31)
MCHC RBC AUTO-ENTMCNC: 30.8 G/DL (ref 32–36)
MCV RBC AUTO: 91 FL (ref 82–98)
MONOCYTES # BLD AUTO: 0.6 K/UL (ref 0.3–1)
MONOCYTES NFR BLD: 10.8 % (ref 4–15)
NEUTROPHILS # BLD AUTO: 3.4 K/UL (ref 1.8–7.7)
NEUTROPHILS NFR BLD: 60.6 % (ref 38–73)
NRBC BLD-RTO: 0 /100 WBC
PCO2 BLDA: 61.5 MMHG (ref 35–45)
PH SMN: 7.39 [PH] (ref 7.35–7.45)
PLATELET # BLD AUTO: 133 K/UL (ref 150–450)
PMV BLD AUTO: 9.9 FL (ref 9.2–12.9)
PO2 BLDA: 26 MMHG (ref 40–60)
POC BE: 12 MMOL/L
POC SATURATED O2: 45 % (ref 95–100)
POC TCO2: 39 MMOL/L (ref 24–29)
POTASSIUM SERPL-SCNC: 4 MMOL/L (ref 3.5–5.1)
RBC # BLD AUTO: 3.99 M/UL (ref 4.6–6.2)
SAMPLE: ABNORMAL
SITE: ABNORMAL
SODIUM SERPL-SCNC: 134 MMOL/L (ref 136–145)
WBC # BLD AUTO: 5.63 K/UL (ref 3.9–12.7)

## 2022-12-18 PROCEDURE — 80048 BASIC METABOLIC PNL TOTAL CA: CPT | Performed by: PHYSICIAN ASSISTANT

## 2022-12-18 PROCEDURE — 85730 THROMBOPLASTIN TIME PARTIAL: CPT | Performed by: INTERNAL MEDICINE

## 2022-12-18 PROCEDURE — 99900031 HC PATIENT EDUCATION (STAT)

## 2022-12-18 PROCEDURE — 99233 PR SUBSEQUENT HOSPITAL CARE,LEVL III: ICD-10-PCS | Mod: ,,, | Performed by: INTERNAL MEDICINE

## 2022-12-18 PROCEDURE — 85025 COMPLETE CBC W/AUTO DIFF WBC: CPT | Performed by: PHYSICIAN ASSISTANT

## 2022-12-18 PROCEDURE — 85730 THROMBOPLASTIN TIME PARTIAL: CPT | Mod: 91 | Performed by: INTERNAL MEDICINE

## 2022-12-18 PROCEDURE — 99900035 HC TECH TIME PER 15 MIN (STAT)

## 2022-12-18 PROCEDURE — 63600175 PHARM REV CODE 636 W HCPCS: Performed by: INTERNAL MEDICINE

## 2022-12-18 PROCEDURE — 99233 SBSQ HOSP IP/OBS HIGH 50: CPT | Mod: ,,, | Performed by: INTERNAL MEDICINE

## 2022-12-18 PROCEDURE — 94761 N-INVAS EAR/PLS OXIMETRY MLT: CPT

## 2022-12-18 PROCEDURE — 83735 ASSAY OF MAGNESIUM: CPT | Performed by: PHYSICIAN ASSISTANT

## 2022-12-18 PROCEDURE — 25000003 PHARM REV CODE 250: Performed by: INTERNAL MEDICINE

## 2022-12-18 PROCEDURE — 20600001 HC STEP DOWN PRIVATE ROOM

## 2022-12-18 RX ORDER — POTASSIUM CHLORIDE 750 MG/1
30 CAPSULE, EXTENDED RELEASE ORAL ONCE
Status: COMPLETED | OUTPATIENT
Start: 2022-12-18 | End: 2022-12-18

## 2022-12-18 RX ADMIN — DOBUTAMINE HYDROCHLORIDE 5 MCG/KG/MIN: 400 INJECTION INTRAVENOUS at 10:12

## 2022-12-18 RX ADMIN — HEPARIN SODIUM 21 UNITS/KG/HR: 10000 INJECTION, SOLUTION INTRAVENOUS at 06:12

## 2022-12-18 RX ADMIN — POTASSIUM CHLORIDE 30 MEQ: 10 CAPSULE, COATED, EXTENDED RELEASE ORAL at 12:12

## 2022-12-18 RX ADMIN — FUROSEMIDE 40 MG/HR: 10 INJECTION, SOLUTION INTRAMUSCULAR; INTRAVENOUS at 06:12

## 2022-12-18 RX ADMIN — CHLOROTHIAZIDE SODIUM 250 MG: 500 INJECTION, POWDER, LYOPHILIZED, FOR SOLUTION INTRAVENOUS at 02:12

## 2022-12-18 RX ADMIN — DOBUTAMINE HYDROCHLORIDE 5 MCG/KG/MIN: 400 INJECTION INTRAVENOUS at 06:12

## 2022-12-18 RX ADMIN — PANTOPRAZOLE SODIUM 40 MG: 40 TABLET, DELAYED RELEASE ORAL at 09:12

## 2022-12-18 RX ADMIN — AMIODARONE HYDROCHLORIDE 200 MG: 200 TABLET ORAL at 09:12

## 2022-12-18 NOTE — PROGRESS NOTES
[facility] - [facility]iology Stepdown  Heart Transplant  Progress Note    Patient Name: Migel Garcia  MRN: [id number]  Admission Date: 12/5/2022  Hospital Length of Stay: 13 days  Attending Physician: Gin Mendes MD  Primary Care Provider: Anuj Banks MD  Principal Problem:Acute on chronic combined systolic and diastolic heart failure    Subjective:     Interval History:   CVP 17, SVO2 45, net neg -480, will give diuril 250x1 and keep lasix gtt 40  CI/CO 1.7/3.2, SVR 1513      Intake/Output Summary (Last 24 hours) at 12/18/2022 1212  Last data filed at 12/18/2022 0800  Gross per 24 hour   Intake 1140 ml   Output 1200 ml   Net -60 ml             Continuous Infusions:   DOBUTamine IV infusion (non-titrating) 5 mcg/kg/min (12/18/22 0617)    furosemide (LASIX) 10 mg/mL infusion (non-titrating) 40 mg/hr (12/18/22 0621)    heparin (porcine) in D5W 23 Units/kg/hr (12/18/22 0951)     Scheduled Meds:   amiodarone  200 mg Oral Daily    chlorothiazide (DIURIL) IVPB  250 mg Intravenous Once    digoxin  0.125 mg Oral Every other day    nicotine  1 patch Transdermal Daily    pantoprazole  40 mg Oral Daily    potassium chloride  30 mEq Oral Once     PRN Meds:acetaminophen, LIDOcaine HCL 10 mg/ml (1%), sodium chloride 0.9%, sodium chloride 0.9%    Review of patient's allergies indicates:  No Known Allergies  Objective:     Vital Signs (Most Recent):  Temp: 97.7 °F (36.5 °C) (12/18/22 1122)  Pulse: 83 (12/18/22 1122)  Resp: 18 (12/18/22 1122)  BP: 98/64 (12/18/22 1122)  SpO2: 98 % (12/18/22 1122) Vital Signs (24h Range):  Temp:  [97.6 °F (36.4 °C)-98 °F (36.7 °C)] 97.7 °F (36.5 °C)  Pulse:  [64-92] 83  Resp:  [17-18] 18  SpO2:  [97 %-100 %] 98 %  BP: ()/(57-70) 98/64     Patient Vitals for the past 72 hrs (Last 3 readings):   Weight   12/18/22 0746 72.1 kg (158 lb 15.2 oz)   12/17/22 0800 72.9 kg (160 lb 11.5 oz)   12/16/22 0812 75.8 kg (167 lb 1.7 oz)       Body mass index is 25.66 kg/m².      Intake/Output Summary  (Last 24 hours) at 12/18/2022 1212  Last data filed at 12/18/2022 0800  Gross per 24 hour   Intake 1140 ml   Output 1200 ml   Net -60 ml         Hemodynamic Parameters:  CVP:  [17 mmHg] 17 mmHg    Telemetry: reviewed  Physical Exam  Vitals and nursing note reviewed.   Constitutional:       Appearance: Normal appearance.   HENT:      Head: Normocephalic.      Nose: Nose normal.      Mouth/Throat:      Mouth: Mucous membranes are moist.   Neck:      Vascular: JVD present.      Comments: JVP elevated.   Cardiovascular:      Rate and Rhythm: Normal rate.      Heart sounds: Murmur heard.     Gallop present.   Pulmonary:      Effort: Pulmonary effort is normal.   Abdominal:      General: Abdomen is flat. There is no distension.      Palpations: Abdomen is soft.   Musculoskeletal:         General: Swelling present. Normal range of motion.   Skin:     General: Skin is warm.      Capillary Refill: Capillary refill takes 2 to 3 seconds.   Neurological:      Mental Status: He is alert and oriented to person, place, and time.       Significant Labs:  CBC:  Recent Labs   Lab 12/16/22  0711 12/17/22  0521 12/18/22  0504   WBC 6.93 5.90 5.63   RBC 4.22* 3.91* 3.99*   HGB 11.9* 10.7* 11.2*   HCT 39.0* 36.1* 36.4*    141* 133*   MCV 92 92 91   MCH 28.2 27.4 28.1   MCHC 30.5* 29.6* 30.8*       BNP:  Recent Labs   Lab 12/12/22 0442 12/14/22  0527 12/16/22  0711   BNP 1,518* 1,846* 1,542*       CMP:  Recent Labs   Lab 12/12/22 0442 12/13/22  0522 12/14/22  0527 12/15/22  0551 12/16/22  0711 12/17/22  0521 12/18/22  0504   GLU 70   < > 106   < > 80 258* 150*   CALCIUM 9.3   < > 9.3   < > 9.9 9.2 9.6   ALBUMIN 3.6  --  2.6*  --  4.0  --   --    PROT 6.6  --  5.0*  --  8.0  --   --       < > 137   < > 139 135* 134*   K 3.7   < > 3.7   < > 4.1 3.8 4.0   CO2 30*   < > 31*   < > 35* 32* 32*   CL 94*   < > 96   < > 94* 90* 91*   BUN 33*   < > 29*   < > 42* 44* 50*   CREATININE 1.4   < > 1.9*   < > 1.8* 1.9* 1.8*   ALKPHOS 210*   --  149*  --  243*  --   --    ALT 25  --  15  --  20  --   --    AST 22  --  13  --  22  --   --    BILITOT 3.8*  --  2.2*  --  3.3*  --   --     < > = values in this interval not displayed.        Coagulation:   Recent Labs   Lab 12/17/22  1806 12/18/22  0504 12/18/22  0725   APTT 76.3* 93.4* 34.9*       LDH:  No results for input(s): LDH in the last 72 hours.    Microbiology:  Microbiology Results (last 7 days)       ** No results found for the last 168 hours. **            I have reviewed all pertinent labs within the past 24 hours.    Estimated Creatinine Clearance: 42.3 mL/min (A) (based on SCr of 1.8 mg/dL (H)).    Diagnostic Results:  I have reviewed all pertinent imaging results/findings within the past 24 hours.    Assessment and Plan:     52 year old man with a history of non-ischemic cardiomyopathy with ICD placement, CKD 3, atrial fibrillation presents from  to [facility] for advanced options, on dobutamine 5. Echo 12/6 with EF of 25%. Pathway for LVAD/heart transplant started      * Acute on chronic combined systolic and diastolic heart failure  -NICM  -Transferred from [place] for ADHF and possible workup.  During hospital stay there; he was briefly on Levo and started on  and they were unable to wean  off.  Presented on  5  -Last 2D Echo 12/6/22: LVEF: 25% , LVEDD:  5.33 cm  -Hypervolemic on examination today  -CVP 13 On IV Lasix gtt at 30 mg/hr  -RHC today   -GDMT with home dose of Digoxin, Entresto 24-26. Was previously on Toprol 100mg QHS but holding in the setting of   -Patient is currently being evaluated for OHTx/VAD  -Heart failure pathway Step 4. Per Pulm recs, will obtain chest CT with contrast to further evaluate pulmonary nodules, but will wait until Cr improves.    -2g Na dietary restriction, 1500 mL fluid restriction, strict I/Os      Pulmonary nodule  -Questionable 8 mm left lower lobe pulmonary nodule series 6, image 314.  This courses along the left lower lobe pulmonary  artery and pulmonary aneurysm not excluded.  Dedicated CT chest with contrast suggested  -Will plan for CT with contrast once Cr improves    Atrial fibrillation  -[id number] score is 2  -On Eliquis at home for anticoagulation  -Stopped on 12/5 in anticipation of workup and possible procedures  -On Lovenox for DVT prophylaxis  -continue amio/dig    ICD (implantable cardioverter-defibrillator), single, in situ  -[employer] ICD    HTN (hypertension)  Continue entresto    CKD (chronic kidney disease), stage III  -Reported baseline 1.6-2.0  -Will monitor trend  -avoid nephrotoxins     Tobacco use  -counseled on cessation    GERD (gastroesophageal reflux disease)  -Protonix 40mg Qdaily        Spears Cristino Araiza MD  Heart Transplant  [facility] - [facility] Step[facility]

## 2022-12-18 NOTE — SUBJECTIVE & OBJECTIVE
Interval History:   CVP 17, SVO2 45, net neg -480, will give diuril 250x1 and keep lasix gtt 40  CI/CO 1.7/3.2, SVR 1513      Intake/Output Summary (Last 24 hours) at 12/18/2022 1212  Last data filed at 12/18/2022 0800  Gross per 24 hour   Intake 1140 ml   Output 1200 ml   Net -60 ml             Continuous Infusions:   DOBUTamine IV infusion (non-titrating) 5 mcg/kg/min (12/18/22 0617)    furosemide (LASIX) 10 mg/mL infusion (non-titrating) 40 mg/hr (12/18/22 0621)    heparin (porcine) in D5W 23 Units/kg/hr (12/18/22 0951)     Scheduled Meds:   amiodarone  200 mg Oral Daily    chlorothiazide (DIURIL) IVPB  250 mg Intravenous Once    digoxin  0.125 mg Oral Every other day    nicotine  1 patch Transdermal Daily    pantoprazole  40 mg Oral Daily    potassium chloride  30 mEq Oral Once     PRN Meds:acetaminophen, LIDOcaine HCL 10 mg/ml (1%), sodium chloride 0.9%, sodium chloride 0.9%    Review of patient's allergies indicates:  No Known Allergies  Objective:     Vital Signs (Most Recent):  Temp: 97.7 °F (36.5 °C) (12/18/22 1122)  Pulse: 83 (12/18/22 1122)  Resp: 18 (12/18/22 1122)  BP: 98/64 (12/18/22 1122)  SpO2: 98 % (12/18/22 1122) Vital Signs (24h Range):  Temp:  [97.6 °F (36.4 °C)-98 °F (36.7 °C)] 97.7 °F (36.5 °C)  Pulse:  [64-92] 83  Resp:  [17-18] 18  SpO2:  [97 %-100 %] 98 %  BP: ()/(57-70) 98/64     Patient Vitals for the past 72 hrs (Last 3 readings):   Weight   12/18/22 0746 72.1 kg (158 lb 15.2 oz)   12/17/22 0800 72.9 kg (160 lb 11.5 oz)   12/16/22 0812 75.8 kg (167 lb 1.7 oz)       Body mass index is 25.66 kg/m².      Intake/Output Summary (Last 24 hours) at 12/18/2022 1212  Last data filed at 12/18/2022 0800  Gross per 24 hour   Intake 1140 ml   Output 1200 ml   Net -60 ml         Hemodynamic Parameters:  CVP:  [17 mmHg] 17 mmHg    Telemetry: reviewed  Physical Exam  Vitals and nursing note reviewed.   Constitutional:       Appearance: Normal appearance.   HENT:      Head: Normocephalic.      Nose:  Nose normal.      Mouth/Throat:      Mouth: Mucous membranes are moist.   Neck:      Vascular: JVD present.      Comments: JVP elevated.   Cardiovascular:      Rate and Rhythm: Normal rate.      Heart sounds: Murmur heard.     Gallop present.   Pulmonary:      Effort: Pulmonary effort is normal.   Abdominal:      General: Abdomen is flat. There is no distension.      Palpations: Abdomen is soft.   Musculoskeletal:         General: Swelling present. Normal range of motion.   Skin:     General: Skin is warm.      Capillary Refill: Capillary refill takes 2 to 3 seconds.   Neurological:      Mental Status: He is alert and oriented to person, place, and time.       Significant Labs:  CBC:  Recent Labs   Lab 12/16/22  0711 12/17/22  0521 12/18/22  0504   WBC 6.93 5.90 5.63   RBC 4.22* 3.91* 3.99*   HGB 11.9* 10.7* 11.2*   HCT 39.0* 36.1* 36.4*    141* 133*   MCV 92 92 91   MCH 28.2 27.4 28.1   MCHC 30.5* 29.6* 30.8*       BNP:  Recent Labs   Lab 12/12/22  0442 12/14/22  0527 12/16/22  0711   BNP 1,518* 1,846* 1,542*       CMP:  Recent Labs   Lab 12/12/22  0442 12/13/22  0522 12/14/22  0527 12/15/22  0551 12/16/22  0711 12/17/22  0521 12/18/22  0504   GLU 70   < > 106   < > 80 258* 150*   CALCIUM 9.3   < > 9.3   < > 9.9 9.2 9.6   ALBUMIN 3.6  --  2.6*  --  4.0  --   --    PROT 6.6  --  5.0*  --  8.0  --   --       < > 137   < > 139 135* 134*   K 3.7   < > 3.7   < > 4.1 3.8 4.0   CO2 30*   < > 31*   < > 35* 32* 32*   CL 94*   < > 96   < > 94* 90* 91*   BUN 33*   < > 29*   < > 42* 44* 50*   CREATININE 1.4   < > 1.9*   < > 1.8* 1.9* 1.8*   ALKPHOS 210*  --  149*  --  243*  --   --    ALT 25  --  15  --  20  --   --    AST 22  --  13  --  22  --   --    BILITOT 3.8*  --  2.2*  --  3.3*  --   --     < > = values in this interval not displayed.        Coagulation:   Recent Labs   Lab 12/17/22  1806 12/18/22  0504 12/18/22  0725   APTT 76.3* 93.4* 34.9*       LDH:  No results for input(s): LDH in the last 72  hours.    Microbiology:  Microbiology Results (last 7 days)       ** No results found for the last 168 hours. **            I have reviewed all pertinent labs within the past 24 hours.    Estimated Creatinine Clearance: 42.3 mL/min (A) (based on SCr of 1.8 mg/dL (H)).    Diagnostic Results:  I have reviewed all pertinent imaging results/findings within the past 24 hours.

## 2022-12-18 NOTE — NURSING
Latest Reference Range & Units 12/18/22 07:25   aPTT 21.0 - 32.0 sec 34.9 (H)   (H): Data is abnormally high    Restarted Heparin gtt @ 23 u/kg/hr with a 30 u/kg bolus, will recheck aPTT in 6 hours      Latest Reference Range & Units 12/18/22 13:56   aPTT 21.0 - 32.0 sec 78.6 (H)   (H): Data is abnormally high    Heparin gtt paused x1 hour, will restart @ 20 u/kg/hr per protocol stating to decrease by 3 u.    Next aptt to be drawn @ 4000

## 2022-12-19 ENCOUNTER — EDUCATION (OUTPATIENT)
Dept: TRANSPLANT | Facility: CLINIC | Age: 52
End: 2022-12-19
Payer: MEDICARE

## 2022-12-19 ENCOUNTER — COMMITTEE REVIEW (OUTPATIENT)
Dept: TRANSPLANT | Facility: CLINIC | Age: 52
End: 2022-12-19
Payer: MEDICARE

## 2022-12-19 DIAGNOSIS — I50.9 ACUTE DECOMPENSATED HEART FAILURE: Primary | ICD-10-CM

## 2022-12-19 DIAGNOSIS — N18.31 CHRONIC KIDNEY DISEASE, STAGE 3A: ICD-10-CM

## 2022-12-19 LAB
ALBUMIN SERPL BCP-MCNC: 4 G/DL (ref 3.5–5.2)
ALLENS TEST: ABNORMAL
ALP SERPL-CCNC: 207 U/L (ref 55–135)
ALT SERPL W/O P-5'-P-CCNC: 20 U/L (ref 10–44)
ANION GAP SERPL CALC-SCNC: 11 MMOL/L (ref 8–16)
APTT BLDCRRT: 45.9 SEC (ref 21–32)
APTT BLDCRRT: 61.7 SEC (ref 21–32)
APTT BLDCRRT: 62.1 SEC (ref 21–32)
AST SERPL-CCNC: 20 U/L (ref 10–40)
BASOPHILS # BLD AUTO: 0.08 K/UL (ref 0–0.2)
BASOPHILS NFR BLD: 1.4 % (ref 0–1.9)
BILIRUB DIRECT SERPL-MCNC: 1.7 MG/DL (ref 0.1–0.3)
BILIRUB SERPL-MCNC: 2.8 MG/DL (ref 0.1–1)
BNP SERPL-MCNC: 1155 PG/ML (ref 0–99)
BUN SERPL-MCNC: 57 MG/DL (ref 6–20)
CALCIUM SERPL-MCNC: 9.7 MG/DL (ref 8.7–10.5)
CHLORIDE SERPL-SCNC: 91 MMOL/L (ref 95–110)
CO2 SERPL-SCNC: 32 MMOL/L (ref 23–29)
CREAT SERPL-MCNC: 2.1 MG/DL (ref 0.5–1.4)
DIFFERENTIAL METHOD: ABNORMAL
EOSINOPHIL # BLD AUTO: 0.2 K/UL (ref 0–0.5)
EOSINOPHIL NFR BLD: 4.1 % (ref 0–8)
ERYTHROCYTE [DISTWIDTH] IN BLOOD BY AUTOMATED COUNT: 21 % (ref 11.5–14.5)
EST. GFR  (NO RACE VARIABLE): 36.7 ML/MIN/1.73 M^2
GLUCOSE SERPL-MCNC: 123 MG/DL (ref 70–110)
HCO3 UR-SCNC: 36.3 MMOL/L (ref 24–28)
HCT VFR BLD AUTO: 37.4 % (ref 40–54)
HGB BLD-MCNC: 11.3 G/DL (ref 14–18)
IMM GRANULOCYTES # BLD AUTO: 0.06 K/UL (ref 0–0.04)
IMM GRANULOCYTES NFR BLD AUTO: 1 % (ref 0–0.5)
LYMPHOCYTES # BLD AUTO: 1.2 K/UL (ref 1–4.8)
LYMPHOCYTES NFR BLD: 21.2 % (ref 18–48)
MAGNESIUM SERPL-MCNC: 2.3 MG/DL (ref 1.6–2.6)
MCH RBC QN AUTO: 27.7 PG (ref 27–31)
MCHC RBC AUTO-ENTMCNC: 30.2 G/DL (ref 32–36)
MCV RBC AUTO: 92 FL (ref 82–98)
MONOCYTES # BLD AUTO: 0.6 K/UL (ref 0.3–1)
MONOCYTES NFR BLD: 9.8 % (ref 4–15)
NEUTROPHILS # BLD AUTO: 3.6 K/UL (ref 1.8–7.7)
NEUTROPHILS NFR BLD: 62.5 % (ref 38–73)
NRBC BLD-RTO: 0 /100 WBC
PCO2 BLDA: 58.3 MMHG (ref 35–45)
PH SMN: 7.4 [PH] (ref 7.35–7.45)
PLATELET # BLD AUTO: 114 K/UL (ref 150–450)
PMV BLD AUTO: 11 FL (ref 9.2–12.9)
PO2 BLDA: 36 MMHG (ref 40–60)
POC BE: 12 MMOL/L
POC SATURATED O2: 67 % (ref 95–100)
POC TCO2: 38 MMOL/L (ref 24–29)
POTASSIUM SERPL-SCNC: 3.8 MMOL/L (ref 3.5–5.1)
PROT SERPL-MCNC: 8.1 G/DL (ref 6–8.4)
RBC # BLD AUTO: 4.08 M/UL (ref 4.6–6.2)
SAMPLE: ABNORMAL
SITE: ABNORMAL
SODIUM SERPL-SCNC: 134 MMOL/L (ref 136–145)
WBC # BLD AUTO: 5.79 K/UL (ref 3.9–12.7)

## 2022-12-19 PROCEDURE — 80076 HEPATIC FUNCTION PANEL: CPT | Performed by: PHYSICIAN ASSISTANT

## 2022-12-19 PROCEDURE — 80048 BASIC METABOLIC PNL TOTAL CA: CPT | Performed by: PHYSICIAN ASSISTANT

## 2022-12-19 PROCEDURE — 83880 ASSAY OF NATRIURETIC PEPTIDE: CPT | Performed by: PHYSICIAN ASSISTANT

## 2022-12-19 PROCEDURE — 63600175 PHARM REV CODE 636 W HCPCS: Performed by: INTERNAL MEDICINE

## 2022-12-19 PROCEDURE — 20600001 HC STEP DOWN PRIVATE ROOM

## 2022-12-19 PROCEDURE — 85730 THROMBOPLASTIN TIME PARTIAL: CPT | Performed by: INTERNAL MEDICINE

## 2022-12-19 PROCEDURE — 25000003 PHARM REV CODE 250: Performed by: INTERNAL MEDICINE

## 2022-12-19 PROCEDURE — 85025 COMPLETE CBC W/AUTO DIFF WBC: CPT | Performed by: PHYSICIAN ASSISTANT

## 2022-12-19 PROCEDURE — 86644 CMV ANTIBODY: CPT | Performed by: INTERNAL MEDICINE

## 2022-12-19 PROCEDURE — 99900035 HC TECH TIME PER 15 MIN (STAT)

## 2022-12-19 PROCEDURE — 85730 THROMBOPLASTIN TIME PARTIAL: CPT | Mod: 91 | Performed by: INTERNAL MEDICINE

## 2022-12-19 PROCEDURE — 82803 BLOOD GASES ANY COMBINATION: CPT

## 2022-12-19 PROCEDURE — 86480 TB TEST CELL IMMUN MEASURE: CPT | Performed by: INTERNAL MEDICINE

## 2022-12-19 PROCEDURE — 99233 SBSQ HOSP IP/OBS HIGH 50: CPT | Mod: FS,,, | Performed by: INTERNAL MEDICINE

## 2022-12-19 PROCEDURE — 25000003 PHARM REV CODE 250: Performed by: PHYSICIAN ASSISTANT

## 2022-12-19 PROCEDURE — 83735 ASSAY OF MAGNESIUM: CPT | Performed by: PHYSICIAN ASSISTANT

## 2022-12-19 PROCEDURE — 99233 PR SUBSEQUENT HOSPITAL CARE,LEVL III: ICD-10-PCS | Mod: FS,,, | Performed by: INTERNAL MEDICINE

## 2022-12-19 RX ORDER — METOLAZONE 5 MG/1
10 TABLET ORAL ONCE
Status: COMPLETED | OUTPATIENT
Start: 2022-12-19 | End: 2022-12-19

## 2022-12-19 RX ADMIN — DOBUTAMINE HYDROCHLORIDE 5 MCG/KG/MIN: 400 INJECTION INTRAVENOUS at 08:12

## 2022-12-19 RX ADMIN — METOLAZONE 10 MG: 5 TABLET ORAL at 12:12

## 2022-12-19 RX ADMIN — PANTOPRAZOLE SODIUM 40 MG: 40 TABLET, DELAYED RELEASE ORAL at 10:12

## 2022-12-19 RX ADMIN — FUROSEMIDE 40 MG/HR: 10 INJECTION, SOLUTION INTRAMUSCULAR; INTRAVENOUS at 06:12

## 2022-12-19 RX ADMIN — DIGOXIN 0.12 MG: 125 TABLET ORAL at 10:12

## 2022-12-19 RX ADMIN — AMIODARONE HYDROCHLORIDE 200 MG: 200 TABLET ORAL at 10:12

## 2022-12-19 RX ADMIN — HEPARIN SODIUM 18 UNITS/KG/HR: 10000 INJECTION, SOLUTION INTRAVENOUS at 03:12

## 2022-12-19 NOTE — PROGRESS NOTES
"Met with pt due to concerns from HTS team from a nutrition standpoint    Pt states he was "not eating" leading up to current hospitalization. Pt would have 2 bites of meals and had no ruslan. Pt felt like he would get "full from drinking water."    Pt states ruslan is returning but "getting tired of hospital food."    Encouraged pt to continue to eat and was given contact info  "

## 2022-12-19 NOTE — PLAN OF CARE
Pt remained free of injuries, falls, and trauma. VSS. No complaints of pain mentioned. Continuous , Lasix, and Heparin infusing at prescribed rates. APTT being monitored. Fluid restriction in place. 1 x dose of diuril was given during day shift. Strict intake and output being monitored. Plan of care reviewed w/ pt. Pt verbalized understanding. Will continue to monitor.    Problem: Adult Inpatient Plan of Care  Goal: Patient-Specific Goal (Individualized)  Outcome: Ongoing, Progressing     Problem: Fluid Imbalance (Heart Failure)  Goal: Fluid Balance  Intervention: Monitor and Manage Fluid Balance  Flowsheets (Taken 12/19/2022 0227)  Fluid/Electrolyte Management: fluids restricted     Problem: Cardiac Output Decreased  Goal: Effective Cardiac Output  Intervention: Optimize Cardiac Output  Flowsheets (Taken 12/19/2022 0227)  Head of Bed (HOB) Positioning: HOB elevated  Environmental Support: calm environment promoted

## 2022-12-19 NOTE — COMMITTEE REVIEW
Native Organ Dx: NICM    Deferred  Presented Mr Migel Garcia at Heart Transplant/LVAD Selection Committee today to determine suitability of Heart Transplant listing or VAD implant.    Decision is deferred at this time due to renal insufficiency and pulmonary nodule.    Note was written by Lydia Pena RN.    ==========================================================    I agree and attest to the decision of the committee.

## 2022-12-19 NOTE — ASSESSMENT & PLAN NOTE
-[id number]-[id number] score is 2  -On Eliquis at home for anticoagulation  -Stopped on 12/5 in anticipation of workup and possible procedures  -On Lovenox for DVT prophylaxis  -continue amio/dig

## 2022-12-19 NOTE — ASSESSMENT & PLAN NOTE
-NICM  -Transferred from [place] for ADHF and possible workup.  During hospital stay there; he was briefly on Levo and started on  and they were unable to wean  off.  Presented on  5  -Last 2D Echo 12/6/22: LVEF: 25% , LVEDD:  5.33 cm  -RHC: 12/16/22 RA: 15/16 (14), RV: 47/2 (14), PA: 47/23 (32), PWP: 22/30 (23), CO: 2.9, CI: 1.5  -Hypervolemic on examination today  -CVP 15 On IV Lasix gtt at 40 mg/hr  -GDMT with home dose of Digoxin, Entresto 24-26. Was previously on Toprol 100mg QHS but holding in the setting of   -Patient is currently being evaluated for OHTx/VAD  -Heart failure pathway Step 4. Per Pulm recs, will obtain chest CT with contrast to further evaluate pulmonary nodules, but will wait until Cr improves.    -2g Na dietary restriction, 1500 mL fluid restriction, strict I/Os

## 2022-12-19 NOTE — PROGRESS NOTES
[facility] - Cardiology Stepdown  Heart Transplant  Progress Note    Patient Name: Migel Garcia  MRN: [id number]  Admission Date: 12/5/2022  Hospital Length of Stay: 14 days  Attending Physician: Sukhjinder Ball MD  Primary Care Provider: Anuj Banks MD  Principal Problem:Acute on chronic combined systolic and diastolic heart failure    Subjective:     Interval History: NAEON. No complaints. CVP 15 on IV Lasix 40 mg/hr. He received 250mg of Diuril yesterday.  Will dose Metolazone 10mg today and monitor response.  Plan to re discuss at selection today.  She is negative 1.5L in the last 24 hours.  CVP: 15, SVO2: 67, CO: 5.0, CI: 2.73, SVR: 1056    Continuous Infusions:   DOBUTamine IV infusion (non-titrating) 5 mcg/kg/min (12/18/22 2883)    furosemide (LASIX) 10 mg/mL infusion (non-titrating) 40 mg/hr (12/19/22 0604)    heparin (porcine) in D5W 18 Units/kg/hr (12/19/22 0347)     Scheduled Meds:   amiodarone  200 mg Oral Daily    digoxin  0.125 mg Oral Every other day    nicotine  1 patch Transdermal Daily    pantoprazole  40 mg Oral Daily     PRN Meds:acetaminophen, LIDOcaine HCL 10 mg/ml (1%), sodium chloride 0.9%, sodium chloride 0.9%    Review of patient's allergies indicates:  No Known Allergies  Objective:     Vital Signs (Most Recent):  Temp: 98.3 °F (36.8 °C) (12/19/22 1118)  Pulse: (!) 53 (12/19/22 1118)  Resp: 18 (12/19/22 1118)  BP: 100/68 (12/19/22 1118)  SpO2: 100 % (12/19/22 1118) Vital Signs (24h Range):  Temp:  [97.6 °F (36.4 °C)-98.3 °F (36.8 °C)] 98.3 °F (36.8 °C)  Pulse:  [] 53  Resp:  [18] 18  SpO2:  [98 %-100 %] 100 %  BP: ()/(55-69) 100/68     Patient Vitals for the past 72 hrs (Last 3 readings):   Weight   12/19/22 0743 71.8 kg (158 lb 4.6 oz)   12/18/22 0746 72.1 kg (158 lb 15.2 oz)   12/17/22 0800 72.9 kg (160 lb 11.5 oz)       Body mass index is 25.55 kg/m².      Intake/Output Summary (Last 24 hours) at 12/19/2022 1324  Last data filed at 12/19/2022 1257  Gross per 24 hour    Intake 1880 ml   Output 3570 ml   Net -1690 ml         Hemodynamic Parameters:  CVP:  [15 mmHg] 15 mmHg    Telemetry: reviewed  Physical Exam  Vitals and nursing note reviewed.   Constitutional:       Appearance: Normal appearance.   HENT:      Head: Normocephalic.      Nose: Nose normal.      Mouth/Throat:      Mouth: Mucous membranes are moist.   Neck:      Vascular: JVD present.      Comments: JVP elevated.   Cardiovascular:      Rate and Rhythm: Tachycardia present. Rhythm irregular.      Heart sounds: Murmur heard.     Gallop present.   Pulmonary:      Effort: Pulmonary effort is normal.   Abdominal:      General: Abdomen is flat. There is no distension.      Palpations: Abdomen is soft.   Musculoskeletal:         General: Swelling present. Normal range of motion.   Skin:     General: Skin is warm.      Capillary Refill: Capillary refill takes 2 to 3 seconds.   Neurological:      Mental Status: He is alert and oriented to person, place, and time.       Significant Labs:  CBC:  Recent Labs   Lab 12/17/22  0521 12/18/22  0504 12/19/22  0450   WBC 5.90 5.63 5.79   RBC 3.91* 3.99* 4.08*   HGB 10.7* 11.2* 11.3*   HCT 36.1* 36.4* 37.4*   * 133* 114*   MCV 92 91 92   MCH 27.4 28.1 27.7   MCHC 29.6* 30.8* 30.2*       BNP:  Recent Labs   Lab 12/14/22  0527 12/16/22  0711 12/19/22  0450   BNP 1,846* 1,542* 1,155*       CMP:  Recent Labs   Lab 12/14/22  0527 12/15/22  0551 12/16/22  0711 12/17/22  0521 12/18/22  0504 12/19/22  0450      < > 80 258* 150* 123*   CALCIUM 9.3   < > 9.9 9.2 9.6 9.7   ALBUMIN 2.6*  --  4.0  --   --  4.0   PROT 5.0*  --  8.0  --   --  8.1      < > 139 135* 134* 134*   K 3.7   < > 4.1 3.8 4.0 3.8   CO2 31*   < > 35* 32* 32* 32*   CL 96   < > 94* 90* 91* 91*   BUN 29*   < > 42* 44* 50* 57*   CREATININE 1.9*   < > 1.8* 1.9* 1.8* 2.1*   ALKPHOS 149*  --  243*  --   --  207*   ALT 15  --  20  --   --  20   AST 13  --  22  --   --  20   BILITOT 2.2*  --  3.3*  --   --  2.8*    <  > = values in this interval not displayed.        Coagulation:   Recent Labs   Lab 12/18/22  2247 12/19/22  0450 12/19/22  1135   APTT 70.8* 62.1* 61.7*       LDH:  No results for input(s): LDH in the last 72 hours.    Microbiology:  Microbiology Results (last 7 days)       ** No results found for the last 168 hours. **            I have reviewed all pertinent labs within the past 24 hours.    Estimated Creatinine Clearance: 36.3 mL/min (A) (based on SCr of 2.1 mg/dL (H)).    Diagnostic Results:  I have reviewed all pertinent imaging results/findings within the past 24 hours.    Assessment and Plan:     52 year old man with a history of non-ischemic cardiomyopathy with ICD placement, CKD 3, atrial [place] [facility] [place], on dobutamine 5. Echo 12/6 with EF of 25%. Pathway for LVAD/heart transplant started      * Acute on chronic combined systolic and diastolic heart failure  -NICM  -Transferred from [place] for ADHF and possible workup.  During hospital stay there; he was briefly on Levo and started on  and they were unable to wean  off.  Presented on  5  -Last 2D Echo 12/6/22: LVEF: 25% , LVEDD:  5.33 cm  -RHC: 12/16/22 RA: 15/16 (14), RV: 47/2 (14), PA: 47/23 (32), PWP: 22/30 (23), CO: 2.9, CI: 1.5  -Hypervolemic on examination today  -CVP 15 On IV Lasix gtt at 40 mg/hr  -GDMT with home dose of Digoxin, Entresto 24-26. Was previously on Toprol 100mg QHS but holding in the setting of   -Patient is currently being evaluated for OHTx/VAD  -Heart failure pathway Step 4. Per Pulm recs, will obtain chest CT with contrast to further evaluate pulmonary nodules, but will wait until Cr improves.    -2g Na dietary restriction, 1500 mL fluid restriction, strict I/Os      Pulmonary nodule  -Questionable 8 mm left lower lobe pulmonary nodule series 6, image 314.  This courses along the left lower lobe pulmonary artery and pulmonary aneurysm not excluded.  Dedicated CT chest with  contrast suggested  -Will plan for CT with contrast once Cr improves    CKD (chronic kidney disease), stage III  -Reported baseline 1.6-2.0  -Will monitor trend  -avoid nephrotoxins     Atrial fibrillation  -[id number] score is 2  -On Eliquis at home for anticoagulation  -Stopped on 12/5 in anticipation of workup and possible procedures  -On Lovenox for DVT prophylaxis  -continue amio/dig    HTN (hypertension)  Continue entresto    GERD (gastroesophageal reflux disease)  -Protonix 40mg Qdaily    Tobacco use  -counseled on cessation    ICD (implantable cardioverter-defibrillator), single, in situ  -[employer] ICD        JEFF Puentes  Heart Transplant  [facility] - Cardiology Stepdown

## 2022-12-19 NOTE — SUBJECTIVE & OBJECTIVE
Interval History: NAEON. No complaints. CVP 15 on IV Lasix 40 mg/hr. He received 250mg of Diuril yesterday.  Will dose Metolazone 10mg today and monitor response.  Plan to re discuss at selection today.  She is negative 1.5L in the last 24 hours.  CVP: 15, SVO2: 67, CO: 5.0, CI: 2.73, SVR: 1056    Continuous Infusions:   DOBUTamine IV infusion (non-titrating) 5 mcg/kg/min (12/18/22 2253)    furosemide (LASIX) 10 mg/mL infusion (non-titrating) 40 mg/hr (12/19/22 0604)    heparin (porcine) in D5W 18 Units/kg/hr (12/19/22 0347)     Scheduled Meds:   amiodarone  200 mg Oral Daily    digoxin  0.125 mg Oral Every other day    nicotine  1 patch Transdermal Daily    pantoprazole  40 mg Oral Daily     PRN Meds:acetaminophen, LIDOcaine HCL 10 mg/ml (1%), sodium chloride 0.9%, sodium chloride 0.9%    Review of patient's allergies indicates:  No Known Allergies  Objective:     Vital Signs (Most Recent):  Temp: 98.3 °F (36.8 °C) (12/19/22 1118)  Pulse: (!) 53 (12/19/22 1118)  Resp: 18 (12/19/22 1118)  BP: 100/68 (12/19/22 1118)  SpO2: 100 % (12/19/22 1118) Vital Signs (24h Range):  Temp:  [97.6 °F (36.4 °C)-98.3 °F (36.8 °C)] 98.3 °F (36.8 °C)  Pulse:  [] 53  Resp:  [18] 18  SpO2:  [98 %-100 %] 100 %  BP: ()/(55-69) 100/68     Patient Vitals for the past 72 hrs (Last 3 readings):   Weight   12/19/22 0743 71.8 kg (158 lb 4.6 oz)   12/18/22 0746 72.1 kg (158 lb 15.2 oz)   12/17/22 0800 72.9 kg (160 lb 11.5 oz)       Body mass index is 25.55 kg/m².      Intake/Output Summary (Last 24 hours) at 12/19/2022 1324  Last data filed at 12/19/2022 1257  Gross per 24 hour   Intake 1880 ml   Output 3570 ml   Net -1690 ml         Hemodynamic Parameters:  CVP:  [15 mmHg] 15 mmHg    Telemetry: reviewed  Physical Exam  Vitals and nursing note reviewed.   Constitutional:       Appearance: Normal appearance.   HENT:      Head: Normocephalic.      Nose: Nose normal.      Mouth/Throat:      Mouth: Mucous membranes are moist.   Neck:       Vascular: JVD present.      Comments: JVP elevated.   Cardiovascular:      Rate and Rhythm: Tachycardia present. Rhythm irregular.      Heart sounds: Murmur heard.     Gallop present.   Pulmonary:      Effort: Pulmonary effort is normal.   Abdominal:      General: Abdomen is flat. There is no distension.      Palpations: Abdomen is soft.   Musculoskeletal:         General: Swelling present. Normal range of motion.   Skin:     General: Skin is warm.      Capillary Refill: Capillary refill takes 2 to 3 seconds.   Neurological:      Mental Status: He is alert and oriented to person, place, and time.       Significant Labs:  CBC:  Recent Labs   Lab 12/17/22  0521 12/18/22  0504 12/19/22  0450   WBC 5.90 5.63 5.79   RBC 3.91* 3.99* 4.08*   HGB 10.7* 11.2* 11.3*   HCT 36.1* 36.4* 37.4*   * 133* 114*   MCV 92 91 92   MCH 27.4 28.1 27.7   MCHC 29.6* 30.8* 30.2*       BNP:  Recent Labs   Lab 12/14/22  0527 12/16/22  0711 12/19/22  0450   BNP 1,846* 1,542* 1,155*       CMP:  Recent Labs   Lab 12/14/22  0527 12/15/22  0551 12/16/22  0711 12/17/22  0521 12/18/22  0504 12/19/22  0450      < > 80 258* 150* 123*   CALCIUM 9.3   < > 9.9 9.2 9.6 9.7   ALBUMIN 2.6*  --  4.0  --   --  4.0   PROT 5.0*  --  8.0  --   --  8.1      < > 139 135* 134* 134*   K 3.7   < > 4.1 3.8 4.0 3.8   CO2 31*   < > 35* 32* 32* 32*   CL 96   < > 94* 90* 91* 91*   BUN 29*   < > 42* 44* 50* 57*   CREATININE 1.9*   < > 1.8* 1.9* 1.8* 2.1*   ALKPHOS 149*  --  243*  --   --  207*   ALT 15  --  20  --   --  20   AST 13  --  22  --   --  20   BILITOT 2.2*  --  3.3*  --   --  2.8*    < > = values in this interval not displayed.        Coagulation:   Recent Labs   Lab 12/18/22  2247 12/19/22  0450 12/19/22  1135   APTT 70.8* 62.1* 61.7*       LDH:  No results for input(s): LDH in the last 72 hours.    Microbiology:  Microbiology Results (last 7 days)       ** No results found for the last 168 hours. **            I have reviewed all pertinent  labs within the past 24 hours.    Estimated Creatinine Clearance: 36.3 mL/min (A) (based on SCr of 2.1 mg/dL (H)).    Diagnostic Results:  I have reviewed all pertinent imaging results/findings within the past 24 hours.

## 2022-12-20 LAB
ALLENS TEST: ABNORMAL
ANION GAP SERPL CALC-SCNC: 15 MMOL/L (ref 8–16)
ANION GAP SERPL CALC-SCNC: 15 MMOL/L (ref 8–16)
APTT BLDCRRT: 62.4 SEC (ref 21–32)
ASCENDING AORTA: 2.39 CM
AV INDEX (PROSTH): 0.52
AV MEAN GRADIENT: 3 MMHG
AV PEAK GRADIENT: 5 MMHG
AV VALVE AREA: 1.33 CM2
AV VELOCITY RATIO: 0.53
BASOPHILS # BLD AUTO: 0.06 K/UL (ref 0–0.2)
BASOPHILS NFR BLD: 1.1 % (ref 0–1.9)
BSA FOR ECHO PROCEDURE: 2.06 M2
BUN SERPL-MCNC: 62 MG/DL (ref 6–20)
BUN SERPL-MCNC: 65 MG/DL (ref 6–20)
CALCIUM SERPL-MCNC: 10 MG/DL (ref 8.7–10.5)
CALCIUM SERPL-MCNC: 9.7 MG/DL (ref 8.7–10.5)
CHLORIDE SERPL-SCNC: 87 MMOL/L (ref 95–110)
CHLORIDE SERPL-SCNC: 87 MMOL/L (ref 95–110)
CMV IGG SERPL QL IA: REACTIVE
CO2 SERPL-SCNC: 31 MMOL/L (ref 23–29)
CO2 SERPL-SCNC: 31 MMOL/L (ref 23–29)
CREAT SERPL-MCNC: 2.2 MG/DL (ref 0.5–1.4)
CREAT SERPL-MCNC: 2.3 MG/DL (ref 0.5–1.4)
CV ECHO LV RWT: 0.33 CM
DIFFERENTIAL METHOD: ABNORMAL
DOP CALC AO PEAK VEL: 1.11 M/S
DOP CALC AO VTI: 14.8 CM
DOP CALC LVOT AREA: 2.5 CM2
DOP CALC LVOT DIAMETER: 1.8 CM
DOP CALC LVOT PEAK VEL: 0.59 M/S
DOP CALC LVOT STROKE VOLUME: 19.74 CM3
DOP CALCLVOT PEAK VEL VTI: 7.76 CM
E/E' RATIO: 11.5 M/S
ECHO LV POSTERIOR WALL: 0.93 CM (ref 0.6–1.1)
EJECTION FRACTION: 20 %
EOSINOPHIL # BLD AUTO: 0.2 K/UL (ref 0–0.5)
EOSINOPHIL NFR BLD: 2.8 % (ref 0–8)
ERYTHROCYTE [DISTWIDTH] IN BLOOD BY AUTOMATED COUNT: 20.7 % (ref 11.5–14.5)
EST. GFR  (NO RACE VARIABLE): 32.9 ML/MIN/1.73 M^2
EST. GFR  (NO RACE VARIABLE): 34.7 ML/MIN/1.73 M^2
FRACTIONAL SHORTENING: 11 % (ref 28–44)
GAMMA INTERFERON BACKGROUND BLD IA-ACNC: 0.1 IU/ML
GLUCOSE SERPL-MCNC: 127 MG/DL (ref 70–110)
GLUCOSE SERPL-MCNC: 79 MG/DL (ref 70–110)
HCO3 UR-SCNC: 37.3 MMOL/L (ref 24–28)
HCT VFR BLD AUTO: 38.4 % (ref 40–54)
HGB BLD-MCNC: 12 G/DL (ref 14–18)
IMM GRANULOCYTES # BLD AUTO: 0.03 K/UL (ref 0–0.04)
IMM GRANULOCYTES NFR BLD AUTO: 0.5 % (ref 0–0.5)
INTERVENTRICULAR SEPTUM: 0.72 CM (ref 0.6–1.1)
LA MAJOR: 6.04 CM
LA MINOR: 6 CM
LA WIDTH: 3.8 CM
LEFT ATRIUM SIZE: 4.06 CM
LEFT ATRIUM VOLUME INDEX MOD: 36.2 ML/M2
LEFT ATRIUM VOLUME INDEX: 44.1 ML/M2
LEFT ATRIUM VOLUME MOD: 64.73 CM3
LEFT ATRIUM VOLUME: 78.94 CM3
LEFT INTERNAL DIMENSION IN SYSTOLE: 5.1 CM (ref 2.1–4)
LEFT VENTRICLE DIASTOLIC VOLUME INDEX: 75.66 ML/M2
LEFT VENTRICLE DIASTOLIC VOLUME: 135.44 ML
LEFT VENTRICLE MASS INDEX: 99 G/M2
LEFT VENTRICLE SYSTOLIC VOLUME INDEX: 56.5 ML/M2
LEFT VENTRICLE SYSTOLIC VOLUME: 101.2 ML
LEFT VENTRICULAR INTERNAL DIMENSION IN DIASTOLE: 5.7 CM (ref 3.5–6)
LEFT VENTRICULAR MASS: 176.88 G
LV LATERAL E/E' RATIO: 10.22 M/S
LV SEPTAL E/E' RATIO: 13.14 M/S
LYMPHOCYTES # BLD AUTO: 1.2 K/UL (ref 1–4.8)
LYMPHOCYTES NFR BLD: 21.8 % (ref 18–48)
M TB IFN-G CD4+ BCKGRND COR BLD-ACNC: -0.02 IU/ML
MAGNESIUM SERPL-MCNC: 2.4 MG/DL (ref 1.6–2.6)
MCH RBC QN AUTO: 28.1 PG (ref 27–31)
MCHC RBC AUTO-ENTMCNC: 31.3 G/DL (ref 32–36)
MCV RBC AUTO: 90 FL (ref 82–98)
MITOGEN IGNF BCKGRD COR BLD-ACNC: 5.84 IU/ML
MONOCYTES # BLD AUTO: 0.6 K/UL (ref 0.3–1)
MONOCYTES NFR BLD: 11.3 % (ref 4–15)
MV PEAK E VEL: 0.92 M/S
NEUTROPHILS # BLD AUTO: 3.6 K/UL (ref 1.8–7.7)
NEUTROPHILS NFR BLD: 62.5 % (ref 38–73)
NRBC BLD-RTO: 0 /100 WBC
PCO2 BLDA: 56.7 MMHG (ref 35–45)
PH SMN: 7.43 [PH] (ref 7.35–7.45)
PISA MRMAX VEL: 0.04 M/S
PISA TR MAX VEL: 3.56 M/S
PLATELET # BLD AUTO: 117 K/UL (ref 150–450)
PMV BLD AUTO: 10.9 FL (ref 9.2–12.9)
PO2 BLDA: 28 MMHG (ref 40–60)
POC BE: 13 MMOL/L
POC SATURATED O2: 52 % (ref 95–100)
POC TCO2: 39 MMOL/L (ref 24–29)
POTASSIUM SERPL-SCNC: 4.1 MMOL/L (ref 3.5–5.1)
POTASSIUM SERPL-SCNC: 4.2 MMOL/L (ref 3.5–5.1)
RA MAJOR: 5.4 CM
RA PRESSURE: 15 MMHG
RA WIDTH: 3.93 CM
RBC # BLD AUTO: 4.27 M/UL (ref 4.6–6.2)
RIGHT VENTRICULAR END-DIASTOLIC DIMENSION: 4.45 CM
RV TISSUE DOPPLER FREE WALL SYSTOLIC VELOCITY 1 (APICAL 4 CHAMBER VIEW): 4.35 CM/S
SAMPLE: ABNORMAL
SINUS: 2.19 CM
SITE: ABNORMAL
SODIUM SERPL-SCNC: 133 MMOL/L (ref 136–145)
SODIUM SERPL-SCNC: 133 MMOL/L (ref 136–145)
STJ: 2.04 CM
TB GOLD PLUS: NEGATIVE
TB2 - NIL: -0.01 IU/ML
TDI LATERAL: 0.09 M/S
TDI SEPTAL: 0.07 M/S
TDI: 0.08 M/S
TR MAX PG: 51 MMHG
TRICUSPID ANNULAR PLANE SYSTOLIC EXCURSION: 1.44 CM
TV REST PULMONARY ARTERY PRESSURE: 66 MMHG
WBC # BLD AUTO: 5.68 K/UL (ref 3.9–12.7)

## 2022-12-20 PROCEDURE — 83735 ASSAY OF MAGNESIUM: CPT | Performed by: PHYSICIAN ASSISTANT

## 2022-12-20 PROCEDURE — 80323 ALKALOIDS NOS: CPT | Performed by: INTERNAL MEDICINE

## 2022-12-20 PROCEDURE — 82803 BLOOD GASES ANY COMBINATION: CPT

## 2022-12-20 PROCEDURE — 80048 BASIC METABOLIC PNL TOTAL CA: CPT | Mod: 91 | Performed by: PHYSICIAN ASSISTANT

## 2022-12-20 PROCEDURE — 97116 GAIT TRAINING THERAPY: CPT

## 2022-12-20 PROCEDURE — 25500020 PHARM REV CODE 255: Performed by: INTERNAL MEDICINE

## 2022-12-20 PROCEDURE — 80048 BASIC METABOLIC PNL TOTAL CA: CPT | Performed by: PHYSICIAN ASSISTANT

## 2022-12-20 PROCEDURE — 85730 THROMBOPLASTIN TIME PARTIAL: CPT | Performed by: INTERNAL MEDICINE

## 2022-12-20 PROCEDURE — 99233 SBSQ HOSP IP/OBS HIGH 50: CPT | Mod: FS,,, | Performed by: INTERNAL MEDICINE

## 2022-12-20 PROCEDURE — 85025 COMPLETE CBC W/AUTO DIFF WBC: CPT | Performed by: PHYSICIAN ASSISTANT

## 2022-12-20 PROCEDURE — 25000003 PHARM REV CODE 250: Performed by: INTERNAL MEDICINE

## 2022-12-20 PROCEDURE — 63600175 PHARM REV CODE 636 W HCPCS: Performed by: INTERNAL MEDICINE

## 2022-12-20 PROCEDURE — 99900035 HC TECH TIME PER 15 MIN (STAT)

## 2022-12-20 PROCEDURE — 99233 PR SUBSEQUENT HOSPITAL CARE,LEVL III: ICD-10-PCS | Mod: FS,,, | Performed by: INTERNAL MEDICINE

## 2022-12-20 PROCEDURE — 25000003 PHARM REV CODE 250: Performed by: PHYSICIAN ASSISTANT

## 2022-12-20 PROCEDURE — 20600001 HC STEP DOWN PRIVATE ROOM

## 2022-12-20 RX ORDER — METOLAZONE 5 MG/1
10 TABLET ORAL ONCE
Status: COMPLETED | OUTPATIENT
Start: 2022-12-20 | End: 2022-12-20

## 2022-12-20 RX ADMIN — DOBUTAMINE HYDROCHLORIDE 5 MCG/KG/MIN: 400 INJECTION INTRAVENOUS at 02:12

## 2022-12-20 RX ADMIN — HEPARIN SODIUM 18 UNITS/KG/HR: 10000 INJECTION, SOLUTION INTRAVENOUS at 12:12

## 2022-12-20 RX ADMIN — HUMAN ALBUMIN MICROSPHERES AND PERFLUTREN 0.66 MG: 10; .22 INJECTION, SOLUTION INTRAVENOUS at 02:12

## 2022-12-20 RX ADMIN — AMIODARONE HYDROCHLORIDE 200 MG: 200 TABLET ORAL at 09:12

## 2022-12-20 RX ADMIN — HEPARIN SODIUM 18 UNITS/KG/HR: 10000 INJECTION, SOLUTION INTRAVENOUS at 10:12

## 2022-12-20 RX ADMIN — FUROSEMIDE 40 MG/HR: 10 INJECTION, SOLUTION INTRAMUSCULAR; INTRAVENOUS at 03:12

## 2022-12-20 RX ADMIN — METOLAZONE 10 MG: 5 TABLET ORAL at 12:12

## 2022-12-20 RX ADMIN — HEPARIN SODIUM 18 UNITS/KG/HR: 10000 INJECTION, SOLUTION INTRAVENOUS at 05:12

## 2022-12-20 RX ADMIN — FUROSEMIDE 40 MG/HR: 10 INJECTION, SOLUTION INTRAMUSCULAR; INTRAVENOUS at 05:12

## 2022-12-20 RX ADMIN — PANTOPRAZOLE SODIUM 40 MG: 40 TABLET, DELAYED RELEASE ORAL at 09:12

## 2022-12-20 NOTE — ASSESSMENT & PLAN NOTE
-NICM  -Transferred from [place] for ADHF and possible workup.  During hospital stay there; he was briefly on Levo and started on  and they were unable to wean  off.  Presented on  5  -Last 2D Echo 12/6/22: LVEF: 25% , LVEDD:  5.33 cm  -RHC: 12/16/22 RA: 15/16 (14), RV: 47/2 (14), PA: 47/23 (32), PWP: 22/30 (23), CO: 2.9, CI: 1.5  -Hypervolemic on examination today  -CVP 13 On IV Lasix gtt at 40 mg/hr, SVO2: 40 CO: 2.77, CI: 1.51, SVR: 1819.  Will dose Metolazone 10 this morning and consider evening dose if CVP remains elevated   -GDMT with home dose of Digoxin, Entresto 24-26. Was previously on Toprol 100mg QHS but holding in the setting of   -Patient is currently being evaluated for OHTx/VAD  -Heart failure pathway Step 4. Per Pulm recs, will obtain chest CT with contrast to further evaluate pulmonary nodules, but will wait until Cr improves.    -2g Na dietary restriction, 1500 mL fluid restriction, strict I/Os

## 2022-12-20 NOTE — PLAN OF CARE
Pt educated on fall risk overnight,pt remained free from falls/trauma/injury. Denies chest pain, SOB, palpitations, dizziness, pain, or discomfort. Plan of care reviewed with pt, all questions answered. Bed locked in lowest position, call bell within reach, no acute distress noted, will continue to monitor.        Problem: Adult Inpatient Plan of Care  Goal: Patient-Specific Goal (Individualized)  Outcome: Ongoing, Progressing     Problem: Adjustment to Illness (Heart Failure)  Goal: Optimal Coping  Outcome: Ongoing, Progressing     Problem: Cardiac Output Decreased (Heart Failure)  Goal: Optimal Cardiac Output  Outcome: Ongoing, Progressing     Problem: Dysrhythmia (Heart Failure)  Goal: Stable Heart Rate and Rhythm  Outcome: Ongoing, Progressing     Problem: Fluid Imbalance (Heart Failure)  Goal: Fluid Balance  Outcome: Ongoing, Progressing     Problem: Functional Ability Impaired (Heart Failure)  Goal: Optimal Functional Ability  Outcome: Ongoing, Progressing

## 2022-12-20 NOTE — SUBJECTIVE & OBJECTIVE
Interval History: NAEON. No complaints. Continues to diurese on Lasix infusion at 40mg/hr and  5.  He was given Metolazone 10 yesterday and is net negative 2.8L in the last 24 hours.  CVP: 13, SVo2: 40, CO: 2.77, CI: 1.51, SVR: 1819.  Will dose Metolazone 10 this am and recheck CVP, SVO2 and BMP at 1600.  Can re dose Metolazone 10 this pm if CVP remains up and renal function stable.     Continuous Infusions:   DOBUTamine IV infusion (non-titrating) 5 mcg/kg/min (12/19/22 2044)    furosemide (LASIX) 10 mg/mL infusion (non-titrating) 40 mg/hr (12/20/22 0531)    heparin (porcine) in D5W 18 Units/kg/hr (12/20/22 0010)     Scheduled Meds:   amiodarone  200 mg Oral Daily    digoxin  0.125 mg Oral Every other day    metOLazone  10 mg Oral Once    pantoprazole  40 mg Oral Daily     PRN Meds:acetaminophen, LIDOcaine HCL 10 mg/ml (1%), sodium chloride 0.9%, sodium chloride 0.9%    Review of patient's allergies indicates:  No Known Allergies  Objective:     Vital Signs (Most Recent):  Temp: 98.5 °F (36.9 °C) (12/20/22 0900)  Pulse: 94 (12/20/22 0900)  Resp: 18 (12/20/22 0900)  BP: 117/68 (12/20/22 0900)  SpO2: 99 % (12/20/22 0900) Vital Signs (24h Range):  Temp:  [97.9 °F (36.6 °C)-98.7 °F (37.1 °C)] 98.5 °F (36.9 °C)  Pulse:  [74-98] 94  Resp:  [17-18] 18  SpO2:  [96 %-99 %] 99 %  BP: ()/(58-82) 117/68     Patient Vitals for the past 72 hrs (Last 3 readings):   Weight   12/20/22 0745 70 kg (154 lb 5.2 oz)   12/19/22 0743 71.8 kg (158 lb 4.6 oz)   12/18/22 0746 72.1 kg (158 lb 15.2 oz)       Body mass index is 24.91 kg/m².      Intake/Output Summary (Last 24 hours) at 12/20/2022 1143  Last data filed at 12/20/2022 0454  Gross per 24 hour   Intake 720 ml   Output 3225 ml   Net -2505 ml         Hemodynamic Parameters:  CVP:  [13 mmHg] 13 mmHg    Telemetry: reviewed  Physical Exam  Vitals and nursing note reviewed.   Constitutional:       Appearance: Normal appearance.   HENT:      Head: Normocephalic.      Nose: Nose  normal.      Mouth/Throat:      Mouth: Mucous membranes are moist.   Neck:      Vascular: JVD present.      Comments: JVP elevated.   Cardiovascular:      Rate and Rhythm: Tachycardia present. Rhythm irregular.      Heart sounds: Murmur heard.     Gallop present.   Pulmonary:      Effort: Pulmonary effort is normal.   Abdominal:      General: Abdomen is flat. There is no distension.      Palpations: Abdomen is soft.   Musculoskeletal:         General: Swelling present. Normal range of motion.   Skin:     General: Skin is warm.      Capillary Refill: Capillary refill takes 2 to 3 seconds.   Neurological:      Mental Status: He is alert and oriented to person, place, and time.       Significant Labs:  CBC:  Recent Labs   Lab 12/18/22  0504 12/19/22  0450 12/20/22  0405   WBC 5.63 5.79 5.68   RBC 3.99* 4.08* 4.27*   HGB 11.2* 11.3* 12.0*   HCT 36.4* 37.4* 38.4*   * 114* 117*   MCV 91 92 90   MCH 28.1 27.7 28.1   MCHC 30.8* 30.2* 31.3*       BNP:  Recent Labs   Lab 12/14/22  0527 12/16/22  0711 12/19/22  0450   BNP 1,846* 1,542* 1,155*       CMP:  Recent Labs   Lab 12/14/22  0527 12/15/22  0551 12/16/22  0711 12/17/22  0521 12/18/22  0504 12/19/22  0450 12/20/22  0405      < > 80   < > 150* 123* 127*   CALCIUM 9.3   < > 9.9   < > 9.6 9.7 9.7   ALBUMIN 2.6*  --  4.0  --   --  4.0  --    PROT 5.0*  --  8.0  --   --  8.1  --       < > 139   < > 134* 134* 133*   K 3.7   < > 4.1   < > 4.0 3.8 4.1   CO2 31*   < > 35*   < > 32* 32* 31*   CL 96   < > 94*   < > 91* 91* 87*   BUN 29*   < > 42*   < > 50* 57* 62*   CREATININE 1.9*   < > 1.8*   < > 1.8* 2.1* 2.2*   ALKPHOS 149*  --  243*  --   --  207*  --    ALT 15  --  20  --   --  20  --    AST 13  --  22  --   --  20  --    BILITOT 2.2*  --  3.3*  --   --  2.8*  --     < > = values in this interval not displayed.        Coagulation:   Recent Labs   Lab 12/19/22  1135 12/19/22  1815 12/20/22  0405   APTT 61.7* 45.9* 62.4*       LDH:  No results for input(s):  LDH in the last 72 hours.    Microbiology:  Microbiology Results (last 7 days)       ** No results found for the last 168 hours. **            I have reviewed all pertinent labs within the past 24 hours.    Estimated Creatinine Clearance: 34.6 mL/min (A) (based on SCr of 2.2 mg/dL (H)).    Diagnostic Results:  I have reviewed all pertinent imaging results/findings within the past 24 hours.

## 2022-12-20 NOTE — PROGRESS NOTES
[facility] - [facility]iology Step[facility]  Heart Transplant  Progress Note    Patient Name: Migel Garcia  MRN: [id number]  Admission Date: 12/5/2022  Hospital Length of Stay: 15 days  Attending Physician: Sukhjinder Ball MD  Primary Care Provider: Anuj Banks MD  Principal Problem:Acute on chronic combined systolic and diastolic heart failure    Subjective:     Interval History: NAEON. No complaints. Continues to diurese on Lasix infusion at 40mg/hr and  5.  He was given Metolazone 10 yesterday and is net negative 2.8L in the last 24 hours.  CVP: 13, SVo2: 40, CO: 2.77, CI: 1.51, SVR: 1819.  Will dose Metolazone 10 this am and recheck CVP, SVO2 and BMP at 1600.  Can re dose Metolazone 10 this pm if CVP remains up and renal function stable.     Continuous Infusions:   DOBUTamine IV infusion (non-titrating) 5 mcg/kg/min (12/19/22 2044)    furosemide (LASIX) 10 mg/mL infusion (non-titrating) 40 mg/hr (12/20/22 0531)    heparin (porcine) in D5W 18 Units/kg/hr (12/20/22 0010)     Scheduled Meds:   amiodarone  200 mg Oral Daily    digoxin  0.125 mg Oral Every other day    metOLazone  10 mg Oral Once    pantoprazole  40 mg Oral Daily     PRN Meds:acetaminophen, LIDOcaine HCL 10 mg/ml (1%), sodium chloride 0.9%, sodium chloride 0.9%    Review of patient's allergies indicates:  No Known Allergies  Objective:     Vital Signs (Most Recent):  Temp: 98.5 °F (36.9 °C) (12/20/22 0900)  Pulse: 94 (12/20/22 0900)  Resp: 18 (12/20/22 0900)  BP: 117/68 (12/20/22 0900)  SpO2: 99 % (12/20/22 0900) Vital Signs (24h Range):  Temp:  [97.9 °F (36.6 °C)-98.7 °F (37.1 °C)] 98.5 °F (36.9 °C)  Pulse:  [74-98] 94  Resp:  [17-18] 18  SpO2:  [96 %-99 %] 99 %  BP: ()/(58-82) 117/68     Patient Vitals for the past 72 hrs (Last 3 readings):   Weight   12/20/22 0745 70 kg (154 lb 5.2 oz)   12/19/22 0743 71.8 kg (158 lb 4.6 oz)   12/18/22 0746 72.1 kg (158 lb 15.2 oz)       Body mass index is 24.91 kg/m².      Intake/Output Summary (Last 24  hours) at 12/20/2022 1143  Last data filed at 12/20/2022 0454  Gross per 24 hour   Intake 720 ml   Output 3225 ml   Net -2505 ml         Hemodynamic Parameters:  CVP:  [13 mmHg] 13 mmHg    Telemetry: reviewed  Physical Exam  Vitals and nursing note reviewed.   Constitutional:       Appearance: Normal appearance.   HENT:      Head: Normocephalic.      Nose: Nose normal.      Mouth/Throat:      Mouth: Mucous membranes are moist.   Neck:      Vascular: JVD present.      Comments: JVP elevated.   Cardiovascular:      Rate and Rhythm: Tachycardia present. Rhythm irregular.      Heart sounds: Murmur heard.     Gallop present.   Pulmonary:      Effort: Pulmonary effort is normal.   Abdominal:      General: Abdomen is flat. There is no distension.      Palpations: Abdomen is soft.   Musculoskeletal:         General: Swelling present. Normal range of motion.   Skin:     General: Skin is warm.      Capillary Refill: Capillary refill takes 2 to 3 seconds.   Neurological:      Mental Status: He is alert and oriented to person, place, and time.       Significant Labs:  CBC:  Recent Labs   Lab 12/18/22  0504 12/19/22  0450 12/20/22  0405   WBC 5.63 5.79 5.68   RBC 3.99* 4.08* 4.27*   HGB 11.2* 11.3* 12.0*   HCT 36.4* 37.4* 38.4*   * 114* 117*   MCV 91 92 90   MCH 28.1 27.7 28.1   MCHC 30.8* 30.2* 31.3*       BNP:  Recent Labs   Lab 12/14/22  0527 12/16/22  0711 12/19/22  0450   BNP 1,846* 1,542* 1,155*       CMP:  Recent Labs   Lab 12/14/22  0527 12/15/22  0551 12/16/22  0711 12/17/22  0521 12/18/22  0504 12/19/22  0450 12/20/22  0405      < > 80   < > 150* 123* 127*   CALCIUM 9.3   < > 9.9   < > 9.6 9.7 9.7   ALBUMIN 2.6*  --  4.0  --   --  4.0  --    PROT 5.0*  --  8.0  --   --  8.1  --       < > 139   < > 134* 134* 133*   K 3.7   < > 4.1   < > 4.0 3.8 4.1   CO2 31*   < > 35*   < > 32* 32* 31*   CL 96   < > 94*   < > 91* 91* 87*   BUN 29*   < > 42*   < > 50* 57* 62*   CREATININE 1.9*   < > 1.8*   < > 1.8*  2.1* 2.2*   ALKPHOS 149*  --  243*  --   --  207*  --    ALT 15  --  20  --   --  20  --    AST 13  --  22  --   --  20  --    BILITOT 2.2*  --  3.3*  --   --  2.8*  --     < > = values in this interval not displayed.        Coagulation:   Recent Labs   Lab 12/19/22  1135 12/19/22  1815 12/20/22  0405   APTT 61.7* 45.9* 62.4*       LDH:  No results for input(s): LDH in the last 72 hours.    Microbiology:  Microbiology Results (last 7 days)       ** No results found for the last 168 hours. **            I have reviewed all pertinent labs within the past 24 hours.    Estimated Creatinine Clearance: 34.6 mL/min (A) (based on SCr of 2.2 mg/dL (H)).    Diagnostic Results:  I have reviewed all pertinent imaging results/findings within the past 24 hours.    Assessment and Plan:     52 year old man with a history of non-ischemic cardiomyopathy with ICD placement, CKD 3, atrial [place] [facility] [place] on do[place]amine 5. Echo 12/6 with EF of 25%. Pathway for LVAD/heart transplant started      * Acute on chronic combined systolic and diastolic heart failure  -NICM  -Transferred from [place] for ADHF and possible workup.  During hospital stay there; he was briefly on Levo and started on  and they were unable to wean  off.  Presented on  5  -Last 2D Echo 12/6/22: LVEF: 25% , LVEDD:  5.33 cm  -RHC: 12/16/22 RA: 15/16 (14), RV: 47/2 (14), PA: 47/23 (32), PWP: 22/30 (23), CO: 2.9, CI: 1.5  -Hypervolemic on examination today  -CVP 13 On IV Lasix gtt at 40 mg/hr, SVO2: 40 CO: 2.77, CI: 1.51, SVR: 1819.  Will dose Metolazone 10 this morning and consider evening dose if CVP remains elevated   -GDMT with home dose of Digoxin, Entresto 24-26. Was previously on Toprol 100mg QHS but holding in the setting of   -Patient is currently being evaluated for OHTx/VAD  -Heart failure pathway Step 4. Per Pulm recs, will obtain chest CT with contrast to further evaluate pulmonary nodules, but will  wait until Cr improves.    -2g Na dietary restriction, 1500 mL fluid restriction, strict I/Os      Pulmonary nodule  -Questionable 8 mm left lower lobe pulmonary nodule series 6, image 314.  This courses along the left lower lobe pulmonary artery and pulmonary aneurysm not excluded.  Dedicated CT chest with contrast suggested  -Will plan for CT with contrast once Cr improves    CKD (chronic kidney disease), stage III  -Reported baseline 1.6-2.0  -Will monitor trend  -avoid nephrotoxins     Atrial fibrillation  -MPO2DA4-GOYz score is 2  -On Eliquis at home for anticoagulation  -Stopped on 12/5 in anticipation of workup and possible procedures  -On Lovenox for DVT prophylaxis  -continue amio/dig    HTN (hypertension)  Continue entresto    GERD (gastroesophageal reflux disease)  -Protonix 40mg Qdaily    Tobacco use  -counseled on cessation    ICD (implantable cardioverter-defibrillator), single, in situ  -[employer] ICD        JEFF Puentes  Heart Transplant  [facility] - [facility]iology Step[facility]

## 2022-12-20 NOTE — PLAN OF CARE
Problem: Physical Therapy  Goal: Physical Therapy Goal  Description: Goals to be met by: 22, revised: 22    Patient will increase functional independence with mobility by performin. Gait  x 300 feet with independence using LRAD as needed -met     Outcome: Met     Pt tolerated PT session well.  Pt is at his baseline, no need for skilled PT    All needs met, all questions answered.

## 2022-12-20 NOTE — PT/OT/SLP PROGRESS
Physical Therapy  Treatment D/C    Patient Name:  Migel Garcia   MRN:  [id number]    Recommendations:     Discharge Recommendations:  home   Discharge Equipment Recommendations: none   Barriers to discharge: none    Assessment:     Migel Garcia is a 52 y.o. male admitted with a medical diagnosis of Acute on chronic combined systolic and diastolic heart failure.  Pt is ambulating at his baseline and does not requires skilled PT at this time.      Impairments and functional limitations:  impaired cardiopulmonary response to activity.  These deficits affect their roles and responsibilities in which they were able to complete prior to admit.  Rehab Prognosis:   Good ; patient would benefit from acute skilled PT services  (n/a) to address these deficits, improve quality of life, focus on recovery of impairments, provide patient/caregiver education, reduce fall risk, and reach maximum level of function.  Pt is highly  motivated to participated in skilled PT.    Recent Surgery:   Procedure(s) (LRB):  INSERTION, CATHETER, RIGHT HEART (Right) 4 Days Post-Op    Plan:     During this hospitalization, patient to be seen  (n/a) to address the identified rehab impairments via  (n/a) and progress toward the following goals:    Plan of Care Expires:  12/20/22    Subjective     Chief Complaint: decreased tolerance to functional mobility  Patient/Family Comments/Goals: Return home  Pain/Comfort:  Pain Rating 1: 0/10    Objective:     Communicated with RN prior to session.  Patient found up in chair with telemetry, PICC line upon PT entry to room.     General Precautions: Standard, fall   Orthopedic Precautions:N/A   Braces: N/A  Oxygen Device:      Functional Mobility:  Bed Mobility:  Seated in chair at start of session and returned to chair  Head of bed position: n/a    Transfers:  Sit to Stand: IND with No AD    Gait: Patient ambulated 450' with No AD and IND. Patient demonstrates steady gait. All lines remained intact throughout  ambulation trial, mask donned for out of room ambulation.  Pt performs dynamic head turns with no LOB     Balance:   Position Score Time   Static Sitting GOOD: Takes MODERATE challenges n/a   Dynamic Sitting GOOD: Maintains balance through MODERATE excursions of active trunk motion n/a   Static Standing GOOD: Takes MODERATE challenges n/a   Dynamic Standing GOOD: Maintains balance through MODERATE excursions of active trunk motion n/a       AM-PAC 6 CLICK MOBILITY  Turning over in bed (including adjusting bedclothes, sheets and blankets)?: 4  Sitting down on and standing up from a chair with arms (e.g., wheelchair, bedside commode, etc.): 4  Moving from lying on back to sitting on the side of the bed?: 4  Moving to and from a bed to a chair (including a wheelchair)?: 4  Need to walk in hospital room?: 4  Climbing 3-5 steps with a railing?: 4  Basic Mobility Total Score: 24     Therapeutic Activities:  Patient educated on role of acute care PT and PT POC, safety while in hospital including calling nurse for mobility, call light usage, benefits of out of bed mobility, breathing technique, fall risk, posture, and benefits of continued PT by explanation.    Patient demonstrates excellent understanding of education provided this day.   Whiteboard updated  Pt to continue his IND walking program    Therapeutic Exercises:  n/a    Patient left up in chair with all lines intact, call button in reach, and family present.    GOALS:   Multidisciplinary Problems       Physical Therapy Goals       Not on file              Multidisciplinary Problems (Resolved)          Problem: Physical Therapy    Goal Priority Disciplines Outcome Goal Variances Interventions   Physical Therapy Goal   (Resolved)     PT, PT/OT Met     Description: Goals to be met by: 22, revised: 22    Patient will increase functional independence with mobility by performin. Gait  x 300 feet with independence using LRAD as needed -met                           Time Tracking:     PT Received On: 12/20/22  PT Start Time: 0850     PT Stop Time: 0904  PT Total Time (min): 14 min     Billable Minutes: Gait Training 14    Treatment Type: Treatment  PT/PTA: PT     PTA Visit Number: 0     12/20/2022

## 2022-12-20 NOTE — NURSING
Patient identified by 2 identifiers. Denies previous reactions to blood transfusions, allergies reviewed & procedure explained.  Triple lumen PICC IV in place to RA, flushed w/ 10cc NS pre & post contrast administration.  3cc Optison administered, echo images obtained.  Pt tolerated procedure well.

## 2022-12-21 LAB
ALBUMIN SERPL BCP-MCNC: 4.2 G/DL (ref 3.5–5.2)
ALLENS TEST: ABNORMAL
ALLENS TEST: ABNORMAL
ALP SERPL-CCNC: 238 U/L (ref 55–135)
ALT SERPL W/O P-5'-P-CCNC: 18 U/L (ref 10–44)
ANION GAP SERPL CALC-SCNC: 16 MMOL/L (ref 8–16)
APTT BLDCRRT: 63.5 SEC (ref 21–32)
AST SERPL-CCNC: 21 U/L (ref 10–40)
BASOPHILS # BLD AUTO: 0.05 K/UL (ref 0–0.2)
BASOPHILS NFR BLD: 1 % (ref 0–1.9)
BILIRUB DIRECT SERPL-MCNC: 2 MG/DL (ref 0.1–0.3)
BILIRUB SERPL-MCNC: 3.5 MG/DL (ref 0.1–1)
BIPAP: 0
BNP SERPL-MCNC: 1048 PG/ML (ref 0–99)
BUN SERPL-MCNC: 73 MG/DL (ref 6–20)
CALCIUM SERPL-MCNC: 10.2 MG/DL (ref 8.7–10.5)
CHLORIDE SERPL-SCNC: 85 MMOL/L (ref 95–110)
CO2 SERPL-SCNC: 31 MMOL/L (ref 23–29)
CREAT SERPL-MCNC: 2.6 MG/DL (ref 0.5–1.4)
DELSYS: ABNORMAL
DELSYS: ABNORMAL
DIFFERENTIAL METHOD: ABNORMAL
EOSINOPHIL # BLD AUTO: 0.2 K/UL (ref 0–0.5)
EOSINOPHIL NFR BLD: 3.7 % (ref 0–8)
ERYTHROCYTE [DISTWIDTH] IN BLOOD BY AUTOMATED COUNT: 20.7 % (ref 11.5–14.5)
EST. GFR  (NO RACE VARIABLE): 28.4 ML/MIN/1.73 M^2
FIO2: 36 %
GLUCOSE SERPL-MCNC: 93 MG/DL (ref 70–110)
HCO3 UR-SCNC: 36.4 MMOL/L (ref 24–28)
HCO3 UR-SCNC: 39 MMOL/L (ref 24–28)
HCT VFR BLD AUTO: 41.1 % (ref 40–54)
HGB BLD-MCNC: 12.6 G/DL (ref 14–18)
IMM GRANULOCYTES # BLD AUTO: 0.05 K/UL (ref 0–0.04)
IMM GRANULOCYTES NFR BLD AUTO: 1 % (ref 0–0.5)
LEFT AXILLARY ARTERY: 0.52 CM/S
LEFT DIST SUBCLAVIAN ARTERY: 0.62 CM
LEFT MID SUBCLAVIAN ARTERY: 0.62 CM/S
LEFT PROX SUBCLAVIAN ARTERY: 0.64 CM/S
LYMPHOCYTES # BLD AUTO: 1.2 K/UL (ref 1–4.8)
LYMPHOCYTES NFR BLD: 23.9 % (ref 18–48)
MAGNESIUM SERPL-MCNC: 2.6 MG/DL (ref 1.6–2.6)
MCH RBC QN AUTO: 27.4 PG (ref 27–31)
MCHC RBC AUTO-ENTMCNC: 30.7 G/DL (ref 32–36)
MCV RBC AUTO: 89 FL (ref 82–98)
MONOCYTES # BLD AUTO: 0.6 K/UL (ref 0.3–1)
MONOCYTES NFR BLD: 11.8 % (ref 4–15)
NEUTROPHILS # BLD AUTO: 3.1 K/UL (ref 1.8–7.7)
NEUTROPHILS NFR BLD: 58.6 % (ref 38–73)
NRBC BLD-RTO: 0 /100 WBC
PCO2 BLDA: 60.6 MMHG (ref 35–45)
PCO2 BLDA: 65.5 MMHG (ref 35–45)
PH SMN: 7.35 [PH] (ref 7.35–7.45)
PH SMN: 7.42 [PH] (ref 7.35–7.45)
PLATELET # BLD AUTO: 115 K/UL (ref 150–450)
PMV BLD AUTO: 11 FL (ref 9.2–12.9)
PO2 BLDA: 28 MMHG (ref 40–60)
PO2 BLDA: 29 MMHG (ref 40–60)
POC BE: 11 MMOL/L
POC BE: 14 MMOL/L
POC METHB: 0.6 % (ref 0–3)
POC PERFORMED BY: NORMAL
POC SATURATED O2: 46 % (ref 95–100)
POC SATURATED O2: 54 % (ref 95–100)
POC SVO2: 54 %
POC TCO2: 38 MMOL/L (ref 24–29)
POC TCO2: 41 MMOL/L (ref 24–29)
POC TEMPERATURE: 37 C
POTASSIUM SERPL-SCNC: 4.1 MMOL/L (ref 3.5–5.1)
PROT SERPL-MCNC: 8.7 G/DL (ref 6–8.4)
RBC # BLD AUTO: 4.6 M/UL (ref 4.6–6.2)
RIGHT AXILLARY ARTERY MAPPING: 0.5 CM
RIGHT DIST SUBCLAVIAN ARTERY: 0.52 CM
RIGHT MID SUBCLAVIAN ARTERY: 0.71 CM
RIGHT PROX SUBCLAVIAN ARTERY: 0.78 CM
SAMPLE: ABNORMAL
SAMPLE: ABNORMAL
SITE: ABNORMAL
SITE: ABNORMAL
SODIUM SERPL-SCNC: 132 MMOL/L (ref 136–145)
SPECIMEN SOURCE: NORMAL
UPPER ARTERIAL LEFT ARM AXILLARY SYS MAX: 88 CM/S
UPPER ARTERIAL LEFT ARM SUBCLAVIAN SYS MAX: 92 CM/S
UPPER ARTERIAL RIGHT ARM AXILLARY SYS MAX: 80 CM/S
UPPER ARTERIAL RIGHT ARM SUBCLAVIAN SYS MAX: 88 CM/S
WBC # BLD AUTO: 5.19 K/UL (ref 3.9–12.7)

## 2022-12-21 PROCEDURE — 63600175 PHARM REV CODE 636 W HCPCS: Performed by: PHYSICIAN ASSISTANT

## 2022-12-21 PROCEDURE — 85730 THROMBOPLASTIN TIME PARTIAL: CPT | Performed by: INTERNAL MEDICINE

## 2022-12-21 PROCEDURE — 25000003 PHARM REV CODE 250: Performed by: INTERNAL MEDICINE

## 2022-12-21 PROCEDURE — 85025 COMPLETE CBC W/AUTO DIFF WBC: CPT | Performed by: PHYSICIAN ASSISTANT

## 2022-12-21 PROCEDURE — 80048 BASIC METABOLIC PNL TOTAL CA: CPT | Performed by: PHYSICIAN ASSISTANT

## 2022-12-21 PROCEDURE — 63600175 PHARM REV CODE 636 W HCPCS: Performed by: INTERNAL MEDICINE

## 2022-12-21 PROCEDURE — 83735 ASSAY OF MAGNESIUM: CPT | Performed by: PHYSICIAN ASSISTANT

## 2022-12-21 PROCEDURE — 80076 HEPATIC FUNCTION PANEL: CPT | Performed by: PHYSICIAN ASSISTANT

## 2022-12-21 PROCEDURE — 25000003 PHARM REV CODE 250: Performed by: STUDENT IN AN ORGANIZED HEALTH CARE EDUCATION/TRAINING PROGRAM

## 2022-12-21 PROCEDURE — 27000221 HC OXYGEN, UP TO 24 HOURS

## 2022-12-21 PROCEDURE — 99900035 HC TECH TIME PER 15 MIN (STAT)

## 2022-12-21 PROCEDURE — 63600367 HC NITRIC OXIDE PER HOUR

## 2022-12-21 PROCEDURE — 20000000 HC ICU ROOM

## 2022-12-21 PROCEDURE — 83880 ASSAY OF NATRIURETIC PEPTIDE: CPT | Performed by: PHYSICIAN ASSISTANT

## 2022-12-21 PROCEDURE — 99233 SBSQ HOSP IP/OBS HIGH 50: CPT | Mod: FS,,, | Performed by: INTERNAL MEDICINE

## 2022-12-21 PROCEDURE — 94761 N-INVAS EAR/PLS OXIMETRY MLT: CPT

## 2022-12-21 PROCEDURE — 82803 BLOOD GASES ANY COMBINATION: CPT

## 2022-12-21 PROCEDURE — 99233 PR SUBSEQUENT HOSPITAL CARE,LEVL III: ICD-10-PCS | Mod: FS,,, | Performed by: INTERNAL MEDICINE

## 2022-12-21 RX ORDER — METOLAZONE 5 MG/1
10 TABLET ORAL ONCE
Status: COMPLETED | OUTPATIENT
Start: 2022-12-21 | End: 2022-12-21

## 2022-12-21 RX ORDER — TALC
9 POWDER (GRAM) TOPICAL NIGHTLY PRN
Status: DISCONTINUED | OUTPATIENT
Start: 2022-12-21 | End: 2022-12-23 | Stop reason: HOSPADM

## 2022-12-21 RX ADMIN — AMIODARONE HYDROCHLORIDE 200 MG: 200 TABLET ORAL at 09:12

## 2022-12-21 RX ADMIN — DOBUTAMINE HYDROCHLORIDE 5 MCG/KG/MIN: 400 INJECTION INTRAVENOUS at 09:12

## 2022-12-21 RX ADMIN — HEPARIN SODIUM 18 UNITS/KG/HR: 10000 INJECTION, SOLUTION INTRAVENOUS at 07:12

## 2022-12-21 RX ADMIN — DIGOXIN 0.12 MG: 125 TABLET ORAL at 09:12

## 2022-12-21 RX ADMIN — Medication 0.04 MCG/KG/MIN: at 05:12

## 2022-12-21 RX ADMIN — PANTOPRAZOLE SODIUM 40 MG: 40 TABLET, DELAYED RELEASE ORAL at 09:12

## 2022-12-21 RX ADMIN — FUROSEMIDE 40 MG/HR: 10 INJECTION, SOLUTION INTRAMUSCULAR; INTRAVENOUS at 01:12

## 2022-12-21 RX ADMIN — METOLAZONE 10 MG: 5 TABLET ORAL at 10:12

## 2022-12-21 NOTE — NURSING
Patient transferred to CICU 3077 for higher level of care. Patient in agreement with transfer. Heparin gtt, lasix gtt and dobutamine gtt continue to infuse at same rate. Report call to Steve VALENCIA.

## 2022-12-21 NOTE — PLAN OF CARE
Problem: Adult Inpatient Plan of Care  Goal: Patient-Specific Goal (Individualized)  Outcome: Ongoing, Progressing     Problem: Adjustment to Illness (Heart Failure)  Goal: Optimal Coping  Outcome: Ongoing, Progressing     Problem: Cardiac Output Decreased (Heart Failure)  Goal: Optimal Cardiac Output  Outcome: Ongoing, Progressing     Problem: Dysrhythmia (Heart Failure)  Goal: Stable Heart Rate and Rhythm  Outcome: Ongoing, Progressing     Problem: Fluid Imbalance (Heart Failure)  Goal: Fluid Balance  Outcome: Ongoing, Progressing     Problem: Oral Intake Inadequate (Heart Failure)  Goal: Optimal Nutrition Intake  Outcome: Ongoing, Progressing     Problem: Respiratory Compromise (Heart Failure)  Goal: Effective Oxygenation and Ventilation  Outcome: Ongoing, Progressing     Problem: Sleep Disordered Breathing (Heart Failure)  Goal: Effective Breathing Pattern During Sleep  Outcome: Ongoing, Progressing     Problem: Cardiac Output Decreased  Goal: Effective Cardiac Output  Outcome: Ongoing, Progressing

## 2022-12-21 NOTE — PROGRESS NOTES
Update    Pt presents as AAO x4, calm, cooperative, and asking and answering questions appropriately, caregivers not present. Pt states he is doing well at this time and that the team is trying to get the fluid off of him. LCSW offered support and encouragement. No additional needs or questions were addressed during visit. HTS LCSW went back to room a second time to speak to PT, Wife, and Son over the phone to discuss moving Pt to ICU. LCSW offered additional support to Pt and family. SW providing ongoing psychosocial, counseling, & emotional support, education, resources, assistance, and discharge planning as indicated.  SW to continue to follow.

## 2022-12-21 NOTE — ASSESSMENT & PLAN NOTE
-SHAUN on CKD III  -Reported baseline 1.6-2.0  -Renal function worsening today on Lasix infusion and .  Plan to move to ICU for Epi and Laura  -avoid nephrotoxins

## 2022-12-21 NOTE — ASSESSMENT & PLAN NOTE
-NICM  -Transferred from [place] for ADHF and possible workup.  During hospital stay there; he was briefly on Levo and started on  and they were unable to wean  off.  Presented on  5  -Last 2D Echo 12/6/22: LVEF: 25% , LVEDD:  5.33 cm  -RHC: 12/16/22 RA: 15/16 (14), RV: 47/2 (14), PA: 47/23 (32), PWP: 22/30 (23), CO: 2.9, CI: 1.5  -Hypervolemic on examination today  -CVP 14 On IV Lasix gtt at 40 mg/hr and Metolazone 10, SVO2: 54 CO: 3.45, CI: 1.91, SVR: 1669. Renal function worsening so plan to move to ICU with Epi and Laura. Awaiting patient to agree to decision   -GDMT with home dose of Digoxin, Entresto 24-26. Was previously on Toprol 100mg QHS but holding in the setting of   -Patient is currently being evaluated for OHTx/VAD  -Heart failure pathway Step 4. Per Pulm recs, will obtain chest CT with contrast to further evaluate pulmonary nodules, but will wait until Cr improves.    -2g Na dietary restriction, 1500 mL fluid restriction, strict I/Os

## 2022-12-21 NOTE — SUBJECTIVE & OBJECTIVE
Interval History: NAEON. No complaints. Continues to diurese on Lasix infusion at 40mg/hr and  5.  He was given Metolazone 10 yesterday with plans to redose in pm if CVP was up but it was 10 so he did not get pm dose.  He is net negative 3L in the last 24 hours.  CVP: 14, SVo2: 54, CO: 3.45, CI: 1.91, SVR: 1669.  He continues to be negative 2-3L a day on current regimen and CVP continues to maintain at 14.  His renal function is worsening and creatinine is 2.6 today.  Plan to move him to ICU for Epi and Laura for RV support.  Upon discussing this with patient; he was not sure he wanted to do that.  Had a long conversation with patient, wife and son about the fact that he is worsening on current regimen and needs more support.  They feel like the medications are causing his renal function to worsen.  Educated that CI is low and blood flow to the kidneys is compromised due to end stage heart failure.  The patient and family needed time to make decision.  I have spoken to them at length multiple times and they are unclear of what they want to do.  Orders placed to move to ICU for Epi and Laura and they will make a decision by 3:00.      Continuous Infusions:   DOBUTamine IV infusion (non-titrating) 5 mcg/kg/min (12/21/22 0918)    furosemide (LASIX) 10 mg/mL infusion (non-titrating) 40 mg/hr (12/21/22 1345)    heparin (porcine) in D5W 18 Units/kg/hr (12/20/22 2237)     Scheduled Meds:   amiodarone  200 mg Oral Daily    digoxin  0.125 mg Oral Every other day    pantoprazole  40 mg Oral Daily     PRN Meds:acetaminophen, LIDOcaine HCL 10 mg/ml (1%), sodium chloride 0.9%, sodium chloride 0.9%    Review of patient's allergies indicates:  No Known Allergies  Objective:     Vital Signs (Most Recent):  Temp: 98.1 °F (36.7 °C) (12/21/22 1207)  Pulse: 106 (12/21/22 1212)  Resp: 18 (12/21/22 1207)  BP: 113/86 (12/21/22 1207)  SpO2: 99 % (12/21/22 1207) Vital Signs (24h Range):  Temp:  [97.6 °F (36.4 °C)-99 °F (37.2 °C)] 98.1 °F  (36.7 °C)  Pulse:  [] 106  Resp:  [18-20] 18  SpO2:  [96 %-99 %] 99 %  BP: (107-129)/(60-86) 113/86     Patient Vitals for the past 72 hrs (Last 3 readings):   Weight   12/21/22 0728 68.7 kg (151 lb 7.3 oz)   12/20/22 0745 70 kg (154 lb 5.2 oz)   12/19/22 0743 71.8 kg (158 lb 4.6 oz)       Body mass index is 24.45 kg/m².      Intake/Output Summary (Last 24 hours) at 12/21/2022 1439  Last data filed at 12/21/2022 1011  Gross per 24 hour   Intake 1078 ml   Output 4300 ml   Net -3222 ml         Hemodynamic Parameters:  CVP:  [10 mmHg-14 mmHg] 14 mmHg    Telemetry: reviewed  Physical Exam  Vitals and nursing note reviewed.   Constitutional:       Appearance: Normal appearance.   HENT:      Head: Normocephalic.      Nose: Nose normal.      Mouth/Throat:      Mouth: Mucous membranes are moist.   Neck:      Vascular: JVD present.      Comments: JVP elevated.   Cardiovascular:      Rate and Rhythm: Tachycardia present. Rhythm irregular.      Heart sounds: Murmur heard.     Gallop present.   Pulmonary:      Effort: Pulmonary effort is normal.   Abdominal:      General: Abdomen is flat. There is no distension.      Palpations: Abdomen is soft.   Musculoskeletal:         General: Swelling present. Normal range of motion.   Skin:     General: Skin is warm.      Capillary Refill: Capillary refill takes 2 to 3 seconds.   Neurological:      Mental Status: He is alert and oriented to person, place, and time.       Significant Labs:  CBC:  Recent Labs   Lab 12/19/22  0450 12/20/22  0405 12/21/22  0444   WBC 5.79 5.68 5.19   RBC 4.08* 4.27* 4.60   HGB 11.3* 12.0* 12.6*   HCT 37.4* 38.4* 41.1   * 117* 115*   MCV 92 90 89   MCH 27.7 28.1 27.4   MCHC 30.2* 31.3* 30.7*       BNP:  Recent Labs   Lab 12/16/22  0711 12/19/22  0450 12/21/22  0444   BNP 1,542* 1,155* 1,048*       CMP:  Recent Labs   Lab 12/16/22  0711 12/17/22  0521 12/19/22  0450 12/20/22  0405 12/20/22  1600 12/21/22  0444   GLU 80   < > 123* 127* 79 93    CALCIUM 9.9   < > 9.7 9.7 10.0 10.2   ALBUMIN 4.0  --  4.0  --   --  4.2   PROT 8.0  --  8.1  --   --  8.7*      < > 134* 133* 133* 132*   K 4.1   < > 3.8 4.1 4.2 4.1   CO2 35*   < > 32* 31* 31* 31*   CL 94*   < > 91* 87* 87* 85*   BUN 42*   < > 57* 62* 65* 73*   CREATININE 1.8*   < > 2.1* 2.2* 2.3* 2.6*   ALKPHOS 243*  --  207*  --   --  238*   ALT 20  --  20  --   --  18   AST 22  --  20  --   --  21   BILITOT 3.3*  --  2.8*  --   --  3.5*    < > = values in this interval not displayed.        Coagulation:   Recent Labs   Lab 12/19/22  1815 12/20/22  0405 12/21/22  0444   APTT 45.9* 62.4* 63.5*       LDH:  No results for input(s): LDH in the last 72 hours.    Microbiology:  Microbiology Results (last 7 days)       ** No results found for the last 168 hours. **            I have reviewed all pertinent labs within the past 24 hours.    Estimated Creatinine Clearance: 29.3 mL/min (A) (based on SCr of 2.6 mg/dL (H)).    Diagnostic Results:  I have reviewed all pertinent imaging results/findings within the past 24 hours.

## 2022-12-21 NOTE — PROGRESS NOTES
[facility] - [facility]iology Step[facility]  Heart Transplant  Progress Note    Patient Name: Migel Garcia  MRN: [id number]  Admission Date: 12/5/2022  Hospital Length of Stay: 16 days  Attending Physician: Sukhjinder Ball MD  Primary Care Provider: Anuj Banks MD  Principal Problem:Acute on chronic combined systolic and diastolic heart failure    Subjective:     Interval History: NAEON. No complaints. Continues to diurese on Lasix infusion at 40mg/hr and  5.  He was given Metolazone 10 yesterday with plans to redose in pm if CVP was up but it was 10 so he did not get pm dose.  He is net negative 3L in the last 24 hours.  CVP: 14, SVo2: 54, CO: 3.45, CI: 1.91, SVR: 1669.  He continues to be negative 2-3L a day on current regimen and CVP continues to maintain at 14.  His renal function is worsening and creatinine is 2.6 today.  Plan to move him to ICU for Epi and Laura for RV support.  Upon discussing this with patient; he was not sure he wanted to do that.  Had a long conversation with patient, wife and son about the fact that he is worsening on current regimen and needs more support.  They feel like the medications are causing his renal function to worsen.  Educated that CI is low and blood flow to the kidneys is compromised due to end stage heart failure.  The patient and family needed time to make decision.  I have spoken to them at length multiple times and they are unclear of what they want to do.  Orders placed to move to ICU for Epi and Laura and they will make a decision by 3:00.      Continuous Infusions:   DOBUTamine IV infusion (non-titrating) 5 mcg/kg/min (12/21/22 0918)    furosemide (LASIX) 10 mg/mL infusion (non-titrating) 40 mg/hr (12/21/22 1345)    heparin (porcine) in D5W 18 Units/kg/hr (12/20/22 2547)     Scheduled Meds:   amiodarone  200 mg Oral Daily    digoxin  0.125 mg Oral Every other day    pantoprazole  40 mg Oral Daily     PRN Meds:acetaminophen, LIDOcaine HCL 10 mg/ml (1%), sodium chloride  0.9%, sodium chloride 0.9%    Review of patient's allergies indicates:  No Known Allergies  Objective:     Vital Signs (Most Recent):  Temp: 98.1 °F (36.7 °C) (12/21/22 1207)  Pulse: 106 (12/21/22 1212)  Resp: 18 (12/21/22 1207)  BP: 113/86 (12/21/22 1207)  SpO2: 99 % (12/21/22 1207) Vital Signs (24h Range):  Temp:  [97.6 °F (36.4 °C)-99 °F (37.2 °C)] 98.1 °F (36.7 °C)  Pulse:  [] 106  Resp:  [18-20] 18  SpO2:  [96 %-99 %] 99 %  BP: (107-129)/(60-86) 113/86     Patient Vitals for the past 72 hrs (Last 3 readings):   Weight   12/21/22 0728 68.7 kg (151 lb 7.3 oz)   12/20/22 0745 70 kg (154 lb 5.2 oz)   12/19/22 0743 71.8 kg (158 lb 4.6 oz)       Body mass index is 24.45 kg/m².      Intake/Output Summary (Last 24 hours) at 12/21/2022 1439  Last data filed at 12/21/2022 1011  Gross per 24 hour   Intake 1078 ml   Output 4300 ml   Net -3222 ml         Hemodynamic Parameters:  CVP:  [10 mmHg-14 mmHg] 14 mmHg    Telemetry: reviewed  Physical Exam  Vitals and nursing note reviewed.   Constitutional:       Appearance: Normal appearance.   HENT:      Head: Normocephalic.      Nose: Nose normal.      Mouth/Throat:      Mouth: Mucous membranes are moist.   Neck:      Vascular: JVD present.      Comments: JVP elevated.   Cardiovascular:      Rate and Rhythm: Tachycardia present. Rhythm irregular.      Heart sounds: Murmur heard.     Gallop present.   Pulmonary:      Effort: Pulmonary effort is normal.   Abdominal:      General: Abdomen is flat. There is no distension.      Palpations: Abdomen is soft.   Musculoskeletal:         General: Swelling present. Normal range of motion.   Skin:     General: Skin is warm.      Capillary Refill: Capillary refill takes 2 to 3 seconds.   Neurological:      Mental Status: He is alert and oriented to person, place, and time.       Significant Labs:  CBC:  Recent Labs   Lab 12/19/22  0450 12/20/22  0405 12/21/22  0444   WBC 5.79 5.68 5.19   RBC 4.08* 4.27* 4.60   HGB 11.3* 12.0* 12.6*    HCT 37.4* 38.4* 41.1   * 117* 115*   MCV 92 90 89   MCH 27.7 28.1 27.4   MCHC 30.2* 31.3* 30.7*       BNP:  Recent Labs   Lab 12/16/22  0711 12/19/22  0450 12/21/22  0444   BNP 1,542* 1,155* 1,048*       CMP:  Recent Labs   Lab 12/16/22  0711 12/17/22  0521 12/19/22  0450 12/20/22  0405 12/20/22  1600 12/21/22  0444   GLU 80   < > 123* 127* 79 93   CALCIUM 9.9   < > 9.7 9.7 10.0 10.2   ALBUMIN 4.0  --  4.0  --   --  4.2   PROT 8.0  --  8.1  --   --  8.7*      < > 134* 133* 133* 132*   K 4.1   < > 3.8 4.1 4.2 4.1   CO2 35*   < > 32* 31* 31* 31*   CL 94*   < > 91* 87* 87* 85*   BUN 42*   < > 57* 62* 65* 73*   CREATININE 1.8*   < > 2.1* 2.2* 2.3* 2.6*   ALKPHOS 243*  --  207*  --   --  238*   ALT 20  --  20  --   --  18   AST 22  --  20  --   --  21   BILITOT 3.3*  --  2.8*  --   --  3.5*    < > = values in this interval not displayed.        Coagulation:   Recent Labs   Lab 12/19/22  1815 12/20/22  0405 12/21/22  0444   APTT 45.9* 62.4* 63.5*       LDH:  No results for input(s): LDH in the last 72 hours.    Microbiology:  Microbiology Results (last 7 days)       ** No results found for the last 168 hours. **            I have reviewed all pertinent labs within the past 24 hours.    Estimated Creatinine Clearance: 29.3 mL/min (A) (based on SCr of 2.6 mg/dL (H)).    Diagnostic Results:  I have reviewed all pertinent imaging results/findings within the past 24 hours.    Assessment and Plan:     52 year old man with a history of non-ischemic cardiomyopathy with ICD placement, CKD 3, atrial [place] [facility] [place], on do[place]amine 5. Echo 12/6 with EF of 25%. Pathway for LVAD/heart transplant started      * Acute on chronic combined systolic and diastolic heart failure  -NICM  -Transferred from [place] for ADHF and possible workup.  During hospital stay there; he was briefly on Levo and started on  and they were unable to wean  off.  Presented on  5  -Last 2D Echo  12/6/22: LVEF: 25% , LVEDD:  5.33 cm  -RHC: 12/16/22 RA: 15/16 (14), RV: 47/2 (14), PA: 47/23 (32), PWP: 22/30 (23), CO: 2.9, CI: 1.5  -Hypervolemic on examination today  -CVP 14 On IV Lasix gtt at 40 mg/hr and Metolazone 10, SVO2: 54 CO: 3.45, CI: 1.91, SVR: 1669. Renal function worsening so plan to move to ICU with Epi and Laura. Awaiting patient to agree to decision   -GDMT with home dose of Digoxin, Entresto 24-26. Was previously on Toprol 100mg QHS but holding in the setting of   -Patient is currently being evaluated for OHTx/VAD  -Heart failure pathway Step 4. Per Pulm recs, will obtain chest CT with contrast to further evaluate pulmonary nodules, but will wait until Cr improves.    -2g Na dietary restriction, 1500 mL fluid restriction, strict I/Os      Pulmonary nodule  -Questionable 8 mm left lower lobe pulmonary nodule series 6, image 314.  This courses along the left lower lobe pulmonary artery and pulmonary aneurysm not excluded.  Dedicated CT chest with contrast suggested  -Will plan for CT with contrast once Cr improves    CKD (chronic kidney disease), stage III  -SHAUN on CKD III  -Reported baseline 1.6-2.0  -Renal function worsening today on Lasix infusion and .  Plan to move to ICU for Epi and Laura  -avoid nephrotoxins     Atrial fibrillation  -[id number]-TOMd score is 2  -On Eliquis at home for anticoagulation  -Stopped on 12/5 in anticipation of workup and possible procedures  -On Lovenox for DVT prophylaxis  -continue amio/dig    HTN (hypertension)  Continue entresto    GERD (gastroesophageal reflux disease)  -Protonix 40mg Qdaily    Tobacco use  -counseled on cessation    ICD (implantable cardioverter-defibrillator), single, in situ  -[employer] ICD      JEFF Puentes  Heart Transplant  [facility] - Cardiology Stepdown

## 2022-12-22 LAB
ALLENS TEST: ABNORMAL
ALLENS TEST: ABNORMAL
ANION GAP SERPL CALC-SCNC: 13 MMOL/L (ref 8–16)
APTT BLDCRRT: 63.6 SEC (ref 21–32)
BASOPHILS # BLD AUTO: 0.04 K/UL (ref 0–0.2)
BASOPHILS NFR BLD: 0.6 % (ref 0–1.9)
BUN SERPL-MCNC: 80 MG/DL (ref 6–20)
CALCIUM SERPL-MCNC: 9.4 MG/DL (ref 8.7–10.5)
CHLORIDE SERPL-SCNC: 82 MMOL/L (ref 95–110)
CO2 SERPL-SCNC: 29 MMOL/L (ref 23–29)
COTININE SERPL-MCNC: <3 NG/ML
CREAT SERPL-MCNC: 2.6 MG/DL (ref 0.5–1.4)
DIFFERENTIAL METHOD: ABNORMAL
DIGOXIN SERPL-MCNC: 0.9 NG/ML (ref 0.8–2)
EOSINOPHIL # BLD AUTO: 0.1 K/UL (ref 0–0.5)
EOSINOPHIL NFR BLD: 1.9 % (ref 0–8)
ERYTHROCYTE [DISTWIDTH] IN BLOOD BY AUTOMATED COUNT: 19.9 % (ref 11.5–14.5)
EST. GFR  (NO RACE VARIABLE): 28.4 ML/MIN/1.73 M^2
GLUCOSE SERPL-MCNC: 356 MG/DL (ref 70–110)
HCO3 UR-SCNC: 36.8 MMOL/L (ref 24–28)
HCO3 UR-SCNC: 39.6 MMOL/L (ref 24–28)
HCT VFR BLD AUTO: 39 % (ref 40–54)
HGB BLD-MCNC: 12.2 G/DL (ref 14–18)
IMM GRANULOCYTES # BLD AUTO: 0.06 K/UL (ref 0–0.04)
IMM GRANULOCYTES NFR BLD AUTO: 1 % (ref 0–0.5)
LACTATE SERPL-SCNC: 0.8 MMOL/L (ref 0.5–2.2)
LYMPHOCYTES # BLD AUTO: 1.2 K/UL (ref 1–4.8)
LYMPHOCYTES NFR BLD: 18.3 % (ref 18–48)
MAGNESIUM SERPL-MCNC: 2.5 MG/DL (ref 1.6–2.6)
MCH RBC QN AUTO: 27.6 PG (ref 27–31)
MCHC RBC AUTO-ENTMCNC: 31.3 G/DL (ref 32–36)
MCV RBC AUTO: 88 FL (ref 82–98)
METHEMOGLOBIN: 0.6 % (ref 0–3)
MONOCYTES # BLD AUTO: 0.7 K/UL (ref 0.3–1)
MONOCYTES NFR BLD: 11 % (ref 4–15)
NEUTROPHILS # BLD AUTO: 4.2 K/UL (ref 1.8–7.7)
NEUTROPHILS NFR BLD: 67.2 % (ref 38–73)
NICOTINE SERPL-MCNC: <3 NG/ML
NRBC BLD-RTO: 0 /100 WBC
PCO2 BLDA: 62.7 MMHG (ref 35–45)
PCO2 BLDA: 62.9 MMHG (ref 35–45)
PH SMN: 7.38 [PH] (ref 7.35–7.45)
PH SMN: 7.41 [PH] (ref 7.35–7.45)
PLATELET # BLD AUTO: 115 K/UL (ref 150–450)
PMV BLD AUTO: 11.3 FL (ref 9.2–12.9)
PO2 BLDA: 25 MMHG (ref 40–60)
PO2 BLDA: 29 MMHG (ref 40–60)
POC BE: 12 MMOL/L
POC BE: 15 MMOL/L
POC SATURATED O2: 42 % (ref 95–100)
POC SATURATED O2: 53 % (ref 95–100)
POC TCO2: 39 MMOL/L (ref 24–29)
POC TCO2: 42 MMOL/L (ref 24–29)
POCT GLUCOSE: 144 MG/DL (ref 70–110)
POCT GLUCOSE: 150 MG/DL (ref 70–110)
POCT GLUCOSE: 204 MG/DL (ref 70–110)
POCT GLUCOSE: 352 MG/DL (ref 70–110)
POTASSIUM SERPL-SCNC: 3.8 MMOL/L (ref 3.5–5.1)
RBC # BLD AUTO: 4.42 M/UL (ref 4.6–6.2)
SAMPLE: ABNORMAL
SAMPLE: ABNORMAL
SITE: ABNORMAL
SITE: ABNORMAL
SODIUM SERPL-SCNC: 124 MMOL/L (ref 136–145)
WBC # BLD AUTO: 6.27 K/UL (ref 3.9–12.7)

## 2022-12-22 PROCEDURE — C1751 CATH, INF, PER/CENT/MIDLINE: HCPCS

## 2022-12-22 PROCEDURE — 99233 SBSQ HOSP IP/OBS HIGH 50: CPT | Mod: 25,,, | Performed by: INTERNAL MEDICINE

## 2022-12-22 PROCEDURE — 99900035 HC TECH TIME PER 15 MIN (STAT)

## 2022-12-22 PROCEDURE — 63600175 PHARM REV CODE 636 W HCPCS: Performed by: PHYSICIAN ASSISTANT

## 2022-12-22 PROCEDURE — C1751 CATH, INF, PER/CENT/MIDLINE: HCPCS | Performed by: INTERNAL MEDICINE

## 2022-12-22 PROCEDURE — 85025 COMPLETE CBC W/AUTO DIFF WBC: CPT | Performed by: INTERNAL MEDICINE

## 2022-12-22 PROCEDURE — 20000000 HC ICU ROOM

## 2022-12-22 PROCEDURE — 93451 PR RIGHT HEART CATH O2 SATURATION & CARDIAC OUTPUT: ICD-10-PCS | Mod: 26,,, | Performed by: INTERNAL MEDICINE

## 2022-12-22 PROCEDURE — 99292 CRITICAL CARE ADDL 30 MIN: CPT | Mod: 25,,, | Performed by: INTERNAL MEDICINE

## 2022-12-22 PROCEDURE — 80048 BASIC METABOLIC PNL TOTAL CA: CPT | Performed by: PHYSICIAN ASSISTANT

## 2022-12-22 PROCEDURE — 25000003 PHARM REV CODE 250: Performed by: PHYSICIAN ASSISTANT

## 2022-12-22 PROCEDURE — 63600367 HC NITRIC OXIDE PER HOUR

## 2022-12-22 PROCEDURE — 99291 CRITICAL CARE FIRST HOUR: CPT | Mod: ,,, | Performed by: PHYSICIAN ASSISTANT

## 2022-12-22 PROCEDURE — C1894 INTRO/SHEATH, NON-LASER: HCPCS | Performed by: INTERNAL MEDICINE

## 2022-12-22 PROCEDURE — 25000003 PHARM REV CODE 250: Performed by: INTERNAL MEDICINE

## 2022-12-22 PROCEDURE — 83735 ASSAY OF MAGNESIUM: CPT | Performed by: PHYSICIAN ASSISTANT

## 2022-12-22 PROCEDURE — 25000003 PHARM REV CODE 250: Performed by: STUDENT IN AN ORGANIZED HEALTH CARE EDUCATION/TRAINING PROGRAM

## 2022-12-22 PROCEDURE — 93451 RIGHT HEART CATH: CPT | Performed by: INTERNAL MEDICINE

## 2022-12-22 PROCEDURE — 99233 PR SUBSEQUENT HOSPITAL CARE,LEVL III: ICD-10-PCS | Mod: 25,,, | Performed by: INTERNAL MEDICINE

## 2022-12-22 PROCEDURE — 93451 RIGHT HEART CATH: CPT | Mod: 26,,, | Performed by: INTERNAL MEDICINE

## 2022-12-22 PROCEDURE — 83605 ASSAY OF LACTIC ACID: CPT | Performed by: INTERNAL MEDICINE

## 2022-12-22 PROCEDURE — 99291 PR CRITICAL CARE, E/M 30-74 MINUTES: ICD-10-PCS | Mod: ,,, | Performed by: PHYSICIAN ASSISTANT

## 2022-12-22 PROCEDURE — 94761 N-INVAS EAR/PLS OXIMETRY MLT: CPT

## 2022-12-22 PROCEDURE — 63600175 PHARM REV CODE 636 W HCPCS: Performed by: STUDENT IN AN ORGANIZED HEALTH CARE EDUCATION/TRAINING PROGRAM

## 2022-12-22 PROCEDURE — 27000221 HC OXYGEN, UP TO 24 HOURS

## 2022-12-22 PROCEDURE — 85730 THROMBOPLASTIN TIME PARTIAL: CPT | Performed by: INTERNAL MEDICINE

## 2022-12-22 PROCEDURE — 99292 PR CRITICAL CARE, ADDL 30 MIN: ICD-10-PCS | Mod: 25,,, | Performed by: INTERNAL MEDICINE

## 2022-12-22 PROCEDURE — 80162 ASSAY OF DIGOXIN TOTAL: CPT | Performed by: INTERNAL MEDICINE

## 2022-12-22 RX ORDER — DEXTROSE MONOHYDRATE 100 MG/ML
12.5 INJECTION, SOLUTION INTRAVENOUS
Status: DISCONTINUED | OUTPATIENT
Start: 2022-12-22 | End: 2022-12-23 | Stop reason: HOSPADM

## 2022-12-22 RX ORDER — SODIUM CHLORIDE 9 MG/ML
INJECTION, SOLUTION INTRAVENOUS CONTINUOUS
Status: DISCONTINUED | OUTPATIENT
Start: 2022-12-22 | End: 2022-12-23 | Stop reason: HOSPADM

## 2022-12-22 RX ORDER — POTASSIUM CHLORIDE 20 MEQ/1
40 TABLET, EXTENDED RELEASE ORAL ONCE
Status: COMPLETED | OUTPATIENT
Start: 2022-12-22 | End: 2022-12-22

## 2022-12-22 RX ORDER — INSULIN ASPART 100 [IU]/ML
0-5 INJECTION, SOLUTION INTRAVENOUS; SUBCUTANEOUS
Status: DISCONTINUED | OUTPATIENT
Start: 2022-12-22 | End: 2022-12-23 | Stop reason: HOSPADM

## 2022-12-22 RX ORDER — LIDOCAINE HYDROCHLORIDE 10 MG/ML
INJECTION INFILTRATION; PERINEURAL
Status: DISCONTINUED | OUTPATIENT
Start: 2022-12-22 | End: 2022-12-22 | Stop reason: HOSPADM

## 2022-12-22 RX ORDER — IBUPROFEN 200 MG
16 TABLET ORAL
Status: DISCONTINUED | OUTPATIENT
Start: 2022-12-22 | End: 2022-12-23 | Stop reason: HOSPADM

## 2022-12-22 RX ADMIN — PANTOPRAZOLE SODIUM 40 MG: 40 TABLET, DELAYED RELEASE ORAL at 08:12

## 2022-12-22 RX ADMIN — ACETAMINOPHEN 650 MG: 325 TABLET ORAL at 10:12

## 2022-12-22 RX ADMIN — POTASSIUM CHLORIDE 40 MEQ: 1500 TABLET, EXTENDED RELEASE ORAL at 08:12

## 2022-12-22 RX ADMIN — HEPARIN SODIUM 18 UNITS/KG/HR: 10000 INJECTION, SOLUTION INTRAVENOUS at 12:12

## 2022-12-22 RX ADMIN — DOBUTAMINE HYDROCHLORIDE 5 MCG/KG/MIN: 400 INJECTION INTRAVENOUS at 05:12

## 2022-12-22 RX ADMIN — FUROSEMIDE 40 MG/HR: 10 INJECTION, SOLUTION INTRAMUSCULAR; INTRAVENOUS at 09:12

## 2022-12-22 RX ADMIN — DOBUTAMINE HYDROCHLORIDE 5 MCG/KG/MIN: 400 INJECTION INTRAVENOUS at 04:12

## 2022-12-22 RX ADMIN — AMIODARONE HYDROCHLORIDE 200 MG: 200 TABLET ORAL at 08:12

## 2022-12-22 RX ADMIN — SODIUM CHLORIDE: 9 INJECTION, SOLUTION INTRAVENOUS at 04:12

## 2022-12-22 RX ADMIN — FUROSEMIDE 40 MG/HR: 10 INJECTION, SOLUTION INTRAMUSCULAR; INTRAVENOUS at 03:12

## 2022-12-22 RX ADMIN — Medication 0.04 MCG/KG/MIN: at 09:12

## 2022-12-22 RX ADMIN — INSULIN ASPART 2 UNITS: 100 INJECTION, SOLUTION INTRAVENOUS; SUBCUTANEOUS at 11:12

## 2022-12-22 RX ADMIN — INSULIN ASPART 5 UNITS: 100 INJECTION, SOLUTION INTRAVENOUS; SUBCUTANEOUS at 08:12

## 2022-12-22 RX ADMIN — FUROSEMIDE 40 MG/HR: 10 INJECTION, SOLUTION INTRAMUSCULAR; INTRAVENOUS at 10:12

## 2022-12-22 NOTE — SUBJECTIVE & OBJECTIVE
Interval History: Moved to the ICU to start epi and Laura. Given 10 mg metolazone overnight with CVP 15. CVP improved to 9 this morning. However SVO2 is trending down to 42% with CO/CI 3.12/1.7, SVR 2000 on epi 0.04, Laura 10 ppm,  5, and Lasix gtt at 40 mg/hr. Cr stabilized at 2.6. Will get RHC today to consider if upgrading to MCS is indicated at this time.     Continuous Infusions:   DOBUTamine IV infusion (non-titrating) 5 mcg/kg/min (12/22/22 1000)    EPINEPHrine 0.04 mcg/kg/min (12/22/22 1000)    furosemide (LASIX) 10 mg/mL infusion (non-titrating) 40 mg/hr (12/22/22 1037)    heparin (porcine) in D5W 18 Units/kg/hr (12/22/22 1000)    nitric oxide gas       Scheduled Meds:   amiodarone  200 mg Oral Daily    digoxin  0.125 mg Oral Every other day    pantoprazole  40 mg Oral Daily     PRN Meds:acetaminophen, dextrose 10 % in water (D10W), glucose, insulin aspart U-100, melatonin, sodium chloride 0.9%    Review of patient's allergies indicates:  No Known Allergies  Objective:     Vital Signs (Most Recent):  Temp: 98 °F (36.7 °C) (12/22/22 0702)  Pulse: 93 (12/22/22 1000)  Resp: 16 (12/22/22 1000)  BP: 102/74 (12/22/22 1000)  SpO2: 100 % (12/22/22 1000) Vital Signs (24h Range):  Temp:  [98 °F (36.7 °C)-98.9 °F (37.2 °C)] 98 °F (36.7 °C)  Pulse:  [] 93  Resp:  [16-30] 16  SpO2:  [67 %-100 %] 100 %  BP: ()/(59-86) 102/74     Patient Vitals for the past 72 hrs (Last 3 readings):   Weight   12/22/22 0301 70.6 kg (155 lb 10.3 oz)   12/21/22 0728 68.7 kg (151 lb 7.3 oz)   12/20/22 0745 70 kg (154 lb 5.2 oz)       Body mass index is 25.12 kg/m².      Intake/Output Summary (Last 24 hours) at 12/22/2022 1049  Last data filed at 12/22/2022 1000  Gross per 24 hour   Intake 4234.2 ml   Output 3010 ml   Net 1224.2 ml         Hemodynamic Parameters:       Telemetry: reviewed  Physical Exam  Vitals and nursing note reviewed.   Constitutional:       Appearance: Normal appearance.   HENT:      Head: Normocephalic.       Nose: Nose normal.      Mouth/Throat:      Mouth: Mucous membranes are moist.   Neck:      Vascular: JVD present.   Cardiovascular:      Rate and Rhythm: Tachycardia present. Rhythm irregular.      Heart sounds: Murmur heard.     Gallop present.   Pulmonary:      Effort: Pulmonary effort is normal.   Abdominal:      General: Abdomen is flat. There is no distension.      Palpations: Abdomen is soft.   Musculoskeletal:         General: Normal range of motion.   Skin:     General: Skin is warm.      Capillary Refill: Capillary refill takes 2 to 3 seconds.   Neurological:      Mental Status: He is alert and oriented to person, place, and time.       Significant Labs:  CBC:  Recent Labs   Lab 12/20/22  0405 12/21/22  0444 12/22/22  0338   WBC 5.68 5.19 6.27   RBC 4.27* 4.60 4.42*   HGB 12.0* 12.6* 12.2*   HCT 38.4* 41.1 39.0*   * 115* 115*   MCV 90 89 88   MCH 28.1 27.4 27.6   MCHC 31.3* 30.7* 31.3*       BNP:  Recent Labs   Lab 12/16/22  0711 12/19/22  0450 12/21/22  0444   BNP 1,542* 1,155* 1,048*       CMP:  Recent Labs   Lab 12/16/22  0711 12/17/22  0521 12/19/22  0450 12/20/22  0405 12/20/22  1600 12/21/22  0444 12/22/22  0338   GLU 80   < > 123*   < > 79 93 356*   CALCIUM 9.9   < > 9.7   < > 10.0 10.2 9.4   ALBUMIN 4.0  --  4.0  --   --  4.2  --    PROT 8.0  --  8.1  --   --  8.7*  --       < > 134*   < > 133* 132* 124*   K 4.1   < > 3.8   < > 4.2 4.1 3.8   CO2 35*   < > 32*   < > 31* 31* 29   CL 94*   < > 91*   < > 87* 85* 82*   BUN 42*   < > 57*   < > 65* 73* 80*   CREATININE 1.8*   < > 2.1*   < > 2.3* 2.6* 2.6*   ALKPHOS 243*  --  207*  --   --  238*  --    ALT 20  --  20  --   --  18  --    AST 22  --  20  --   --  21  --    BILITOT 3.3*  --  2.8*  --   --  3.5*  --     < > = values in this interval not displayed.        Coagulation:   Recent Labs   Lab 12/20/22  0405 12/21/22  0444 12/22/22  0453   APTT 62.4* 63.5* 63.6*       LDH:  No results for input(s): LDH in the last 72  hours.    Microbiology:  Microbiology Results (last 7 days)       ** No results found for the last 168 hours. **            I have reviewed all pertinent labs within the past 24 hours.    Estimated Creatinine Clearance: 29.3 mL/min (A) (based on SCr of 2.6 mg/dL (H)).    Diagnostic Results:  I have reviewed all pertinent imaging results/findings within the past 24 hours.

## 2022-12-22 NOTE — PLAN OF CARE
Hemodynamics:   Parameter   at 2000   CVP 15   SvO2 46   CO  2.8   CI 1.57   SVR 1711   MAP 75     Current Heart Failure Medications:  - Dobutamine 5 mcg/kg/min  - Epinephrine 0.04 mcg/kg/min  - Lasix 40 mg/hr  - Nitric 10 ppm  - Heparin gtt    Current Mechanical Circulatory Support:  -none    UOP:  UOP: 175cc since 7pm    Intake/Output Summary (Last 24 hours) at 12/21/2022 2211  Last data filed at 12/21/2022 1908  Gross per 24 hour   Intake 3320.57 ml   Output 3575 ml   Net -254.43 ml     Assessment/Plan:  - Plan was discussed with attending staff   - Give metolazone 10mg PO x1    Royal Grey MD  Cardiology Fellow PGY IV  Pager: [phone]

## 2022-12-22 NOTE — H&P
[facility] - Cardiac Intensive Care  Interventional Cardiology  H&P    Patient Name: Migel Garcia  MRN: [id number]  Admission Date: 12/5/2022  Code Status: Full Code   Attending Provider: Sukhjinder Ball MD   Primary Care Physician: Anuj Banks MD  Principal Problem:Acute on chronic combined systolic and diastolic heart failure    Patient information was obtained from ER records.     Subjective:     Chief Complaint:  dyspnea     HPI: 53 yo man with advanced HF here for RHC and leave in swan catheter.    Past Medical History:   Diagnosis Date    A-fib     GERD (gastroesophageal reflux disease)     Heart failure, unspecified     Hypertension     V-tach        Past Surgical History:   Procedure Laterality Date    COLONOSCOPY N/A 12/13/2022    Procedure: COLONOSCOPY;  Surgeon: Geovany Cole MD;  Location: [facility] ([address] [place] [address]);  Service: Endoscopy;  Laterality: N/A;    COLONOSCOPY N/A 12/12/2022    Procedure: COLONOSCOPY;  Surgeon: Geovany Cole MD;  Location: [facility] ([address] [place] [address]);  Service: Endoscopy;  Laterality: N/A;    RIGHT HEART CATHETERIZATION Right 12/16/2022    Procedure: INSERTION, CATHETER, RIGHT HEART;  Surgeon: Brando Parada MD;  Location: [facility];  Service: Cardiology;  Laterality: Right;       Review of patient's allergies indicates:  No Known Allergies    No medications prior to admission.     Family History    None       Tobacco Use    Smoking status: Some Days     Types: Cigarettes    Smokeless tobacco: Not on file   Substance and Sexual Activity    Alcohol use: Not Currently    Drug use: Not on file    Sexual activity: Not on file     ROS  Objective:     Vital Signs (Most Recent):  Temp: 98.9 °F (37.2 °C) (12/22/22 1100)  Pulse: 110 (12/22/22 1200)  Resp: 20 (12/22/22 1200)  BP: 110/75 (12/22/22 1200)  SpO2: 99 % (12/22/22 1309) Vital Signs (24h Range):  Temp:  [98 °F (36.7 °C)-98.9 °F (37.2 °C)] 98.9 °F (37.2 °C)  Pulse:  [] 110  Resp:  [16-30] 20  SpO2:  [67 %-100 %] 99  %  BP: ()/(59-85) 110/75     Weight: 70.6 kg (155 lb 10.3 oz)  Body mass index is 25.12 kg/m².    SpO2: 99 %  (RETIRED) O2 Device (Oxygen Therapy): room air      Intake/Output Summary (Last 24 hours) at 12/22/2022 1336  Last data filed at 12/22/2022 1200  Gross per 24 hour   Intake 4116.45 ml   Output 3610 ml   Net 506.45 ml       Lines/Drains/Airways       Peripherally Inserted Central Catheter Line  Duration             PICC Triple Lumen right basilic -- days              Peripheral Intravenous Line  Duration                  Peripheral IV - Single Lumen 12/21/22 1745 20 G Anterior;Right Forearm <1 day                    Physical Exam    Significant Labs: CBC   Recent Labs   Lab 12/21/22  0444 12/22/22  0338   WBC 5.19 6.27   HGB 12.6* 12.2*   HCT 41.1 39.0*   * 115*       Significant Imaging: Echocardiogram: 2D echo with color flow doppler: Echo results to be reported separately.   Assessment and Plan:     Active Diagnoses:    Diagnosis Date Noted POA    PRINCIPAL PROBLEM:  Acute on chronic combined systolic and diastolic heart failure [I50.43] 12/05/2022 Yes    Acute decompensated heart failure [I50.9] 12/13/2022 Unknown    Atrial fibrillation [I48.91] 12/06/2022 Unknown    Pulmonary nodule [R91.1] 12/06/2022 Unknown    GERD (gastroesophageal reflux disease) [K21.9] 12/05/2022 Yes    Tobacco use [Z72.0] 12/05/2022 Yes    Marijuana abuse [F12.10] 12/05/2022 Yes    CKD (chronic kidney disease), stage III [N18.30] 12/05/2022 Yes    HTN (hypertension) [I10] 12/05/2022 Yes    Anemia [D64.9] 12/05/2022 Yes    ICD (implantable cardioverter-defibrillator), single, in situ [Z95.810] 12/05/2022 Yes    HFrEF (heart failure with reduced ejection fraction) [I50.20] 12/05/2022 Yes      Problems Resolved During this Admission:       Patient agrees with procedure.    VTE Risk Mitigation (From admission, onward)           Ordered     heparin 25,000 units in dextrose 5% 250 mL (100 units/mL) infusion LOW INTENSITY  nomogram - [facility]  Continuous        Question Answer Comment   Heparin Infusion Adjustment (DO NOT MODIFY ANSWER) [email or web address] LOW INTENSITY nomogram for [facility] [email or web address]    Begin at (in units/kg/hr) 12        12/09/22 1446     IP VTE HIGH RISK PATIENT  Once         12/05/22 0100     Place sequential compression device  Until discontinued         12/05/22 0100                    Brando Parada MD  Interventional Cardiology   [facility] - Cardiac Intensive Care

## 2022-12-22 NOTE — ASSESSMENT & PLAN NOTE
-[id number]-ZSWm score is 2  -On Eliquis at home for anticoagulation  -Stopped on 12/5 in anticipation of workup and possible procedures  -On Lovenox for DVT prophylaxis  -continue amio/dig

## 2022-12-22 NOTE — DISCHARGE SUMMARY
[facility] - [facility] Lab  Interventional Cardiology  Discharge Summary      Patient Name: Migel Garcia  MRN: [id number]  Admission Date: 12/5/2022  Hospital Length of Stay: 17 days  Discharge Date and Time:  12/22/2022 2:42 PM  Attending Physician: Sukhjinder Ball MD  Discharging Provider: Brando Parada MD  Primary Care Physician: Anuj Banks MD    HPI: patient tolerated procedure well. Leave in swan was left and final position was verified fluoroscopically.    Procedure(s) (LRB):  INSERTION, CATHETER, RIGHT HEART (Right)  INSERTION, CATHETER, SWAN-LESTER, WITH IMAGING GUIDANCE     Indwelling Lines/Drains at time of discharge:  Lines/Drains/Airways       Peripherally Inserted Central Catheter Line  Duration             PICC Triple Lumen right basilic -- days                    Hospital Course (synopsis of major diagnoses, care, treatment, and services provided during the course of the hospital stay):      patient tolerated procedure well. Leave in swan was left and final position was verified fluoroscopically.    Consults (From admission, onward)          Status Ordering Provider     Inpatient consult to Infectious Diseases  Once        Provider:  (Not yet assigned)    Completed SARAH CHILDS     Inpatient consult to Pulmonology  Once        Provider:  (Not yet assigned)    Completed NAPOLEON DAVIS     Inpatient consult to Registered Dietitian/Nutritionist  Once        Provider:  (Not yet assigned)    Completed SARAH CHILDS     Inpatient consult to Gastroenterology  Once        Provider:  (Not yet assigned)    Completed SARAH CHILDS     Inpatient Consult to Transplant Coordinator  Once        Provider:  (Not yet assigned)    Acknowledged JUAN PATEL     Inpatient Consult to Pre-VAD Coordinator  Once        Provider:  (Not yet assigned)    Completed JUAN PATEL     Inpatient consult to Cardiothoracic Surgery  Once        Provider:  (Not yet assigned)    Completed JUAN PATEL             Significant Diagnostic Studies: Labs: CBC   Recent Labs   Lab 12/21/22  0444 12/22/22  0338   WBC 5.19 6.27   HGB 12.6* 12.2*   HCT 41.1 39.0*   * 115*       Pending Diagnostic Studies:       Procedure Component Value Units Date/Time    APTT [[id number]] Collected: 11/20/22 2350    Order Status: Sent Lab Status: In process Updated: 12/12/22 0124    Specimen: Blood     HLA PRA Screen [[id number]] Collected: 12/09/22 1711    Order Status: Sent Lab Status: In process Updated: 12/12/22 0849    Specimen: Blood     Magnesium [[id number]] Collected: 12/17/22 0521    Order Status: Sent Lab Status: In process Updated: 12/17/22 0521    Specimen: Blood     Quantiferon Gold TB [[id number]] Collected: 12/10/22 0422    Order Status: Sent Lab Status: In process Updated: 12/10/22 0423    Specimen: Blood           Final Active Diagnoses:    Diagnosis Date Noted POA    PRINCIPAL PROBLEM:  Acute on chronic combined systolic and diastolic heart failure [I50.43] 12/05/2022 Yes    Acute decompensated heart failure [I50.9] 12/13/2022 Unknown    Atrial fibrillation [I48.91] 12/06/2022 Unknown    Pulmonary nodule [R91.1] 12/06/2022 Unknown    GERD (gastroesophageal reflux disease) [K21.9] 12/05/2022 Yes    Tobacco use [Z72.0] 12/05/2022 Yes    Marijuana abuse [F12.10] 12/05/2022 Yes    CKD (chronic kidney disease), stage III [N18.30] 12/05/2022 Yes    HTN (hypertension) [I10] 12/05/2022 Yes    Anemia [D64.9] 12/05/2022 Yes    ICD (implantable cardioverter-defibrillator), single, in situ [Z95.810] 12/05/2022 Yes    HFrEF (heart failure with reduced ejection fraction) [I50.20] 12/05/2022 Yes      Problems Resolved During this Admission:       Discharged Condition: good.  Back to the inpatient unit with primary team.    Follow Up:    Patient Instructions:   No discharge procedures on file.  Medications:  None    Time spent on the discharge of patient: 20 minutes    Brando Parada MD  Interventional Cardiology  [facility] - Cath  Lab

## 2022-12-22 NOTE — ASSESSMENT & PLAN NOTE
-SHAUN on CKD III  -Reported baseline 1.6-2.0  -Renal function stabilized after moving to ICU to initiate Laura and epi. Continue Lasix infusion and .  -avoid nephrotoxins

## 2022-12-22 NOTE — PROGRESS NOTES
Interdisciplinary Rounds Report:   Attended interdisciplinary rounds with the \[facility]\""/CTS services including the LVAD Coordinators, social workers, cardiologists, surgeons,  PT/OT/Speech, dietician, and unit charge nurses. Discussed patient status, plan of care, goals of care, including DC date, and post discharge needs. Plan of care will be discussed with the patient and/or family per the physician while rounding on the floor. This is a recurring meeting that is medically and socially necessary to collaborate with the interdisciplinary team to assist patient needs and safe discharge.

## 2022-12-22 NOTE — ASSESSMENT & PLAN NOTE
-NICM  -Transferred from [place] for ADHF and possible workup.  During hospital stay there; he was briefly on Levo and started on  and they were unable to wean  off.  Presented on  5  -Last 2D Echo 12/6/22: LVEF: 25% , LVEDD:  5.33 cm  -RHC: 12/16/22 RA: 15/16 (14), RV: 47/2 (14), PA: 47/23 (32), PWP: 22/30 (23), CO: 2.9, CI: 1.5 on  5  -Moved to the ICU 12/21 to initiate Laura and epi  -Given metolazone 10 mg QD overnight with improvement in CVP to 9. Cr stabilized to 2.6  -CVP 9, SVO2: 42 CO: 3.12, CI: 1.7, SVR: 2000 on  5, epi 0.04, Laura 10 ppm, and Lasix gtt at 40 mg/hr.   -RHC today to determine if MCS is indicated at this time   -GDMT with home dose of Digoxin, Entresto 24-26. Was previously on Toprol 100mg QHS but holding in the setting of   -Patient is currently being evaluated for OHTx/VAD  -Heart failure pathway Step 4. Per Pulm recs, will obtain chest CT with contrast to further evaluate pulmonary nodules, but will wait until Cr improves.    -2g Na dietary restriction, 1500 mL fluid restriction, strict I/Os

## 2022-12-22 NOTE — PROGRESS NOTES
[facility] - Cardiac Intensive Care  Heart Transplant  Progress Note    Patient Name: Migel Garcia  MRN: [id number]  Admission Date: 12/5/2022  Hospital Length of Stay: 17 days  Attending Physician: Sukhjinder Ball MD  Primary Care Provider: Anuj Banks MD  Principal Problem:Acute on chronic combined systolic and diastolic heart failure    Subjective:     Interval History: Moved to the ICU to start epi and Laura. Given 10 mg metolazone overnight with CVP 15. CVP improved to 9 this morning. However SVO2 is trending down to 42% with CO/CI 3.12/1.7, SVR 2000 on epi 0.04, Laura 10 ppm,  5, and Lasix gtt at 40 mg/hr. Cr stabilized at 2.6. Will get RHC today to determine if upgrading to MCS is indicated.      Continuous Infusions:   DOBUTamine IV infusion (non-titrating) 5 mcg/kg/min (12/22/22 1000)    EPINEPHrine 0.04 mcg/kg/min (12/22/22 1000)    furosemide (LASIX) 10 mg/mL infusion (non-titrating) 40 mg/hr (12/22/22 1037)    heparin (porcine) in D5W 18 Units/kg/hr (12/22/22 1000)    nitric oxide gas       Scheduled Meds:   amiodarone  200 mg Oral Daily    digoxin  0.125 mg Oral Every other day    pantoprazole  40 mg Oral Daily     PRN Meds:acetaminophen, dextrose 10 % in water (D10W), glucose, insulin aspart U-100, melatonin, sodium chloride 0.9%    Review of patient's allergies indicates:  No Known Allergies  Objective:     Vital Signs (Most Recent):  Temp: 98 °F (36.7 °C) (12/22/22 0702)  Pulse: 93 (12/22/22 1000)  Resp: 16 (12/22/22 1000)  BP: 102/74 (12/22/22 1000)  SpO2: 100 % (12/22/22 1000) Vital Signs (24h Range):  Temp:  [98 °F (36.7 °C)-98.9 °F (37.2 °C)] 98 °F (36.7 °C)  Pulse:  [] 93  Resp:  [16-30] 16  SpO2:  [67 %-100 %] 100 %  BP: ()/(59-86) 102/74     Patient Vitals for the past 72 hrs (Last 3 readings):   Weight   12/22/22 0301 70.6 kg (155 lb 10.3 oz)   12/21/22 0728 68.7 kg (151 lb 7.3 oz)   12/20/22 0745 70 kg (154 lb 5.2 oz)       Body mass index is 25.12  kg/m².      Intake/Output Summary (Last 24 hours) at 12/22/2022 1049  Last data filed at 12/22/2022 1000  Gross per 24 hour   Intake 4234.2 ml   Output 3010 ml   Net 1224.2 ml         Hemodynamic Parameters:       Telemetry: reviewed  Physical Exam  Vitals and nursing note reviewed.   Constitutional:       Appearance: Normal appearance.   HENT:      Head: Normocephalic.      Nose: Nose normal.      Mouth/Throat:      Mouth: Mucous membranes are moist.   Neck:      Vascular: JVD present.   Cardiovascular:      Rate and Rhythm: Tachycardia present. Rhythm irregular.      Heart sounds: Murmur heard.     Gallop present.   Pulmonary:      Effort: Pulmonary effort is normal.   Abdominal:      General: Abdomen is flat. There is no distension.      Palpations: Abdomen is soft.   Musculoskeletal:         General: Normal range of motion.   Skin:     General: Skin is warm.      Capillary Refill: Capillary refill takes 2 to 3 seconds.   Neurological:      Mental Status: He is alert and oriented to person, place, and time.       Significant Labs:  CBC:  Recent Labs   Lab 12/20/22  0405 12/21/22  0444 12/22/22  0338   WBC 5.68 5.19 6.27   RBC 4.27* 4.60 4.42*   HGB 12.0* 12.6* 12.2*   HCT 38.4* 41.1 39.0*   * 115* 115*   MCV 90 89 88   MCH 28.1 27.4 27.6   MCHC 31.3* 30.7* 31.3*       BNP:  Recent Labs   Lab 12/16/22  0711 12/19/22  0450 12/21/22  0444   BNP 1,542* 1,155* 1,048*       CMP:  Recent Labs   Lab 12/16/22  0711 12/17/22  0521 12/19/22  0450 12/20/22  0405 12/20/22  1600 12/21/22  0444 12/22/22  0338   GLU 80   < > 123*   < > 79 93 356*   CALCIUM 9.9   < > 9.7   < > 10.0 10.2 9.4   ALBUMIN 4.0  --  4.0  --   --  4.2  --    PROT 8.0  --  8.1  --   --  8.7*  --       < > 134*   < > 133* 132* 124*   K 4.1   < > 3.8   < > 4.2 4.1 3.8   CO2 35*   < > 32*   < > 31* 31* 29   CL 94*   < > 91*   < > 87* 85* 82*   BUN 42*   < > 57*   < > 65* 73* 80*   CREATININE 1.8*   < > 2.1*   < > 2.3* 2.6* 2.6*   ALKPHOS 243*   --  207*  --   --  238*  --    ALT 20  --  20  --   --  18  --    AST 22  --  20  --   --  21  --    BILITOT 3.3*  --  2.8*  --   --  3.5*  --     < > = values in this interval not displayed.        Coagulation:   Recent Labs   Lab 12/20/22  0405 12/21/22  0444 12/22/22  0453   APTT 62.4* 63.5* 63.6*       LDH:  No results for input(s): LDH in the last 72 hours.    Microbiology:  Microbiology Results (last 7 days)       ** No results found for the last 168 hours. **            I have reviewed all pertinent labs within the past 24 hours.    Estimated Creatinine Clearance: 29.3 mL/min (A) (based on SCr of 2.6 mg/dL (H)).    Diagnostic Results:  I have reviewed all pertinent imaging results/findings within the past 24 hours.    Assessment and Plan:     52 year old man with a history of non-ischemic cardiomyopathy with ICD placement, CKD 3, atrial fibrillation [facility], on dobutamine 5. Echo 12/6 with EF of 25%. Pathway for LVAD/heart transplant started      * Acute on chronic combined systolic and diastolic heart failure  -NICM  -Transferred from [place] for ADHF and possible workup.  During hospital stay there; he was briefly on Levo and started on  and they were unable to wean  off.  Presented on  5  -Last 2D Echo 12/6/22: LVEF: 25% , LVEDD:  5.33 cm  -RHC: 12/16/22 RA: 15/16 (14), RV: 47/2 (14), PA: 47/23 (32), PWP: 22/30 (23), CO: 2.9, CI: 1.5 on  5  -Moved to the ICU 12/21 to initiate Laura and epi  -Given metolazone 10 mg QD overnight with improvement in CVP to 9. Cr stabilized to 2.6  -CVP 9, SVO2: 42 CO: 3.12, CI: 1.7, SVR: 2000 on  5, epi 0.04, Laura 10 ppm, and Lasix gtt at 40 mg/hr.   -RHC today to determine if MCS is indicated at this time   -GDMT with home dose of Digoxin, Entresto 24-26. Was previously on Toprol 100mg QHS but holding in the setting of   -Patient is currently being evaluated for OHTx/VAD  -Heart failure pathway Step 4. Per Pulm recs,  will obtain chest CT with contrast to further evaluate pulmonary nodules, but will wait until Cr improves.    -2g Na dietary restriction, 1500 mL fluid restriction, strict I/Os      Pulmonary nodule  -Questionable 8 mm left lower lobe pulmonary nodule series 6, image 314.  This courses along the left lower lobe pulmonary artery and pulmonary aneurysm not excluded.  Dedicated CT chest with contrast suggested  -Will plan for CT with contrast once Cr improves    Atrial fibrillation  -ZGD0VT3-UOSd score is 2  -On Eliquis at home for anticoagulation  -Stopped on 12/5 in anticipation of workup and possible procedures  -On Lovenox for DVT prophylaxis  -continue amio/dig    ICD (implantable cardioverter-defibrillator), single, in situ  -[employer] ICD    HTN (hypertension)  Continue entresto    CKD (chronic kidney disease), stage III  -SHAUN on CKD III  -Reported baseline 1.6-2.0  -Renal function stabilized after moving to ICU to initiate Laura and epi. Continue Lasix infusion and .  -avoid nephrotoxins     Tobacco use  -counseled on cessation    GERD (gastroesophageal reflux disease)  -Protonix 40mg Qdaily    Uninterrupted Critical Care/Counseling Time (not including procedures): 74 minutes      Grecia Gillis PA-C  Heart Transplant  [facility] - Cardiac Intensive Care

## 2022-12-22 NOTE — BRIEF OP NOTE
Brief Operative Note:    : Brando Parada MD     Referring Physician: Miller Bell     All Operators: Surgeon(s):  MD Brando Kunz MD     Preoperative Diagnosis: Acute on chronic combined systolic and diastolic CHF, NYHA class 4 [I50.43]     Postop Diagnosis: Acute on chronic combined systolic and diastolic CHF, NYHA class 4 [I50.43]    Treatments/Procedures: Procedure(s) (LRB):  INSERTION, CATHETER, RIGHT HEART (Right)  INSERTION, CATHETER, SWAN-LESTER, WITH IMAGING GUIDANCE    Access: [facility]    Findings:   High (L) sided filling pressures with normal (R) sided filling pressures on Laura 10 ppm,  gtt 5 and Epi gtt  0.04      The estimated blood loss was none.    The filling pressures on the left were severely elevated.    RA  8  PA 44/25 (31)  PCWP 24    Cardiac output was 2.6  by Joe. Cardiac index is 1.4 L/min/m2.   O2 Sat: PA 44%.   Pulmonary vascular resistance: 2.7 CUADRA.     See catheterization report for full details.    Intervention: Insertion of leave in Iron at 54 cm    See catheterization report for full details.      Plan:  - Post cath protocol   - Transfer back to CICU with HTS     Estimated Blood loss: 20 cc    Specimens removed: No    Regan Araiza MD  Interventional Cardiology PGY-4  12/22/2022

## 2022-12-23 VITALS
HEIGHT: 66 IN | OXYGEN SATURATION: 100 % | HEART RATE: 95 BPM | RESPIRATION RATE: 18 BRPM | WEIGHT: 155.63 LBS | BODY MASS INDEX: 25.01 KG/M2 | TEMPERATURE: 98 F | SYSTOLIC BLOOD PRESSURE: 113 MMHG | DIASTOLIC BLOOD PRESSURE: 57 MMHG

## 2022-12-23 LAB
ALLENS TEST: ABNORMAL
ANION GAP SERPL CALC-SCNC: 16 MMOL/L (ref 8–16)
APTT BLDCRRT: 51.7 SEC (ref 21–32)
APTT BLDCRRT: 58.7 SEC (ref 21–32)
BIPAP: 0
BNP SERPL-MCNC: 923 PG/ML (ref 0–99)
BUN SERPL-MCNC: 106 MG/DL (ref 6–20)
CALCIUM SERPL-MCNC: 10.1 MG/DL (ref 8.7–10.5)
CHLORIDE SERPL-SCNC: 85 MMOL/L (ref 95–110)
CO2 SERPL-SCNC: 30 MMOL/L (ref 23–29)
CREAT SERPL-MCNC: 2.4 MG/DL (ref 0.5–1.4)
EST. GFR  (NO RACE VARIABLE): 31.3 ML/MIN/1.73 M^2
FIO2: 21 %
GLUCOSE SERPL-MCNC: 154 MG/DL (ref 70–110)
HCO3 UR-SCNC: 39 MMOL/L (ref 24–28)
MAGNESIUM SERPL-MCNC: 2.6 MG/DL (ref 1.6–2.6)
PCO2 BLDA: 57.9 MMHG (ref 35–45)
PH SMN: 7.44 [PH] (ref 7.35–7.45)
PO2 BLDA: 30 MMHG (ref 40–60)
POC BE: 15 MMOL/L
POC METHB: 0.7 %
POC PERFORMED BY: NORMAL
POC SATURATED O2: 58 % (ref 95–100)
POC TCO2: 41 MMOL/L (ref 24–29)
POC TEMPERATURE: 37 C
POCT GLUCOSE: 141 MG/DL (ref 70–110)
POTASSIUM SERPL-SCNC: 3.7 MMOL/L (ref 3.5–5.1)
SAMPLE: ABNORMAL
SITE: ABNORMAL
SITE: NORMAL
SODIUM SERPL-SCNC: 131 MMOL/L (ref 136–145)
SPECIMEN SOURCE: NORMAL

## 2022-12-23 PROCEDURE — 83735 ASSAY OF MAGNESIUM: CPT | Performed by: PHYSICIAN ASSISTANT

## 2022-12-23 PROCEDURE — 63600175 PHARM REV CODE 636 W HCPCS: Performed by: PHYSICIAN ASSISTANT

## 2022-12-23 PROCEDURE — 94761 N-INVAS EAR/PLS OXIMETRY MLT: CPT

## 2022-12-23 PROCEDURE — 63600367 HC NITRIC OXIDE PER HOUR

## 2022-12-23 PROCEDURE — 25000003 PHARM REV CODE 250: Performed by: PHYSICIAN ASSISTANT

## 2022-12-23 PROCEDURE — 99291 CRITICAL CARE FIRST HOUR: CPT | Mod: ,,, | Performed by: PHYSICIAN ASSISTANT

## 2022-12-23 PROCEDURE — 99900035 HC TECH TIME PER 15 MIN (STAT)

## 2022-12-23 PROCEDURE — 83880 ASSAY OF NATRIURETIC PEPTIDE: CPT | Performed by: PHYSICIAN ASSISTANT

## 2022-12-23 PROCEDURE — 99292 PR CRITICAL CARE, ADDL 30 MIN: ICD-10-PCS | Mod: ,,, | Performed by: INTERNAL MEDICINE

## 2022-12-23 PROCEDURE — 85730 THROMBOPLASTIN TIME PARTIAL: CPT | Mod: 91 | Performed by: INTERNAL MEDICINE

## 2022-12-23 PROCEDURE — 27000221 HC OXYGEN, UP TO 24 HOURS

## 2022-12-23 PROCEDURE — 85730 THROMBOPLASTIN TIME PARTIAL: CPT | Performed by: INTERNAL MEDICINE

## 2022-12-23 PROCEDURE — 99291 PR CRITICAL CARE, E/M 30-74 MINUTES: ICD-10-PCS | Mod: ,,, | Performed by: PHYSICIAN ASSISTANT

## 2022-12-23 PROCEDURE — 99292 CRITICAL CARE ADDL 30 MIN: CPT | Mod: ,,, | Performed by: INTERNAL MEDICINE

## 2022-12-23 PROCEDURE — 80048 BASIC METABOLIC PNL TOTAL CA: CPT | Performed by: PHYSICIAN ASSISTANT

## 2022-12-23 RX ORDER — METHOCARBAMOL 500 MG/1
TABLET, FILM COATED ORAL
Status: DISCONTINUED
Start: 2022-12-23 | End: 2022-12-23 | Stop reason: HOSPADM

## 2022-12-23 RX ORDER — ACETAMINOPHEN 500 MG
TABLET ORAL
Status: DISCONTINUED
Start: 2022-12-23 | End: 2022-12-23 | Stop reason: HOSPADM

## 2022-12-23 RX ORDER — SENNOSIDES 8.6 MG/1
8.6 TABLET ORAL DAILY
Status: DISCONTINUED | OUTPATIENT
Start: 2022-12-23 | End: 2022-12-23 | Stop reason: HOSPADM

## 2022-12-23 RX ORDER — DIGOXIN 125 MCG
0.12 TABLET ORAL EVERY OTHER DAY
Qty: 15 TABLET | Refills: 11
Start: 2022-12-25 | End: 2024-01-26

## 2022-12-23 RX ORDER — METHOCARBAMOL 500 MG/1
500 TABLET, FILM COATED ORAL 4 TIMES DAILY
Status: DISCONTINUED | OUTPATIENT
Start: 2022-12-23 | End: 2022-12-23 | Stop reason: HOSPADM

## 2022-12-23 RX ORDER — BUMETANIDE 1 MG/1
3 TABLET ORAL 2 TIMES DAILY
Qty: 180 TABLET | Refills: 11 | Status: ON HOLD | OUTPATIENT
Start: 2022-12-23 | End: 2024-01-31

## 2022-12-23 RX ORDER — POLYETHYLENE GLYCOL 3350 17 G/17G
17 POWDER, FOR SOLUTION ORAL DAILY
Status: DISCONTINUED | OUTPATIENT
Start: 2022-12-23 | End: 2022-12-23 | Stop reason: HOSPADM

## 2022-12-23 RX ORDER — POTASSIUM CHLORIDE 20 MEQ/1
40 TABLET, EXTENDED RELEASE ORAL ONCE
Status: COMPLETED | OUTPATIENT
Start: 2022-12-23 | End: 2022-12-23

## 2022-12-23 RX ORDER — AMIODARONE HYDROCHLORIDE 200 MG/1
200 TABLET ORAL DAILY
Qty: 30 TABLET | Refills: 11
Start: 2022-12-24 | End: 2024-01-26

## 2022-12-23 RX ORDER — ACETAMINOPHEN 500 MG
1000 TABLET ORAL EVERY 8 HOURS PRN
Status: DISCONTINUED | OUTPATIENT
Start: 2022-12-23 | End: 2022-12-23 | Stop reason: HOSPADM

## 2022-12-23 RX ADMIN — PANTOPRAZOLE SODIUM 40 MG: 40 TABLET, DELAYED RELEASE ORAL at 08:12

## 2022-12-23 RX ADMIN — METHOCARBAMOL 500 MG: 500 TABLET ORAL at 08:12

## 2022-12-23 RX ADMIN — POTASSIUM CHLORIDE 40 MEQ: 1500 TABLET, EXTENDED RELEASE ORAL at 09:12

## 2022-12-23 RX ADMIN — METHOCARBAMOL 500 MG: 500 TABLET ORAL at 01:12

## 2022-12-23 RX ADMIN — DIGOXIN 0.12 MG: 125 TABLET ORAL at 08:12

## 2022-12-23 RX ADMIN — SENNOSIDES 8.6 MG: 8.6 TABLET, FILM COATED ORAL at 01:12

## 2022-12-23 RX ADMIN — AMIODARONE HYDROCHLORIDE 200 MG: 200 TABLET ORAL at 08:12

## 2022-12-23 RX ADMIN — HEPARIN SODIUM 18 UNITS/KG/HR: 10000 INJECTION, SOLUTION INTRAVENOUS at 01:12

## 2022-12-23 RX ADMIN — POLYETHYLENE GLYCOL 3350 17 G: 17 POWDER, FOR SOLUTION ORAL at 01:12

## 2022-12-23 RX ADMIN — FUROSEMIDE 30 MG/HR: 10 INJECTION, SOLUTION INTRAMUSCULAR; INTRAVENOUS at 11:12

## 2022-12-23 RX ADMIN — ACETAMINOPHEN 1000 MG: 500 TABLET ORAL at 08:12

## 2022-12-23 NOTE — PLAN OF CARE
[facility]   Heart Transplant Clinic  [address] [place] [address]   [phone] after hours        HOME  HEALTH ORDERS      Admit to Home Health    Diagnosis:   Patient Active Problem List   Diagnosis    HFrEF (heart failure with reduced ejection fraction)    Acute on chronic combined systolic and diastolic heart failure    GERD (gastroesophageal reflux disease)    Tobacco use    Marijuana abuse    CKD (chronic kidney disease), stage III    HTN (hypertension)    Anemia    ICD (implantable cardioverter-defibrillator), single, in situ    Atrial fibrillation    Pulmonary nodule    Acute decompensated heart failure       Patient is homebound due to: stage IV class D CHF  Diet: Low Sodium  Acitivities: As Tolerated    Nursing:   SN to complete comprehensive assessment including routine vital signs. Instruct on disease process and s/s of complications to report to MD. Review/verify medication list sent home with the patient at time of discharge  and instruct patient/caregiver as needed. Frequency may be adjusted depending on start of care date.    Notify MD if SBP > 160 or < 90; DBP > 90 or < 50; HR > 120 or < 50; Temp > 101; Weight gain >3lbs in 1 day or 5lbs in 1 week.    LABS:  SN to perform labs: Weekly CBC, CMP, Mg, BNP    HOME INFUSION THERAPY:    SN to perform Infusion Therapy/Central Line Care.  Review Central Line Care & Central Line Flush with patient.    Administer (drug and dose): Dobutamine 5mcg/kg/min @ dosing weight 90.4kg    **For questions or concerns, please call [phone] and ask for Pre-Heart transplant clinic, M-F 8-5. After hours, weekends, call [phone] and ask for the Heart Transplant Cardiologist on call.**    Central line care:  Scrub the Hub: Prior to accessing the line, always perform a 30 second alcohol scrub  Each lumen of the central line is to be flushed at least daily with 10 mL Normal Saline and 3 mL Heparin flush (100  units/mL)  Skilled Nurse (SN) may draw blood from IV access  Blood Draw Procedure:   - Aspirate at least 5 mL of blood   - Discard   - Obtain specimen   - Change posiflow cap   - Flush with 20 mL Normal Saline followed by a                 3-5 mL Heparin flush (100 units/mL)  Central :   - Sterile dressing changes are done weekly and as needed.   - Use chlor-hexadine scrub to cleanse site, apply Biopatch to insertion site,       apply securement device dressing   - Posi-flow caps are changed weekly and after EVERY lab draw.   - If sterile gauze is under dressing to control oozing,                 dressing change must be performed every 24 hours until gauze is not needed.      Send initial Home Health orders to [facility] attending physician on call.    PRIMARY OFFICE  [facility]  LIZZY MAYA M.D., [facility] ([facility])  [address] [place] [id number]  Phone: [phone]  Fax: [phone]    **Dr Maya to follow patient and renew HH//lab orders

## 2022-12-23 NOTE — ASSESSMENT & PLAN NOTE
-NICM  -Transferred from [place] for ADHF and possible workup. During hospital stay there; he was briefly on Levo and started on  and they were unable to wean  off.  Presented on  5  -Last 2D Echo 12/6/22: LVEF: 25% , LVEDD:  5.33 cm  -RHC: 12/16/22 RA: 15/16 (14), RV: 47/2 (14), PA: 47/23 (32), PWP: 22/30 (23), CO: 2.9, CI: 1.5 on  5  -Moved to the ICU 12/21 to initiate Laura and epi  -Given metolazone 10 mg to augment diuresis with improvement in CVP to 9. Cr stabilized to 2.6   -CVP 6, SVO2: 58 CO: 3.12, CI: 1.9 on  5, epi 0.04, Laura 10 ppm, and Lasix gtt at 40 mg/hr.    -Decision made 12/23 after extensive discussions regarding goals of care. Patient to discharge on dobutamine and will f/u with home cardiologist  -GDMT with home dose of Digoxin. Was previously on Toprol 100mg QHS but holding in the setting of   -Patient is currently being evaluated for OHTx/VAD  -Heart failure pathway Step 4. Per Pulm recs, will obtain chest CT with contrast to further evaluate pulmonary nodules, but will wait until Cr improves.    -2g Na dietary restriction, 1500 mL fluid restriction, strict I/Os

## 2022-12-23 NOTE — PLAN OF CARE
Hemodynamics:   Parameter   at 2100   CVP 13   SvO2 52   CO  3.36   CI 1.86   SVR 1358   MAP 70     Current Heart Failure Medications:  - Dobutamine 5 mcg/kg/min  - Epinephrine 0.04 mcg/kg/min  - Lasix 40 mg/hr  - Nitric 10 ppm  - Heparin gtt    Current Mechanical Circulatory Support:  -none    UOP:  UOP: 700cc since 7pm    Intake/Output Summary (Last 24 hours) at 12/22/2022 1845  Last data filed at 12/22/2022 1800  Gross per 24 hour   Intake 1732.41 ml   Output 3510 ml   Net -1777.59 ml     Assessment/Plan:  - Plan was discussed with attending staff   - Lactate 0.8    Royal Grey MD  Cardiology Fellow PGY IV  Pager: [phone]

## 2022-12-23 NOTE — PLAN OF CARE
CICU DAILY GOALS       A: Awake    RASS: Goal -    Actual - RASS (Britton Agitation-Sedation Scale): 0-->alert and calm   Restraint necessity:    B: Breath   SBT: Not intubated   C: Coordinate A & B, analgesics/sedatives   Pain: managed    SAT: Not intubated  D: Delirium   CAM-ICU: Overall CAM-ICU: Negative  E: Early(intubated/ Progressive (non-intubated) Mobility   MOVE Screen: Pass   Activity: Activity Management: Rolling - L1  FAS: Feeding/Nutrition   Diet order: Diet/Nutrition Received: low saturated fat/low cholesterol, 2 gram sodium, Specialty Diet/Nutrition Received:  (Cardiac) Fluid restriction: Fluid Requirement: 1500cc FR   Nutritional Supplement Intake: Quantity N/A, Type: N/A  T: Thrombus   DVT prophylaxis: VTE Required Core Measure: Pharmacological prophylaxis initiated/maintained  H: HOB Elevation   Head of Bed (HOB) Positioning: HOB at 20-30 degrees  U: Ulcer Prophylaxis   GI: yes  G: Glucose control   managed    S: Skin   Bundle compliance: yes   Bathing/Skin Care: linen changed (self) Date:   B: Bowel Function   no issues   I: Indwelling Catheters   [place] necessity:     CVC necessity: Yes   IPAD offered: No  D: De-escalation Antibx   No  Plan for the day   Monitor VS and UOP. CVP 9, 10 & 12. SV02 42.   Family/Goals of care/Code Status   Code Status: Full Code     No acute events throughout day, VS and assessment per flow sheet, patient progressing towards goals as tolerated, plan of care reviewed with Migel Garcia and family, all concerns addressed, will continue to monitor.

## 2022-12-23 NOTE — DISCHARGE SUMMARY
[facility] - Cardiac Intensive Care  Heart Transplant  Discharge Summary      Patient Name: Migel Garcia  MRN: [id number]  Admission Date: 12/5/2022  Hospital Length of Stay: 18 days  Discharge Date and Time: 12/23/2022 4:10 PM  Attending Physician: Sukhjinder Ball MD   Discharging Provider: Yenni Barnes PA-C  Primary Care Provider: Anuj Banks MD     HPI: 52 year old man with a history of non-ischemic cardiomyopathy with ICD placement, CKD 3, atrial fibrillation presents from  to [facility] for advanced options, on dobutamine 5. Echo 12/6 with EF of 25%. Pathway for LVAD/heart transplant started      Procedure(s) (LRB):  INSERTION, CATHETER, RIGHT HEART (Right)  INSERTION, CATHETER, SWAN-LESTER, WITH IMAGING GUIDANCE     Hospital Course: Mr. Garcia was transferred from [place] with ADHF on dobutamine and lasix gtt and for consideration of advanced options workup. Workup was started upon admission to [facility]. He was diuresed on lasix gtt and continued on  for 2 weeks however unfortunately renal function worsened and CO/CI did not improve despite aggressive fluid removal. He was transferred to ICU for addition of epi and inhaled nitric with some improvement in hemodynamics and sCr, however at this point on 12/23 patient reports that he is tired of being in the hospital and would like to discharge home on dobutamine. Discussed goals of care extensively with patient with his wife and son at bedside with staff MD Dr. Cruz and Dr. Ball and ultimately patient decided he does not want to undergo any further workup/procedures or increased HD support at this time and would like to discharge home on dobutamine. He will be declined for advanced options in light of this. He will follow up with Dr. Bell who is his previous cardiologist.      Goals of Care Treatment Preferences:  Code Status: Full Code      Consults (From admission, onward)        Status Ordering Provider     Inpatient consult to Infectious Diseases  Once         Provider:  (Not yet assigned)    Completed SARAH CHILDS     Inpatient consult to Pulmonology  Once        Provider:  (Not yet assigned)    Completed NAPOLEON DAVIS     Inpatient consult to Registered Dietitian/Nutritionist  Once        Provider:  (Not yet assigned)    Completed SARAH CHILDS     Inpatient consult to Gastroenterology  Once        Provider:  (Not yet assigned)    Completed SARAH CHILDS     Inpatient Consult to Transplant Coordinator  Once        Provider:  (Not yet assigned)    Acknowledged JUAN PATEL     Inpatient Consult to Pre-VAD Coordinator  Once        Provider:  (Not yet assigned)    Completed JUAN PATEL     Inpatient consult to Cardiothoracic Surgery  Once        Provider:  (Not yet assigned)    Completed JUAN PATEL          Significant Diagnostic Studies:     Pending Diagnostic Studies:     Procedure Component Value Units Date/Time    APTT [[id number]] Collected: 11/20/22 2350    Order Status: Sent Lab Status: In process Updated: 12/12/22 0124    Specimen: Blood     HLA PRA Screen [[id number]] Collected: 12/09/22 1711    Order Status: Sent Lab Status: In process Updated: 12/12/22 0849    Specimen: Blood     Hepatic function panel [[id number]] Collected: 12/23/22 0509    Order Status: Sent Lab Status: In process Updated: 12/23/22 0509    Specimen: Blood     Magnesium [[id number]] Collected: 12/17/22 0521    Order Status: Sent Lab Status: In process Updated: 12/17/22 0521    Specimen: Blood     Quantiferon Gold TB [[id number]] Collected: 12/10/22 0422    Order Status: Sent Lab Status: In process Updated: 12/10/22 0423    Specimen: Blood         Final Active Diagnoses:    Diagnosis Date Noted POA    PRINCIPAL PROBLEM:  Acute on chronic combined systolic and diastolic heart failure [I50.43] 12/05/2022 Yes    Acute decompensated heart failure [I50.9] 12/13/2022 Unknown    Atrial fibrillation [I48.91] 12/06/2022 Unknown    Pulmonary nodule [R91.1] 12/06/2022  Unknown    GERD (gastroesophageal reflux disease) [K21.9] 12/05/2022 Yes    Tobacco use [Z72.0] 12/05/2022 Yes    Marijuana abuse [F12.10] 12/05/2022 Yes    CKD (chronic kidney disease), stage III [N18.30] 12/05/2022 Yes    HTN (hypertension) [I10] 12/05/2022 Yes    Anemia [D64.9] 12/05/2022 Yes    ICD (implantable cardioverter-defibrillator), single, in situ [Z95.810] 12/05/2022 Yes    HFrEF (heart failure with reduced ejection fraction) [I50.20] 12/05/2022 Yes      Problems Resolved During this Admission:      Discharged Condition: poor    Disposition: Home-Health Care [facility]    Follow Up:    Patient Instructions:   No discharge procedures on file.  Medications:  Reconciled Home Medications:      Medication List      START taking these medications    amiodarone 200 MG Tab  Commonly known as: PACERONE  Take 1 tablet (200 mg total) by mouth once daily.  Start taking on: December 24, 2022     apixaban 5 mg Tab  Commonly known as: ELIQUIS  Take 1 tablet (5 mg total) by mouth 2 (two) times daily.     bumetanide 1 MG tablet  Commonly known as: BUMEX  Take 3 tablets (3 mg total) by mouth 2 (two) times a day.     digoxin 125 mcg tablet  Commonly known as: LANOXIN  Take 1 tablet (0.125 mg total) by mouth every other day.  Start taking on: December 25, 2022            Yenni Barnes PA-C  Heart Transplant  [facility] - Cardiac Intensive Care

## 2022-12-23 NOTE — PROGRESS NOTES
[facility] - Cardiac Intensive Care  Heart Transplant  Progress Note    Patient Name: Migel Garcia  MRN: [id number]  Admission Date: 12/5/2022  Hospital Length of Stay: 18 days  Attending Physician: Sukhjinder Ball MD  Primary Care Provider: Anuj Banks MD  Principal Problem:Acute on chronic combined systolic and diastolic heart failure    Subjective:     Interval History: CI 1.9 today with improvement of sCr to 2.4 from 2.6 on epi, Laura, , and lasix gtt. Over course of morning, patient and family expressed that he is tired and wants to go home. Multiple extensive discussion regarding patient's goals with family, bedside RN, Dr. Cruz, and Dr. Ball present and ultimately patient decided that he wants to go home on dobutamine. Patient will be declined for advanced HF surgical options (heart transplant/LVAD). Weaning other support over rest of day and will d/c on dobutamine today, to follow with Dr. Bell in [place].       Continuous Infusions:   sodium chloride 0.9% 5 mL/hr at 12/23/22 1205    sodium chloride 0.9% 10 mL/hr at 12/23/22 1205    DOBUTamine IV infusion (non-titrating) 5 mcg/kg/min (12/23/22 1205)    EPINEPHrine 0.02 mcg/kg/min (12/23/22 1332)    furosemide (LASIX) 10 mg/mL infusion (non-titrating) 20 mg/hr (12/23/22 1332)    nitric oxide gas       Scheduled Meds:   amiodarone  200 mg Oral Daily    digoxin  0.125 mg Oral Every other day    methocarbamoL  500 mg Oral QID    pantoprazole  40 mg Oral Daily    polyethylene glycol  17 g Oral Daily    senna  8.6 mg Oral Daily     PRN Meds:acetaminophen, dextrose 10 % in water (D10W), glucose, insulin aspart U-100, melatonin, sodium chloride 0.9%    Review of patient's allergies indicates:  No Known Allergies  Objective:     Vital Signs (Most Recent):  Temp: 97.8 °F (36.6 °C) (12/23/22 0705)  Pulse: 95 (12/23/22 1405)  Resp: 17 (12/23/22 1405)  BP: 101/73 (12/23/22 1405)  SpO2: 99 % (12/23/22 1405) Vital Signs (24h Range):  Temp:  [97.8 °F (36.6  °C)-98.3 °F (36.8 °C)] 97.8 °F (36.6 °C)  Pulse:  [] 95  Resp:  [11-31] 17  SpO2:  [73 %-100 %] 99 %  BP: ()/(58-89) 101/73     Patient Vitals for the past 72 hrs (Last 3 readings):   Weight   12/22/22 0301 70.6 kg (155 lb 10.3 oz)   12/21/22 0728 68.7 kg (151 lb 7.3 oz)       Body mass index is 25.12 kg/m².      Intake/Output Summary (Last 24 hours) at 12/23/2022 1548  Last data filed at 12/23/2022 1230  Gross per 24 hour   Intake 1998.26 ml   Output 2525 ml   Net -526.74 ml         Hemodynamic Parameters:  PAP: (35-87)/(24-65) 61/38  PAP (Mean):  [30 mmHg-75 mmHg] 47 mmHg  CVP:  [6 mmHg-13 mmHg] 6 mmHg    Telemetry: reviewed  Physical Exam  Vitals and nursing note reviewed.   Constitutional:       Appearance: Normal appearance.   HENT:      Head: Normocephalic.      Nose: Nose normal.      Mouth/Throat:      Mouth: Mucous membranes are moist.   Neck:      Vascular: JVD present.   Cardiovascular:      Rate and Rhythm: Tachycardia present. Rhythm irregular.      Heart sounds: Murmur heard.     Gallop present.   Pulmonary:      Effort: Pulmonary effort is normal.   Abdominal:      General: Abdomen is flat. There is no distension.      Palpations: Abdomen is soft.   Musculoskeletal:         General: Normal range of motion.   Skin:     General: Skin is warm.      Capillary Refill: Capillary refill takes 2 to 3 seconds.   Neurological:      Mental Status: He is alert and oriented to person, place, and time.       Significant Labs:  CBC:  Recent Labs   Lab 12/20/22  0405 12/21/22  0444 12/22/22  0338   WBC 5.68 5.19 6.27   RBC 4.27* 4.60 4.42*   HGB 12.0* 12.6* 12.2*   HCT 38.4* 41.1 39.0*   * 115* 115*   MCV 90 89 88   MCH 28.1 27.4 27.6   MCHC 31.3* 30.7* 31.3*       BNP:  Recent Labs   Lab 12/19/22  0450 12/21/22  0444 12/23/22  0509   BNP 1,155* 1,048* 923*       CMP:  Recent Labs   Lab 12/19/22  0450 12/20/22  0405 12/21/22  0444 12/22/22  0338 12/23/22  0509   *   < > 93 356* 154*    CALCIUM 9.7   < > 10.2 9.4 10.1   ALBUMIN 4.0  --  4.2  --   --    PROT 8.1  --  8.7*  --   --    *   < > 132* 124* 131*   K 3.8   < > 4.1 3.8 3.7   CO2 32*   < > 31* 29 30*   CL 91*   < > 85* 82* 85*   BUN 57*   < > 73* 80* 106*   CREATININE 2.1*   < > 2.6* 2.6* 2.4*   ALKPHOS 207*  --  238*  --   --    ALT 20  --  18  --   --    AST 20  --  21  --   --    BILITOT 2.8*  --  3.5*  --   --     < > = values in this interval not displayed.        Coagulation:   Recent Labs   Lab 12/22/22  0453 12/23/22  0006 12/23/22  0509   APTT 63.6* 51.7* 58.7*       LDH:  No results for input(s): LDH in the last 72 hours.    Microbiology:  Microbiology Results (last 7 days)       ** No results found for the last 168 hours. **            I have reviewed all pertinent labs within the past 24 hours.    Estimated Creatinine Clearance: 31.8 mL/min (A) (based on SCr of 2.4 mg/dL (H)).    Diagnostic Results:  I have reviewed all pertinent imaging results/findings within the past 24 hours.    Assessment and Plan:     52 year old man with a history of non-ischemic cardiomyopathy with ICD placement, CKD 3, atrial fibrillation presents from  to [facility] for advanced options, on dobutamine 5. Echo 12/6 with EF of 25%. Pathway for LVAD/heart transplant started      * Acute on chronic combined systolic and diastolic heart failure  -NICM  -Transferred from [place] for ADHF and possible workup. During hospital stay there; he was briefly on Levo and started on  and they were unable to wean  off.  Presented on  5  -Last 2D Echo 12/6/22: LVEF: 25% , LVEDD:  5.33 cm  -RHC: 12/16/22 RA: 15/16 (14), RV: 47/2 (14), PA: 47/23 (32), PWP: 22/30 (23), CO: 2.9, CI: 1.5 on  5  -Moved to the ICU 12/21 to initiate Laura and epi  -Given metolazone 10 mg to augment diuresis with improvement in CVP to 9. Cr stabilized to 2.6   -CVP 6, SVO2: 58 CO: 3.12, CI: 1.9 on  5, epi 0.04, Laura 10 ppm, and Lasix gtt at 40 mg/hr.    -Decision made 12/23  after extensive discussions regarding goals of care. Patient to discharge on dobutamine and will f/u with home cardiologist. He will be declined for advanced options.  -GDMT with home dose of Digoxin. Was previously on Toprol 100mg QHS but holding in the setting of   -Patient is currently being evaluated for OHTx/VAD  -Heart failure pathway Step 4. Per Pulm recs, will obtain chest CT with contrast to further evaluate pulmonary nodules, but will wait until Cr improves.    -2g Na dietary restriction, 1500 mL fluid restriction, strict I/Os      Pulmonary nodule  -Questionable 8 mm left lower lobe pulmonary nodule series 6, image 314.  This courses along the left lower lobe pulmonary artery and pulmonary aneurysm not excluded.  Dedicated CT chest with contrast suggested  -Will plan for CT with contrast once Cr improves    Atrial fibrillation  -[id number] score is 2  -On Eliquis at home for anticoagulation  -Stopped on 12/5 in anticipation of workup and possible procedures  -On Lovenox for DVT prophylaxis  -continue amio/dig    ICD (implantable cardioverter-defibrillator), single, in situ  -[employer] ICD    HTN (hypertension)  Continue entresto    CKD (chronic kidney disease), stage III  -SHAUN on CKD III  -Reported baseline 1.6-2.0  -Renal function stabilized after moving to ICU to initiate Laura and epi. Continue Lasix infusion and .  -avoid nephrotoxins     Tobacco use  -counseled on cessation    GERD (gastroesophageal reflux disease)  -Protonix 40mg Qdaily    Uninterrupted Critical Care/Counseling Time (not including procedures): 70 minutes    Yenni Barnes PA-C  Heart Transplant  [facility] - Cardiac Intensive Care

## 2022-12-23 NOTE — SUBJECTIVE & OBJECTIVE
Interval History: CI 1.9 today with improvement of sCr to 2.4 from 2.6 on epi, Laura, , and lasix gtt. Over course of morning, patient and family expressed that he is tired and wants to go home. Multiple extensive discussion regarding patient's goals with family, bedside RN, Dr. Cruz, and Dr. Ball present and ultimately patient decided that he wants to go home on dobutamine. Weaning other support over rest of day and will d/c on dobutamine today, to follow with Dr. Bell in [place].       Continuous Infusions:   sodium chloride 0.9% 5 mL/hr at 12/23/22 1205    sodium chloride 0.9% 10 mL/hr at 12/23/22 1205    DOBUTamine IV infusion (non-titrating) 5 mcg/kg/min (12/23/22 1205)    EPINEPHrine 0.02 mcg/kg/min (12/23/22 1332)    furosemide (LASIX) 10 mg/mL infusion (non-titrating) 20 mg/hr (12/23/22 1332)    nitric oxide gas       Scheduled Meds:   amiodarone  200 mg Oral Daily    digoxin  0.125 mg Oral Every other day    methocarbamoL  500 mg Oral QID    pantoprazole  40 mg Oral Daily    polyethylene glycol  17 g Oral Daily    senna  8.6 mg Oral Daily     PRN Meds:acetaminophen, dextrose 10 % in water (D10W), glucose, insulin aspart U-100, melatonin, sodium chloride 0.9%    Review of patient's allergies indicates:  No Known Allergies  Objective:     Vital Signs (Most Recent):  Temp: 97.8 °F (36.6 °C) (12/23/22 0705)  Pulse: 95 (12/23/22 1405)  Resp: 17 (12/23/22 1405)  BP: 101/73 (12/23/22 1405)  SpO2: 99 % (12/23/22 1405) Vital Signs (24h Range):  Temp:  [97.8 °F (36.6 °C)-98.3 °F (36.8 °C)] 97.8 °F (36.6 °C)  Pulse:  [] 95  Resp:  [11-31] 17  SpO2:  [73 %-100 %] 99 %  BP: ()/(58-89) 101/73     Patient Vitals for the past 72 hrs (Last 3 readings):   Weight   12/22/22 0301 70.6 kg (155 lb 10.3 oz)   12/21/22 0728 68.7 kg (151 lb 7.3 oz)       Body mass index is 25.12 kg/m².      Intake/Output Summary (Last 24 hours) at 12/23/2022 1548  Last data filed at 12/23/2022 1230  Gross per 24 hour   Intake  1998.26 ml   Output 2525 ml   Net -526.74 ml         Hemodynamic Parameters:  PAP: (35-87)/(24-65) 61/38  PAP (Mean):  [30 mmHg-75 mmHg] 47 mmHg  CVP:  [6 mmHg-13 mmHg] 6 mmHg    Telemetry: reviewed  Physical Exam  Vitals and nursing note reviewed.   Constitutional:       Appearance: Normal appearance.   HENT:      Head: Normocephalic.      Nose: Nose normal.      Mouth/Throat:      Mouth: Mucous membranes are moist.   Neck:      Vascular: JVD present.   Cardiovascular:      Rate and Rhythm: Tachycardia present. Rhythm irregular.      Heart sounds: Murmur heard.     Gallop present.   Pulmonary:      Effort: Pulmonary effort is normal.   Abdominal:      General: Abdomen is flat. There is no distension.      Palpations: Abdomen is soft.   Musculoskeletal:         General: Normal range of motion.   Skin:     General: Skin is warm.      Capillary Refill: Capillary refill takes 2 to 3 seconds.   Neurological:      Mental Status: He is alert and oriented to person, place, and time.       Significant Labs:  CBC:  Recent Labs   Lab 12/20/22 0405 12/21/22 0444 12/22/22  0338   WBC 5.68 5.19 6.27   RBC 4.27* 4.60 4.42*   HGB 12.0* 12.6* 12.2*   HCT 38.4* 41.1 39.0*   * 115* 115*   MCV 90 89 88   MCH 28.1 27.4 27.6   MCHC 31.3* 30.7* 31.3*       BNP:  Recent Labs   Lab 12/19/22  0450 12/21/22  0444 12/23/22  0509   BNP 1,155* 1,048* 923*       CMP:  Recent Labs   Lab 12/19/22  0450 12/20/22  0405 12/21/22  0444 12/22/22  0338 12/23/22  0509   *   < > 93 356* 154*   CALCIUM 9.7   < > 10.2 9.4 10.1   ALBUMIN 4.0  --  4.2  --   --    PROT 8.1  --  8.7*  --   --    *   < > 132* 124* 131*   K 3.8   < > 4.1 3.8 3.7   CO2 32*   < > 31* 29 30*   CL 91*   < > 85* 82* 85*   BUN 57*   < > 73* 80* 106*   CREATININE 2.1*   < > 2.6* 2.6* 2.4*   ALKPHOS 207*  --  238*  --   --    ALT 20  --  18  --   --    AST 20  --  21  --   --    BILITOT 2.8*  --  3.5*  --   --     < > = values in this interval not displayed.         Coagulation:   Recent Labs   Lab 12/22/22  0453 12/23/22  0006 12/23/22  0509   APTT 63.6* 51.7* 58.7*       LDH:  No results for input(s): LDH in the last 72 hours.    Microbiology:  Microbiology Results (last 7 days)       ** No results found for the last 168 hours. **            I have reviewed all pertinent labs within the past 24 hours.    Estimated Creatinine Clearance: 31.8 mL/min (A) (based on SCr of 2.4 mg/dL (H)).    Diagnostic Results:  I have reviewed all pertinent imaging results/findings within the past 24 hours.

## 2022-12-23 NOTE — PLAN OF CARE
[facility]  Department of Hospital Medicine  [address] [place] [address]  [phone] after hours  [phone] fax    HOSPICE  ORDERS    12/23/2022    Admit to Hospice:  Home Service  Diagnoses:   Active Hospital Problems    Diagnosis  POA    *Acute on chronic combined systolic and diastolic heart failure [I50.43]  Yes    Acute decompensated heart failure [I50.9]  Unknown    Atrial fibrillation [I48.91]  Unknown    Pulmonary nodule [R91.1]  Unknown    GERD (gastroesophageal reflux disease) [K21.9]  Yes    Tobacco use [Z72.0]  Yes    Marijuana abuse [F12.10]  Yes    CKD (chronic kidney disease), stage III [N18.30]  Yes    HTN (hypertension) [I10]  Yes    Anemia [D64.9]  Yes    ICD (implantable cardioverter-defibrillator), single, in situ [Z95.810]  Yes    HFrEF (heart failure with reduced ejection fraction) [I50.20]  Yes      Resolved Hospital Problems   No resolved problems to display.       Hospice Qualifying Diagnoses: end stage CHF       Patient has a life expectancy < 6 months due to:  Primary Hospice Diagnosis:  end stage CHF  Comorbid Conditions Contributing to Decline:  SHAUN  Vital Signs: Routine per Hospice Protocol.    Allergies: Review of patient's allergies indicates:  No Known Allergies    Diet: regular as tolerated    Activities: As tolerated    Goals of Care Treatment Preferences:  Code Status: Full Code      Nursing: Per Hospice Routine.      Routine Skin for Bedridden Patients: Apply moisture barrier cream to all skin folds and   wet areas in perineal area daily and after baths and all bowel movements.    PICC Care:   Scrub the Hub: Prior to accessing the line, always perform a 30 second alcohol scrub  Each lumen of the central line is to be flushed at least daily with 10 mL Normal Saline and 3 mL Heparin flush (100 units/mL)  Skilled Nurse (SN) may draw blood from IV access      Central :   - Sterile dressing changes are done weekly  and as needed.   - Use chlor-hexadine scrub to cleanse site, apply Biopatch to insertion site,       apply securement device dressing   - Posi-flow caps are changed weekly and after EVERY lab draw.   - If sterile gauze is under dressing to control oozing,                 dressing change must be performed every 24 hours until gauze is not needed.      Oxygen: per hospice routine    Other Miscellaneous Care:     Medications:     Dobutamine 5mcg/kg/min @ dosing weight 90.4kg       Medication List        START taking these medications      amiodarone 200 MG Tab  Commonly known as: PACERONE  Take 1 tablet (200 mg total) by mouth once daily.  Start taking on: December 24, 2022     apixaban 5 mg Tab  Commonly known as: ELIQUIS  Take 1 tablet (5 mg total) by mouth 2 (two) times daily.     bumetanide 1 MG tablet  Commonly known as: BUMEX  Take 3 tablets (3 mg total) by mouth 2 (two) times a day.     digoxin 125 mcg tablet  Commonly known as: LANOXIN  Take 1 tablet (0.125 mg total) by mouth every other day.  Start taking on: December 25, 2022                  Future Orders:  Hospice Medical Director may dictate new orders for comfortable care measures & sign death certificate.        _________________________________  Yenni Barnes PA-C  12/23/2022

## 2022-12-23 NOTE — PROGRESS NOTES
DISCHARGE    SW to pt's room x5 for discharge today, family at bedside.  Pt presents as AAO x4, calm, cooperative, and asking and answering questions appropriately. Dr. Cruz and Dr. Ball at bedside. Pt states he wants to go home today on  and that he does not want to go on palliative care or Hospice. HTS LCSW provided education around HH, palliative care and hospice.  Pt verbalizes understanding and agreement with plan for d/c to home today with HH and .  Pt reports his family will transport him home today.  Pt reports coping adequately at this time, and denies any needs or concerns to SW. Pt's wife presented paperwork for cruise ship reimbursement due to illness, LCSW instructed the family that they would need to go to the medical reccords and management office to have paperwork filled out and obtain medical records.     SW notified Rosalinda at [facility] (p: [phone]) of d/c today and confirmed they have Epic access.  JOSE notified Haleigh with [facility] ([phone]) of d/c today and faxed HH orders & hospital records.      Update: At 4:30pm, [facility] states that the co pay would be $150 per week, family states they cannot afford. Dr. Cruz at bedside, discussing care options with Pt and family. Pt and family would like to speak to hospice liaison. LCSW contacted Rosa at [facility] who came to room and signed hospice paperwork. Anne at bedside providing education on  & pump. LCSW processed feelings with Pt and family and said their goodbyes.     SW providing ongoing psychosocial and counseling support, education, resources, assistance, and discharge planning as indicated.  SW remains available.

## 2022-12-24 LAB
ALLENS TEST: ABNORMAL
DELSYS: ABNORMAL
HCO3 UR-SCNC: 39.6 MMOL/L (ref 24–28)
MODE: ABNORMAL
PCO2 BLDA: 60.3 MMHG (ref 35–45)
PH SMN: 7.42 [PH] (ref 7.35–7.45)
PO2 BLDA: 23 MMHG (ref 40–60)
POC BE: 15 MMOL/L
POC SATURATED O2: 40 % (ref 95–100)
POC TCO2: 41 MMOL/L (ref 24–29)
SAMPLE: ABNORMAL
SITE: ABNORMAL

## 2022-12-24 NOTE — NURSING
Note Discharge order received, All home sevices set up with the assistance of the HTS team. Nicole with the home infusion company at bedside to provide home Dobutamine infusion with pump and education. Pt connected to home Dobutamine infusion, all questions answered per Anne and Hospice RN at bedside as well. Discharge instructions in provided in great detail, all questions answered; Patient/son/patient's wife all verbalized understanding with teach back. PIV removed with catheter tip intact, no hematoma or bleeding noted, gauze dressing applied. Pt dressed self without difficulty. Pt prepared for discharge.

## 2022-12-28 ENCOUNTER — COMMITTEE REVIEW (OUTPATIENT)
Dept: TRANSPLANT | Facility: CLINIC | Age: 52
End: 2022-12-28
Payer: MEDICARE

## 2022-12-28 NOTE — COMMITTEE REVIEW
Pt was discussed at Heart Transplant/LVAD Selection Committee and was declined for both heart transplant listing and LVAD implant. Reason: Patient choice.  Mr Garcia is not interested in either option and has been discharged home with hospice and palliative inotrope under the care of Dr Bell.      Note was written by Lydia Pena RN.    ==========================================================

## 2023-01-03 LAB
CLASS I ANTIBODIES - LUMINEX: NEGATIVE
CLASS II ANTIBODY COMMENTS - LUMINEX: NORMAL
CPRA %: 0
HPRA INTERPRETATION: NORMAL
SERUM COLLECTION DT - LUMINEX CLASS I: NORMAL
SERUM COLLECTION DT - LUMINEX CLASS II: NORMAL
SPCL1 TESTING DATE: NORMAL
SPCL2 TESTING DATE: NORMAL
SPLUA TESTING DATE: NORMAL

## 2023-01-26 ENCOUNTER — TELEPHONE (OUTPATIENT)
Dept: CARDIOLOGY | Facility: CLINIC | Age: 53
End: 2023-01-26
Payer: MEDICARE

## 2023-01-26 NOTE — TELEPHONE ENCOUNTER
----- Message from Elisabeth Conde sent at 1/25/2023  3:34 PM CST -----  Contact: PT spouse [phone]  PT spouse calling to schedule an appointment with the office. Please call to advise.

## 2023-01-27 ENCOUNTER — OFFICE VISIT (OUTPATIENT)
Dept: CARDIOLOGY | Facility: CLINIC | Age: 53
End: 2023-01-27
Payer: MEDICARE

## 2023-01-27 VITALS
SYSTOLIC BLOOD PRESSURE: 100 MMHG | BODY MASS INDEX: 25.05 KG/M2 | DIASTOLIC BLOOD PRESSURE: 70 MMHG | HEART RATE: 77 BPM | WEIGHT: 155.19 LBS | OXYGEN SATURATION: 99 %

## 2023-01-27 DIAGNOSIS — I50.43 ACUTE ON CHRONIC COMBINED SYSTOLIC AND DIASTOLIC HEART FAILURE: Primary | ICD-10-CM

## 2023-01-27 PROCEDURE — 99214 PR OFFICE/OUTPT VISIT, EST, LEVL IV, 30-39 MIN: ICD-10-PCS | Mod: GW,S$GLB,, | Performed by: PHYSICIAN ASSISTANT

## 2023-01-27 PROCEDURE — 1160F PR REVIEW ALL MEDS BY PRESCRIBER/CLIN PHARMACIST DOCUMENTED: ICD-10-PCS | Mod: CPTII,S$GLB,, | Performed by: PHYSICIAN ASSISTANT

## 2023-01-27 PROCEDURE — 99214 OFFICE O/P EST MOD 30 MIN: CPT | Mod: GW,S$GLB,, | Performed by: PHYSICIAN ASSISTANT

## 2023-01-27 PROCEDURE — 3074F SYST BP LT 130 MM HG: CPT | Mod: CPTII,S$GLB,, | Performed by: PHYSICIAN ASSISTANT

## 2023-01-27 PROCEDURE — 1159F PR MEDICATION LIST DOCUMENTED IN MEDICAL RECORD: ICD-10-PCS | Mod: CPTII,S$GLB,, | Performed by: PHYSICIAN ASSISTANT

## 2023-01-27 PROCEDURE — 99999 PR PBB SHADOW E&M-EST. PATIENT-LVL II: CPT | Mod: PBBFAC,,, | Performed by: PHYSICIAN ASSISTANT

## 2023-01-27 PROCEDURE — 3008F PR BODY MASS INDEX (BMI) DOCUMENTED: ICD-10-PCS | Mod: CPTII,S$GLB,, | Performed by: PHYSICIAN ASSISTANT

## 2023-01-27 PROCEDURE — 3078F DIAST BP <80 MM HG: CPT | Mod: CPTII,S$GLB,, | Performed by: PHYSICIAN ASSISTANT

## 2023-01-27 PROCEDURE — 3078F PR MOST RECENT DIASTOLIC BLOOD PRESSURE < 80 MM HG: ICD-10-PCS | Mod: CPTII,S$GLB,, | Performed by: PHYSICIAN ASSISTANT

## 2023-01-27 PROCEDURE — 99999 PR PBB SHADOW E&M-EST. PATIENT-LVL II: ICD-10-PCS | Mod: PBBFAC,,, | Performed by: PHYSICIAN ASSISTANT

## 2023-01-27 PROCEDURE — 1159F MED LIST DOCD IN RCRD: CPT | Mod: CPTII,S$GLB,, | Performed by: PHYSICIAN ASSISTANT

## 2023-01-27 PROCEDURE — 1160F RVW MEDS BY RX/DR IN RCRD: CPT | Mod: CPTII,S$GLB,, | Performed by: PHYSICIAN ASSISTANT

## 2023-01-27 PROCEDURE — 3074F PR MOST RECENT SYSTOLIC BLOOD PRESSURE < 130 MM HG: ICD-10-PCS | Mod: CPTII,S$GLB,, | Performed by: PHYSICIAN ASSISTANT

## 2023-01-27 PROCEDURE — 3008F BODY MASS INDEX DOCD: CPT | Mod: CPTII,S$GLB,, | Performed by: PHYSICIAN ASSISTANT

## 2023-01-27 RX ORDER — METOLAZONE 5 MG/1
5 TABLET ORAL DAILY
Status: ON HOLD | COMMUNITY
End: 2024-01-31 | Stop reason: HOSPADM

## 2023-01-27 NOTE — PROGRESS NOTES
[facility] Provider Note (Initial Clinic) Consult Note    Date of original referral: 12/23/2022  Age: 52 y.o.  Gender: male  Ethnicity: Not  or /a   Number of admissions for CHF within the preceding year: multiple at [facility]  Duration of CHF: more than 5 years, [facility] Dr Bell   Type of Congestive Heart Failure: Systolic   Etiology: Non-ischemic    Social history: none  Enrolled in Infusion suite: no    Diagnostic Labs:   EKG - 12/05/2022  CXR -   ECHO - 12/20/2022  Stress test -   Stress echo -   Pharmacologic stress -   Cardiac catheterization - 12/22/2022   Cardiac MRI -     Lab Results   Component Value Date     (L) 12/23/2022     (L) 12/22/2022    K 3.7 12/23/2022    K 3.8 12/22/2022    CL 85 (L) 12/23/2022    CL 82 (L) 12/22/2022    CO2 30 (H) 12/23/2022    CO2 29 12/22/2022     (H) 12/23/2022     (H) 12/22/2022     (H) 12/23/2022    BUN 80 (H) 12/22/2022    CREATININE 2.4 (H) 12/23/2022    CREATININE 2.6 (H) 12/22/2022    CALCIUM 10.1 12/23/2022    CALCIUM 9.4 12/22/2022    PROT 8.7 (H) 12/21/2022    PROT 8.1 12/19/2022    ALBUMIN 4.2 12/21/2022    ALBUMIN 4.0 12/19/2022    BILITOT 3.5 (H) 12/21/2022    BILITOT 2.8 (H) 12/19/2022    ALKPHOS 238 (H) 12/21/2022    ALKPHOS 207 (H) 12/19/2022    AST 21 12/21/2022    AST 20 12/19/2022    ALT 18 12/21/2022    ALT 20 12/19/2022    ANIONGAP 16 12/23/2022    ANIONGAP 13 12/22/2022       Lab Results   Component Value Date    WBC 6.27 12/22/2022    WBC 5.19 12/21/2022    RBC 4.42 (L) 12/22/2022    RBC 4.60 12/21/2022    HGB 12.2 (L) 12/22/2022    HGB 12.6 (L) 12/21/2022    HCT 39.0 (L) 12/22/2022    HCT 41.1 12/21/2022    MCV 88 12/22/2022    MCV 89 12/21/2022    MCH 27.6 12/22/2022    MCH 27.4 12/21/2022    MCHC 31.3 (L) 12/22/2022    MCHC 30.7 (L) 12/21/2022    RDW 19.9 (H) 12/22/2022    RDW 20.7 (H) 12/21/2022     (L) 12/22/2022     (L) 12/21/2022    MPV 11.3 12/22/2022    MPV 11.0 12/21/2022    IMMGR  1.0 (H) 12/22/2022    IMMGR 1.0 (H) 12/21/2022    IGABS 0.06 (H) 12/22/2022    IGABS 0.05 (H) 12/21/2022    LYMPH 1.2 12/22/2022    LYMPH 18.3 12/22/2022    MONO 0.7 12/22/2022    MONO 11.0 12/22/2022    EOS 0.1 12/22/2022    EOS 0.2 12/21/2022    BASO 0.04 12/22/2022    BASO 0.05 12/21/2022    NRBC 0 12/22/2022    NRBC 0 12/21/2022    GRAN 4.2 12/22/2022    GRAN 67.2 12/22/2022    EOSINOPHIL 1.9 12/22/2022    EOSINOPHIL 3.7 12/21/2022    BASOPHIL 0.6 12/22/2022    BASOPHIL 1.0 12/21/2022       Lab Results   Component Value Date     (H) 12/23/2022    BNP 1,048 (H) 12/21/2022    MG 2.6 12/23/2022    MG 2.5 12/22/2022    HGBA1C 5.1 12/05/2022    TSH 6.480 (H) 12/10/2022    FREET4 0.89 12/10/2022       Lab Results   Component Value Date    IRON 64 12/09/2022    FERRITIN 294 12/09/2022    CHOL 111 (L) 12/09/2022    TRIG 54 12/09/2022    HDL 33 (L) 12/09/2022    LDLCALC 67.2 12/09/2022    CHOLHDL 29.7 12/09/2022    TOTALCHOLEST 3.4 12/09/2022    NONHDLCHOL 78 12/09/2022    COLORU Yellow 12/09/2022    APPEARANCEUA Clear 12/09/2022    PHUR 7.0 12/09/2022    SPECGRAV 1.010 12/09/2022    PROTEINUA Negative 12/09/2022    GLUCUA Negative 12/09/2022    KETONESU Negative 12/09/2022    BILIRUBINUA Negative 12/09/2022    OCCULTUA Negative 12/09/2022    NITRITE Negative 12/09/2022    LEUKOCYTESUR Negative 12/09/2022       List all implanted cardiac devices:     Cardiomems: no    Current Outpatient Medications on File Prior to Visit   Medication Sig Dispense Refill    amiodarone (PACERONE) 200 MG Tab Take 1 tablet (200 mg total) by mouth once daily. 30 tablet 11    apixaban (ELIQUIS) 5 mg Tab Take 1 tablet (5 mg total) by mouth 2 (two) times daily.      bumetanide (BUMEX) 1 MG tablet Take 3 tablets (3 mg total) by mouth 2 (two) times a day. 180 tablet 11    digoxin (LANOXIN) 125 mcg tablet Take 1 tablet (0.125 mg total) by mouth every other day. 15 tablet 11    metOLazone (ZAROXOLYN) 5 MG tablet Take 5 mg by mouth once daily.        No current facility-administered medications on file prior to visit.         HPI:  Patient can walk around house since dc, does get SOB   Patient sleeps on 2 pillows   Patient wakes up SOB, has to get out of bed, associated cough, sputum none   Palpitations - none   Dizzy, light-headed, pre-syncope or syncope none   Since discharge frequency of performing weights, home weight and weight change   Other information felt pertinent to HPI    [place] [facility] [place]      Mr Garcia is a 51 YO M w/ history of long standing NICM (> 10 yrs), HTN, marijuana and tobacco use, CKD stage 3 who was transferred to our hospital on 12/5/22 with decompensated heart failure after initially being admitted to [facility] on 11/20/22 with ADHF and started on  and lasix there.  was not able to be weaned at outside hospital. LHC/RHC showed no obstructive CAD and RA 40, PA 72/38, PCWP 42, CI 1.4. He was transferred here for further management and consideration for advanced thearapies. His TTE here showed LVEF of < 20%. RHC here done on  showed RA 14, PA 47/23, mean 32, PCWP 23, CI 1.5, CO 3. PVR of 3. Evaluation for advanced therapies including OHT and LVAD were initiated. Through the course of his admission he had clinical deterioration and ultimately needed support with IV Dobutamine, IV Epinephrine, IV lasix drip and inhaled nitric oxide for biventricular failure. Family expressed concerns about the evaluation process as well as his clinical deterioration over the final week of his hospital course as we were evaluating him for heart transplant/LVAD. We had prolonged discussions with Mr Garcia as well as Wife Julianne and Son Bayron. These conversations are detailed in my attestations of progress notes dated 12/21/22 as well as 12/23/22. After multiple discussions explaining plan and goals of care Mr Garcia decided he does not want to undergo any further workup/procedures or increased HD support at this time and  would like to discharge home on dobutamine. He was discharged home with home hospice and on IV dobutamine as palliative inotropes. He will be declined for advanced options in light of his wishes and will follow up with his local cardiologist Dr Bell for further cardiac care.     Sukhjinder Ball MD  Cardiology                               Pt arrives today for first visit post dc from [facility] advanced HF service. He is on hospice and remains on  5 mcg/kg/min IV. He is here with his son and his wife called in to the appointment about half way through. He states he is here for opinion on stopping  gtt. He feels it is very cumbersome, especially the bag, wants to be able to go on and off of it. Since dc patient states he has felt ok, feels ioke he was doing ok until this last admission when he was transferred to [facility]. When wife arrived via phone, she appeared very frustrated with care they received in [place], felt he was declined for advanced HF therapies because he wanted to be discharged and follow up as outpatient. [place] like he had too many tests, too much blood taken, and he was never told how sick his heart was. She also states they had never been told prior that he had CKD due to his advancing HF.     No SOB at present, no PND, orthopnea.             PHYSICAL:   Vitals:    01/27/23 1124   BP: 100/70   Pulse: 77      Wt Readings from Last 3 Encounters:   01/27/23 70.4 kg (155 lb 3.3 oz)   12/22/22 70.6 kg (155 lb 10.3 oz)   12/04/22 90.4 kg (199 lb 4.7 oz)       JVD: yes   Heart rhythm: regular  Cardiac murmur: No    S3: yes  S4: no  Lungs: clear  Liver span: 16 cm:   Hepatojugular reflux: yes  Edema: no  Jaundiced eyes       ASSESSMENT: acute on chronic systolic HF    PLAN:      Patient Instructions:   Instruct the patient to notify this clinic if HH, a physician or an advanced care provider wants to change medication one of their HF medications   Activity and Diet restrictions:   Recommend 2-3 gram  sodium restriction and 1500cc- 2000cc fluid restriction.  Encourage physical activity with graded exercise program.  Requested patient to weigh themselves daily, and to notify us if their weight increases by more than 3 lbs in 1 day or 5 lbs in 3 days.    Assigned dry weight on home scale: 70 kg     Patient has advanced stage HF with no advanced options now on hospice and palliative inotropes.   He is Off GDMT now due to advancing HF therapies, unable to tolerate even low miriam ARB/BB.  I spoke with patient, wife and son for 35 minutes about his heart failure and how to proceed and the care he received from the [facility] team. We discussed him being too sick to wait any further for an outpatient workup, this is why the team wanted him to stay inpatient. We discussed if they would like second opinion at outside transplant center, but I explained the workup process would be very similar, likely requiring an inpt stay.   We also discussed that going on and off inotropes at home was dangerous and he would likely be symptomatic very quickly if he took himself off.   On inotropes with multiple admissions for ADHF and multi system organ dysfunction, mortality risk high in 6 months 50%, explained this to patient. He wishes to stay with home hospice and continue his palliative inotropes.   Am here to assist patient in any way they need, happy to speak with hospice agency if they need any further care.

## 2024-01-26 ENCOUNTER — HOSPITAL ENCOUNTER (INPATIENT)
Facility: HOSPITAL | Age: 54
LOS: 5 days | Discharge: HOSPICE | DRG: 291 | End: 2024-01-31
Attending: EMERGENCY MEDICINE | Admitting: SPECIALIST
Payer: MEDICARE

## 2024-01-26 DIAGNOSIS — R06.02 SHORTNESS OF BREATH: ICD-10-CM

## 2024-01-26 DIAGNOSIS — I50.9 ACUTE CONGESTIVE HEART FAILURE, UNSPECIFIED HEART FAILURE TYPE: ICD-10-CM

## 2024-01-26 DIAGNOSIS — N18.31 STAGE 3A CHRONIC KIDNEY DISEASE: ICD-10-CM

## 2024-01-26 DIAGNOSIS — I50.20 HFREF (HEART FAILURE WITH REDUCED EJECTION FRACTION): ICD-10-CM

## 2024-01-26 DIAGNOSIS — R57.0 CARDIOGENIC SHOCK: ICD-10-CM

## 2024-01-26 DIAGNOSIS — R79.89 TROPONIN I ABOVE REFERENCE RANGE: ICD-10-CM

## 2024-01-26 DIAGNOSIS — I50.43 ACUTE ON CHRONIC COMBINED SYSTOLIC AND DIASTOLIC HEART FAILURE: ICD-10-CM

## 2024-01-26 DIAGNOSIS — R07.9 CHEST PAIN, UNSPECIFIED TYPE: Primary | ICD-10-CM

## 2024-01-26 DIAGNOSIS — K72.00 SHOCK LIVER: ICD-10-CM

## 2024-01-26 DIAGNOSIS — Z51.5 ENCOUNTER FOR PALLIATIVE CARE: ICD-10-CM

## 2024-01-26 DIAGNOSIS — J96.01 ACUTE HYPOXEMIC RESPIRATORY FAILURE: ICD-10-CM

## 2024-01-26 LAB
ALBUMIN SERPL BCP-MCNC: 3.4 G/DL (ref 3.5–5.2)
ALP SERPL-CCNC: 226 U/L (ref 55–135)
ALT SERPL W/O P-5'-P-CCNC: 16 U/L (ref 10–44)
ANION GAP SERPL CALC-SCNC: 17 MMOL/L (ref 8–16)
ANISOCYTOSIS BLD QL SMEAR: SLIGHT
AST SERPL-CCNC: 27 U/L (ref 10–40)
BASOPHILS # BLD AUTO: 0.05 K/UL (ref 0–0.2)
BASOPHILS NFR BLD: 0.7 % (ref 0–1.9)
BILIRUB SERPL-MCNC: 4.9 MG/DL (ref 0.1–1)
BNP SERPL-MCNC: 1460 PG/ML (ref 0–99)
BUN SERPL-MCNC: 96 MG/DL (ref 6–20)
CALCIUM SERPL-MCNC: 9.3 MG/DL (ref 8.7–10.5)
CHLORIDE SERPL-SCNC: 86 MMOL/L (ref 95–110)
CO2 SERPL-SCNC: 29 MMOL/L (ref 23–29)
CREAT SERPL-MCNC: 2.4 MG/DL (ref 0.5–1.4)
DIFFERENTIAL METHOD BLD: ABNORMAL
EOSINOPHIL # BLD AUTO: 0.1 K/UL (ref 0–0.5)
EOSINOPHIL NFR BLD: 1.7 % (ref 0–8)
ERYTHROCYTE [DISTWIDTH] IN BLOOD BY AUTOMATED COUNT: 22.5 % (ref 11.5–14.5)
EST. GFR  (NO RACE VARIABLE): 31 ML/MIN/1.73 M^2
GLUCOSE SERPL-MCNC: 84 MG/DL (ref 70–110)
HCT VFR BLD AUTO: 32.6 % (ref 40–54)
HCV AB SERPL QL IA: NEGATIVE
HEP C VIRUS HOLD SPECIMEN: NORMAL
HGB BLD-MCNC: 9.2 G/DL (ref 14–18)
HIV 1+2 AB+HIV1 P24 AG SERPL QL IA: NEGATIVE
IMM GRANULOCYTES # BLD AUTO: 0.05 K/UL (ref 0–0.04)
IMM GRANULOCYTES NFR BLD AUTO: 0.7 % (ref 0–0.5)
LYMPHOCYTES # BLD AUTO: 0.7 K/UL (ref 1–4.8)
LYMPHOCYTES NFR BLD: 9.7 % (ref 18–48)
MCH RBC QN AUTO: 22.5 PG (ref 27–31)
MCHC RBC AUTO-ENTMCNC: 28.2 G/DL (ref 32–36)
MCV RBC AUTO: 80 FL (ref 82–98)
MONOCYTES # BLD AUTO: 0.7 K/UL (ref 0.3–1)
MONOCYTES NFR BLD: 8.8 % (ref 4–15)
NEUTROPHILS # BLD AUTO: 5.9 K/UL (ref 1.8–7.7)
NEUTROPHILS NFR BLD: 78.4 % (ref 38–73)
NRBC BLD-RTO: 2 /100 WBC
OVALOCYTES BLD QL SMEAR: ABNORMAL
PLATELET # BLD AUTO: 107 K/UL (ref 150–450)
PMV BLD AUTO: 9.9 FL (ref 9.2–12.9)
POIKILOCYTOSIS BLD QL SMEAR: SLIGHT
POLYCHROMASIA BLD QL SMEAR: ABNORMAL
POTASSIUM SERPL-SCNC: 3 MMOL/L (ref 3.5–5.1)
PROT SERPL-MCNC: 7.9 G/DL (ref 6–8.4)
RBC # BLD AUTO: 4.08 M/UL (ref 4.6–6.2)
SODIUM SERPL-SCNC: 132 MMOL/L (ref 136–145)
STOMATOCYTES BLD QL SMEAR: PRESENT
TARGETS BLD QL SMEAR: ABNORMAL
TROPONIN I SERPL DL<=0.01 NG/ML-MCNC: 0.06 NG/ML (ref 0–0.03)
WBC # BLD AUTO: 7.49 K/UL (ref 3.9–12.7)

## 2024-01-26 PROCEDURE — 84484 ASSAY OF TROPONIN QUANT: CPT | Performed by: REGISTERED NURSE

## 2024-01-26 PROCEDURE — 83880 ASSAY OF NATRIURETIC PEPTIDE: CPT | Performed by: REGISTERED NURSE

## 2024-01-26 PROCEDURE — 25000003 PHARM REV CODE 250: Performed by: NURSE PRACTITIONER

## 2024-01-26 PROCEDURE — 99285 EMERGENCY DEPT VISIT HI MDM: CPT | Mod: 25

## 2024-01-26 PROCEDURE — 25000003 PHARM REV CODE 250: Performed by: SPECIALIST

## 2024-01-26 PROCEDURE — 93010 ELECTROCARDIOGRAM REPORT: CPT | Mod: GW,,, | Performed by: INTERNAL MEDICINE

## 2024-01-26 PROCEDURE — 86803 HEPATITIS C AB TEST: CPT | Performed by: REGISTERED NURSE

## 2024-01-26 PROCEDURE — 87389 HIV-1 AG W/HIV-1&-2 AB AG IA: CPT | Performed by: REGISTERED NURSE

## 2024-01-26 PROCEDURE — 63600175 PHARM REV CODE 636 W HCPCS: Performed by: NURSE PRACTITIONER

## 2024-01-26 PROCEDURE — 20000000 HC ICU ROOM

## 2024-01-26 PROCEDURE — 93005 ELECTROCARDIOGRAM TRACING: CPT

## 2024-01-26 PROCEDURE — 80053 COMPREHEN METABOLIC PANEL: CPT | Performed by: REGISTERED NURSE

## 2024-01-26 PROCEDURE — 25000003 PHARM REV CODE 250: Performed by: INTERNAL MEDICINE

## 2024-01-26 PROCEDURE — 85025 COMPLETE CBC W/AUTO DIFF WBC: CPT | Performed by: REGISTERED NURSE

## 2024-01-26 RX ORDER — DOBUTAMINE HYDROCHLORIDE 100 MG/100ML
2.5 INJECTION INTRAVENOUS CONTINUOUS
COMMUNITY
Start: 2024-01-18

## 2024-01-26 RX ORDER — AMIODARONE HYDROCHLORIDE 200 MG/1
400 TABLET ORAL DAILY
Status: DISCONTINUED | OUTPATIENT
Start: 2024-01-26 | End: 2024-01-29

## 2024-01-26 RX ORDER — CHLORHEXIDINE GLUCONATE ORAL RINSE 1.2 MG/ML
15 SOLUTION DENTAL 2 TIMES DAILY
Status: DISCONTINUED | OUTPATIENT
Start: 2024-01-26 | End: 2024-01-29

## 2024-01-26 RX ORDER — POTASSIUM CHLORIDE 750 MG/1
50 TABLET, EXTENDED RELEASE ORAL ONCE
Status: COMPLETED | OUTPATIENT
Start: 2024-01-26 | End: 2024-01-26

## 2024-01-26 RX ORDER — DOBUTAMINE HYDROCHLORIDE 400 MG/100ML
5 INJECTION INTRAVENOUS CONTINUOUS
Status: DISCONTINUED | OUTPATIENT
Start: 2024-01-26 | End: 2024-01-31 | Stop reason: HOSPADM

## 2024-01-26 RX ORDER — POTASSIUM CHLORIDE 29.8 MG/ML
40 INJECTION INTRAVENOUS ONCE
Status: DISCONTINUED | OUTPATIENT
Start: 2024-01-26 | End: 2024-01-26 | Stop reason: CLARIF

## 2024-01-26 RX ORDER — ASPIRIN 81 MG/1
81 TABLET ORAL DAILY
Status: DISCONTINUED | OUTPATIENT
Start: 2024-01-27 | End: 2024-01-29

## 2024-01-26 RX ORDER — SODIUM CHLORIDE 9 MG/ML
INJECTION, SOLUTION INTRAVENOUS CONTINUOUS
Status: ACTIVE | OUTPATIENT
Start: 2024-01-26 | End: 2024-01-27

## 2024-01-26 RX ORDER — EPLERENONE 50 MG/1
50 TABLET, FILM COATED ORAL DAILY
COMMUNITY

## 2024-01-26 RX ORDER — NAPROXEN SODIUM 220 MG/1
81 TABLET, FILM COATED ORAL DAILY
COMMUNITY
Start: 2024-01-01

## 2024-01-26 RX ORDER — POTASSIUM CHLORIDE 20 MEQ/1
20 TABLET, EXTENDED RELEASE ORAL DAILY
COMMUNITY

## 2024-01-26 RX ORDER — ACETAMINOPHEN 500 MG
1000 TABLET ORAL EVERY 8 HOURS PRN
Status: DISCONTINUED | OUTPATIENT
Start: 2024-01-26 | End: 2024-01-29

## 2024-01-26 RX ORDER — FAMOTIDINE 20 MG/1
20 TABLET, FILM COATED ORAL DAILY
Status: DISCONTINUED | OUTPATIENT
Start: 2024-01-27 | End: 2024-01-27

## 2024-01-26 RX ORDER — MUPIROCIN 20 MG/G
OINTMENT TOPICAL 2 TIMES DAILY
Status: COMPLETED | OUTPATIENT
Start: 2024-01-26 | End: 2024-01-31

## 2024-01-26 RX ORDER — SODIUM CHLORIDE 0.9 % (FLUSH) 0.9 %
10 SYRINGE (ML) INJECTION
Status: DISCONTINUED | OUTPATIENT
Start: 2024-01-26 | End: 2024-01-31 | Stop reason: HOSPADM

## 2024-01-26 RX ORDER — METOPROLOL TARTRATE 25 MG/1
12.5 TABLET, FILM COATED ORAL 2 TIMES DAILY
Status: ON HOLD | COMMUNITY
Start: 2024-01-22 | End: 2024-01-31

## 2024-01-26 RX ORDER — POTASSIUM CHLORIDE 7.45 MG/ML
10 INJECTION INTRAVENOUS
Status: DISCONTINUED | OUTPATIENT
Start: 2024-01-26 | End: 2024-01-26

## 2024-01-26 RX ORDER — HYDRALAZINE HYDROCHLORIDE AND ISOSORBIDE DINITRATE 37.5; 2 MG/1; MG/1
1 TABLET, FILM COATED ORAL 3 TIMES DAILY
COMMUNITY

## 2024-01-26 RX ORDER — ONDANSETRON 4 MG/1
4 TABLET, ORALLY DISINTEGRATING ORAL EVERY 6 HOURS PRN
Status: ON HOLD | COMMUNITY
Start: 2023-08-15 | End: 2024-01-31

## 2024-01-26 RX ORDER — POTASSIUM CHLORIDE 20 MEQ/1
20 TABLET, EXTENDED RELEASE ORAL ONCE
Status: DISCONTINUED | OUTPATIENT
Start: 2024-01-26 | End: 2024-01-26

## 2024-01-26 RX ADMIN — POTASSIUM CHLORIDE 50 MEQ: 750 TABLET, EXTENDED RELEASE ORAL at 08:01

## 2024-01-26 RX ADMIN — APIXABAN 5 MG: 2.5 TABLET, FILM COATED ORAL at 08:01

## 2024-01-26 RX ADMIN — AMIODARONE HYDROCHLORIDE 400 MG: 200 TABLET ORAL at 08:01

## 2024-01-26 RX ADMIN — SODIUM CHLORIDE: 9 INJECTION, SOLUTION INTRAVENOUS at 09:01

## 2024-01-26 RX ADMIN — DOBUTAMINE HYDROCHLORIDE 5 MCG/KG/MIN: 400 INJECTION INTRAVENOUS at 09:01

## 2024-01-26 RX ADMIN — MUPIROCIN: 20 OINTMENT TOPICAL at 08:01

## 2024-01-26 RX ADMIN — CHLORHEXIDINE GLUCONATE 0.12% ORAL RINSE 15 ML: 1.2 LIQUID ORAL at 08:01

## 2024-01-26 RX ADMIN — ACETAMINOPHEN 1000 MG: 500 TABLET ORAL at 09:01

## 2024-01-26 NOTE — H&P
[facility] - Emergency Dept.  Critical Care Medicine  History & Physical    Patient Name: Migel Garcia  MRN: [id number]  Admission Date: 1/26/2024  Hospital Length of Stay: 0 days  Code Status: Prior  Attending Physician: Layo Montemayor Jr., MD   Primary Care Provider: Anuj Banks MD   Principal Problem: <principal problem not specified>    Subjective:     HPI:  Patient presents with intermittant chest pain for the last 24-36hrs.  Hx of severe NICM, EF <20%, Pulmonary HTN - PAP 47/30.   He has been evaluated by the transplant team but does not want to pursue advanced HD Support.  He has been discharged home with hospice and has been on dobutamine infusion for over a year.    Hospital/ICU Course:  No notes on file     Past Medical History:   Diagnosis Date    A-fib     GERD (gastroesophageal reflux disease)     Heart failure, unspecified     Hypertension     V-tach        Past Surgical History:   Procedure Laterality Date    COLONOSCOPY N/A 12/13/2022    Procedure: COLONOSCOPY;  Surgeon: Geovany Cole MD;  Location: [facility] ([address] [place] [address]);  Service: Endoscopy;  Laterality: N/A;    COLONOSCOPY N/A 12/12/2022    Procedure: COLONOSCOPY;  Surgeon: Geovany Cole MD;  Location: [facility] ([address] [place] [address]);  Service: Endoscopy;  Laterality: N/A;    PLACEMENT OF SWAN LESTER CATHETER WITH IMAGING GUIDANCE  12/22/2022    Procedure: INSERTION, CATHETER, SWAN-LESTER, WITH IMAGING GUIDANCE;  Surgeon: Brando Parada MD;  Location: [facility] CATH LAB;  Service: Cardiology;;    RIGHT HEART CATHETERIZATION Right 12/16/2022    Procedure: INSERTION, CATHETER, RIGHT HEART;  Surgeon: Brando Parada MD;  Location: [facility] CATH LAB;  Service: Cardiology;  Laterality: Right;    RIGHT HEART CATHETERIZATION Right 12/22/2022    Procedure: INSERTION, CATHETER, RIGHT HEART;  Surgeon: Brando Parada MD;  Location: [facility] CATH LAB;  Service: Cardiology;  Laterality: Right;       Review of patient's allergies indicates:  No Known  Allergies    Family History    None       Tobacco Use    Smoking status: Some Days     Types: Cigarettes    Smokeless tobacco: Not on file   Substance and Sexual Activity    Alcohol use: Not Currently    Drug use: Not on file    Sexual activity: Not on file         Review of Systems   Constitutional:  Positive for fatigue.   Eyes: Negative.    Respiratory:  Positive for shortness of breath.    Gastrointestinal: Negative.    All other systems reviewed and are negative.    Objective:     Vital Signs (Most Recent):  Temp: 97.6 °F (36.4 °C) (01/26/24 1412)  Pulse: 108 (01/26/24 1515)  Resp: 15 (01/26/24 1515)  BP: 122/75 (01/26/24 1515)  SpO2: (S)  (JAREK; attempted to forehead, multiple fingers, and ears) (01/26/24 1412) Vital Signs (24h Range):  Temp:  [97.6 °F (36.4 °C)] 97.6 °F (36.4 °C)  Pulse:  [] 108  Resp:  [15-16] 15  BP: ()/(57-75) 122/75     Weight: 79.4 kg (175 lb)  Body mass index is 28.25 kg/m².    No intake or output data in the 24 hours ending 01/26/24 1741     Physical Exam  Vitals and nursing note reviewed.   HENT:      Head: Normocephalic.   Eyes:      Pupils: Pupils are equal, round, and reactive to light.   Cardiovascular:      Rate and Rhythm: Rhythm irregular.   Pulmonary:      Effort: No respiratory distress.   Neurological:      General: No focal deficit present.      Mental Status: He is alert.          Vents:       Lines/Drains/Airways       Peripherally Inserted Central Catheter Line  Duration             PICC Triple Lumen right basilic -- days                    Significant Labs:    CBC/Anemia Profile:  Recent Labs   Lab 01/26/24  1512   WBC 7.49   HGB 9.2*   HCT 32.6*   *   MCV 80*   RDW 22.5*        Chemistries:  Recent Labs   Lab 01/26/24  1512   *   K 3.0*   CL 86*   CO2 29   BUN 96*   CREATININE 2.4*   CALCIUM 9.3   ALBUMIN 3.4*   PROT 7.9   BILITOT 4.9*   ALKPHOS 226*   ALT 16   AST 27       All pertinent labs within the past 24 hours have been  reviewed.    Significant Imaging:   I have reviewed all pertinent imaging results/findings within the past 24 hours.  Assessment/Plan:     Cardiac/Vascular  ICD (implantable cardioverter-defibrillator), single, in situ  Cardiology to follow    Acute on chronic combined systolic and diastolic heart failure  As above    HFrEF (heart failure with reduced ejection fraction)  Cardiology consult  No pulmonary edema on CxR   BNP elevated.   Will consult cardiology  Careful hydration  Will differ need for inotropic support to cardiology as we slowly hydrate him to preserve his renal function  High risk of deterioration  Appropriate for ICU admission    Renal/  CKD (chronic kidney disease), stage III  Careful hydration to get Cr below 2.    GI  GERD (gastroesophageal reflux disease)  Protonix         Critical Care Time: 38 minutes  Critical secondary to Patient has a condition that poses threat to life and bodily function: Acute Renal Failure    Critical care was time spent personally by me on the following activities: development of treatment plan with patient or surrogate and bedside caregivers, discussions with consultants, evaluation of patient's response to treatment, examination of patient, ordering and performing treatments and interventions, ordering and review of laboratory studies, ordering and review of radiographic studies, pulse oximetry, re-evaluation of patient's condition. This critical care time did not overlap with that of any other provider or involve time for any procedures.     Emir Rock MD  Critical Care Medicine  [facility] - Emergency Dept.

## 2024-01-26 NOTE — ED PROVIDER NOTES
SCRIBE #1 NOTE: IYenni, am scribing for, and in the presence of, Layo Montemayor Jr., MD. I have scribed the HPI, ROS, and PE      History     Chief Complaint   Patient presents with    Chest Pain     Intermittent since last night; hx of a-fib; on dobutamine infusion     Review of patient's allergies indicates:  No Known Allergies      History of Present Illness     HPI    1/26/2024, 3:26 PM  History obtained from the patient      History of Present Illness: Migel Garcia is a 53 y.o. male patient with a PMHx of GERD and A-fib who presents to the Emergency Department for evaluation of chest pain. Pt reports intermittent chest pain/ tightness since last night. Pt hypotensive upon arrival. Symptoms are episodic and moderate in severity. No mitigating or exacerbating factors reported. Patient denies any fever, chills, SOB, palpitations, HA, and all other sxs at this time. No further complaints or concerns at this time.  Patient was a tobacco user.      Arrival mode: Personal vehicle      PCP: Anuj Banks MD        Past Medical History:  Past Medical History:   Diagnosis Date    A-fib     GERD (gastroesophageal reflux disease)     Heart failure, unspecified     Hypertension     V-tach        Past Surgical History:  Past Surgical History:   Procedure Laterality Date    COLONOSCOPY N/A 12/13/2022    Procedure: COLONOSCOPY;  Surgeon: Geovany Cole MD;  Location: [address];  Service: Endoscopy;  Laterality: N/A;    COLONOSCOPY N/A 12/12/2022    Procedure: COLONOSCOPY;  Surgeon: Geovany Cole MD;  Location: [address]);  Service: Endoscopy;  Laterality: N/A;    PLACEMENT OF SWAN LESTER CATHETER WITH IMAGING GUIDANCE  12/22/2022    Procedure: INSERTION, CATHETER, SWAN-LESTER, WITH IMAGING GUIDANCE;  Surgeon: Brando Parada MD;  Location: [facility] CATH LAB;  Service: Cardiology;;    RIGHT HEART CATHETERIZATION Right 12/16/2022    Procedure: INSERTION, CATHETER, RIGHT HEART;   Surgeon: Brnado Parada MD;  Location: [facility] CATH LAB;  Service: Cardiology;  Laterality: Right;    RIGHT HEART CATHETERIZATION Right 12/22/2022    Procedure: INSERTION, CATHETER, RIGHT HEART;  Surgeon: Brando Parada MD;  Location: [facility] CATH LAB;  Service: Cardiology;  Laterality: Right;         Family History:  No family history on file.    Social History:  Social History     Tobacco Use    Smoking status: Some Days     Types: Cigarettes    Smokeless tobacco: Not on file   Substance and Sexual Activity    Alcohol use: Not Currently    Drug use: Not on file    Sexual activity: Not on file        Review of Systems     Review of Systems   Constitutional:  Negative for chills and fever.   HENT:  Negative for congestion and sore throat.    Eyes:  Negative for pain and redness.   Respiratory:  Positive for chest tightness. Negative for cough and shortness of breath.    Cardiovascular:  Positive for chest pain. Negative for palpitations.   Gastrointestinal:  Negative for diarrhea and vomiting.   Genitourinary:  Negative for dysuria and hematuria.   Musculoskeletal:  Negative for back pain and neck pain.   Skin:  Negative for rash and wound.   Neurological:  Negative for dizziness and weakness.   Psychiatric/Behavioral:  Negative for agitation and confusion.    All other systems reviewed and are negative.       Physical Exam     Initial Vitals   BP Pulse Resp Temp SpO2   01/26/24 1412 01/26/24 1412 01/26/24 1412 01/26/24 1412 01/26/24 1846   (!) 85/57 (!) 54 16 97.6 °F (36.4 °C) 100 %      MAP       --                 Physical Exam  Nursing Notes and Vital Signs Reviewed.  Constitutional: Patient is in no acute distress. Well-developed and well-nourished.  Head: Atraumatic. Normocephalic.  Eyes:  EOM intact.  No scleral icterus.  ENT: Mucous membranes are moist.  Nares clear   Neck:  Full ROM. No JVD.  Cardiovascular: Regular rate. Regular rhythm No murmurs, rubs, or gallops. Distal pulses are 2+ and  symmetric  Pulmonary/Chest: No respiratory distress. Clear to auscultation bilaterally. No wheezing or rales.  Equal chest wall rise bilaterally  Abdominal: Soft and non-distended.  There is no tenderness.  No rebound, guarding, or rigidity. Good bowel sounds.  Genitourinary: No CVA tenderness.  No suprapubic tenderness  Musculoskeletal: Moves all extremities. No obvious deformities.  5 x 5 strength in all extremities   Skin: Warm and dry.  Neurological:  Alert, awake, and appropriate.  Normal speech.  No acute focal neurological deficits are appreciated.  Two through 12 intact bilaterally.  Psychiatric: Normal affect. Good eye contact. Appropriate in content.     ED Course   Critical Care    Date/Time: 1/26/2024 3:28 PM    Performed by: Layo Montemayor Jr., MD  Authorized by: Layo Montemayor Jr., MD  Direct patient critical care time: 10 minutes  Additional history critical care time: 9 minutes  Ordering / reviewing critical care time: 8 minutes  Documentation critical care time: 5 minutes  Consulting other physicians critical care time: 4 minutes  Consult with family critical care time: 3 minutes  Total critical care time (exclusive of procedural time) : 39 minutes  Critical care time was exclusive of separately billable procedures and treating other patients and teaching time.  Critical care was necessary to treat or prevent imminent or life-threatening deterioration of the following conditions: AFIB RVR.  Critical care was time spent personally by me on the following activities: blood draw for specimens, development of treatment plan with patient or surrogate, interpretation of cardiac output measurements, evaluation of patient's response to treatment, examination of patient, obtaining history from patient or surrogate, ordering and performing treatments and interventions, ordering and review of laboratory studies, ordering and review of radiographic studies, re-evaluation of patient's condition, review of  old charts and pulse oximetry.        ED Vital Signs:  Vitals:    01/27/24 0430 01/27/24 0445 01/27/24 0500 01/27/24 0515   BP: 109/67 (!) 88/67 95/77 122/66   Pulse: 93 100 101 101   Resp: (!) 21 20 16 (!) 21   Temp:       TempSrc:       SpO2: 99% 99% 95% 99%   Weight:       Height:        01/27/24 0530 01/27/24 0545 01/27/24 0600 01/27/24 0615   BP: 107/61 96/61 113/67 (!) 72/32   Pulse: 106 91 96 (!) 112   Resp: 20 (!) 21 17 20   Temp:       TempSrc:       SpO2: 95% 95% 97% 99%   Weight:       Height:        01/27/24 0630 01/27/24 0645 01/27/24 0800 01/27/24 0815   BP: (!) 89/39 (!) 106/41  (!) 83/53   Pulse: 96 96  (!) 114   Resp: 20 20  20   Temp:   98 °F (36.7 °C)    TempSrc:   Temporal    SpO2: 98% 98%  (!) 92%   Weight:       Height:        01/27/24 0900 01/27/24 1000 01/27/24 1133   BP: (!) 89/55  (!) 82/52   Pulse: 78 84 91   Resp: 15  16   Temp:   97.8 °F (36.6 °C)   TempSrc:   Oral   SpO2:   95%   Weight:      Height:          Abnormal Lab Results:  Labs Reviewed   CBC W/ AUTO DIFFERENTIAL - Abnormal; Notable for the following components:       Result Value    RBC 4.08 (*)     Hemoglobin 9.2 (*)     Hematocrit 32.6 (*)     MCV 80 (*)     MCH 22.5 (*)     MCHC 28.2 (*)     RDW 22.5 (*)     Platelets 107 (*)     Immature Granulocytes 0.7 (*)     Immature Grans (Abs) 0.05 (*)     Lymph # 0.7 (*)     nRBC 2 (*)     Gran % 78.4 (*)     Lymph % 9.7 (*)     All other components within normal limits    Narrative:     Release to patient->Immediate   COMPREHENSIVE METABOLIC PANEL - Abnormal; Notable for the following components:    Sodium 132 (*)     Potassium 3.0 (*)     Chloride 86 (*)     BUN 96 (*)     Creatinine 2.4 (*)     Albumin 3.4 (*)     Total Bilirubin 4.9 (*)     Alkaline Phosphatase 226 (*)     eGFR 31 (*)     Anion Gap 17 (*)     All other components within normal limits    Narrative:     Release to patient->Immediate   TROPONIN I - Abnormal; Notable for the following components:    Troponin I 0.056  (*)     All other components within normal limits    Narrative:     Release to patient->Immediate   B-TYPE NATRIURETIC PEPTIDE - Abnormal; Notable for the following components:    BNP 1,460 (*)     All other components within normal limits    Narrative:     Release to patient->Immediate   HIV 1 / 2 ANTIBODY    Narrative:     Release to patient->Immediate   HEPATITIS C ANTIBODY    Narrative:     Release to patient->Immediate   HEP C VIRUS HOLD SPECIMEN    Narrative:     Release to patient->Immediate        All Lab Results:  Results for orders placed or performed during the hospital encounter of 01/26/24   CBC auto differential   Result Value Ref Range    WBC 7.49 3.90 - 12.70 K/uL    RBC 4.08 (L) 4.60 - 6.20 M/uL    Hemoglobin 9.2 (L) 14.0 - 18.0 g/dL    Hematocrit 32.6 (L) 40.0 - 54.0 %    MCV 80 (L) 82 - 98 fL    MCH 22.5 (L) 27.0 - 31.0 pg    MCHC 28.2 (L) 32.0 - 36.0 g/dL    RDW 22.5 (H) 11.5 - 14.5 %    Platelets 107 (L) 150 - 450 K/uL    MPV 9.9 9.2 - 12.9 fL    Immature Granulocytes 0.7 (H) 0.0 - 0.5 %    Gran # (ANC) 5.9 1.8 - 7.7 K/uL    Immature Grans (Abs) 0.05 (H) 0.00 - 0.04 K/uL    Lymph # 0.7 (L) 1.0 - 4.8 K/uL    Mono # 0.7 0.3 - 1.0 K/uL    Eos # 0.1 0.0 - 0.5 K/uL    Baso # 0.05 0.00 - 0.20 K/uL    nRBC 2 (A) 0 /100 WBC    Gran % 78.4 (H) 38.0 - 73.0 %    Lymph % 9.7 (L) 18.0 - 48.0 %    Mono % 8.8 4.0 - 15.0 %    Eosinophil % 1.7 0.0 - 8.0 %    Basophil % 0.7 0.0 - 1.9 %    Aniso Slight     Poik Slight     Poly Occasional     Ovalocytes Occasional     Target Cells Occasional     Stomatocytes Present     Differential Method Automated    Comprehensive metabolic panel   Result Value Ref Range    Sodium 132 (L) 136 - 145 mmol/L    Potassium 3.0 (L) 3.5 - 5.1 mmol/L    Chloride 86 (L) 95 - 110 mmol/L    CO2 29 23 - 29 mmol/L    Glucose 84 70 - 110 mg/dL    BUN 96 (H) 6 - 20 mg/dL    Creatinine 2.4 (H) 0.5 - 1.4 mg/dL    Calcium 9.3 8.7 - 10.5 mg/dL    Total Protein 7.9 6.0 - 8.4 g/dL    Albumin 3.4 (L)  3.5 - 5.2 g/dL    Total Bilirubin 4.9 (H) 0.1 - 1.0 mg/dL    Alkaline Phosphatase 226 (H) 55 - 135 U/L    AST 27 10 - 40 U/L    ALT 16 10 - 44 U/L    eGFR 31 (A) >60 mL/min/1.73 m^2    Anion Gap 17 (H) 8 - 16 mmol/L   Troponin I   Result Value Ref Range    Troponin I 0.056 (H) 0.000 - 0.026 ng/mL   Brain natriuretic peptide   Result Value Ref Range    BNP 1,460 (H) 0 - 99 pg/mL   HIV 1/2 Ag/Ab (4th Gen)   Result Value Ref Range    HIV 1/2 Ag/Ab Negative Negative   Hepatitis C Antibody   Result Value Ref Range    Hepatitis C Ab Negative Negative   HCV Virus Hold Specimen   Result Value Ref Range    HEP C Virus Hold Specimen Hold for HCV sendout    Basic metabolic panel   Result Value Ref Range    Sodium 134 (L) 136 - 145 mmol/L    Potassium 3.2 (L) 3.5 - 5.1 mmol/L    Chloride 89 (L) 95 - 110 mmol/L    CO2 30 (H) 23 - 29 mmol/L    Glucose 101 70 - 110 mg/dL    BUN 91 (H) 6 - 20 mg/dL    Creatinine 2.3 (H) 0.5 - 1.4 mg/dL    Calcium 9.2 8.7 - 10.5 mg/dL    Anion Gap 15 8 - 16 mmol/L    eGFR 33 (A) >60 mL/min/1.73 m^2   Magnesium   Result Value Ref Range    Magnesium 2.4 1.6 - 2.6 mg/dL   CBC auto differential   Result Value Ref Range    WBC 5.24 3.90 - 12.70 K/uL    RBC 3.91 (L) 4.60 - 6.20 M/uL    Hemoglobin 8.6 (L) 14.0 - 18.0 g/dL    Hematocrit 30.6 (L) 40.0 - 54.0 %    MCV 78 (L) 82 - 98 fL    MCH 22.0 (L) 27.0 - 31.0 pg    MCHC 28.1 (L) 32.0 - 36.0 g/dL    RDW 22.1 (H) 11.5 - 14.5 %    Platelets 101 (L) 150 - 450 K/uL    MPV 10.2 9.2 - 12.9 fL    Immature Granulocytes 0.4 0.0 - 0.5 %    Gran # (ANC) 3.7 1.8 - 7.7 K/uL    Immature Grans (Abs) 0.02 0.00 - 0.04 K/uL    Lymph # 0.6 (L) 1.0 - 4.8 K/uL    Mono # 0.8 0.3 - 1.0 K/uL    Eos # 0.2 0.0 - 0.5 K/uL    Baso # 0.05 0.00 - 0.20 K/uL    nRBC 2 (A) 0 /100 WBC    Gran % 70.4 38.0 - 73.0 %    Lymph % 10.7 (L) 18.0 - 48.0 %    Mono % 14.3 4.0 - 15.0 %    Eosinophil % 3.2 0.0 - 8.0 %    Basophil % 1.0 0.0 - 1.9 %    Differential Method Automated    Phosphorus   Result  Value Ref Range    Phosphorus 3.9 2.7 - 4.5 mg/dL         Imaging Results:  Imaging Results              X-Ray Chest AP Portable (Final result)  Result time 01/26/24 14:34:03      Final result by Agapito Elise MD (01/26/24 14:34:03)                   Impression:      Cardiomegaly.      Electronically signed by: Agapito Elise  Date:    01/26/2024  Time:    14:34               Narrative:    EXAMINATION:  XR CHEST AP PORTABLE    CLINICAL HISTORY:  CHF;    FINDINGS:  Comparison: None available.    Left-sided cardiac conduction device.    Cardiomegaly.  The lungs are clear.  No pneumothorax or pleural effusion.  No acute osseous finding.                                       The EKG was ordered, reviewed, and independently interpreted by the ED provider.  Interpretation time: 15:13  Rate: 114 BPM  Rhythm: atrial fibrillation with rapid ventricular response with premature ventricular or aberrantly conducted complexes.  Interpretation: Low voltage QRS. Septal Infarct, age undetermined. Lateral infarct, age undetermined. Abnormal ECG. No STEMI.             The Emergency Provider reviewed the vital signs and test results, which are outlined above.     ED Discussion       4:01 PM: Discussed case with MD Pranav(CC). Dr. Rock agrees with current care and management of pt and accepts admission.   Admitting Service: ICU  Admitting Physician: Dr. Rock    4:01 PM: Re-evaluated pt. I have discussed test results, shared treatment plan, and the need for admission with patient and family at bedside. Pt and family express understanding at this time and agree with all information. All questions answered. Pt and family have no further questions or concerns at this time. Pt is ready for admit.        ED Course as of 01/27/24 1351   Fri Jan 26, 2024   1539 Independent review of chest xray:    Cardiomegaly, lungs clear, pacer.  [LB]   1540 Hemoglobin 14.0 - 18.0 g/dL 9.2 Low  12.2 Low  12.6 Low  12.0 Low  11.3 Low  11.2 Low   10.7 Low   Hematocrit 40.0 - 54.0 % 32.6 Low  39.0 Low  41.1 38.4 Low  37.4 Low  36.4 Low  36.1 Low      [LB]   1541 2022:  Last ECHO:  · he left ventricle is mildly enlarged with severely decreased systolic function.  · The estimated ejection fraction is <20%.  · There is left ventricular global hypokinesis.  · Indeterminate left ventricular diastolic function.  · Mild right ventricular enlargement with moderately reduced right ventricular systolic function.  · Biatrial enlargement.  · Mild mitral regurgitation.  · Mild tricuspid regurgitation.  · The estimated PA systolic pressure is 66 mmHg.  · There is pulmonary hypertension.  · Elevated central venous pressure (15 mmHg).  · Small posterior pericardial effusion.      [LB]   1549 Potassium(!): 3.0 [LB]   1549 BUN(!): 96 [LB]   1549 Creatinine(!): 2.4 [LB]      ED Course User Index  [LB] Shirin Leone, DO     Medical Decision Making  Differential diagnosis:  CHF, AFib, atrial flutter, shortness of breath, pulmonary edema    Amount and/or Complexity of Data Reviewed  External Data Reviewed: labs and notes.  Labs: ordered. Decision-making details documented in ED Course.  Radiology: ordered. Decision-making details documented in ED Course.  ECG/medicine tests: ordered and independent interpretation performed. Decision-making details documented in ED Course.  Discussion of management or test interpretation with external provider(s): Discussed case with hospital medicine graciously accepts    Risk  OTC drugs.  Prescription drug management.  Drug therapy requiring intensive monitoring for toxicity.  Decision regarding hospitalization.    Critical Care  Total time providing critical care: 39 minutes                ED Medication(s):  Medications   0.9%  NaCl infusion ( Intravenous Stopped 1/27/24 0203)   sodium chloride 0.9% flush 10 mL (has no administration in time range)   mupirocin 2 % ointment ( Nasal Given 1/27/24 9595)   chlorhexidine 0.12 % solution 15 mL  (15 mLs Mouth/Throat Given 1/27/24 0939)   amiodarone tablet 400 mg (400 mg Oral Given 1/27/24 0939)   apixaban tablet 5 mg (5 mg Oral Given 1/27/24 0940)   aspirin EC tablet 81 mg (81 mg Oral Given 1/27/24 0940)   DOBUtamine 1000 mg in D5W 250 mL infusion (5 mcg/kg/min × 81.6 kg Intravenous Verify Only 1/27/24 1046)   acetaminophen tablet 1,000 mg (1,000 mg Oral Given 1/26/24 2105)   ranolazine 12 hr tablet 500 mg (500 mg Oral Given 1/27/24 0939)   furosemide injection 40 mg (40 mg Intravenous Given 1/27/24 0938)   spironolactone tablet 25 mg (25 mg Oral Given 1/27/24 0939)   metoprolol tartrate (LOPRESSOR) split tablet 12.5 mg (12.5 mg Oral Given 1/27/24 0939)   pantoprazole EC tablet 40 mg (40 mg Oral Given 1/27/24 0939)   polyethylene glycol packet 17 g (17 g Oral Given 1/27/24 0946)   potassium chloride SA CR tablet 50 mEq (50 mEq Oral Given 1/26/24 2050)   potassium chloride SA CR tablet 20 mEq (20 mEq Oral Given 1/27/24 0939)   lactulose 20 gram/30 mL solution Soln 30 g (30 g Oral Given 1/27/24 0946)       Current Discharge Medication List                  Scribe Attestation:   Scribe #1: I performed the above scribed service and the documentation accurately describes the services I performed. I attest to the accuracy of the note.     Attending:   Physician Attestation Statement for Scribe #1: I, Layo Montemayor Jr., MD, personally performed the services described in this documentation, as scribed by Yenni Schuster, in my presence, and it is both accurate and complete.           Clinical Impression       ICD-10-CM ICD-9-CM   1. Chest pain, unspecified type  R07.9 786.50   2. Shortness of breath  R06.02 786.05   3. Troponin I above reference range  R79.89 790.6   4. Acute congestive heart failure, unspecified heart failure type  I50.9 428.0       Disposition:   Disposition: Admitted  Condition: Serious         Tregle, Layo W. Jr., MD  01/27/24 4503

## 2024-01-26 NOTE — PHARMACY MED REC
"Admission Medication History     The home medication history was taken by Catina Jacome.    You may go to "Admission" then "Reconcile Home Medications" tabs to review and/or act upon these items.     The home medication list has been updated by the Pharmacy department.   Please read ALL comments highlighted in yellow.   Please address this information as you see fit.    Feel free to contact us if you have any questions or require assistance.      The medications listed below were removed from the home medication list. Please reorder if appropriate:  Patient reports no longer taking the following medication(s):  Digoxin 125 mcg  Amiodarone 200 mg      Medications listed below were obtained from: Patient/family and Analytic software- corey Oneill brought medication list from home  (Not in a hospital admission)      LAST MED REC COMPLETED:     Catina Jacome  [id number]    Current Outpatient Medications on File Prior to Encounter   Medication Sig Dispense Refill Last Dose    amiodarone (PACERONE) 200 MG Tab Take 1 tablet (200 mg total) by mouth once daily. 30 tablet 11 1/26/2024    apixaban (ELIQUIS) 5 mg Tab Take 1 tablet (5 mg total) by mouth 2 (two) times daily.   1/26/2024    aspirin 81 MG Chew Take 81 mg by mouth once daily.   1/26/2024    BIDIL 20-37.5 mg Tab Take 1 tablet by mouth 3 (three) times daily.   1/26/2024    bumetanide (BUMEX) 1 MG tablet Take 3 tablets (3 mg total) by mouth 2 (two) times a day. 180 tablet 11 1/26/2024    DOBUTamine (DOBUTREX) 250 mg/250 mL (1 mg/mL) infusion Inject 2.5 mcg/kg/min into the vein continuous.   1/26/2024    eplerenone (INSPRA) 50 MG Tab Take 50 mg by mouth once daily.   1/26/2024    metOLazone (ZAROXOLYN) 5 MG tablet Take 5 mg by mouth once daily.   1/26/2024    metoprolol tartrate (LOPRESSOR) 25 MG tablet Take 12.5 mg by mouth 2 (two) times daily.   1/26/2024    potassium chloride SA (K-DUR,KLOR-CON) 20 MEQ tablet Take 20 mEq by mouth once daily.   1/26/2024    " ondansetron (ZOFRAN-ODT) 4 MG TbDL Take 4 mg by mouth every 6 (six) hours as needed.   Unknown                           .

## 2024-01-26 NOTE — HPI
Patient presents with intermittant chest pain for the last 24-36hrs.  Hx of severe NICM, EF <20%, Pulmonary HTN - PAP 47/30.   He has been evaluated by the transplant team but does not want to pursue advanced HD Support.  He has been discharged home with hospice and has been on dobutamine infusion for over a year.

## 2024-01-26 NOTE — ASSESSMENT & PLAN NOTE
Cardiology consult  No pulmonary edema on CxR   BNP elevated.   Will consult cardiology  Careful hydration  Will differ need for inotropic support to cardiology as we slowly hydrate him to preserve his renal function  High risk of deterioration  Appropriate for ICU admission

## 2024-01-26 NOTE — FIRST PROVIDER EVALUATION
Medical screening examination initiated.  I have conducted a focused provider triage encounter, findings are as follows:    Brief history of present illness:  SOB and leg swelling    There were no vitals filed for this visit.    Pertinent physical exam:  no acute distress, patient alert and oriented     Brief workup plan:  workup    Preliminary workup initiated; this workup will be continued and followed by the physician or advanced practice provider that is assigned to the patient when roomed.

## 2024-01-26 NOTE — SUBJECTIVE & OBJECTIVE
Past Medical History:   Diagnosis Date    A-fib     GERD (gastroesophageal reflux disease)     Heart failure, unspecified     Hypertension     V-tach        Past Surgical History:   Procedure Laterality Date    COLONOSCOPY N/A 12/13/2022    Procedure: COLONOSCOPY;  Surgeon: Geovany Cole MD;  Location: [facility] ENDO (2ND FLR);  Service: Endoscopy;  Laterality: N/A;    COLONOSCOPY N/A 12/12/2022    Procedure: COLONOSCOPY;  Surgeon: Geovany Cole MD;  Location: [facility] ENDO (2ND FLR);  Service: Endoscopy;  Laterality: N/A;    PLACEMENT OF SWAN LESTER CATHETER WITH IMAGING GUIDANCE  12/22/2022    Procedure: INSERTION, CATHETER, SWAN-LESTER, WITH IMAGING GUIDANCE;  Surgeon: Brando Parada MD;  Location: [facility] CATH LAB;  Service: Cardiology;;    RIGHT HEART CATHETERIZATION Right 12/16/2022    Procedure: INSERTION, CATHETER, RIGHT HEART;  Surgeon: Brando Parada MD;  Location: [facility] CATH LAB;  Service: Cardiology;  Laterality: Right;    RIGHT HEART CATHETERIZATION Right 12/22/2022    Procedure: INSERTION, CATHETER, RIGHT HEART;  Surgeon: Brando Parada MD;  Location: [facility] CATH LAB;  Service: Cardiology;  Laterality: Right;       Review of patient's allergies indicates:  No Known Allergies    Family History    None       Tobacco Use    Smoking status: Some Days     Types: Cigarettes    Smokeless tobacco: Not on file   Substance and Sexual Activity    Alcohol use: Not Currently    Drug use: Not on file    Sexual activity: Not on file         Review of Systems   Constitutional:  Positive for fatigue.   Eyes: Negative.    Respiratory:  Positive for shortness of breath.    Gastrointestinal: Negative.    All other systems reviewed and are negative.    Objective:     Vital Signs (Most Recent):  Temp: 97.6 °F (36.4 °C) (01/26/24 1412)  Pulse: 108 (01/26/24 1515)  Resp: 15 (01/26/24 1515)  BP: 122/75 (01/26/24 1515)  SpO2: (S)  (JAREK; attempted to forehead, multiple fingers, and ears) (01/26/24 1412) Vital Signs  (24h Range):  Temp:  [97.6 °F (36.4 °C)] 97.6 °F (36.4 °C)  Pulse:  [] 108  Resp:  [15-16] 15  BP: ()/(57-75) 122/75     Weight: 79.4 kg (175 lb)  Body mass index is 28.25 kg/m².    No intake or output data in the 24 hours ending 01/26/24 1741     Physical Exam  Vitals and nursing note reviewed.   HENT:      Head: Normocephalic.   Eyes:      Pupils: Pupils are equal, round, and reactive to light.   Cardiovascular:      Rate and Rhythm: Rhythm irregular.   Pulmonary:      Effort: No respiratory distress.   Neurological:      General: No focal deficit present.      Mental Status: He is alert.          Vents:       Lines/Drains/Airways       Peripherally Inserted Central Catheter Line  Duration             PICC Triple Lumen right basilic -- days                    Significant Labs:    CBC/Anemia Profile:  Recent Labs   Lab 01/26/24  1512   WBC 7.49   HGB 9.2*   HCT 32.6*   *   MCV 80*   RDW 22.5*        Chemistries:  Recent Labs   Lab 01/26/24  1512   *   K 3.0*   CL 86*   CO2 29   BUN 96*   CREATININE 2.4*   CALCIUM 9.3   ALBUMIN 3.4*   PROT 7.9   BILITOT 4.9*   ALKPHOS 226*   ALT 16   AST 27       All pertinent labs within the past 24 hours have been reviewed.    Significant Imaging:   I have reviewed all pertinent imaging results/findings within the past 24 hours.

## 2024-01-27 LAB
ANION GAP SERPL CALC-SCNC: 15 MMOL/L (ref 8–16)
BASOPHILS # BLD AUTO: 0.05 K/UL (ref 0–0.2)
BASOPHILS NFR BLD: 1 % (ref 0–1.9)
BUN SERPL-MCNC: 91 MG/DL (ref 6–20)
CALCIUM SERPL-MCNC: 9.2 MG/DL (ref 8.7–10.5)
CHLORIDE SERPL-SCNC: 89 MMOL/L (ref 95–110)
CO2 SERPL-SCNC: 30 MMOL/L (ref 23–29)
CREAT SERPL-MCNC: 2.3 MG/DL (ref 0.5–1.4)
DIFFERENTIAL METHOD BLD: ABNORMAL
EOSINOPHIL # BLD AUTO: 0.2 K/UL (ref 0–0.5)
EOSINOPHIL NFR BLD: 3.2 % (ref 0–8)
ERYTHROCYTE [DISTWIDTH] IN BLOOD BY AUTOMATED COUNT: 22.1 % (ref 11.5–14.5)
EST. GFR  (NO RACE VARIABLE): 33 ML/MIN/1.73 M^2
GLUCOSE SERPL-MCNC: 101 MG/DL (ref 70–110)
HCT VFR BLD AUTO: 30.6 % (ref 40–54)
HGB BLD-MCNC: 8.6 G/DL (ref 14–18)
IMM GRANULOCYTES # BLD AUTO: 0.02 K/UL (ref 0–0.04)
IMM GRANULOCYTES NFR BLD AUTO: 0.4 % (ref 0–0.5)
LYMPHOCYTES # BLD AUTO: 0.6 K/UL (ref 1–4.8)
LYMPHOCYTES NFR BLD: 10.7 % (ref 18–48)
MAGNESIUM SERPL-MCNC: 2.4 MG/DL (ref 1.6–2.6)
MCH RBC QN AUTO: 22 PG (ref 27–31)
MCHC RBC AUTO-ENTMCNC: 28.1 G/DL (ref 32–36)
MCV RBC AUTO: 78 FL (ref 82–98)
MONOCYTES # BLD AUTO: 0.8 K/UL (ref 0.3–1)
MONOCYTES NFR BLD: 14.3 % (ref 4–15)
NEUTROPHILS # BLD AUTO: 3.7 K/UL (ref 1.8–7.7)
NEUTROPHILS NFR BLD: 70.4 % (ref 38–73)
NRBC BLD-RTO: 2 /100 WBC
PHOSPHATE SERPL-MCNC: 3.9 MG/DL (ref 2.7–4.5)
PLATELET # BLD AUTO: 101 K/UL (ref 150–450)
PMV BLD AUTO: 10.2 FL (ref 9.2–12.9)
POCT GLUCOSE: 116 MG/DL (ref 70–110)
POCT GLUCOSE: 63 MG/DL (ref 70–110)
POTASSIUM SERPL-SCNC: 3.2 MMOL/L (ref 3.5–5.1)
RBC # BLD AUTO: 3.91 M/UL (ref 4.6–6.2)
SODIUM SERPL-SCNC: 134 MMOL/L (ref 136–145)
WBC # BLD AUTO: 5.24 K/UL (ref 3.9–12.7)

## 2024-01-27 PROCEDURE — 63600175 PHARM REV CODE 636 W HCPCS: Performed by: INTERNAL MEDICINE

## 2024-01-27 PROCEDURE — 85025 COMPLETE CBC W/AUTO DIFF WBC: CPT | Performed by: NURSE PRACTITIONER

## 2024-01-27 PROCEDURE — 92950 HEART/LUNG RESUSCITATION CPR: CPT

## 2024-01-27 PROCEDURE — 84100 ASSAY OF PHOSPHORUS: CPT | Performed by: NURSE PRACTITIONER

## 2024-01-27 PROCEDURE — 25000003 PHARM REV CODE 250: Performed by: SPECIALIST

## 2024-01-27 PROCEDURE — 25000003 PHARM REV CODE 250: Performed by: INTERNAL MEDICINE

## 2024-01-27 PROCEDURE — 36600 WITHDRAWAL OF ARTERIAL BLOOD: CPT

## 2024-01-27 PROCEDURE — 25000003 PHARM REV CODE 250: Performed by: NURSE PRACTITIONER

## 2024-01-27 PROCEDURE — 31500 INSERT EMERGENCY AIRWAY: CPT

## 2024-01-27 PROCEDURE — 82140 ASSAY OF AMMONIA: CPT | Performed by: NURSE PRACTITIONER

## 2024-01-27 PROCEDURE — 36415 COLL VENOUS BLD VENIPUNCTURE: CPT | Performed by: NURSE PRACTITIONER

## 2024-01-27 PROCEDURE — 36415 COLL VENOUS BLD VENIPUNCTURE: CPT | Mod: XB | Performed by: NURSE PRACTITIONER

## 2024-01-27 PROCEDURE — 83735 ASSAY OF MAGNESIUM: CPT | Performed by: NURSE PRACTITIONER

## 2024-01-27 PROCEDURE — 80048 BASIC METABOLIC PNL TOTAL CA: CPT | Performed by: NURSE PRACTITIONER

## 2024-01-27 PROCEDURE — 99900035 HC TECH TIME PER 15 MIN (STAT)

## 2024-01-27 PROCEDURE — 63600175 PHARM REV CODE 636 W HCPCS: Performed by: NURSE PRACTITIONER

## 2024-01-27 PROCEDURE — 99222 1ST HOSP IP/OBS MODERATE 55: CPT | Mod: GW,HPC,, | Performed by: INTERNAL MEDICINE

## 2024-01-27 PROCEDURE — 82803 BLOOD GASES ANY COMBINATION: CPT

## 2024-01-27 PROCEDURE — 63600175 PHARM REV CODE 636 W HCPCS: Performed by: HOSPITALIST

## 2024-01-27 PROCEDURE — 11000001 HC ACUTE MED/SURG PRIVATE ROOM

## 2024-01-27 PROCEDURE — 20000000 HC ICU ROOM

## 2024-01-27 PROCEDURE — 83605 ASSAY OF LACTIC ACID: CPT | Performed by: NURSE PRACTITIONER

## 2024-01-27 RX ORDER — LACTULOSE 10 G/15ML
30 SOLUTION ORAL ONCE
Status: COMPLETED | OUTPATIENT
Start: 2024-01-27 | End: 2024-01-27

## 2024-01-27 RX ORDER — SODIUM CHLORIDE 9 MG/ML
INJECTION, SOLUTION INTRAVENOUS CONTINUOUS
Status: DISCONTINUED | OUTPATIENT
Start: 2024-01-27 | End: 2024-01-27

## 2024-01-27 RX ORDER — POTASSIUM CHLORIDE 20 MEQ/1
20 TABLET, EXTENDED RELEASE ORAL ONCE
Status: COMPLETED | OUTPATIENT
Start: 2024-01-27 | End: 2024-01-27

## 2024-01-27 RX ORDER — SODIUM CHLORIDE 9 MG/ML
INJECTION, SOLUTION INTRAVENOUS CONTINUOUS
Status: DISCONTINUED | OUTPATIENT
Start: 2024-01-27 | End: 2024-01-28

## 2024-01-27 RX ORDER — PANTOPRAZOLE SODIUM 40 MG/1
40 TABLET, DELAYED RELEASE ORAL DAILY
Status: DISCONTINUED | OUTPATIENT
Start: 2024-01-27 | End: 2024-01-29

## 2024-01-27 RX ORDER — POLYETHYLENE GLYCOL 3350 17 G/17G
17 POWDER, FOR SOLUTION ORAL DAILY
Status: DISCONTINUED | OUTPATIENT
Start: 2024-01-27 | End: 2024-01-29

## 2024-01-27 RX ORDER — RANOLAZINE 500 MG/1
500 TABLET, EXTENDED RELEASE ORAL 2 TIMES DAILY
Status: DISCONTINUED | OUTPATIENT
Start: 2024-01-27 | End: 2024-01-29

## 2024-01-27 RX ORDER — FUROSEMIDE 10 MG/ML
40 INJECTION INTRAMUSCULAR; INTRAVENOUS EVERY 12 HOURS
Status: DISCONTINUED | OUTPATIENT
Start: 2024-01-27 | End: 2024-01-27

## 2024-01-27 RX ORDER — METOPROLOL TARTRATE 25 MG/1
12.5 TABLET ORAL 2 TIMES DAILY
Status: DISCONTINUED | OUTPATIENT
Start: 2024-01-27 | End: 2024-01-29

## 2024-01-27 RX ORDER — SPIRONOLACTONE 25 MG/1
25 TABLET ORAL DAILY
Status: DISCONTINUED | OUTPATIENT
Start: 2024-01-27 | End: 2024-01-27

## 2024-01-27 RX ORDER — ONDANSETRON HYDROCHLORIDE 2 MG/ML
4 INJECTION, SOLUTION INTRAVENOUS EVERY 6 HOURS PRN
Status: DISCONTINUED | OUTPATIENT
Start: 2024-01-27 | End: 2024-01-31 | Stop reason: HOSPADM

## 2024-01-27 RX ADMIN — RANOLAZINE 500 MG: 500 TABLET, EXTENDED RELEASE ORAL at 08:01

## 2024-01-27 RX ADMIN — AMIODARONE HYDROCHLORIDE 400 MG: 200 TABLET ORAL at 09:01

## 2024-01-27 RX ADMIN — METOPROLOL TARTRATE 12.5 MG: 25 TABLET, FILM COATED ORAL at 08:01

## 2024-01-27 RX ADMIN — POLYETHYLENE GLYCOL 3350 17 G: 17 POWDER, FOR SOLUTION ORAL at 09:01

## 2024-01-27 RX ADMIN — ASPIRIN 81 MG: 81 TABLET, COATED ORAL at 09:01

## 2024-01-27 RX ADMIN — APIXABAN 5 MG: 2.5 TABLET, FILM COATED ORAL at 08:01

## 2024-01-27 RX ADMIN — ONDANSETRON 4 MG: 2 INJECTION INTRAMUSCULAR; INTRAVENOUS at 05:01

## 2024-01-27 RX ADMIN — LACTULOSE 30 G: 20 SOLUTION ORAL at 09:01

## 2024-01-27 RX ADMIN — APIXABAN 5 MG: 2.5 TABLET, FILM COATED ORAL at 09:01

## 2024-01-27 RX ADMIN — METOPROLOL TARTRATE 12.5 MG: 25 TABLET, FILM COATED ORAL at 09:01

## 2024-01-27 RX ADMIN — MUPIROCIN: 20 OINTMENT TOPICAL at 09:01

## 2024-01-27 RX ADMIN — SPIRONOLACTONE 25 MG: 25 TABLET, FILM COATED ORAL at 09:01

## 2024-01-27 RX ADMIN — FUROSEMIDE 40 MG: 10 INJECTION, SOLUTION INTRAMUSCULAR; INTRAVENOUS at 09:01

## 2024-01-27 RX ADMIN — RANOLAZINE 500 MG: 500 TABLET, EXTENDED RELEASE ORAL at 09:01

## 2024-01-27 RX ADMIN — EPINEPHRINE 1 MG: 0.1 INJECTION INTRACARDIAC; INTRAVENOUS at 11:01

## 2024-01-27 RX ADMIN — CHLORHEXIDINE GLUCONATE 0.12% ORAL RINSE 15 ML: 1.2 LIQUID ORAL at 09:01

## 2024-01-27 RX ADMIN — MUPIROCIN: 20 OINTMENT TOPICAL at 08:01

## 2024-01-27 RX ADMIN — FUROSEMIDE 40 MG: 10 INJECTION, SOLUTION INTRAMUSCULAR; INTRAVENOUS at 08:01

## 2024-01-27 RX ADMIN — CHLORHEXIDINE GLUCONATE 0.12% ORAL RINSE 15 ML: 1.2 LIQUID ORAL at 08:01

## 2024-01-27 RX ADMIN — POTASSIUM CHLORIDE 20 MEQ: 1500 TABLET, EXTENDED RELEASE ORAL at 09:01

## 2024-01-27 RX ADMIN — PANTOPRAZOLE SODIUM 40 MG: 40 TABLET, DELAYED RELEASE ORAL at 09:01

## 2024-01-27 NOTE — PROGRESS NOTES
[facility] ([facility]  Critical Care Medicine  Progress Note    Patient Name: Migel Garcia  MRN: [id number]  Admission Date: 1/26/2024  Hospital Length of Stay: 1 days  Code Status: Full Code  Attending Provider: Emir Rock MD  Primary Care Provider: Anuj Banks MD   Principal Problem: <principal problem not specified>    Subjective:     HPI:  Patient presents with intermittant chest pain for the last 24-36hrs.  Hx of severe NICM, EF <20%, Pulmonary HTN - PAP 47/30.   He has been evaluated by the transplant team but does not want to pursue advanced HD Support.  He has been discharged home with hospice and has been on dobutamine infusion for over a year.    Hospital/ICU Course:  1/27. Up in a recliner  On RA. Hemodynamics stable.       Objective:     Vital Signs (Most Recent):  Temp: 98 °F (36.7 °C) (01/27/24 0800)  Pulse: (!) 114 (01/27/24 0815)  Resp: 20 (01/27/24 0815)  BP: (!) 83/53 (01/27/24 0815)  SpO2: (!) 79 % (01/27/24 0815) Vital Signs (24h Range):  Temp:  [97.6 °F (36.4 °C)-98 °F (36.7 °C)] 98 °F (36.7 °C)  Pulse:  [] 114  Resp:  [15-28] 20  SpO2:  [79 %-100 %] 79 %  BP: ()/(24-91) 83/53     Weight: 81.6 kg (179 lb 14.3 oz)  Body mass index is 29.04 kg/m².      Intake/Output Summary (Last 24 hours) at 1/27/2024 1003  Last data filed at 1/27/2024 0513  Gross per 24 hour   Intake 973.9 ml   Output 400 ml   Net 573.9 ml        Physical Exam  Vitals and nursing note reviewed.   HENT:      Head: Normocephalic.   Eyes:      Pupils: Pupils are equal, round, and reactive to light.   Cardiovascular:      Rate and Rhythm: Normal rate.      Pulses: Normal pulses.   Pulmonary:      Effort: Pulmonary effort is normal.   Abdominal:      General: Bowel sounds are normal.   Neurological:      General: No focal deficit present.      Mental Status: He is alert.           Review of Systems   Unable to perform ROS: Acuity of condition       Vents:       Lines/Drains/Airways        "Peripherally Inserted Central Catheter Line  Duration             PICC Triple Lumen 01/19/24 1200 right basilic 7 days                    Significant Labs:    CBC/Anemia Profile:  Recent Labs   Lab 01/26/24  1512 01/27/24  0415   WBC 7.49 5.24   HGB 9.2* 8.6*   HCT 32.6* 30.6*   * 101*   MCV 80* 78*   RDW 22.5* 22.1*        Chemistries:  Recent Labs   Lab 01/26/24  1512 01/27/24  0415   * 134*   K 3.0* 3.2*   CL 86* 89*   CO2 29 30*   BUN 96* 91*   CREATININE 2.4* 2.3*   CALCIUM 9.3 9.2   ALBUMIN 3.4*  --    PROT 7.9  --    BILITOT 4.9*  --    ALKPHOS 226*  --    ALT 16  --    AST 27  --    MG  --  2.4   PHOS  --  3.9       All pertinent labs within the past 24 hours have been reviewed.    Significant Imaging:  I have reviewed all pertinent imaging results/findings within the past 24 hours.    ABG  No results for input(s): "PH", "PO2", "PCO2", "HCO3", "BE" in the last 168 hours.  Assessment/Plan:     Cardiac/Vascular  ICD (implantable cardioverter-defibrillator), single, in situ  Cardiology to follow    Acute on chronic combined systolic and diastolic heart failure  As above    HFrEF (heart failure with reduced ejection fraction)  Cardiology consult  No pulmonary edema on CxR   BNP elevated.   Will consult cardiology  Careful hydration  Will differ need for inotropic support to cardiology as we slowly hydrate him to preserve his renal function  High risk of deterioration  Appropriate for ICU admission    1/27. Stable overnight. No SOB or chest pain. No resp issues. On RA. Stable for transfer per cardiology. Appreciate the help. Will differ diuretics to cards. BUN / Cr slightly better.     Renal/  CKD (chronic kidney disease), stage III  Careful hydration to get Cr below 2.    GI  GERD (gastroesophageal reflux disease)  Protonix    mEir Rock MD  Critical Care Medicine  [facility] ([facility])  "

## 2024-01-27 NOTE — ASSESSMENT & PLAN NOTE
Cardiology consult  No pulmonary edema on CxR   BNP elevated.   Will consult cardiology  Careful hydration  Will differ need for inotropic support to cardiology as we slowly hydrate him to preserve his renal function  High risk of deterioration  Appropriate for ICU admission    1/27. Stable overnight. No SOB or chest pain. No resp issues. On RA. Stable for transfer per cardiology. Appreciate the help. Will differ diuretics to cards. BUN / Cr slightly better.

## 2024-01-27 NOTE — SUBJECTIVE & OBJECTIVE
Objective:     Vital Signs (Most Recent):  Temp: 98 °F (36.7 °C) (01/27/24 0800)  Pulse: (!) 114 (01/27/24 0815)  Resp: 20 (01/27/24 0815)  BP: (!) 83/53 (01/27/24 0815)  SpO2: (!) 79 % (01/27/24 0815) Vital Signs (24h Range):  Temp:  [97.6 °F (36.4 °C)-98 °F (36.7 °C)] 98 °F (36.7 °C)  Pulse:  [] 114  Resp:  [15-28] 20  SpO2:  [79 %-100 %] 79 %  BP: ()/(24-91) 83/53     Weight: 81.6 kg (179 lb 14.3 oz)  Body mass index is 29.04 kg/m².      Intake/Output Summary (Last 24 hours) at 1/27/2024 1003  Last data filed at 1/27/2024 0513  Gross per 24 hour   Intake 973.9 ml   Output 400 ml   Net 573.9 ml        Physical Exam  Vitals and nursing note reviewed.   HENT:      Head: Normocephalic.   Eyes:      Pupils: Pupils are equal, round, and reactive to light.   Cardiovascular:      Rate and Rhythm: Normal rate.      Pulses: Normal pulses.   Pulmonary:      Effort: Pulmonary effort is normal.   Abdominal:      General: Bowel sounds are normal.   Neurological:      General: No focal deficit present.      Mental Status: He is alert.           Review of Systems   Unable to perform ROS: Acuity of condition       Vents:       Lines/Drains/Airways       Peripherally Inserted Central Catheter Line  Duration             PICC Triple Lumen 01/19/24 1200 right basilic 7 days                    Significant Labs:    CBC/Anemia Profile:  Recent Labs   Lab 01/26/24  1512 01/27/24  0415   WBC 7.49 5.24   HGB 9.2* 8.6*   HCT 32.6* 30.6*   * 101*   MCV 80* 78*   RDW 22.5* 22.1*        Chemistries:  Recent Labs   Lab 01/26/24  1512 01/27/24  0415   * 134*   K 3.0* 3.2*   CL 86* 89*   CO2 29 30*   BUN 96* 91*   CREATININE 2.4* 2.3*   CALCIUM 9.3 9.2   ALBUMIN 3.4*  --    PROT 7.9  --    BILITOT 4.9*  --    ALKPHOS 226*  --    ALT 16  --    AST 27  --    MG  --  2.4   PHOS  --  3.9       All pertinent labs within the past 24 hours have been reviewed.    Significant Imaging:  I have reviewed all pertinent imaging  results/findings within the past 24 hours.

## 2024-01-27 NOTE — PROGRESS NOTES
54 yo male w/ PMHx NICM EF < 20% on home dobutamine gtt, HTN here w. Intermittent ongoing chest pain.   Pt was in hospice at home.     CKD w/ SHAUN now    Pt is being managed by the bedside CC team along w/ cardio and nephro.     CXR:  - +ve for Cardiomegaly.    No hypoxia  Gentle IV hydration w/ NS @ 60ml/hr noted.     HD stable.     Apixaban for A/C.     Detailed H&P noted in the chart.   Electrolyte replacement as needed.     Please call us for further assistance.

## 2024-01-27 NOTE — HPI
53 YO M w/ history of long standing NICM (> 10 yrs), HTN, marijuana and tobacco use, CKD stage 3 admitted for CP.  CP is atypical sometimes pleuritic, worse on movements.  Cath 2022 [facility] nonobstructive/minimal ds. EKG afib with V rate 114. There was 1 episode of NSVT. There is a mild increase in troponin .056. Pt with chronic CHF sx NYHA class 3.   CXR wnl.  After multiple discussions explaining plan and goals of care with OHT team in [place] he does not want to undergo any further workup/procedures or increased HD support at this time ( OHT or LVAD)   Pt on home dobutrex.

## 2024-01-27 NOTE — PLAN OF CARE
Patient awake, alert and oriented x 4  Room air  Voids to urinal  Afib on monitor  EILEEN PICC with dobutamine drip infusing  No complaints of chest pain

## 2024-01-27 NOTE — ASSESSMENT & PLAN NOTE
53 YO M w/ history of long standing NICM (> 10 yrs), HTN, marijuana and tobacco use, CKD stage 3 admitted for CP.  CP is atypical sometimes pleuritic, worse on movements.  Cath 2022 [facility] nonobstructive/minimal ds. EKG afib with V rate 114. There was 1 episode of NSVT. There is a mild increase in troponin .056. Pt with chronic CHF sx NYHA class 3.   CXR wnl.  After multiple discussions explaining plan and goals of care with OHT team in [place] he does not want to undergo any further workup/procedures or increased HD support at this time ( OHT or LVAD)   Pt on home dobutrex.    Pt with atypical CP ( nonobstructive ds by cath), will add ranexa, can add PPI. CHF mild continue IV diuretics, amiodarone added for NSVT. Pt can go to telemetry

## 2024-01-27 NOTE — PLAN OF CARE
Patient resting in bed, remains free of falls and injuries this shift. VSS. No obvious s/sx of distress noted. No complaints of pain this shift. Dobutamine drip running as ordered to PICC line with no complications. POC reviewed with the patient, verbalized understanding. No further needs at this time, call light within reach. Continue POC as ordered, chat check completed and all orders reviewed.

## 2024-01-27 NOTE — HOSPITAL COURSE
1/27. Up in a recliner  On RA. Hemodynamics stable.   1/28. Events of last night noted  Progressive decline and then cardiac arrest. Now on MV and pressors.   1/29 this morning patient was on epinephrine vaso and dobutamine.  He was on the vent.  Placed on pressure support however patient continued to bite on his ET tube.  Later in the day contacted secondary to cuff balloon.  Discussed reintubation or trial of extubation since on PS and alert / following commands min settings  Now on dobutamine/ vaso levo off / tolerating vapotherm   1/30-on nasal cannula   On vaso and dobutamine   1/31-on dobutamine 5 and bumex , off pressors on room air

## 2024-01-27 NOTE — CONSULTS
[facility] ([facility])  Cardiology  Consult Note    Patient Name: Migel Garcia  MRN: [id number]  Admission Date: 1/26/2024  Hospital Length of Stay: 1 days  Code Status: Full Code   Attending Provider: Emir Rock MD   Consulting Provider: Orestes Garcia MD  Primary Care Physician: Anuj Banks MD  Principal Problem:<principal problem not specified>    Patient information was obtained from patient and ER records.     Inpatient consult to Cardiology  Consult performed by: Orestes Garcia MD  Consult ordered by: Robinson Chu NP        Subjective:     Chief Complaint:  CP     HPI:   51 YO M w/ history of long standing NICM (> 10 yrs), HTN, marijuana and tobacco use, CKD stage 3 admitted for CP.  CP is atypical sometimes pleuritic, worse on movements.  Cath 2022 [facility] nonobstructive/minimal ds. EKG afib with V rate 114. There was 1 episode of NSVT. There is a mild increase in troponin .056. Pt with chronic CHF sx NYHA class 3.   CXR wnl.  After multiple discussions explaining plan and goals of care with OHT team in [place] he does not want to undergo any further workup/procedures or increased HD support at this time ( OHT or LVAD)   Pt on home dobutrex.    Past Medical History:   Diagnosis Date    A-fib     GERD (gastroesophageal reflux disease)     Heart failure, unspecified     Hypertension     V-tach        Past Surgical History:   Procedure Laterality Date    COLONOSCOPY N/A 12/13/2022    Procedure: COLONOSCOPY;  Surgeon: Geovany Cole MD;  Location: [facility] ENDO ([address] [place] [address]);  Service: Endoscopy;  Laterality: N/A;    COLONOSCOPY N/A 12/12/2022    Procedure: COLONOSCOPY;  Surgeon: Geovany Cole MD;  Location: [facility] ENDO ([address] [place] [address]);  Service: Endoscopy;  Laterality: N/A;    PLACEMENT OF SWAN LESTER CATHETER WITH IMAGING GUIDANCE  12/22/2022    Procedure: INSERTION, CATHETER, SWAN-LESTER, WITH IMAGING GUIDANCE;  Surgeon: Brando Parada MD;  Location: [facility] CATH  LAB;  Service: Cardiology;;    RIGHT HEART CATHETERIZATION Right 12/16/2022    Procedure: INSERTION, CATHETER, RIGHT HEART;  Surgeon: Brando Parada MD;  Location: [facility] CATH LAB;  Service: Cardiology;  Laterality: Right;    RIGHT HEART CATHETERIZATION Right 12/22/2022    Procedure: INSERTION, CATHETER, RIGHT HEART;  Surgeon: Brando Parada MD;  Location: [facility] CATH LAB;  Service: Cardiology;  Laterality: Right;       Review of patient's allergies indicates:  No Known Allergies    No current facility-administered medications on file prior to encounter.     Current Outpatient Medications on File Prior to Encounter   Medication Sig    amiodarone (PACERONE) 200 MG Tab Take 1 tablet (200 mg total) by mouth once daily.    apixaban (ELIQUIS) 5 mg Tab Take 1 tablet (5 mg total) by mouth 2 (two) times daily.    aspirin 81 MG Chew Take 81 mg by mouth once daily.    BIDIL 20-37.5 mg Tab Take 1 tablet by mouth 3 (three) times daily.    bumetanide (BUMEX) 1 MG tablet Take 3 tablets (3 mg total) by mouth 2 (two) times a day.    DOBUTamine (DOBUTREX) 250 mg/250 mL (1 mg/mL) infusion Inject 2.5 mcg/kg/min into the vein continuous.    eplerenone (INSPRA) 50 MG Tab Take 50 mg by mouth once daily.    metOLazone (ZAROXOLYN) 5 MG tablet Take 5 mg by mouth once daily.    metoprolol tartrate (LOPRESSOR) 25 MG tablet Take 12.5 mg by mouth 2 (two) times daily.    potassium chloride SA (K-DUR,KLOR-CON) 20 MEQ tablet Take 20 mEq by mouth once daily.    ondansetron (ZOFRAN-ODT) 4 MG TbDL Take 4 mg by mouth every 6 (six) hours as needed.     Family History    None       Tobacco Use    Smoking status: Some Days     Types: Cigarettes    Smokeless tobacco: Not on file   Substance and Sexual Activity    Alcohol use: Not Currently    Drug use: Not on file    Sexual activity: Not on file     Review of Systems   Constitutional: Negative. Negative for weight gain.   HENT: Negative.     Eyes: Negative.    Cardiovascular: Negative.   Negative for chest pain, leg swelling and palpitations.   Respiratory: Negative.  Negative for shortness of breath.    Endocrine: Negative.    Hematologic/Lymphatic: Negative.    Skin: Negative.    Musculoskeletal:  Negative for muscle weakness.   Gastrointestinal: Negative.    Genitourinary: Negative.    Neurological: Negative.  Negative for dizziness.   Psychiatric/Behavioral: Negative.     Allergic/Immunologic: Negative.    All other systems reviewed and are negative.    Objective:     Vital Signs (Most Recent):  Temp: 97.6 °F (36.4 °C) (01/27/24 0315)  Pulse: 96 (01/27/24 0645)  Resp: 20 (01/27/24 0645)  BP: (Abnormal) 106/41 (01/27/24 0645)  SpO2: 98 % (01/27/24 0645) Vital Signs (24h Range):  Temp:  [97.6 °F (36.4 °C)-97.9 °F (36.6 °C)] 97.6 °F (36.4 °C)  Pulse:  [] 96  Resp:  [15-28] 20  SpO2:  [92 %-100 %] 98 %  BP: ()/(24-91) 106/41     Weight: 81.6 kg (179 lb 14.3 oz)  Body mass index is 29.04 kg/m².    SpO2: 98 %         Intake/Output Summary (Last 24 hours) at 1/27/2024 0751  Last data filed at 1/27/2024 0513  Gross per 24 hour   Intake 973.9 ml   Output 400 ml   Net 573.9 ml       Lines/Drains/Airways       Peripherally Inserted Central Catheter Line       Name Duration    PICC Triple Lumen right basilic No Data                     Physical Exam  Vitals and nursing note reviewed.   Constitutional:       Appearance: He is well-developed.   HENT:      Head: Normocephalic and atraumatic.   Eyes:      Conjunctiva/sclera: Conjunctivae normal.      Pupils: Pupils are equal, round, and reactive to light.   Cardiovascular:      Rate and Rhythm: Normal rate. Rhythm irregular.      Pulses: Intact distal pulses.      Heart sounds: Normal heart sounds.   Pulmonary:      Effort: Pulmonary effort is normal.      Breath sounds: Normal breath sounds.   Abdominal:      General: Bowel sounds are normal.      Palpations: Abdomen is soft.   Musculoskeletal:      Cervical back: Normal range of motion and neck  supple.   Skin:     General: Skin is warm and dry.   Neurological:      Mental Status: He is alert and oriented to person, place, and time.          Significant Labs: All pertinent lab results from the last 24 hours have been reviewed.    Significant Imaging: X-Ray: CXR: X-Ray Chest 1 View (CXR):   Results for orders placed or performed during the hospital encounter of 01/26/24   X-Ray Chest 1 View    Narrative    EXAMINATION:  XR CHEST 1 VIEW    CLINICAL HISTORY:  chf;    TECHNIQUE:  Single frontal view of the chest was performed.    COMPARISON:  X-ray dated 01/26/2024    FINDINGS:  Left-sided cardiac defibrillator is unchanged in position.  Right-sided PICC is unchanged as well.  Cardiomediastinal silhouette is unchanged in size and contour.  Trachea is midline.  Lung is clear.  No significant pleural effusion or pneumothorax.  Bones are unchanged.      Impression    No acute cardiopulmonary abnormality.      Electronically signed by: Mickey Sethi  Date:    01/27/2024  Time:    07:16     Assessment and Plan:     Acute on chronic combined systolic and diastolic heart failure  51 YO M w/ history of long standing NICM (> 10 yrs), HTN, marijuana and tobacco use, CKD stage 3 admitted for CP.  CP is atypical sometimes pleuritic, worse on movements.  Cath 2022 [facility] nonobstructive/minimal ds. EKG afib with V rate 114. There was 1 episode of NSVT. There is a mild increase in troponin .056. Pt with chronic CHF sx NYHA class 3.   CXR wnl.  After multiple discussions explaining plan and goals of care with OHT team in [place] he does not want to undergo any further workup/procedures or increased HD support at this time ( OHT or LVAD)   Pt on home dobutrex.    Pt with atypical CP ( nonobstructive ds by cath), will add ranexa, can add PPI. CHF mild continue IV diuretics, amiodarone added for NSVT. Pt can go to telemetry         Can restart home b blockers, aldactone, k  VTE Risk Mitigation (From admission,  onward)           Ordered     apixaban tablet 5 mg  2 times daily         01/26/24 1841     IP VTE HIGH RISK PATIENT  Once         01/26/24 1746     Place sequential compression device  Until discontinued         01/26/24 1746                    Thank you for your consult. I will follow-up with patient. Please contact us if you have any additional questions.    Orestes Garcia MD  Cardiology   [facility] ([facility])

## 2024-01-27 NOTE — SUBJECTIVE & OBJECTIVE
Past Medical History:   Diagnosis Date    A-fib     GERD (gastroesophageal reflux disease)     Heart failure, unspecified     Hypertension     V-tach        Past Surgical History:   Procedure Laterality Date    COLONOSCOPY N/A 12/13/2022    Procedure: COLONOSCOPY;  Surgeon: Geovany Cole MD;  Location: [facility] ENDO (2ND FLR);  Service: Endoscopy;  Laterality: N/A;    COLONOSCOPY N/A 12/12/2022    Procedure: COLONOSCOPY;  Surgeon: Geovany Cole MD;  Location: [facility] ENDO (2ND FLR);  Service: Endoscopy;  Laterality: N/A;    PLACEMENT OF SWAN LESTER CATHETER WITH IMAGING GUIDANCE  12/22/2022    Procedure: INSERTION, CATHETER, SWAN-LESTER, WITH IMAGING GUIDANCE;  Surgeon: Brando Parada MD;  Location: [facility] CATH LAB;  Service: Cardiology;;    RIGHT HEART CATHETERIZATION Right 12/16/2022    Procedure: INSERTION, CATHETER, RIGHT HEART;  Surgeon: Brando Parada MD;  Location: [facility] CATH LAB;  Service: Cardiology;  Laterality: Right;    RIGHT HEART CATHETERIZATION Right 12/22/2022    Procedure: INSERTION, CATHETER, RIGHT HEART;  Surgeon: Brando Parada MD;  Location: [facility] CATH LAB;  Service: Cardiology;  Laterality: Right;       Review of patient's allergies indicates:  No Known Allergies    No current facility-administered medications on file prior to encounter.     Current Outpatient Medications on File Prior to Encounter   Medication Sig    amiodarone (PACERONE) 200 MG Tab Take 1 tablet (200 mg total) by mouth once daily.    apixaban (ELIQUIS) 5 mg Tab Take 1 tablet (5 mg total) by mouth 2 (two) times daily.    aspirin 81 MG Chew Take 81 mg by mouth once daily.    BIDIL 20-37.5 mg Tab Take 1 tablet by mouth 3 (three) times daily.    bumetanide (BUMEX) 1 MG tablet Take 3 tablets (3 mg total) by mouth 2 (two) times a day.    DOBUTamine (DOBUTREX) 250 mg/250 mL (1 mg/mL) infusion Inject 2.5 mcg/kg/min into the vein continuous.    eplerenone (INSPRA) 50 MG Tab Take 50 mg by mouth once daily.    metOLazone  (ZAROXOLYN) 5 MG tablet Take 5 mg by mouth once daily.    metoprolol tartrate (LOPRESSOR) 25 MG tablet Take 12.5 mg by mouth 2 (two) times daily.    potassium chloride SA (K-DUR,KLOR-CON) 20 MEQ tablet Take 20 mEq by mouth once daily.    ondansetron (ZOFRAN-ODT) 4 MG TbDL Take 4 mg by mouth every 6 (six) hours as needed.     Family History    None       Tobacco Use    Smoking status: Some Days     Types: Cigarettes    Smokeless tobacco: Not on file   Substance and Sexual Activity    Alcohol use: Not Currently    Drug use: Not on file    Sexual activity: Not on file     Review of Systems   Constitutional: Negative. Negative for weight gain.   HENT: Negative.     Eyes: Negative.    Cardiovascular: Negative.  Negative for chest pain, leg swelling and palpitations.   Respiratory: Negative.  Negative for shortness of breath.    Endocrine: Negative.    Hematologic/Lymphatic: Negative.    Skin: Negative.    Musculoskeletal:  Negative for muscle weakness.   Gastrointestinal: Negative.    Genitourinary: Negative.    Neurological: Negative.  Negative for dizziness.   Psychiatric/Behavioral: Negative.     Allergic/Immunologic: Negative.    All other systems reviewed and are negative.    Objective:     Vital Signs (Most Recent):  Temp: 97.6 °F (36.4 °C) (01/27/24 0315)  Pulse: 96 (01/27/24 0645)  Resp: 20 (01/27/24 0645)  BP: (Abnormal) 106/41 (01/27/24 0645)  SpO2: 98 % (01/27/24 0645) Vital Signs (24h Range):  Temp:  [97.6 °F (36.4 °C)-97.9 °F (36.6 °C)] 97.6 °F (36.4 °C)  Pulse:  [] 96  Resp:  [15-28] 20  SpO2:  [92 %-100 %] 98 %  BP: ()/(24-91) 106/41     Weight: 81.6 kg (179 lb 14.3 oz)  Body mass index is 29.04 kg/m².    SpO2: 98 %         Intake/Output Summary (Last 24 hours) at 1/27/2024 0751  Last data filed at 1/27/2024 0513  Gross per 24 hour   Intake 973.9 ml   Output 400 ml   Net 573.9 ml       Lines/Drains/Airways       Peripherally Inserted Central Catheter Line       Name Duration    PICC Triple  Lumen right basilic No Data                     Physical Exam  Vitals and nursing note reviewed.   Constitutional:       Appearance: He is well-developed.   HENT:      Head: Normocephalic and atraumatic.   Eyes:      Conjunctiva/sclera: Conjunctivae normal.      Pupils: Pupils are equal, round, and reactive to light.   Cardiovascular:      Rate and Rhythm: Normal rate. Rhythm irregular.      Pulses: Intact distal pulses.      Heart sounds: Normal heart sounds.   Pulmonary:      Effort: Pulmonary effort is normal.      Breath sounds: Normal breath sounds.   Abdominal:      General: Bowel sounds are normal.      Palpations: Abdomen is soft.   Musculoskeletal:      Cervical back: Normal range of motion and neck supple.   Skin:     General: Skin is warm and dry.   Neurological:      Mental Status: He is alert and oriented to person, place, and time.          Significant Labs: All pertinent lab results from the last 24 hours have been reviewed.    Significant Imaging: X-Ray: CXR: X-Ray Chest 1 View (CXR):   Results for orders placed or performed during the hospital encounter of 01/26/24   X-Ray Chest 1 View    Narrative    EXAMINATION:  XR CHEST 1 VIEW    CLINICAL HISTORY:  chf;    TECHNIQUE:  Single frontal view of the chest was performed.    COMPARISON:  X-ray dated 01/26/2024    FINDINGS:  Left-sided cardiac defibrillator is unchanged in position.  Right-sided PICC is unchanged as well.  Cardiomediastinal silhouette is unchanged in size and contour.  Trachea is midline.  Lung is clear.  No significant pleural effusion or pneumothorax.  Bones are unchanged.      Impression    No acute cardiopulmonary abnormality.      Electronically signed by: Mickey Sethi  Date:    01/27/2024  Time:    07:16

## 2024-01-28 PROBLEM — I46.9 CARDIAC ARREST: Status: ACTIVE | Noted: 2024-01-28

## 2024-01-28 PROBLEM — J69.0 ASPIRATION PNEUMONIA OF LEFT LUNG: Status: ACTIVE | Noted: 2024-01-28

## 2024-01-28 PROBLEM — E16.2 HYPOGLYCEMIA: Status: ACTIVE | Noted: 2024-01-28

## 2024-01-28 PROBLEM — Z71.89 GOALS OF CARE, COUNSELING/DISCUSSION: Status: ACTIVE | Noted: 2024-01-28

## 2024-01-28 LAB
ALBUMIN SERPL BCP-MCNC: 2.9 G/DL (ref 3.5–5.2)
ALBUMIN SERPL BCP-MCNC: 3.1 G/DL (ref 3.5–5.2)
ALBUMIN SERPL BCP-MCNC: 3.3 G/DL (ref 3.5–5.2)
ALLENS TEST: ABNORMAL
ALP SERPL-CCNC: 198 U/L (ref 55–135)
ALP SERPL-CCNC: 210 U/L (ref 55–135)
ALP SERPL-CCNC: 212 U/L (ref 55–135)
ALT SERPL W/O P-5'-P-CCNC: 115 U/L (ref 10–44)
ALT SERPL W/O P-5'-P-CCNC: 177 U/L (ref 10–44)
ALT SERPL W/O P-5'-P-CCNC: 37 U/L (ref 10–44)
AMMONIA PLAS-SCNC: 85 UMOL/L (ref 10–50)
ANION GAP SERPL CALC-SCNC: 21 MMOL/L (ref 8–16)
ANION GAP SERPL CALC-SCNC: 21 MMOL/L (ref 8–16)
ANION GAP SERPL CALC-SCNC: 24 MMOL/L (ref 8–16)
AST SERPL-CCNC: 257 U/L (ref 10–40)
AST SERPL-CCNC: 375 U/L (ref 10–40)
AST SERPL-CCNC: 74 U/L (ref 10–40)
BASOPHILS # BLD AUTO: 0.03 K/UL (ref 0–0.2)
BASOPHILS NFR BLD: 0.2 % (ref 0–1.9)
BILIRUB DIRECT SERPL-MCNC: 4.3 MG/DL (ref 0.1–0.3)
BILIRUB SERPL-MCNC: 5.5 MG/DL (ref 0.1–1)
BILIRUB SERPL-MCNC: 5.9 MG/DL (ref 0.1–1)
BILIRUB SERPL-MCNC: 5.9 MG/DL (ref 0.1–1)
BUN SERPL-MCNC: 101 MG/DL (ref 6–20)
BUN SERPL-MCNC: 104 MG/DL (ref 6–20)
BUN SERPL-MCNC: 107 MG/DL (ref 6–20)
CALCIUM SERPL-MCNC: 8.5 MG/DL (ref 8.7–10.5)
CALCIUM SERPL-MCNC: 8.9 MG/DL (ref 8.7–10.5)
CALCIUM SERPL-MCNC: 8.9 MG/DL (ref 8.7–10.5)
CHLORIDE SERPL-SCNC: 86 MMOL/L (ref 95–110)
CHLORIDE SERPL-SCNC: 87 MMOL/L (ref 95–110)
CHLORIDE SERPL-SCNC: 88 MMOL/L (ref 95–110)
CO2 SERPL-SCNC: 20 MMOL/L (ref 23–29)
CO2 SERPL-SCNC: 21 MMOL/L (ref 23–29)
CO2 SERPL-SCNC: 21 MMOL/L (ref 23–29)
CREAT SERPL-MCNC: 3.5 MG/DL (ref 0.5–1.4)
CREAT SERPL-MCNC: 3.8 MG/DL (ref 0.5–1.4)
CREAT SERPL-MCNC: 4.2 MG/DL (ref 0.5–1.4)
DELSYS: ABNORMAL
DIFFERENTIAL METHOD BLD: ABNORMAL
EOSINOPHIL # BLD AUTO: 0 K/UL (ref 0–0.5)
EOSINOPHIL NFR BLD: 0.1 % (ref 0–8)
ERYTHROCYTE [DISTWIDTH] IN BLOOD BY AUTOMATED COUNT: 22.2 % (ref 11.5–14.5)
ERYTHROCYTE [SEDIMENTATION RATE] IN BLOOD BY WESTERGREN METHOD: 30 MM/H
ERYTHROCYTE [SEDIMENTATION RATE] IN BLOOD BY WESTERGREN METHOD: 30 MM/H
EST. GFR  (NO RACE VARIABLE): 16 ML/MIN/1.73 M^2
EST. GFR  (NO RACE VARIABLE): 18 ML/MIN/1.73 M^2
EST. GFR  (NO RACE VARIABLE): 20 ML/MIN/1.73 M^2
FIO2: 100
FLOW: 15
FLOW: 2
GLUCOSE SERPL-MCNC: 108 MG/DL (ref 70–110)
GLUCOSE SERPL-MCNC: 61 MG/DL (ref 70–110)
GLUCOSE SERPL-MCNC: 89 MG/DL (ref 70–110)
HCO3 UR-SCNC: 24 MMOL/L (ref 24–28)
HCO3 UR-SCNC: 24.1 MMOL/L (ref 24–28)
HCO3 UR-SCNC: 24.3 MMOL/L (ref 24–28)
HCO3 UR-SCNC: 25.4 MMOL/L (ref 24–28)
HCT VFR BLD AUTO: 35.2 % (ref 40–54)
HGB BLD-MCNC: 9.9 G/DL (ref 14–18)
IMM GRANULOCYTES # BLD AUTO: 0.21 K/UL (ref 0–0.04)
IMM GRANULOCYTES NFR BLD AUTO: 1.6 % (ref 0–0.5)
INR PPP: 2 (ref 0.8–1.2)
INR PPP: 2.4 (ref 0.8–1.2)
LACTATE SERPL-SCNC: 10.3 MMOL/L (ref 0.5–2.2)
LACTATE SERPL-SCNC: 11 MMOL/L (ref 0.5–2.2)
LACTATE SERPL-SCNC: 5.2 MMOL/L (ref 0.5–2.2)
LACTATE SERPL-SCNC: 6.5 MMOL/L (ref 0.5–2.2)
LACTATE SERPL-SCNC: 6.5 MMOL/L (ref 0.5–2.2)
LYMPHOCYTES # BLD AUTO: 0.6 K/UL (ref 1–4.8)
LYMPHOCYTES NFR BLD: 4.1 % (ref 18–48)
MAGNESIUM SERPL-MCNC: 2.2 MG/DL (ref 1.6–2.6)
MAGNESIUM SERPL-MCNC: 2.4 MG/DL (ref 1.6–2.6)
MAGNESIUM SERPL-MCNC: 2.6 MG/DL (ref 1.6–2.6)
MCH RBC QN AUTO: 22.6 PG (ref 27–31)
MCHC RBC AUTO-ENTMCNC: 28.1 G/DL (ref 32–36)
MCV RBC AUTO: 80 FL (ref 82–98)
MODE: ABNORMAL
MONOCYTES # BLD AUTO: 1.3 K/UL (ref 0.3–1)
MONOCYTES NFR BLD: 9.9 % (ref 4–15)
NEUTROPHILS # BLD AUTO: 11.4 K/UL (ref 1.8–7.7)
NEUTROPHILS NFR BLD: 84.1 % (ref 38–73)
NRBC BLD-RTO: 4 /100 WBC
PCO2 BLDA: 37.1 MMHG (ref 35–45)
PCO2 BLDA: 46.1 MMHG (ref 35–45)
PCO2 BLDA: 48.8 MMHG (ref 35–45)
PCO2 BLDA: 53.5 MMHG (ref 35–45)
PEEP: 0
PEEP: 5
PH SMN: 7.28 [PH] (ref 7.35–7.45)
PH SMN: 7.3 [PH] (ref 7.35–7.45)
PH SMN: 7.33 [PH] (ref 7.35–7.45)
PH SMN: 7.42 [PH] (ref 7.35–7.45)
PHOSPHATE SERPL-MCNC: 6.5 MG/DL (ref 2.7–4.5)
PLATELET # BLD AUTO: 131 K/UL (ref 150–450)
PMV BLD AUTO: 10 FL (ref 9.2–12.9)
PO2 BLDA: 121 MMHG (ref 80–100)
PO2 BLDA: 17 MMHG (ref 80–100)
PO2 BLDA: 17 MMHG (ref 80–100)
PO2 BLDA: 200 MMHG (ref 80–100)
POC BE: -1 MMOL/L
POC BE: -2 MMOL/L
POC BE: -2 MMOL/L
POC BE: 0 MMOL/L
POC SATURATED O2: 100 % (ref 95–100)
POC SATURATED O2: 20 % (ref 95–100)
POC SATURATED O2: 21 % (ref 95–100)
POC SATURATED O2: 98 % (ref 95–100)
POCT GLUCOSE: 104 MG/DL (ref 70–110)
POCT GLUCOSE: 104 MG/DL (ref 70–110)
POCT GLUCOSE: 105 MG/DL (ref 70–110)
POCT GLUCOSE: 107 MG/DL (ref 70–110)
POCT GLUCOSE: 111 MG/DL (ref 70–110)
POCT GLUCOSE: 114 MG/DL (ref 70–110)
POCT GLUCOSE: 115 MG/DL (ref 70–110)
POCT GLUCOSE: 126 MG/DL (ref 70–110)
POCT GLUCOSE: 128 MG/DL (ref 70–110)
POCT GLUCOSE: 132 MG/DL (ref 70–110)
POCT GLUCOSE: 140 MG/DL (ref 70–110)
POCT GLUCOSE: 49 MG/DL (ref 70–110)
POCT GLUCOSE: 55 MG/DL (ref 70–110)
POCT GLUCOSE: 60 MG/DL (ref 70–110)
POCT GLUCOSE: 62 MG/DL (ref 70–110)
POCT GLUCOSE: 80 MG/DL (ref 70–110)
POCT GLUCOSE: 83 MG/DL (ref 70–110)
POCT GLUCOSE: 86 MG/DL (ref 70–110)
POCT GLUCOSE: 93 MG/DL (ref 70–110)
POCT GLUCOSE: 97 MG/DL (ref 70–110)
POCT GLUCOSE: 98 MG/DL (ref 70–110)
POCT GLUCOSE: 99 MG/DL (ref 70–110)
POTASSIUM SERPL-SCNC: 3.9 MMOL/L (ref 3.5–5.1)
POTASSIUM SERPL-SCNC: 4.8 MMOL/L (ref 3.5–5.1)
POTASSIUM SERPL-SCNC: 4.9 MMOL/L (ref 3.5–5.1)
PROT SERPL-MCNC: 7 G/DL (ref 6–8.4)
PROT SERPL-MCNC: 7 G/DL (ref 6–8.4)
PROT SERPL-MCNC: 7.5 G/DL (ref 6–8.4)
PROTHROMBIN TIME: 20.3 SEC (ref 9–12.5)
PROTHROMBIN TIME: 24 SEC (ref 9–12.5)
RBC # BLD AUTO: 4.38 M/UL (ref 4.6–6.2)
SAMPLE: ABNORMAL
SITE: ABNORMAL
SODIUM SERPL-SCNC: 128 MMOL/L (ref 136–145)
SODIUM SERPL-SCNC: 129 MMOL/L (ref 136–145)
SODIUM SERPL-SCNC: 132 MMOL/L (ref 136–145)
VT: 400
VT: 400
WBC # BLD AUTO: 13.53 K/UL (ref 3.9–12.7)

## 2024-01-28 PROCEDURE — 0BH17EZ INSERTION OF ENDOTRACHEAL AIRWAY INTO TRACHEA, VIA NATURAL OR ARTIFICIAL OPENING: ICD-10-PCS | Performed by: SPECIALIST

## 2024-01-28 PROCEDURE — 63600175 PHARM REV CODE 636 W HCPCS: Performed by: SPECIALIST

## 2024-01-28 PROCEDURE — 83735 ASSAY OF MAGNESIUM: CPT | Performed by: NURSE PRACTITIONER

## 2024-01-28 PROCEDURE — 63600175 PHARM REV CODE 636 W HCPCS: Performed by: NURSE PRACTITIONER

## 2024-01-28 PROCEDURE — 83605 ASSAY OF LACTIC ACID: CPT | Mod: 91 | Performed by: NURSE PRACTITIONER

## 2024-01-28 PROCEDURE — 85025 COMPLETE CBC W/AUTO DIFF WBC: CPT | Performed by: NURSE PRACTITIONER

## 2024-01-28 PROCEDURE — 83735 ASSAY OF MAGNESIUM: CPT | Mod: 91 | Performed by: NURSE PRACTITIONER

## 2024-01-28 PROCEDURE — 25000003 PHARM REV CODE 250: Performed by: NURSE PRACTITIONER

## 2024-01-28 PROCEDURE — 25000003 PHARM REV CODE 250

## 2024-01-28 PROCEDURE — 27100171 HC OXYGEN HIGH FLOW UP TO 24 HOURS

## 2024-01-28 PROCEDURE — P9047 ALBUMIN (HUMAN), 25%, 50ML: HCPCS | Mod: JZ,JG | Performed by: SPECIALIST

## 2024-01-28 PROCEDURE — P9047 ALBUMIN (HUMAN), 25%, 50ML: HCPCS | Mod: JZ,JG | Performed by: NURSE PRACTITIONER

## 2024-01-28 PROCEDURE — 87186 SC STD MICRODIL/AGAR DIL: CPT | Performed by: SPECIALIST

## 2024-01-28 PROCEDURE — 27202055 HC BRONCHOSCOPE, DISP

## 2024-01-28 PROCEDURE — 63600175 PHARM REV CODE 636 W HCPCS

## 2024-01-28 PROCEDURE — 99232 SBSQ HOSP IP/OBS MODERATE 35: CPT | Mod: GW,HPC,, | Performed by: INTERNAL MEDICINE

## 2024-01-28 PROCEDURE — 0B9J8ZX DRAINAGE OF LEFT LOWER LUNG LOBE, VIA NATURAL OR ARTIFICIAL OPENING ENDOSCOPIC, DIAGNOSTIC: ICD-10-PCS | Performed by: SPECIALIST

## 2024-01-28 PROCEDURE — 83605 ASSAY OF LACTIC ACID: CPT | Performed by: NURSE PRACTITIONER

## 2024-01-28 PROCEDURE — 36620 INSERTION CATHETER ARTERY: CPT

## 2024-01-28 PROCEDURE — 80053 COMPREHEN METABOLIC PANEL: CPT | Performed by: NURSE PRACTITIONER

## 2024-01-28 PROCEDURE — 5A1935Z RESPIRATORY VENTILATION, LESS THAN 24 CONSECUTIVE HOURS: ICD-10-PCS | Performed by: SPECIALIST

## 2024-01-28 PROCEDURE — 36600 WITHDRAWAL OF ARTERIAL BLOOD: CPT

## 2024-01-28 PROCEDURE — 80048 BASIC METABOLIC PNL TOTAL CA: CPT | Mod: XB | Performed by: NURSE PRACTITIONER

## 2024-01-28 PROCEDURE — 80076 HEPATIC FUNCTION PANEL: CPT | Performed by: NURSE PRACTITIONER

## 2024-01-28 PROCEDURE — 51702 INSERT TEMP BLADDER CATH: CPT

## 2024-01-28 PROCEDURE — 99900025 HC BRONCHOSCOPY-ASST (STAT)

## 2024-01-28 PROCEDURE — 85610 PROTHROMBIN TIME: CPT | Performed by: NURSE PRACTITIONER

## 2024-01-28 PROCEDURE — 87205 SMEAR GRAM STAIN: CPT | Performed by: SPECIALIST

## 2024-01-28 PROCEDURE — 25000003 PHARM REV CODE 250: Performed by: SPECIALIST

## 2024-01-28 PROCEDURE — 99223 1ST HOSP IP/OBS HIGH 75: CPT | Mod: GW,HPC,, | Performed by: INTERNAL MEDICINE

## 2024-01-28 PROCEDURE — 83605 ASSAY OF LACTIC ACID: CPT | Mod: 91 | Performed by: SPECIALIST

## 2024-01-28 PROCEDURE — 99900035 HC TECH TIME PER 15 MIN (STAT)

## 2024-01-28 PROCEDURE — 87077 CULTURE AEROBIC IDENTIFY: CPT | Performed by: SPECIALIST

## 2024-01-28 PROCEDURE — 5A12012 PERFORMANCE OF CARDIAC OUTPUT, SINGLE, MANUAL: ICD-10-PCS | Performed by: STUDENT IN AN ORGANIZED HEALTH CARE EDUCATION/TRAINING PROGRAM

## 2024-01-28 PROCEDURE — 25000003 PHARM REV CODE 250: Performed by: INTERNAL MEDICINE

## 2024-01-28 PROCEDURE — 87070 CULTURE OTHR SPECIMN AEROBIC: CPT | Performed by: SPECIALIST

## 2024-01-28 PROCEDURE — 80053 COMPREHEN METABOLIC PANEL: CPT | Mod: 91 | Performed by: NURSE PRACTITIONER

## 2024-01-28 PROCEDURE — 82803 BLOOD GASES ANY COMBINATION: CPT

## 2024-01-28 PROCEDURE — 63600175 PHARM REV CODE 636 W HCPCS: Performed by: INTERNAL MEDICINE

## 2024-01-28 PROCEDURE — 84100 ASSAY OF PHOSPHORUS: CPT | Performed by: NURSE PRACTITIONER

## 2024-01-28 PROCEDURE — 85610 PROTHROMBIN TIME: CPT | Mod: 91 | Performed by: NURSE PRACTITIONER

## 2024-01-28 PROCEDURE — 94761 N-INVAS EAR/PLS OXIMETRY MLT: CPT | Mod: XB

## 2024-01-28 PROCEDURE — 94002 VENT MGMT INPAT INIT DAY: CPT

## 2024-01-28 PROCEDURE — 99900026 HC AIRWAY MAINTENANCE (STAT)

## 2024-01-28 PROCEDURE — 20000000 HC ICU ROOM

## 2024-01-28 RX ORDER — SODIUM CHLORIDE 9 MG/ML
INJECTION, SOLUTION INTRAVENOUS
Status: DISCONTINUED | OUTPATIENT
Start: 2024-01-28 | End: 2024-01-31 | Stop reason: HOSPADM

## 2024-01-28 RX ORDER — PROPOFOL 10 MG/ML
INJECTION, EMULSION INTRAVENOUS
Status: COMPLETED
Start: 2024-01-28 | End: 2024-01-28

## 2024-01-28 RX ORDER — INDOMETHACIN 25 MG/1
CAPSULE ORAL
Status: COMPLETED
Start: 2024-01-28 | End: 2024-01-28

## 2024-01-28 RX ORDER — PROPOFOL 10 MG/ML
0-25 INJECTION, EMULSION INTRAVENOUS CONTINUOUS
Status: DISCONTINUED | OUTPATIENT
Start: 2024-01-28 | End: 2024-01-29

## 2024-01-28 RX ORDER — EPINEPHRINE 0.1 MG/ML
INJECTION INTRAVENOUS CODE/TRAUMA/SEDATION MEDICATION
Status: COMPLETED | OUTPATIENT
Start: 2024-01-27 | End: 2024-01-27

## 2024-01-28 RX ORDER — DEXTROSE MONOHYDRATE 100 MG/ML
INJECTION, SOLUTION INTRAVENOUS CONTINUOUS
Status: DISCONTINUED | OUTPATIENT
Start: 2024-01-28 | End: 2024-01-29

## 2024-01-28 RX ORDER — ALBUMIN HUMAN 250 G/1000ML
12.5 SOLUTION INTRAVENOUS ONCE
Status: COMPLETED | OUTPATIENT
Start: 2024-01-28 | End: 2024-01-28

## 2024-01-28 RX ORDER — ROCURONIUM BROMIDE 10 MG/ML
INJECTION, SOLUTION INTRAVENOUS
Status: COMPLETED
Start: 2024-01-28 | End: 2024-01-28

## 2024-01-28 RX ORDER — INDOMETHACIN 25 MG/1
12.5 CAPSULE ORAL ONCE
Status: COMPLETED | OUTPATIENT
Start: 2024-01-28 | End: 2024-01-28

## 2024-01-28 RX ORDER — ALBUMIN HUMAN 250 G/1000ML
25 SOLUTION INTRAVENOUS ONCE
Status: COMPLETED | OUTPATIENT
Start: 2024-01-28 | End: 2024-01-28

## 2024-01-28 RX ORDER — PROPOFOL 10 MG/ML
VIAL (ML) INTRAVENOUS
Status: COMPLETED | OUTPATIENT
Start: 2024-01-28 | End: 2024-01-28

## 2024-01-28 RX ORDER — VASOPRESSIN IN DEXTROSE 5 % 25/250 ML
0.04 PLASTIC BAG, INJECTION (ML) INTRAVENOUS CONTINUOUS
Status: DISCONTINUED | OUTPATIENT
Start: 2024-01-28 | End: 2024-01-31 | Stop reason: HOSPADM

## 2024-01-28 RX ORDER — ROCURONIUM BROMIDE 10 MG/ML
INJECTION, SOLUTION INTRAVENOUS CODE/TRAUMA/SEDATION MEDICATION
Status: COMPLETED | OUTPATIENT
Start: 2024-01-28 | End: 2024-01-28

## 2024-01-28 RX ORDER — FUROSEMIDE 10 MG/ML
80 INJECTION INTRAMUSCULAR; INTRAVENOUS ONCE
Status: COMPLETED | OUTPATIENT
Start: 2024-01-28 | End: 2024-01-28

## 2024-01-28 RX ORDER — SODIUM BICARBONATE 1 MEQ/ML
SYRINGE (ML) INTRAVENOUS CODE/TRAUMA/SEDATION MEDICATION
Status: COMPLETED | OUTPATIENT
Start: 2024-01-28 | End: 2024-01-28

## 2024-01-28 RX ADMIN — SODIUM CHLORIDE: 9 INJECTION, SOLUTION INTRAVENOUS at 12:01

## 2024-01-28 RX ADMIN — SODIUM BICARBONATE 12.5 MEQ: 84 INJECTION, SOLUTION INTRAVENOUS at 12:01

## 2024-01-28 RX ADMIN — METHYLPREDNISOLONE SODIUM SUCCINATE 40 MG: 40 INJECTION, POWDER, FOR SOLUTION INTRAMUSCULAR; INTRAVENOUS at 02:01

## 2024-01-28 RX ADMIN — PIPERACILLIN AND TAZOBACTAM 4.5 G: 4; .5 INJECTION, POWDER, LYOPHILIZED, FOR SOLUTION INTRAVENOUS; PARENTERAL at 09:01

## 2024-01-28 RX ADMIN — ASPIRIN 81 MG: 81 TABLET, COATED ORAL at 08:01

## 2024-01-28 RX ADMIN — DEXTROSE MONOHYDRATE: 100 INJECTION, SOLUTION INTRAVENOUS at 05:01

## 2024-01-28 RX ADMIN — DOBUTAMINE HYDROCHLORIDE 5 MCG/KG/MIN: 400 INJECTION INTRAVENOUS at 12:01

## 2024-01-28 RX ADMIN — METOPROLOL TARTRATE 12.5 MG: 25 TABLET, FILM COATED ORAL at 09:01

## 2024-01-28 RX ADMIN — ROCURONIUM BROMIDE 40 MG: 10 INJECTION INTRAVENOUS at 12:01

## 2024-01-28 RX ADMIN — APIXABAN 5 MG: 2.5 TABLET, FILM COATED ORAL at 08:01

## 2024-01-28 RX ADMIN — EPINEPHRINE 0.1 MCG/KG/MIN: 1 INJECTION INTRAMUSCULAR; INTRAVENOUS; SUBCUTANEOUS at 12:01

## 2024-01-28 RX ADMIN — RANOLAZINE 500 MG: 500 TABLET, EXTENDED RELEASE ORAL at 08:01

## 2024-01-28 RX ADMIN — METHYLPREDNISOLONE SODIUM SUCCINATE 40 MG: 40 INJECTION, POWDER, FOR SOLUTION INTRAMUSCULAR; INTRAVENOUS at 09:01

## 2024-01-28 RX ADMIN — MUPIROCIN: 20 OINTMENT TOPICAL at 09:01

## 2024-01-28 RX ADMIN — METOPROLOL TARTRATE 12.5 MG: 25 TABLET, FILM COATED ORAL at 08:01

## 2024-01-28 RX ADMIN — CHLORHEXIDINE GLUCONATE 0.12% ORAL RINSE 15 ML: 1.2 LIQUID ORAL at 09:01

## 2024-01-28 RX ADMIN — INDOMETHACIN 12.5 MEQ: 25 CAPSULE ORAL at 12:01

## 2024-01-28 RX ADMIN — PROPOFOL 25 MG: 10 INJECTION, EMULSION INTRAVENOUS at 12:01

## 2024-01-28 RX ADMIN — SODIUM BICARBONATE 50 MEQ: 84 INJECTION INTRAVENOUS at 12:01

## 2024-01-28 RX ADMIN — PANTOPRAZOLE SODIUM 40 MG: 40 TABLET, DELAYED RELEASE ORAL at 08:01

## 2024-01-28 RX ADMIN — APIXABAN 5 MG: 2.5 TABLET, FILM COATED ORAL at 09:01

## 2024-01-28 RX ADMIN — DEXTROSE MONOHYDRATE 250 ML: 100 INJECTION, SOLUTION INTRAVENOUS at 01:01

## 2024-01-28 RX ADMIN — AMIODARONE HYDROCHLORIDE 400 MG: 200 TABLET ORAL at 08:01

## 2024-01-28 RX ADMIN — POLYETHYLENE GLYCOL 3350 17 G: 17 POWDER, FOR SOLUTION ORAL at 08:01

## 2024-01-28 RX ADMIN — PROPOFOL 20 MCG/KG/MIN: 10 INJECTION, EMULSION INTRAVENOUS at 02:01

## 2024-01-28 RX ADMIN — FUROSEMIDE 10 MG/HR: 10 INJECTION, SOLUTION INTRAMUSCULAR; INTRAVENOUS at 03:01

## 2024-01-28 RX ADMIN — SODIUM CHLORIDE: 9 INJECTION, SOLUTION INTRAVENOUS at 09:01

## 2024-01-28 RX ADMIN — FUROSEMIDE 80 MG: 10 INJECTION, SOLUTION INTRAMUSCULAR; INTRAVENOUS at 02:01

## 2024-01-28 RX ADMIN — PIPERACILLIN AND TAZOBACTAM 4.5 G: 4; .5 INJECTION, POWDER, LYOPHILIZED, FOR SOLUTION INTRAVENOUS; PARENTERAL at 11:01

## 2024-01-28 RX ADMIN — RANOLAZINE 500 MG: 500 TABLET, EXTENDED RELEASE ORAL at 09:01

## 2024-01-28 RX ADMIN — ROCURONIUM BROMIDE 40 MG: 10 SOLUTION INTRAVENOUS at 12:01

## 2024-01-28 RX ADMIN — EPINEPHRINE 0.5 MCG/KG/MIN: 1 INJECTION INTRAMUSCULAR; INTRAVENOUS; SUBCUTANEOUS at 07:01

## 2024-01-28 RX ADMIN — ALBUMIN (HUMAN) 25 G: 5 SOLUTION INTRAVENOUS at 12:01

## 2024-01-28 RX ADMIN — CHLORHEXIDINE GLUCONATE 0.12% ORAL RINSE 15 ML: 1.2 LIQUID ORAL at 08:01

## 2024-01-28 RX ADMIN — PROPOFOL 5 MCG/KG/MIN: 10 INJECTION, EMULSION INTRAVENOUS at 03:01

## 2024-01-28 RX ADMIN — EPINEPHRINE 0.5 MCG/KG/MIN: 1 INJECTION INTRAMUSCULAR; INTRAVENOUS; SUBCUTANEOUS at 09:01

## 2024-01-28 RX ADMIN — VASOPRESSIN 0.04 UNITS/MIN: 0.2 INJECTION INTRAVENOUS at 09:01

## 2024-01-28 RX ADMIN — EPINEPHRINE 0.5 MCG/KG/MIN: 1 INJECTION INTRAMUSCULAR; INTRAVENOUS; SUBCUTANEOUS at 02:01

## 2024-01-28 RX ADMIN — MUPIROCIN: 20 OINTMENT TOPICAL at 08:01

## 2024-01-28 RX ADMIN — DEXTROSE MONOHYDRATE 250 ML: 100 INJECTION, SOLUTION INTRAVENOUS at 04:01

## 2024-01-28 RX ADMIN — SODIUM BICARBONATE: 84 INJECTION, SOLUTION INTRAVENOUS at 12:01

## 2024-01-28 RX ADMIN — VASOPRESSIN 0.04 UNITS/MIN: 0.2 INJECTION INTRAVENOUS at 02:01

## 2024-01-28 RX ADMIN — ALBUMIN (HUMAN) 12.5 G: 12.5 SOLUTION INTRAVENOUS at 03:01

## 2024-01-28 NOTE — PROGRESS NOTES
[facility] - Int[facility] Care ([facility])  Cardiology  Consult Note    Patient Name: Migel Garcia  MRN: [id number]  Admission Date: 1/26/2024  Hospital Length of Stay: 2 days  Code Status: Full Code   Attending Provider: Emir Rock MD   Consulting Provider: Alysha Patel  Primary Care Physician: Anuj Banks MD  Principal Problem:<principal problem not specified>    Patient information was obtained from patient and ER records.     Consults  Subjective:     Chief Complaint:  CP     HPI:   53 YO M w/ history of long standing NICM (> 10 yrs), HTN, marijuana and tobacco use, CKD stage 3 admitted for CP.  CP is atypical sometimes pleuritic, worse on movements.  Cath 2022 [facility] nonobstructive/minimal ds. EKG afib with V rate 114. There was 1 episode of NSVT. There is a mild increase in troponin .056. Pt with chronic CHF sx NYHA class 3.   CXR wnl.  After multiple discussions explaining plan and goals of care with OHT team in [place] he does not want to undergo any further workup/procedures or increased HD support at this time ( OHT or LVAD)   Pt on home dobutrex.    1-28-24 Events noted. Pt s/p code last night, 6 minutes CPR no defibrillation. Full code per family.Patient now on Epi drip to maintain systolic BP as well as DBT. Patient intubated and sedated.Continue supportive care.Bilateral pleural effusions noted L > R, IV diuretics    Past Medical History:   Diagnosis Date    A-fib      GERD (gastroesophageal reflux disease)      Heart failure, unspecified      Hypertension      V-tach                 Past Surgical History:   Procedure Laterality Date    COLONOSCOPY N/A 12/13/2022     Procedure: COLONOSCOPY;  Surgeon: Geovany Cole MD;  [facility]: [facility] ([address] [place] [address]);  Service: Endoscopy;  Laterality: N/A;    COLONOSCOPY N/A 12/12/2022     Procedure: COLONOSCOPY;  Surgeon: Geovany Cole MD;  Location: [facility] ([address] [place] [address]);  Service: Endoscopy;  Laterality: N/A;    PLACEMENT OF SWAN LESTER CATHETER  WITH IMAGING GUIDANCE   12/22/2022     Procedure: INSERTION, CATHETER, SWAN-LESTER, WITH IMAGING GUIDANCE;  Surgeon: Brando Parada MD;  Location: [facility] CATH LAB;  Service: Cardiology;;    RIGHT HEART CATHETERIZATION Right 12/16/2022     Procedure: INSERTION, CATHETER, RIGHT HEART;  Surgeon: Brando Parada MD;  Location: [facility] CATH LAB;  Service: Cardiology;  Laterality: Right;    RIGHT HEART CATHETERIZATION Right 12/22/2022     Procedure: INSERTION, CATHETER, RIGHT HEART;  Surgeon: Brando Parada MD;  Location: [facility] CATH LAB;  Service: Cardiology;  Laterality: Right;         Review of patient's allergies indicates:  No Known Allergies     No current facility-administered medications on file prior to encounter.           Current Outpatient Medications on File Prior to Encounter   Medication Sig    amiodarone (PACERONE) 200 MG Tab Take 1 tablet (200 mg total) by mouth once daily.    apixaban (ELIQUIS) 5 mg Tab Take 1 tablet (5 mg total) by mouth 2 (two) times daily.    aspirin 81 MG Chew Take 81 mg by mouth once daily.    BIDIL 20-37.5 mg Tab Take 1 tablet by mouth 3 (three) times daily.    bumetanide (BUMEX) 1 MG tablet Take 3 tablets (3 mg total) by mouth 2 (two) times a day.    DOBUTamine (DOBUTREX) 250 mg/250 mL (1 mg/mL) infusion Inject 2.5 mcg/kg/min into the vein continuous.    eplerenone (INSPRA) 50 MG Tab Take 50 mg by mouth once daily.    metOLazone (ZAROXOLYN) 5 MG tablet Take 5 mg by mouth once daily.    metoprolol tartrate (LOPRESSOR) 25 MG tablet Take 12.5 mg by mouth 2 (two) times daily.    potassium chloride SA (K-DUR,KLOR-CON) 20 MEQ tablet Take 20 mEq by mouth once daily.    ondansetron (ZOFRAN-ODT) 4 MG TbDL Take 4 mg by mouth every 6 (six) hours as needed.      Family History    None               Tobacco Use    Smoking status: Some Days       Types: Cigarettes    Smokeless tobacco: Not on file   Substance and Sexual Activity    Alcohol use: Not Currently    Drug use: Not on  file    Sexual activity: Not on file      Review of Systems   Constitutional: Negative. Negative for weight gain.   HENT: Negative.     Eyes: Negative.    Cardiovascular: Negative.  Negative for chest pain, leg swelling and palpitations.   Respiratory: Negative.  Negative for shortness of breath.    Endocrine: Negative.    Hematologic/Lymphatic: Negative.    Skin: Negative.    Musculoskeletal:  Negative for muscle weakness.   Gastrointestinal: Negative.    Genitourinary: Negative.    Neurological: Negative.  Negative for dizziness.   Psychiatric/Behavioral: Negative.     Allergic/Immunologic: Negative.    All other systems reviewed and are negative.     Objective   Vitals:    01/28/24 0922   BP:    Pulse: 84   Resp: (!) 30   Temp: 99.1 °F (37.3 °C)       Significant Labs: All pertinent lab results from the last 24 hours have been reviewed.     Significant Imaging: X-Ray: CXR: X-Ray Chest 1 View (CXR):       Results for orders placed or performed during the hospital encounter of 01/26/24   X-Ray Chest 1 View     Narrative     EXAMINATION:  XR CHEST 1 VIEW     CLINICAL HISTORY:  chf;     TECHNIQUE:  Single frontal view of the chest was performed.     COMPARISON:  X-ray dated 01/26/2024     FINDINGS:  Left-sided cardiac defibrillator is unchanged in position.  Right-sided PICC is unchanged as well.  Cardiomediastinal silhouette is unchanged in size and contour.  Trachea is midline.  Lung is clear.  No significant pleural effusion or pneumothorax.  Bones are unchanged.        Impression     No acute cardiopulmonary abnormality.        Electronically signed by:       Mickey Sethi  Date:                                        01/27/2024  Time:                                       07:16     Assessment and Plan:     Acute on chronic combined systolic and diastolic heart failure  51 YO M w/ history of long standing NICM (> 10 yrs), HTN, marijuana and tobacco use, CKD stage 3 admitted for CP.  CP is atypical sometimes  pleuritic, worse on movements.  Cath 2022 [facility] nonobstructive/minimal ds. EKG afib with V rate 114. There was 1 episode of NSVT. There is a mild increase in troponin .056. Pt with chronic CHF sx NYHA class 3.   CXR wnl.  After multiple discussions explaining plan and goals of care with OHT team in [place] he does not want to undergo any further workup/procedures or increased HD support at this time ( OHT or LVAD)   Pt on home dobutrex.    Pt with atypical CP ( nonobstructive ds by cath), will add ranexa, can add PPI. CHF mild continue IV diuretics, amiodarone added for NSVT. Pt can go to telemetry         Can restart home b blockers, aldactone, k  VTE Risk Mitigation (From admission, onward)           Ordered     apixaban tablet 5 mg  2 times daily         01/26/24 1841     IP VTE HIGH RISK PATIENT  Once         01/26/24 1746     Place sequential compression device  Until discontinued         01/26/24 1746                    Thank you for your consult. I will follow-up with patient. Please contact us if you have any additional questions.    SCRIBE #1 NOTE: I, Alysha Patel, am scribing for, and in the presence of,  Orestes Garcia MD. I have scribed the entire note.

## 2024-01-28 NOTE — PROCEDURES
"Migel Garcia is a 53 y.o. male patient.    Temp: 99.7 °F (37.6 °C) (01/28/24 1320)  Pulse: 74 (01/28/24 1320)  Resp: (!) 30 (01/28/24 1320)  BP: (!) 46/30 (01/28/24 0100)  SpO2: 96 % (01/28/24 1320)  Weight: 81.6 kg (179 lb 14.3 oz) (01/26/24 2020)  Height: 5' 6" (167.6 cm) (01/26/24 2015)       Procedures    BRONCHOSCOPY    Via ETT    Patient is on ICU / On Mechanical ventilation with adequate IV sedation     No endobronchitis  Normal main trachea  Normal main vik    Rt lung and its branches are normal  Lt lung and its branches are normal    BAL of the Lt LL performed.     No complications  Patient tolerated the procedure well.     1/28/2024    "

## 2024-01-28 NOTE — SIGNIFICANT EVENT
Notified of change in mental status. Pt seen and examined. Pt is lethargic and confused. He opens eyes but will not state his name. Moving all extremities. No focal deficits. Respirations even and unlabored. Crackles noted. Unable to obtain O2sat. BP improved from earlier. Pt on IV Dobutamine and IV lasix. He was hypotensive most of the day with SBP 70-80s-now /49. STAT glucose 63. D10 bolus given.   AMS may be from mild hypoglycemia.   Plan:   Check CT head, ABG, Ammonia, and lactic acid.     Critical care time spent on the evaluation and treatment of severe organ dysfunction, review of pertinent labs and imaging studies, discussions with consulting providers and discussions with patient/family: 30 minutes.

## 2024-01-28 NOTE — PROGRESS NOTES
[facility] - [facility] ([facility])  Critical Care Medicine  Progress Note    Patient Name: Migel Garcia  MRN: [id number]  Admission Date: 1/26/2024  Hospital Length of Stay: 2 days  Code Status: Full Code  Attending Provider: Emir Rock MD  Primary Care Provider: Anuj Banks MD   Principal Problem: <principal problem not specified>    Subjective:     HPI:  Patient presents with intermittant chest pain for the last 24-36hrs.  Hx of severe NICM, EF <20%, Pulmonary HTN - PAP 47/30.   He has been evaluated by the transplant team but does not want to pursue advanced HD Support.  He has been discharged home with hospice and has been on dobutamine infusion for over a year.    Hospital/ICU Course:  1/27. Up in a recliner  On RA. Hemodynamics stable.   1/28. Events of last night noted  Progressive decline and then cardiac arrest. Now on MV and pressors.         Objective:     Vital Signs (Most Recent):  Temp: 99.7 °F (37.6 °C) (01/28/24 1129)  Pulse: 72 (01/28/24 1129)  Resp: (!) 30 (01/28/24 1129)  BP: (!) 46/30 (01/28/24 0100)  SpO2: 98 % (01/28/24 1129) Vital Signs (24h Range):  Temp:  [91.2 °F (32.9 °C)-99.7 °F (37.6 °C)] 99.7 °F (37.6 °C)  Pulse:  [] 72  Resp:  [12-61] 30  SpO2:  [54 %-100 %] 98 %  BP: ()/(29-90) 46/30  Arterial Line BP: ()/(51-73) 92/53     Weight: 81.6 kg (179 lb 14.3 oz)  Body mass index is 29.04 kg/m².      Intake/Output Summary (Last 24 hours) at 1/28/2024 1136  Last data filed at 1/28/2024 0700  Gross per 24 hour   Intake 1405.35 ml   Output 50 ml   Net 1355.35 ml        Physical Exam  Vitals and nursing note reviewed.   Constitutional:       General: He is not in acute distress.     Appearance: He is ill-appearing and diaphoretic.   HENT:      Head: Normocephalic.   Eyes:      Pupils: Pupils are equal, round, and reactive to light.   Cardiovascular:      Rate and Rhythm: Tachycardia present.   Pulmonary:      Effort: Respiratory distress present.      Breath  sounds: Rhonchi present.   Abdominal:      General: Bowel sounds are normal.   Neurological:      General: No focal deficit present.      Mental Status: He is alert.           Review of Systems    Vents:  Vent Mode: A/C (01/28/24 1129)  Set Rate: 30 BPM (01/28/24 1129)  Vt Set: 400 mL (01/28/24 1129)  PEEP/CPAP: 5 cmH20 (01/28/24 1129)  Oxygen Concentration (%): 75 (01/28/24 1129)  Peak Airway Pressure: 23 cmH20 (01/28/24 1129)  Plateau Pressure: 32 cmH20 (01/28/24 1129)  Total Ve: 11.8 L/m (01/28/24 1129)  Negative Inspiratory Force (cm H2O): 0 (01/28/24 1129)  F/VT Ratio<105 (RSBI): (!) 74.26 (01/28/24 1129)    Lines/Drains/Airways       Peripherally Inserted Central Catheter Line  Duration             PICC Triple Lumen 01/19/24 1200 right basilic 8 days              Drain  Duration                  NG/OG Tube 01/28/24 0014 [facility] mouth <1 day         Urethral Catheter 01/28/24 0030 <1 day              Airway  Duration                  Airway - Non-Surgical 01/28/24 0014 Endotracheal Tube <1 day              Arterial Line  Duration             Arterial Line 01/28/24 0103 Right Femoral <1 day                    Significant Labs:    CBC/Anemia Profile:  Recent Labs   Lab 01/26/24  1512 01/27/24  0415 01/28/24  0537   WBC 7.49 5.24 13.53*   HGB 9.2* 8.6* 9.9*   HCT 32.6* 30.6* 35.2*   * 101* 131*   MCV 80* 78* 80*   RDW 22.5* 22.1* 22.2*        Chemistries:  Recent Labs   Lab 01/26/24  1512 01/27/24  0415 01/28/24  0120 01/28/24  0537   * 134* 132* 129*   K 3.0* 3.2* 4.8 3.9   CL 86* 89* 88* 87*   CO2 29 30* 20* 21*   BUN 96* 91* 101* 104*   CREATININE 2.4* 2.3* 3.5* 3.8*   CALCIUM 9.3 9.2 8.9 8.5*   ALBUMIN 3.4*  --  3.3* 3.1*   PROT 7.9  --  7.5 7.0   BILITOT 4.9*  --  5.5* 5.9*   ALKPHOS 226*  --  212* 210*   ALT 16  --  37 115*   AST 27  --  74* 257*   MG  --  2.4 2.6 2.4   PHOS  --  3.9  --  6.5*       All pertinent labs within the past 24 hours have been reviewed.    Significant  Imaging:  I have reviewed all pertinent imaging results/findings within the past 24 hours.    ABG  Recent Labs   Lab 01/28/24  0542   PH 7.424   PO2 200*   PCO2 37.1   HCO3 24.3   BE 0     Assessment/Plan:     Pulmonary  Aspiration pneumonia of left lung  Chikasyn added.   Will consider a bronch.    Cardiac/Vascular  Cardiac arrest  1/28 Transferred to the floor in stable condition yesterday.  Had an episode of SVT and then bradycardia progressing to PEA.  Son was at the bedside. Defebrillator never fired.   Was promptly intubation and epi was initiated.   CPR lasted 6 min. He does wake up and follows commands. Although I told family with uremia and hepatic encephalopathy he may not consistently do that.     Now is severe cardiogenic shock with MSOF.  Transaminitis, no urine output, advance renal failure, uremia, shock liver and refractory hypoglycemia all point to poor perfusion state.   Cardiology saw the patient and he has refused use of assisted device in the past and shock call was not activated.  I also d/w Dr Richard and in this current exacerbation of severe chronic CMP he may not be the candidate for Impella device.  Nephrology has been consulted. He would be a poor candidate for RRT. In the past lasix infusion has worked for him per family but will differ that decision to nephrology.  Lactic acid was 5 and serial lactate ordered.     ICD (implantable cardioverter-defibrillator), single, in situ  Cardiology to follow    Acute on chronic combined systolic and diastolic heart failure  As above    1/28 On inotropic support.     HFrEF (heart failure with reduced ejection fraction)  Cardiology consult  No pulmonary edema on CxR   BNP elevated.   Will consult cardiology  Careful hydration  Will differ need for inotropic support to cardiology as we slowly hydrate him to preserve his renal function  High risk of deterioration  Appropriate for ICU admission    1/27. Stable overnight. No SOB or chest pain. No resp issues.  On RA. Stable for transfer per cardiology. Appreciate the help. Will differ diuretics to cards. BUN / Cr slightly better.     1/28. See cardiac arrest    Renal/  CKD (chronic kidney disease), stage III  Careful hydration to get Cr below 2.    1/29  Significant worsening post cardiac arrest  Nephrology consult    Endocrine  Hypoglycemia  Sec to shock liver  On D10  Solumedrol added      GI  GERD (gastroesophageal reflux disease)  Protonix    Palliative Care  Goals of care, counseling/discussion  Advance Care Planning    Today a voluntary meeting took place: ICU    Patient Participation: Patient is unable to participate     Attendees (Name and  Relationship to patient): Son and wife.    Staff attendees (Name and  Role): Myself and Kelsey patients RN    ACP Conversation (General): Understanding of current condition       Code Status: Full Code    ACP Documents: None    Goals of care: The family endorses that what is most important right now is to focus on  REVERSING HIS CONDITION    Accordingly, we have decided that the best plan to meet the patient's goals includes continuing with treatment      Recommendations/  Follow-up tasks: The patient and health care agent were provided the following recommendations . They would to explore all aggressive measures.       Length of ACP   conversation in minutes: 35 minutes            Critical Care Daily Checklist:    A: Awake: RASS Goal/Actual Goal: RASS Goal: -1-->drowsy  Actual:     B: Spontaneous Breathing Trial Performed?     C: SAT & SBT Coordinated?  yes                      D: Delirium: CAM-ICU Overall CAM-ICU: Positive   E: Early Mobility Performed? yes   F: Feeding Goal:    Status:     Current Diet Order   Procedures    Diet NPO      AS: Analgesia/Sedation yes   T: Thromboembolic Prophylaxis yes   H: HOB > 300 yes   U: Stress Ulcer Prophylaxis (if needed) yes   G: Glucose Control yes   B: Bowel Function Stool Occurrence: 1   I: Indwelling Catheter (Lines & De Le[place])  Necessity yes   D: De-escalation of Antimicrobials/Pharmacotherapies yes    Plan for the day/ETD yes    Code Status:  Family/Goals of Care: Full Code  yes     Critical Care Time: 37 minutes  Critical secondary to Patient has a condition that poses threat to life and bodily function: Severe Respiratory Distress      Critical care was time spent personally by me on the following activities: development of treatment plan with patient or surrogate and bedside caregivers, discussions with consultants, evaluation of patient's response to treatment, examination of patient, ordering and performing treatments and interventions, ordering and review of laboratory studies, ordering and review of radiographic studies, pulse oximetry, re-evaluation of patient's condition. This critical care time did not overlap with that of any other provider or involve time for any procedures.     Emir Rock MD  Critical Care Medicine  [facility] - Intensive Care [facility])

## 2024-01-28 NOTE — ASSESSMENT & PLAN NOTE
1/28 Transferred to the floor in stable condition yesterday.  Had an episode of SVT and then bradycardia progressing to PEA.  Son was at the bedside. Defebrillator never fired.   Was promptly intubation and epi was initiated.   CPR lasted 6 min. He does wake up and follows commands. Although I told family with uremia and hepatic encephalopathy he may not consistently do that.     Now is severe cardiogenic shock with MSOF.  Transaminitis, no urine output, advance renal failure, uremia, shock liver and refractory hypoglycemia all point to poor perfusion state.   Cardiology saw the patient and he has refused use of assisted device in the past and shock call was not activated.  I also d/w Dr Richard and in this current exacerbation of severe chronic CMP he may not be the candidate for Impella device.  Nephrology has been consulted. He would be a poor candidate for RRT. In the past lasix infusion has worked for him per family but will differ that decision to nephrology.  Lactic acid was 5 and serial lactate ordered.

## 2024-01-28 NOTE — SUBJECTIVE & OBJECTIVE
Objective:     Vital Signs (Most Recent):  Temp: 99.7 °F (37.6 °C) (01/28/24 1129)  Pulse: 72 (01/28/24 1129)  Resp: (!) 30 (01/28/24 1129)  BP: (!) 46/30 (01/28/24 0100)  SpO2: 98 % (01/28/24 1129) Vital Signs (24h Range):  Temp:  [91.2 °F (32.9 °C)-99.7 °F (37.6 °C)] 99.7 °F (37.6 °C)  Pulse:  [] 72  Resp:  [12-61] 30  SpO2:  [54 %-100 %] 98 %  BP: ()/(29-90) 46/30  Arterial Line BP: ()/(51-73) 92/53     Weight: 81.6 kg (179 lb 14.3 oz)  Body mass index is 29.04 kg/m².      Intake/Output Summary (Last 24 hours) at 1/28/2024 1136  Last data filed at 1/28/2024 0700  Gross per 24 hour   Intake 1405.35 ml   Output 50 ml   Net 1355.35 ml        Physical Exam  Vitals and nursing note reviewed.   Constitutional:       General: He is not in acute distress.     Appearance: He is ill-appearing and diaphoretic.   HENT:      Head: Normocephalic.   Eyes:      Pupils: Pupils are equal, round, and reactive to light.   Cardiovascular:      Rate and Rhythm: Tachycardia present.   Pulmonary:      Effort: Respiratory distress present.      Breath sounds: Rhonchi present.   Abdominal:      General: Bowel sounds are normal.   Neurological:      General: No focal deficit present.      Mental Status: He is alert.           Review of Systems    Vents:  Vent Mode: A/C (01/28/24 1129)  Set Rate: 30 BPM (01/28/24 1129)  Vt Set: 400 mL (01/28/24 1129)  PEEP/CPAP: 5 cmH20 (01/28/24 1129)  Oxygen Concentration (%): 75 (01/28/24 1129)  Peak Airway Pressure: 23 cmH20 (01/28/24 1129)  Plateau Pressure: 32 cmH20 (01/28/24 1129)  Total Ve: 11.8 L/m (01/28/24 1129)  Negative Inspiratory Force (cm H2O): 0 (01/28/24 1129)  F/VT Ratio<105 (RSBI): (!) 74.26 (01/28/24 1129)    Lines/Drains/Airways       Peripherally Inserted Central Catheter Line  Duration             PICC Triple Lumen 01/19/24 1200 right basilic 8 days              Drain  Duration                  NG/OG Tube 01/28/24 0014 [facility] mouth <1 day          Urethral Catheter 01/28/24 0030 <1 day              Airway  Duration                  Airway - Non-Surgical 01/28/24 0014 Endotracheal Tube <1 day              Arterial Line  Duration             Arterial Line 01/28/24 0103 Right Femoral <1 day                    Significant Labs:    CBC/Anemia Profile:  Recent Labs   Lab 01/26/24  1512 01/27/24  0415 01/28/24  0537   WBC 7.49 5.24 13.53*   HGB 9.2* 8.6* 9.9*   HCT 32.6* 30.6* 35.2*   * 101* 131*   MCV 80* 78* 80*   RDW 22.5* 22.1* 22.2*        Chemistries:  Recent Labs   Lab 01/26/24  1512 01/27/24  0415 01/28/24  0120 01/28/24  0537   * 134* 132* 129*   K 3.0* 3.2* 4.8 3.9   CL 86* 89* 88* 87*   CO2 29 30* 20* 21*   BUN 96* 91* 101* 104*   CREATININE 2.4* 2.3* 3.5* 3.8*   CALCIUM 9.3 9.2 8.9 8.5*   ALBUMIN 3.4*  --  3.3* 3.1*   PROT 7.9  --  7.5 7.0   BILITOT 4.9*  --  5.5* 5.9*   ALKPHOS 226*  --  212* 210*   ALT 16  --  37 115*   AST 27  --  74* 257*   MG  --  2.4 2.6 2.4   PHOS  --  3.9  --  6.5*       All pertinent labs within the past 24 hours have been reviewed.    Significant Imaging:  I have reviewed all pertinent imaging results/findings within the past 24 hours.

## 2024-01-28 NOTE — PLAN OF CARE
Pt remains intubated and sedated. RASS -1, follows commands in all 4 extremities. Reflexes intact. VSS, PEEP 5, FiO2 75%. Atrial fib on heart monitor. MAP > 60, maintained by Epi and Vaso infusions. UOP 5ml/hr, Lasix gtt added per Nephrology order. Pt turned q2h for skin breakdown prevention. Family meeting today with Dr. Rock, discussed prognosis and plan of care with son and wife. They verbalize understanding of poor prognosis, multiple family members visiting throughout day. Bed is locked in lowest position, side rails up x2, bed alarm set, room near nurses station.   Temp:  [98.2 °F (36.8 °C)-99.9 °F (37.7 °C)]   Pulse:  [71-84]   Resp:  [25-34]   SpO2:  [93 %-98 %]   Arterial Line BP: ()/(47-73)      Problem: Adult Inpatient Plan of Care  Goal: Plan of Care Review  Outcome: Ongoing, Progressing  Goal: Patient-Specific Goal (Individualized)  Outcome: Ongoing, Progressing  Goal: Absence of Hospital-Acquired Illness or Injury  Outcome: Ongoing, Progressing  Goal: Optimal Comfort and Wellbeing  Outcome: Ongoing, Progressing  Goal: Readiness for Transition of Care  Outcome: Ongoing, Progressing     Problem: Infection  Goal: Absence of Infection Signs and Symptoms  Outcome: Ongoing, Progressing     Problem: Skin Injury Risk Increased  Goal: Skin Health and Integrity  Outcome: Ongoing, Progressing     Problem: Fall Injury Risk  Goal: Absence of Fall and Fall-Related Injury  Outcome: Ongoing, Progressing     Problem: Restraint, Nonbehavioral (Nonviolent)  Goal: Absence of Harm or Injury  Outcome: Ongoing, Progressing

## 2024-01-28 NOTE — ASSESSMENT & PLAN NOTE
Cardiology consult  No pulmonary edema on CxR   BNP elevated.   Will consult cardiology  Careful hydration  Will differ need for inotropic support to cardiology as we slowly hydrate him to preserve his renal function  High risk of deterioration  Appropriate for ICU admission    1/27. Stable overnight. No SOB or chest pain. No resp issues. On RA. Stable for transfer per cardiology. Appreciate the help. Will differ diuretics to cards. BUN / Cr slightly better.     1/28. See cardiac arrest

## 2024-01-28 NOTE — NURSING
Wife called on the phone to check on the patient , went in the room  found patient sleeping , unlabored breathing on room air, no distress noted at the time.

## 2024-01-28 NOTE — SIGNIFICANT EVENT
Cardiac Arrest    Emergent transfer from floor  Pt well known to me/CC team from admit  [place] to PEA arrest on transfer from floor to ICU beds  See code record  ROSC after ~ 6min CPR + meds, no defibrillation indicated  Intubated post ROSC    Spouse and son at bedside at transfer and throughout code event. Fully updated on plan of care post ROSC.    MOISES NúñezP-BC  Critical Care Medicine  [facility]

## 2024-01-28 NOTE — PROCEDURES
"Migel Garcia is a 53 y.o. male patient.    Temp: 98.1 °F (36.7 °C) (01/28/24 0113)  Pulse: 78 (01/28/24 0113)  Resp: (!) 30 (01/28/24 0113)  BP: (!) 58/43 (01/28/24 0044)  SpO2: 100 % (01/28/24 0113)  Weight: 81.6 kg (179 lb 14.3 oz) (01/26/24 2020)  Height: 5' 6" (167.6 cm) (01/26/24 2015)       Arterial Line    Date/Time: 1/28/2024 1:03 AM  Location procedure was performed: [facility] INTENSIVE CARE UNIT    Performed by: Gretchen Saldaña ACNP-BC  Authorized by: Gretchen Saldaña ACNP-BC  Pre-op Diagnosis: cardiogenic shock  Post-operative diagnosis: cardiogenic shock  Consent Done: Not Needed  Preparation: Patient was prepped and draped in the usual sterile fashion.  Indications: hemodynamic monitoring  Location: right femoral    Patient sedated: no  Needle gauge: 5 Fr.  Seldinger technique: Seldinger technique used  Number of attempts: 1  Complications: No  Specimens: No  Implants: No  Post-procedure: dressing applied and line sutured (biopatch and clear occlusive dressing)  Post-procedure CMS: unchanged          1/28/2024    "

## 2024-01-28 NOTE — ASSESSMENT & PLAN NOTE
Advance Care Planning     Today a voluntary meeting took place: ICU    Patient Participation: Patient is unable to participate     Attendees (Name and  Relationship to patient): Son and wife.    Staff attendees (Name and  Role): Myself and Kelsey killian RN    ACP Conversation (General): Understanding of current condition       Code Status: Full Code    ACP Documents: None    Goals of care: The family endorses that what is most important right now is to focus on  REVERSING HIS CONDITION    Accordingly, we have decided that the best plan to meet the patient's goals includes continuing with treatment      Recommendations/  Follow-up tasks: The patient and health care agent were provided the following recommendations . They would to explore all aggressive measures.       Length of ACP   conversation in minutes: 35 minutes

## 2024-01-28 NOTE — NURSING
"Upon PT arriving to ICU room 9, I noticed a family member standing outside of the Pts room. I kindly educated said family member that our normal policy is for family to wait in the lobby or in our consultation room until the PT is settled, and we would allow them to come see their loved one once appropriate. I gestured for them to follow me to the designated area, but this family member refused and made a hand gesture for me to go away, then preceded to say "get the **** out of my face, I do not care about what you have to say".   Following the normal chain of command, I approach my charge nurse who was in the Pts room while the provider and other staff were performing ACLS protocol. I then called security to come to the unit to ensure the situation was handled appropriately. No further escalation needed at that time.     Once the pt became more stable I encouraged family to ask any questions they might have or if they needed more explanation regarding any of the care we were providing to their loved one.   The same family member that was disrespectful to me earlier then began to express his feelings about how he is unsatisfied with the care we have provided/currently are providing. This family member then began vocalizing how he feels like we are under qualified to be handling a situation of this level, and that his father would have "half a chance" of pulling thru this situation if he were being cared for by anyone else at any other hospital.    I respectfully acknowledged his feelings/concerns and did my best to reassure him that we are capable and trained appropriately to handle this situation in a way that best benefits the PT and ensures the best possible outcome for the PT.   "

## 2024-01-28 NOTE — PROCEDURES
"Migel Garcia is a 53 y.o. male patient.    Temp: 98.6 °F (37 °C) (01/27/24 1917)  Pulse: (!) 130 (01/27/24 2306)  Resp: 17 (01/27/24 2306)  BP: (!) 80/40 (01/27/24 2311)  SpO2: (!) 94 % (01/27/24 1917)  Weight: 81.6 kg (179 lb 14.3 oz) (01/26/24 2020)  Height: 5' 6" (167.6 cm) (01/26/24 2015)       Intubation    Date/Time: 1/28/2024 12:17 AM  Location procedure was performed: [facility] INTENSIVE CARE UNIT    Performed by: Gretchen Saldaña ACNP-BC  Authorized by: Gretchen Saldaña ACNP-BC  Pre-operative diagnosis: cardiogenic shock  Post-operative diagnosis: cardiogenic shock  Consent Done: Emergent Situation  Indications: cardiopulmonary arrest.  Intubation method: video-assisted  Patient status: paralyzed (RSI)  Pretreatment medications: none  Sedatives: propofol  Paralytic: rocuronium  Laryngoscope size: attempt 1 - dickinson 2; second attempt cmac video scope.  Tube type: cuffed  Number of attempts: 2  Ventilation between attempts: BVM  Cricoid pressure: yes  Cords visualized: yes  Post-procedure assessment: CO2 detector and ETCO2 monitor  Breath sounds: equal and absent over the epigastrium  Cuff inflated: yes  ETT to teeth: 23 cm  Tube secured with: ETT mercado  Chest x-ray interpreted by me and radiologist.  Chest x-ray findings: endotracheal tube in appropriate position  Complications: No  Specimens: No  Implants: No      1/28/2024    "

## 2024-01-28 NOTE — HPI
Mr Garcia is a 54 yo male with severe NICM, with an EF <20% and  Pulmonary HTN  noted on his recent ECHO who presented to the hospital with chest pain.  He has previously been evaluated by the transplant team but does not want to pursue advanced support.  After his last hospital stay, he was discharged home with hospice and has been managed on a dobutamine infusion for over a year.  He was managed in the ICU and stabilized. He was Transferred to the floor in stable condition yesterday but then had an episode of SVT and then bradycardia progressing to PEA. His Defebrillator never fired. CPR lasted 6 min.   Was promptly intubation and transferred to the ICU on vasopressin and epi.   Pt is severely ill with  severe cardiogenic shock with MSOF.  Now with transaminitis due to shock liver and no urine output with progressing renal failure,. He has refractory hypoglycemia as well, all point to poor perfusion state.   The family understands he is a poor candidate for RRT. They also understand he would wish for chronic dialysis therapy.   In the past lasix infusion has worked for him per family and I will try this today. It is unlikely that he will respond significantly to this therapy.  I will follow with his response and discuss with family if we have any further options for treatment.

## 2024-01-28 NOTE — PLAN OF CARE
[facility] - Intensive Care (Hospital)  Initial Discharge Assessment       Primary Care Provider: Anuj Banks MD    Admission Diagnosis: Shortness of breath [R06.02]  Troponin I above reference range [R79.89]  Chest pain, unspecified type [R07.9]  Acute congestive heart failure, unspecified heart failure type [I50.9]  Cardiogenic shock [R57.0]    Admission Date: 1/26/2024  Expected Discharge Date:     Transition of Care Barriers: None    Payor: [employer] MANAGED MEDICARE / Plan: [employer] MEDICARE HMO / Product Type: Capitation /     Extended Emergency Contact Information  Primary Emergency Contact: Julianne Garcia  Mobile Phone: [phone]  Relation: Spouse  Secondary Emergency Contact: Bayron Garcia  Mobile Phone: [phone]  Relation: Son  Preferred language: English   needed? No    Discharge Plan A: Home         [id number] [place] - [address] AT [facility]  [address]  [place] [address]  Phone: [phone] Fax: [phone]      Initial Assessment (most recent)       Adult Discharge Assessment - 01/28/24 0915          Discharge Assessment    Assessment Type Discharge Planning Assessment     Confirmed/corrected address, phone number and insurance Yes     Confirmed Demographics Correct on Facesheet     Source of Information family     Does patient/caregiver understand observation status Yes     Communicated ARASELI with patient/caregiver Date not available/Unable to determine     People in Home spouse     Do you expect to return to your current living situation? Yes     Do you have help at home or someone to help you manage your care at home? No     Prior to hospitilization cognitive status: Alert/Oriented     Current cognitive status: Alert/Oriented     Walking or Climbing Stairs Difficulty no     Dressing/Bathing Difficulty no     Equipment Currently Used at Home none     Readmission within 30 days? No     Patient currently being followed  by outpatient case management? No     Do you currently have service(s) that help you manage your care at home? Yes     Is the pt/caregiver preference to resume services with current agency Yes     Do you take prescription medications? Yes     Do you have prescription coverage? Yes     Do you have any problems affording any of your prescribed medications? No     Is the patient taking medications as prescribed? yes     Who is going to help you get home at discharge? family     How do you get to doctors appointments? family or friend will provide;health plan transportation     Are you on dialysis? No     Do you take coumadin? No     Discharge Plan A Home     DME Needed Upon Discharge  --   TBD    Discharge Plan discussed with: Adult children     Transition of Care Barriers None

## 2024-01-28 NOTE — ASSESSMENT & PLAN NOTE
Careful hydration to get Cr below 2.    1/29  Significant worsening post cardiac arrest  Nephrology consult

## 2024-01-28 NOTE — SUBJECTIVE & OBJECTIVE
Past Medical History:   Diagnosis Date    A-fib     GERD (gastroesophageal reflux disease)     Heart failure, unspecified     Hypertension     V-tach        Past Surgical History:   Procedure Laterality Date    COLONOSCOPY N/A 12/13/2022    Procedure: COLONOSCOPY;  Surgeon: Geovany oCle MD;  Location: [facility] ENDO (2ND FLR);  Service: Endoscopy;  Laterality: N/A;    COLONOSCOPY N/A 12/12/2022    Procedure: COLONOSCOPY;  Surgeon: Geovany Cole MD;  Location: [facility] ENDO (2ND FLR);  Service: Endoscopy;  Laterality: N/A;    PLACEMENT OF SWAN LESTER CATHETER WITH IMAGING GUIDANCE  12/22/2022    Procedure: INSERTION, CATHETER, SWAN-LESTER, WITH IMAGING GUIDANCE;  Surgeon: Brando Parada MD;  Location: [facility] CATH LAB;  Service: Cardiology;;    RIGHT HEART CATHETERIZATION Right 12/16/2022    Procedure: INSERTION, CATHETER, RIGHT HEART;  Surgeon: Brando Parada MD;  Location: [facility] CATH LAB;  Service: Cardiology;  Laterality: Right;    RIGHT HEART CATHETERIZATION Right 12/22/2022    Procedure: INSERTION, CATHETER, RIGHT HEART;  Surgeon: Brando Parada MD;  Location: [facility] CATH LAB;  Service: Cardiology;  Laterality: Right;       Review of patient's allergies indicates:  No Known Allergies  Current Facility-Administered Medications   Medication Frequency    acetaminophen tablet 1,000 mg Q8H PRN    amiodarone tablet 400 mg Daily    apixaban tablet 5 mg BID    aspirin EC tablet 81 mg Daily    chlorhexidine 0.12 % solution 15 mL BID    dextrose 10 % infusion Continuous    dextrose 10% bolus 125 mL 125 mL PRN    dextrose 10% bolus 250 mL 250 mL PRN    DOBUtamine 1000 mg in D5W 250 mL infusion Continuous    EPINEPHrine (ADRENALIN) 30 mg in dextrose 5 % (D5W) 250 mL infusion Continuous    furosemide (Lasix) 200 mg in sodium chloride 0.9% SolP 100 mL continuous infusion (conc: 2 mg/mL) Continuous    methylPREDNISolone sodium succinate injection 40 mg TID    metoprolol tartrate (LOPRESSOR) split tablet 12.5 mg BID     mupirocin 2 % ointment BID    ondansetron injection 4 mg Q6H PRN    pantoprazole EC tablet 40 mg Daily    piperacillin-tazobactam (ZOSYN) 4.5 g in dextrose 5 % in water (D5W) 100 mL IVPB (MB+) Q8H    polyethylene glycol packet 17 g Daily    propofol (DIPRIVAN) 10 mg/mL infusion Continuous    ranolazine 12 hr tablet 500 mg BID    sodium chloride 0.9% flush 10 mL PRN    vasopressin (PITRESSIN) 0.2 Units/mL in dextrose 5% 100 mL infusion Continuous     Family History    None       Tobacco Use    Smoking status: Some Days     Types: Cigarettes    Smokeless tobacco: Not on file   Substance and Sexual Activity    Alcohol use: Not Currently    Drug use: Not on file    Sexual activity: Not on file     Review of Systems   Constitutional:         Patient is obtunded and managed on mechanical ventilator, he is unable to give any significant review of systems.     Objective:     Vital Signs (Most Recent):  Temp: 99.7 °F (37.6 °C) (01/28/24 1400)  Pulse: 81 (01/28/24 1400)  Resp: (!) 30 (01/28/24 1400)  BP: (!) 46/30 (01/28/24 0100)  SpO2: 96 % (01/28/24 1400) Vital Signs (24h Range):  Temp:  [91.2 °F (32.9 °C)-99.7 °F (37.6 °C)] 99.7 °F (37.6 °C)  Pulse:  [] 81  Resp:  [12-61] 30  SpO2:  [54 %-100 %] 96 %  BP: ()/(29-90) 46/30  Arterial Line BP: ()/(47-73) 118/68     Weight: 81.6 kg (179 lb 14.3 oz) (01/26/24 2020)  Body mass index is 29.04 kg/m².  Body surface area is 1.95 meters squared.    I/O last 3 completed shifts:  In: 2606.8 [P.O.:1056; I.V.:1066.2; IV Piggyback:484.6]  Out: 450 [Urine:450]     Physical Exam  Vitals reviewed.   Constitutional:       Comments: Critically ill, debilitated, male managed in the ICU, hypotensive on pressors and mechanically ventilated.   Cardiovascular:      Rate and Rhythm: Normal rate and regular rhythm.   Pulmonary:      Comments: Mechanical airway noises auscultated anteriorly  Abdominal:      Palpations: Abdomen is soft.   Musculoskeletal:         General: Swelling  present.          Significant Labs:  ABGs:   Recent Labs   Lab 01/28/24  0542   PH 7.424   PCO2 37.1   HCO3 24.3   POCSATURATED 100   BE 0     CBC:   Recent Labs   Lab 01/28/24  0537   WBC 13.53*   RBC 4.38*   HGB 9.9*   HCT 35.2*   *   MCV 80*   MCH 22.6*   MCHC 28.1*     CMP:   Recent Labs   Lab 01/28/24  1227   GLU 89   CALCIUM 8.9   ALBUMIN 2.9*   PROT 7.0   *   K 4.9   CO2 21*   CL 86*   *   CREATININE 4.2*   ALKPHOS 198*   *   *   BILITOT 5.9*     All labs within the past 24 hours have been reviewed.    Significant Imaging:  X-Ray: Reviewed  Chest x-ray reviewed.  He was an opacified left hemithorax.  He was a clear right lung.  He has an implanted defibrillator present with leads inappropriate placement
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
## 2024-01-28 NOTE — CONSULTS
[facility] ([facility])  Nephrology  Consult Note        Patient Name: Migel Garcia  MRN: [id number]  Admission Date: 1/26/2024  Hospital Length of Stay: 2 days  Attending Provider: Emir Rock MD   Primary Care Physician: Anuj Banks MD  Principal Problem:<principal problem not specified>    Consults  Subjective:     HPI: I was asked to provide a Nephrology consultation on this patient with severe cardiomyopathy status post arrest 2 has acute on chronic kidney injury.    Mr Garcia is a 54 yo male with severe NICM, with an EF <20% and  Pulmonary HTN  noted on his recent ECHO who presented to the hospital with chest pain.  He has previously been evaluated by the transplant team but does not want to pursue advanced support.  After his last hospital stay, he was discharged home with hospice and has been managed on a dobutamine infusion for over a year.  He was managed in the ICU and stabilized. He was Transferred to the floor in stable condition yesterday but then had an episode of SVT and then bradycardia progressing to PEA. His Defebrillator never fired. CPR lasted 6 min.   Was promptly intubation and transferred to the ICU on vasopressin and epi.   Pt is severely ill with  severe cardiogenic shock with MSOF.  Now with transaminitis due to shock liver and no urine output with progressing renal failure,. He has refractory hypoglycemia as well, all point to poor perfusion state.   The family understands he is a poor candidate for RRT. They also understand he would wish for chronic dialysis therapy.   In the past lasix infusion has worked for him per family and I will try this today. It is unlikely that he will respond significantly to this therapy.  We will follow with his response and discuss if we have any further options for treatment tomorrow.       Past Medical History:   Diagnosis Date    A-fib     GERD (gastroesophageal reflux disease)     Heart failure, unspecified     Hypertension   regular rate and rhythm , S1, S2 normal , no murmurs, rubs, gallops , no edema    V-tach        Past Surgical History:   Procedure Laterality Date    COLONOSCOPY N/A 12/13/2022    Procedure: COLONOSCOPY;  Surgeon: Geovany Cole MD;  Location: [facility] ENDO (2ND FLR);  Service: Endoscopy;  Laterality: N/A;    COLONOSCOPY N/A 12/12/2022    Procedure: COLONOSCOPY;  Surgeon: Geovany Cole MD;  Location: [facility] ENDO (2ND FLR);  Service: Endoscopy;  Laterality: N/A;    PLACEMENT OF SWAN LESTER CATHETER WITH IMAGING GUIDANCE  12/22/2022    Procedure: INSERTION, CATHETER, SWAN-LESTER, WITH IMAGING GUIDANCE;  Surgeon: Brando Parada MD;  Location: [facility] CATH LAB;  Service: Cardiology;;    RIGHT HEART CATHETERIZATION Right 12/16/2022    Procedure: INSERTION, CATHETER, RIGHT HEART;  Surgeon: Brando Parada MD;  Location: [facility] CATH LAB;  Service: Cardiology;  Laterality: Right;    RIGHT HEART CATHETERIZATION Right 12/22/2022    Procedure: INSERTION, CATHETER, RIGHT HEART;  Surgeon: Brando Parada MD;  Location: [facility] CATH LAB;  Service: Cardiology;  Laterality: Right;       Review of patient's allergies indicates:  No Known Allergies  Current Facility-Administered Medications   Medication Frequency    acetaminophen tablet 1,000 mg Q8H PRN    amiodarone tablet 400 mg Daily    apixaban tablet 5 mg BID    aspirin EC tablet 81 mg Daily    chlorhexidine 0.12 % solution 15 mL BID    dextrose 10 % infusion Continuous    dextrose 10% bolus 125 mL 125 mL PRN    dextrose 10% bolus 250 mL 250 mL PRN    DOBUtamine 1000 mg in D5W 250 mL infusion Continuous    EPINEPHrine (ADRENALIN) 30 mg in dextrose 5 % (D5W) 250 mL infusion Continuous    furosemide (Lasix) 200 mg in sodium chloride 0.9% SolP 100 mL continuous infusion (conc: 2 mg/mL) Continuous    methylPREDNISolone sodium succinate injection 40 mg TID    metoprolol tartrate (LOPRESSOR) split tablet 12.5 mg BID    mupirocin 2 % ointment BID    ondansetron injection 4 mg Q6H PRN    pantoprazole EC tablet 40 mg Daily    piperacillin-tazobactam  (ZOSYN) 4.5 g in dextrose 5 % in water (D5W) 100 mL IVPB (MB+) Q8H    polyethylene glycol packet 17 g Daily    propofol (DIPRIVAN) 10 mg/mL infusion Continuous    ranolazine 12 hr tablet 500 mg BID    sodium chloride 0.9% flush 10 mL PRN    vasopressin (PITRESSIN) 0.2 Units/mL in dextrose 5% 100 mL infusion Continuous     Family History    None       Tobacco Use    Smoking status: Some Days     Types: Cigarettes    Smokeless tobacco: Not on file   Substance and Sexual Activity    Alcohol use: Not Currently    Drug use: Not on file    Sexual activity: Not on file     Review of Systems   Constitutional:         Patient is obtunded and managed on mechanical ventilator, he is unable to give any significant review of systems.     Objective:     Vital Signs (Most Recent):  Temp: 99.7 °F (37.6 °C) (01/28/24 1400)  Pulse: 81 (01/28/24 1400)  Resp: (!) 30 (01/28/24 1400)  BP: (!) 46/30 (01/28/24 0100)  SpO2: 96 % (01/28/24 1400) Vital Signs (24h Range):  Temp:  [91.2 °F (32.9 °C)-99.7 °F (37.6 °C)] 99.7 °F (37.6 °C)  Pulse:  [] 81  Resp:  [12-61] 30  SpO2:  [54 %-100 %] 96 %  BP: ()/(29-90) 46/30  Arterial Line BP: ()/(47-73) 118/68     Weight: 81.6 kg (179 lb 14.3 oz) (01/26/24 2020)  Body mass index is 29.04 kg/m².  Body surface area is 1.95 meters squared.    I/O last 3 completed shifts:  In: 2606.8 [P.O.:1056; I.V.:1066.2; IV Piggyback:484.6]  Out: 450 [Urine:450]     Physical Exam  Vitals reviewed.   Constitutional:       Comments: Critically ill, debilitated, male managed in the ICU, hypotensive on pressors and mechanically ventilated.   Cardiovascular:      Rate and Rhythm: Normal rate and regular rhythm.   Pulmonary:      Comments: Mechanical airway noises auscultated anteriorly  Abdominal:      Palpations: Abdomen is soft.   Musculoskeletal:         General: Swelling present.          Significant Labs:  ABGs:   Recent Labs   Lab 01/28/24  0542   PH 7.424   PCO2 37.1   HCO3 24.3   POCSATURATED 100    BE 0     CBC:   Recent Labs   Lab 01/28/24  0537   WBC 13.53*   RBC 4.38*   HGB 9.9*   HCT 35.2*   *   MCV 80*   MCH 22.6*   MCHC 28.1*     CMP:   Recent Labs   Lab 01/28/24  1227   GLU 89   CALCIUM 8.9   ALBUMIN 2.9*   PROT 7.0   *   K 4.9   CO2 21*   CL 86*   *   CREATININE 4.2*   ALKPHOS 198*   *   *   BILITOT 5.9*     All labs within the past 24 hours have been reviewed.    Significant Imaging:  X-Ray: Reviewed  Chest x-ray reviewed.  He was an opacified left hemithorax.  He was a clear right lung.  He has an implanted defibrillator present with leads inappropriate placement  Assessment/Plan:     Status post cardiac arrest  Patient was severe cardiomyopathy with defibrillator placement post arrest now with multiorgan failure managed in the ICU.    Cardiology following.    Nonischemic cardiomyopathy with severe decreased ejection fraction and pulmonary hypertension  Patient previously declined workup for cardiac transplant.  He has been managed on dobutamine drip at home for over a year.  His most recent ejection fraction is in the range of 10% making him a poor candidate for renal replacement therapy.    Ventilatory failure  Patient managed on mechanical ventilation post arrest.  Managed per ICU team.    Cardiogenic shock  Now managed in the ICU post arrest.  Blood pressure continues to remain low despite aggressive pressors.  Currently on vasopressin and epinephrine.    Status post previous ICD placement   His ICD apparently did not fire during his recent arrest    Acute on stage III chronic kidney disease  Patient's CKD is likely due to his pre renal state with his ejection fraction in the range of 10%.  He typically has very high BUN to creatinine ratio which is consistent with this.    His acute decline is undoubtedly due to shock associated with his recent cardiac arrest with ongoing hemodynamic instability.    His baseline cardiomyopathy limits his ability to be treated  with renal replacement therapy but given his current hemodynamic decline, dialysis would be contraindicated in my opinion.  He previously responded to IV Lasix drip.  I will order a trial today and hopefully he will show an increase in urine output.  If he does not respond to IV Lasix drip, then our options are severely limited for renal replacement.    Shock liver  Transaminitis and hypoglycemia seen.  Follow with hemodynamic support with fluids and pressors as tolerated.    Patrick Louis MD   Nephrology  [facility] ([facility])

## 2024-01-28 NOTE — CARE UPDATE
Worsening lethargy. Pt now hypotensive. Audible crackles. Extremities cool. RT unable to obtain ABG or O2sat due to low BP. Discussed with family that we are likely out of options to improve the pt's outcome given severe NICM and hypotension. However, will transfer to ICU tonight for cardiogenic shock. Will need to consult palliative care. Care discussed with Dr. Orellana and Gretchen, NP with Critical care

## 2024-01-28 NOTE — PROGRESS NOTES
Pharmacist Renal Dose Adjustment Note    Migel Garcia is a 53 y.o. male being treated with the medication Zosyn    Patient Data:    Vital Signs (Most Recent):  Temp: 99.5 °F (37.5 °C) (01/28/24 1045)  Pulse: 72 (01/28/24 1045)  Resp: (!) 30 (01/28/24 1045)  BP: (!) 46/30 (01/28/24 0100)  SpO2: 98 % (01/28/24 1045) Vital Signs (72h Range):  Temp:  [91.2 °F (32.9 °C)-99.5 °F (37.5 °C)]   Pulse:  []   Resp:  [12-61]   BP: ()/(24-91)   SpO2:  [54 %-100 %]   Arterial Line BP: ()/(51-73)      Recent Labs   Lab 01/27/24  0415 01/28/24  0120 01/28/24  0537   CREATININE 2.3* 3.5* 3.8*     Serum creatinine: 3.8 mg/dL (H) 01/28/24 0537  Estimated creatinine clearance: 22 mL/min (A)    Medication:Zosyn 3.375 g q8h was changed to Zosyn 4.5 g IV q8h per protocol / CRCL > 20.    Pharmacist's Name: Mickey Acosta  Pharmacist's Extension: [phone]

## 2024-01-28 NOTE — PLAN OF CARE
Remains in Cardiogenic Shock  Lactic Acidosis  Renal Failure  Acute hypoxic and hypercapnic respiratory failure  Hypoglycemia    Pt known to cardiology team and seen in past by Advanced Heart Failure/Transplant team. Initiated on dobutamine palliatively. (Previously offered inpt invasive support to bridge to transplant but declined and since that time conversations with Transplant team show he is not longer a candidate for any advanced support.)   Presented 1/26 with chest pain and acute on chronic heart failure/cardiogenic shock with IVVD, Acute on Chronic renal failure  Pt and spouse elected FULL CODE and throughout code event and further discussions, family continues to want all heroic measures continued.    Plan -    - arterial line placed   - bicarb infusion   - low tidal volume, low PEEP vent support due to peak pressures in 60 and cardiogenic shock   - continue dobutamine support   - epinephrine infusion added, goal MAP > 60   - trend lactate   - hourly glucose monitoring and D10 supplementation as indicated   - moving extremities with purpose and following motor requests, reaching toward  tube. Will add very low dose sedation to minimize vent discomfort.      Critical care was time 90 minutes due to above diagnoses  spent personally by myself on the following activities: development of treatment plan with patient or surrogate and bedside caregivers, discussions with consultants, evaluation of patient's response to treatment, examination of patient, ordering and performing treatments and interventions, ordering and review of laboratory studies, ordering and review of radiographic studies, pulse oximetry, re-evaluation of patient's condition.  This critical care time did not overlap with that of any other provider or include time spent on separately billable procedures.    CRISTOBAL Núñez-BC  Critical Care Medicine  [facility]

## 2024-01-29 PROBLEM — K72.00 SHOCK LIVER: Status: ACTIVE | Noted: 2024-01-29

## 2024-01-29 PROBLEM — J96.01 ACUTE HYPOXEMIC RESPIRATORY FAILURE: Status: ACTIVE | Noted: 2024-01-29

## 2024-01-29 LAB
ALBUMIN SERPL BCP-MCNC: 3 G/DL (ref 3.5–5.2)
ALLENS TEST: ABNORMAL
ALLENS TEST: ABNORMAL
ALP SERPL-CCNC: 177 U/L (ref 55–135)
ALT SERPL W/O P-5'-P-CCNC: 336 U/L (ref 10–44)
ANION GAP SERPL CALC-SCNC: 19 MMOL/L (ref 8–16)
AST SERPL-CCNC: 564 U/L (ref 10–40)
BASOPHILS # BLD AUTO: 0.01 K/UL (ref 0–0.2)
BASOPHILS # BLD AUTO: 0.02 K/UL (ref 0–0.2)
BASOPHILS NFR BLD: 0.1 % (ref 0–1.9)
BASOPHILS NFR BLD: 0.2 % (ref 0–1.9)
BILIRUB DIRECT SERPL-MCNC: 5.1 MG/DL (ref 0.1–0.3)
BILIRUB SERPL-MCNC: 6.9 MG/DL (ref 0.1–1)
BUN SERPL-MCNC: 115 MG/DL (ref 6–20)
CALCIUM SERPL-MCNC: 9.2 MG/DL (ref 8.7–10.5)
CHLORIDE SERPL-SCNC: 84 MMOL/L (ref 95–110)
CO2 SERPL-SCNC: 23 MMOL/L (ref 23–29)
CREAT SERPL-MCNC: 4.6 MG/DL (ref 0.5–1.4)
DELSYS: ABNORMAL
DELSYS: ABNORMAL
DIFFERENTIAL METHOD BLD: ABNORMAL
DIFFERENTIAL METHOD BLD: ABNORMAL
EOSINOPHIL # BLD AUTO: 0 K/UL (ref 0–0.5)
EOSINOPHIL # BLD AUTO: 0 K/UL (ref 0–0.5)
EOSINOPHIL NFR BLD: 0 % (ref 0–8)
EOSINOPHIL NFR BLD: 0 % (ref 0–8)
ERYTHROCYTE [DISTWIDTH] IN BLOOD BY AUTOMATED COUNT: 21.6 % (ref 11.5–14.5)
ERYTHROCYTE [DISTWIDTH] IN BLOOD BY AUTOMATED COUNT: 21.7 % (ref 11.5–14.5)
ERYTHROCYTE [SEDIMENTATION RATE] IN BLOOD BY WESTERGREN METHOD: 30 MM/H
EST. GFR  (NO RACE VARIABLE): 14 ML/MIN/1.73 M^2
FIO2: 100
FIO2: 60
FLOW: 40
GLUCOSE SERPL-MCNC: 145 MG/DL (ref 70–110)
HCO3 UR-SCNC: 24.9 MMOL/L (ref 24–28)
HCO3 UR-SCNC: 28 MMOL/L (ref 24–28)
HCT VFR BLD AUTO: 30.8 % (ref 40–54)
HCT VFR BLD AUTO: 34.5 % (ref 40–54)
HGB BLD-MCNC: 10.3 G/DL (ref 14–18)
HGB BLD-MCNC: 9.3 G/DL (ref 14–18)
IMM GRANULOCYTES # BLD AUTO: 0.08 K/UL (ref 0–0.04)
IMM GRANULOCYTES # BLD AUTO: 0.09 K/UL (ref 0–0.04)
IMM GRANULOCYTES NFR BLD AUTO: 0.7 % (ref 0–0.5)
IMM GRANULOCYTES NFR BLD AUTO: 0.9 % (ref 0–0.5)
INR PPP: 3.2 (ref 0.8–1.2)
LACTATE SERPL-SCNC: 3.7 MMOL/L (ref 0.5–2.2)
LYMPHOCYTES # BLD AUTO: 0.4 K/UL (ref 1–4.8)
LYMPHOCYTES # BLD AUTO: 0.6 K/UL (ref 1–4.8)
LYMPHOCYTES NFR BLD: 4 % (ref 18–48)
LYMPHOCYTES NFR BLD: 5.5 % (ref 18–48)
MAGNESIUM SERPL-MCNC: 2.3 MG/DL (ref 1.6–2.6)
MCH RBC QN AUTO: 22.2 PG (ref 27–31)
MCH RBC QN AUTO: 22.5 PG (ref 27–31)
MCHC RBC AUTO-ENTMCNC: 29.9 G/DL (ref 32–36)
MCHC RBC AUTO-ENTMCNC: 30.2 G/DL (ref 32–36)
MCV RBC AUTO: 75 FL (ref 82–98)
MCV RBC AUTO: 75 FL (ref 82–98)
MODE: ABNORMAL
MODE: ABNORMAL
MONOCYTES # BLD AUTO: 0.3 K/UL (ref 0.3–1)
MONOCYTES # BLD AUTO: 0.3 K/UL (ref 0.3–1)
MONOCYTES NFR BLD: 2.6 % (ref 4–15)
MONOCYTES NFR BLD: 3.2 % (ref 4–15)
NEUTROPHILS # BLD AUTO: 8.8 K/UL (ref 1.8–7.7)
NEUTROPHILS # BLD AUTO: 9.9 K/UL (ref 1.8–7.7)
NEUTROPHILS NFR BLD: 91 % (ref 38–73)
NEUTROPHILS NFR BLD: 91.8 % (ref 38–73)
NRBC BLD-RTO: 2 /100 WBC
NRBC BLD-RTO: 2 /100 WBC
OB PNL STL: POSITIVE
PCO2 BLDA: 24.9 MMHG (ref 35–45)
PCO2 BLDA: 33.8 MMHG (ref 35–45)
PEEP: 5
PH SMN: 7.53 [PH] (ref 7.35–7.45)
PH SMN: 7.61 [PH] (ref 7.35–7.45)
PHOSPHATE SERPL-MCNC: 6.8 MG/DL (ref 2.7–4.5)
PLATELET # BLD AUTO: 101 K/UL (ref 150–450)
PLATELET # BLD AUTO: 145 K/UL (ref 150–450)
PMV BLD AUTO: 10.2 FL (ref 9.2–12.9)
PMV BLD AUTO: 10.2 FL (ref 9.2–12.9)
PO2 BLDA: 122 MMHG (ref 80–100)
PO2 BLDA: 215 MMHG (ref 80–100)
POC BE: 3 MMOL/L
POC BE: 5 MMOL/L
POC SATURATED O2: 100 % (ref 95–100)
POC SATURATED O2: 99 % (ref 95–100)
POCT GLUCOSE: 139 MG/DL (ref 70–110)
POCT GLUCOSE: 143 MG/DL (ref 70–110)
POCT GLUCOSE: 151 MG/DL (ref 70–110)
POCT GLUCOSE: 152 MG/DL (ref 70–110)
POCT GLUCOSE: 157 MG/DL (ref 70–110)
POCT GLUCOSE: 159 MG/DL (ref 70–110)
POTASSIUM SERPL-SCNC: 4.4 MMOL/L (ref 3.5–5.1)
PROT SERPL-MCNC: 7.1 G/DL (ref 6–8.4)
PROTHROMBIN TIME: 31.6 SEC (ref 9–12.5)
RBC # BLD AUTO: 4.13 M/UL (ref 4.6–6.2)
RBC # BLD AUTO: 4.63 M/UL (ref 4.6–6.2)
SAMPLE: ABNORMAL
SAMPLE: ABNORMAL
SITE: ABNORMAL
SITE: ABNORMAL
SODIUM SERPL-SCNC: 126 MMOL/L (ref 136–145)
SP02: 100
VT: 400
WBC # BLD AUTO: 10.82 K/UL (ref 3.9–12.7)
WBC # BLD AUTO: 9.59 K/UL (ref 3.9–12.7)

## 2024-01-29 PROCEDURE — 86870 RBC ANTIBODY IDENTIFICATION: CPT | Performed by: NURSE PRACTITIONER

## 2024-01-29 PROCEDURE — 27100171 HC OXYGEN HIGH FLOW UP TO 24 HOURS

## 2024-01-29 PROCEDURE — 83605 ASSAY OF LACTIC ACID: CPT | Performed by: NURSE PRACTITIONER

## 2024-01-29 PROCEDURE — C9113 INJ PANTOPRAZOLE SODIUM, VIA: HCPCS | Performed by: SPECIALIST

## 2024-01-29 PROCEDURE — 86850 RBC ANTIBODY SCREEN: CPT | Performed by: NURSE PRACTITIONER

## 2024-01-29 PROCEDURE — 85025 COMPLETE CBC W/AUTO DIFF WBC: CPT | Performed by: NURSE PRACTITIONER

## 2024-01-29 PROCEDURE — 85025 COMPLETE CBC W/AUTO DIFF WBC: CPT | Mod: 91 | Performed by: NURSE PRACTITIONER

## 2024-01-29 PROCEDURE — 85610 PROTHROMBIN TIME: CPT | Performed by: NURSE PRACTITIONER

## 2024-01-29 PROCEDURE — 86077 PHYS BLOOD BANK SERV XMATCH: CPT | Mod: ,,, | Performed by: STUDENT IN AN ORGANIZED HEALTH CARE EDUCATION/TRAINING PROGRAM

## 2024-01-29 PROCEDURE — 20000000 HC ICU ROOM

## 2024-01-29 PROCEDURE — 84100 ASSAY OF PHOSPHORUS: CPT | Performed by: NURSE PRACTITIONER

## 2024-01-29 PROCEDURE — 99900026 HC AIRWAY MAINTENANCE (STAT)

## 2024-01-29 PROCEDURE — 94799 UNLISTED PULMONARY SVC/PX: CPT

## 2024-01-29 PROCEDURE — 99900035 HC TECH TIME PER 15 MIN (STAT)

## 2024-01-29 PROCEDURE — 94003 VENT MGMT INPAT SUBQ DAY: CPT

## 2024-01-29 PROCEDURE — 63600175 PHARM REV CODE 636 W HCPCS: Performed by: NURSE PRACTITIONER

## 2024-01-29 PROCEDURE — 99233 SBSQ HOSP IP/OBS HIGH 50: CPT | Mod: GW,HPC,, | Performed by: INTERNAL MEDICINE

## 2024-01-29 PROCEDURE — 92610 EVALUATE SWALLOWING FUNCTION: CPT

## 2024-01-29 PROCEDURE — 82272 OCCULT BLD FECES 1-3 TESTS: CPT | Performed by: NURSE PRACTITIONER

## 2024-01-29 PROCEDURE — 63600175 PHARM REV CODE 636 W HCPCS: Performed by: INTERNAL MEDICINE

## 2024-01-29 PROCEDURE — 27100092 HC HIGH FLOW DELIVERY CANNULA

## 2024-01-29 PROCEDURE — 27000249 HC VAPOTHERM CIRCUIT

## 2024-01-29 PROCEDURE — 25000003 PHARM REV CODE 250: Performed by: INTERNAL MEDICINE

## 2024-01-29 PROCEDURE — 37799 UNLISTED PX VASCULAR SURGERY: CPT

## 2024-01-29 PROCEDURE — 83735 ASSAY OF MAGNESIUM: CPT | Performed by: NURSE PRACTITIONER

## 2024-01-29 PROCEDURE — 94761 N-INVAS EAR/PLS OXIMETRY MLT: CPT

## 2024-01-29 PROCEDURE — 82803 BLOOD GASES ANY COMBINATION: CPT

## 2024-01-29 PROCEDURE — 25000003 PHARM REV CODE 250: Performed by: SPECIALIST

## 2024-01-29 PROCEDURE — 80048 BASIC METABOLIC PNL TOTAL CA: CPT | Performed by: NURSE PRACTITIONER

## 2024-01-29 PROCEDURE — 80076 HEPATIC FUNCTION PANEL: CPT | Performed by: NURSE PRACTITIONER

## 2024-01-29 PROCEDURE — 99233 SBSQ HOSP IP/OBS HIGH 50: CPT | Mod: GW,HPC,, | Performed by: PHYSICIAN ASSISTANT

## 2024-01-29 PROCEDURE — 63600175 PHARM REV CODE 636 W HCPCS: Performed by: SPECIALIST

## 2024-01-29 RX ORDER — PANTOPRAZOLE SODIUM 40 MG/10ML
40 INJECTION, POWDER, LYOPHILIZED, FOR SOLUTION INTRAVENOUS DAILY
Status: DISCONTINUED | OUTPATIENT
Start: 2024-01-29 | End: 2024-01-30

## 2024-01-29 RX ORDER — NAPROXEN SODIUM 220 MG/1
81 TABLET, FILM COATED ORAL DAILY
Status: DISCONTINUED | OUTPATIENT
Start: 2024-01-29 | End: 2024-01-30

## 2024-01-29 RX ORDER — CHLORHEXIDINE GLUCONATE ORAL RINSE 1.2 MG/ML
15 SOLUTION DENTAL 2 TIMES DAILY
Status: DISCONTINUED | OUTPATIENT
Start: 2024-01-29 | End: 2024-01-29

## 2024-01-29 RX ORDER — METOPROLOL TARTRATE 25 MG/1
12.5 TABLET ORAL 2 TIMES DAILY
Status: DISCONTINUED | OUTPATIENT
Start: 2024-01-29 | End: 2024-01-29

## 2024-01-29 RX ORDER — POLYETHYLENE GLYCOL 3350 17 G/17G
17 POWDER, FOR SOLUTION ORAL DAILY
Status: DISCONTINUED | OUTPATIENT
Start: 2024-01-29 | End: 2024-01-30

## 2024-01-29 RX ORDER — ACETAMINOPHEN 325 MG/1
975 TABLET ORAL EVERY 8 HOURS PRN
Status: DISCONTINUED | OUTPATIENT
Start: 2024-01-29 | End: 2024-01-30

## 2024-01-29 RX ORDER — AMIODARONE HYDROCHLORIDE 200 MG/1
400 TABLET ORAL DAILY
Status: DISCONTINUED | OUTPATIENT
Start: 2024-01-29 | End: 2024-01-30

## 2024-01-29 RX ORDER — ETOMIDATE 2 MG/ML
INJECTION INTRAVENOUS
Status: DISCONTINUED
Start: 2024-01-29 | End: 2024-01-29 | Stop reason: WASHOUT

## 2024-01-29 RX ADMIN — METHYLPREDNISOLONE SODIUM SUCCINATE 40 MG: 40 INJECTION, POWDER, FOR SOLUTION INTRAMUSCULAR; INTRAVENOUS at 08:01

## 2024-01-29 RX ADMIN — METHYLPREDNISOLONE SODIUM SUCCINATE 40 MG: 40 INJECTION, POWDER, FOR SOLUTION INTRAMUSCULAR; INTRAVENOUS at 04:01

## 2024-01-29 RX ADMIN — APIXABAN 5 MG: 2.5 TABLET, FILM COATED ORAL at 08:01

## 2024-01-29 RX ADMIN — CHLOROTHIAZIDE SODIUM 250 MG: 500 INJECTION, POWDER, LYOPHILIZED, FOR SOLUTION INTRAVENOUS at 10:01

## 2024-01-29 RX ADMIN — VASOPRESSIN 0.04 UNITS/MIN: 0.2 INJECTION INTRAVENOUS at 09:01

## 2024-01-29 RX ADMIN — AMIODARONE HYDROCHLORIDE 400 MG: 200 TABLET ORAL at 08:01

## 2024-01-29 RX ADMIN — PROPOFOL 15 MCG/KG/MIN: 10 INJECTION, EMULSION INTRAVENOUS at 12:01

## 2024-01-29 RX ADMIN — MUPIROCIN: 20 OINTMENT TOPICAL at 08:01

## 2024-01-29 RX ADMIN — PIPERACILLIN AND TAZOBACTAM 4.5 G: 4; .5 INJECTION, POWDER, LYOPHILIZED, FOR SOLUTION INTRAVENOUS; PARENTERAL at 04:01

## 2024-01-29 RX ADMIN — PANTOPRAZOLE SODIUM 40 MG: 40 INJECTION, POWDER, FOR SOLUTION INTRAVENOUS at 08:01

## 2024-01-29 RX ADMIN — VASOPRESSIN 0.04 UNITS/MIN: 0.2 INJECTION INTRAVENOUS at 04:01

## 2024-01-29 RX ADMIN — 0.12% CHLORHEXIDINE GLUCONATE 15 ML: 1.2 RINSE ORAL at 08:01

## 2024-01-29 RX ADMIN — MUPIROCIN: 20 OINTMENT TOPICAL at 09:01

## 2024-01-29 RX ADMIN — VASOPRESSIN 0.04 UNITS/MIN: 0.2 INJECTION INTRAVENOUS at 02:01

## 2024-01-29 RX ADMIN — METHYLPREDNISOLONE SODIUM SUCCINATE 40 MG: 40 INJECTION, POWDER, FOR SOLUTION INTRAMUSCULAR; INTRAVENOUS at 09:01

## 2024-01-29 RX ADMIN — ASPIRIN 81 MG CHEWABLE TABLET 81 MG: 81 TABLET CHEWABLE at 08:01

## 2024-01-29 RX ADMIN — POLYETHYLENE GLYCOL 3350 17 G: 17 POWDER, FOR SOLUTION ORAL at 08:01

## 2024-01-29 RX ADMIN — FUROSEMIDE 10 MG/HR: 10 INJECTION, SOLUTION INTRAMUSCULAR; INTRAVENOUS at 06:01

## 2024-01-29 NOTE — CARE UPDATE
Advance Care Planning   [facility] ([facility])  Palliative Care RN      Patient Name: Migel Garcia  MRN: [id number]  Admission Date: 1/26/2024  Hospital Length of Stay: 3 days  Code Status: Full Code   Attending Provider: Silvia Mae MD  Palliative Care Provider: MYAH Castrejon  Primary Care Physician: Anuj Banks MD  Principal Problem:<principal problem not specified>    Accompanied PM provider and  to bedside. Patient's wife Julianne and 1/3 adult sons Bayron present. Patient extubated on vapotherm reporting difficulty hearing due to O2. We discussed the role of palliative care to provide extra support to patient's and caregivers with life limiting illnesses live the best quality of life possible by assisting in pain and symptom management, goals of care discussions, home based healthcare service coordination, and advanced care planning. Plan for family meeting tomorrow to discuss GOC and code status at [id number]. Dr. Mae updated.       Matt Laird RN-CHPN, Palliative Medicine   [phone]

## 2024-01-29 NOTE — PLAN OF CARE
No acute events overnight. Titration of critical gtts per order, see flow sheet. CHG bath given, linen changed. POC reviewed with patient and family. Safety and fall precautions maintained.

## 2024-01-29 NOTE — SUBJECTIVE & OBJECTIVE
I    Review of Systems   Unable to perform ROS: Intubated     Objective:     Vital Signs (Most Recent):  Temp: 99 °F (37.2 °C) (01/29/24 1116)  Pulse: 94 (01/29/24 1116)  Resp: (!) 24 (01/29/24 1116)  BP: 134/71 (01/29/24 0800)  SpO2: 100 % (01/29/24 1116) Vital Signs (24h Range):  Temp:  [98.8 °F (37.1 °C)-100 °F (37.8 °C)] 99 °F (37.2 °C)  Pulse:  [] 94  Resp:  [23-30] 24  SpO2:  [91 %-100 %] 100 %  BP: (116-134)/(57-71) 134/71  Arterial Line BP: ()/(47-83) 100/67     Weight: 84.2 kg (185 lb 10 oz)  Body mass index is 29.96 kg/m².     SpO2: 100 %         Intake/Output Summary (Last 24 hours) at 1/29/2024 1126  Last data filed at 1/29/2024 1100  Gross per 24 hour   Intake 1748.56 ml   Output 1355 ml   Net 393.56 ml       Lines/Drains/Airways       Peripherally Inserted Central Catheter Line  Duration             PICC Double Lumen 01/19/24 10 days              Drain  Duration                  Urethral Catheter 01/28/24 0030 1 day              Arterial Line  Duration             Arterial Line 01/28/24 0103 Right Femoral 1 day              Peripheral Intravenous Line  Duration                  Peripheral IV - Single Lumen 01/28/24 1830 20 G Left Antecubital <1 day         Peripheral IV - Single Lumen 01/28/24 1835 20 G Anterior;Distal;Left Upper Arm <1 day                       Physical Exam  Vitals and nursing note reviewed.   Constitutional:       Appearance: He is ill-appearing.      Comments: Intubated, but awakens on vent   HENT:      Head: Normocephalic and atraumatic.   Eyes:      Pupils: Pupils are equal, round, and reactive to light.   Neck:      Vascular: JVD present.   Cardiovascular:      Rate and Rhythm: Rhythm irregularly irregular.      Heart sounds: Normal heart sounds, S1 normal and S2 normal. No murmur heard.     Comments: AICD site well-healed  Pulmonary:      Comments: Diminished BS  Abdominal:      General: There is distension.   Musculoskeletal:      Right lower leg: No edema.       "Left lower leg: No edema.   Skin:     Comments: Extremities cool   Neurological:      Comments: Intubated but awakens on vent/responds to stimuli            Significant Labs: CMP   Recent Labs   Lab 01/28/24  0537 01/28/24  1227 01/29/24  0414   * 128* 126*   K 3.9 4.9 4.4   CL 87* 86* 84*   CO2 21* 21* 23    89 145*   * 107* 115*   CREATININE 3.8* 4.2* 4.6*   CALCIUM 8.5* 8.9 9.2   PROT 7.0 7.0 7.1   ALBUMIN 3.1* 2.9* 3.0*   BILITOT 5.9* 5.9* 6.9*   ALKPHOS 210* 198* 177*   * 375* 564*   * 177* 336*   ANIONGAP 21* 21* 19*   , CBC   Recent Labs   Lab 01/28/24  0537 01/29/24  0414   WBC 13.53* 10.82   HGB 9.9* 10.3*   HCT 35.2* 34.5*   * 145*   , Troponin No results for input(s): "TROPONINI" in the last 48 hours., and All pertinent lab results from the last 24 hours have been reviewed.    Significant Imaging: Echocardiogram: Transthoracic echo (TTE) complete (Cupid Only):   Results for orders placed or performed during the hospital encounter of 12/05/22   Echo   Result Value Ref Range    BSA 2.06 m2    TDI SEPTAL 0.07 m/s    LV LATERAL E/E' RATIO 10.22 m/s    LV SEPTAL E/E' RATIO 13.14 m/s    LA WIDTH 3.80 cm    TDI LATERAL 0.09 m/s    LVIDd 5.70 3.5 - 6.0 cm    IVS 0.72 0.6 - 1.1 cm    Posterior Wall 0.93 0.6 - 1.1 cm    LVIDs 5.10 (A) 2.1 - 4.0 cm    FS 11 28 - 44 %    LA volume 78.94 cm3    Sinus 2.19 cm    STJ 2.04 cm    Ascending aorta 2.39 cm    LV mass 176.88 g    LA size 4.06 cm    RVDD 4.45 cm    TAPSE 1.44 cm    RV S' 4.35 cm/s    Left Ventricle Relative Wall Thickness 0.33 cm    AV mean gradient 3 mmHg    AV valve area 1.33 cm2    AV Velocity Ratio 0.53     AV index (prosthetic) 0.52     Mean e' 0.08 m/s    LVOT diameter 1.80 cm    LVOT area 2.5 cm2    LVOT peak alex 0.59 m/s    LVOT peak VTI 7.76 cm    Ao peak alex 1.11 m/s    Ao VTI 14.80 cm    Mr max alex 0.04 m/s    LVOT stroke volume 19.74 cm3    AV peak gradient 5 mmHg    E/E' ratio 11.50 m/s    MV Peak E Alex " 0.92 m/s    TR Max Alex 3.56 m/s    LV Systolic Volume 101.20 mL    LV Systolic Volume Index 56.5 mL/m2    LV Diastolic Volume 135.44 mL    LV Diastolic Volume Index 75.66 mL/m2    LA Volume Index 44.1 mL/m2    LV Mass Index 99 g/m2    RA Major Axis 5.40 cm    Left Atrium Minor Axis 6.00 cm    Left Atrium Major Axis 6.04 cm    Triscuspid Valve Regurgitation Peak Gradient 51 mmHg    LA Volume Index (Mod) 36.2 mL/m2    LA volume (mod) 64.73 cm3    RA Width 3.93 cm    Right Atrial Pressure (from IVC) 15 mmHg    EF 20 %    TV resting pulmonary artery pressure 66 mmHg    Narrative    · The left ventricle is mildly enlarged with severely decreased systolic   function.  · The estimated ejection fraction is <20%.  · There is left ventricular global hypokinesis.  · Indeterminate left ventricular diastolic function.  · Mild right ventricular enlargement with moderately reduced right   ventricular systolic function.  · Biatrial enlargement.  · Mild mitral regurgitation.  · Mild tricuspid regurgitation.  · The estimated PA systolic pressure is 66 mmHg.  · There is pulmonary hypertension.  · Elevated central venous pressure (15 mmHg).  · Small posterior pericardial effusion.      , EKG: Reviewed, and X-Ray: CXR: X-Ray Chest 1 View (CXR):   Results for orders placed or performed during the hospital encounter of 01/26/24   X-Ray Chest 1 View    Narrative    EXAMINATION:  XR CHEST 1 VIEW    CLINICAL HISTORY:  <Diagnosis>, respiratory failure;    COMPARISON:  Chest, 01/28/2024    FINDINGS:  Endotracheal tube and nasogastric tube and right PICC catheter remain in good position.  Stable enlargement the cardiac silhouette.  There appears to be some mild congestion bilaterally which appears stable.  There is improving aeration of the medial right upper lobe.      Impression    Mild congestion bilaterally appears stable.  Improving aeration of the right upper lobe.      Electronically signed by: Dewayne Harrison  MD  Date:    01/29/2024  Time:    10:51    and X-Ray Chest PA and Lateral (CXR): No results found for this visit on 01/26/24.

## 2024-01-29 NOTE — SUBJECTIVE & OBJECTIVE
Interval History:  Patient is seen and examined in his room.  He remains critically ill and managed in the intensive care unit on mechanical ventilation and pressors.  His family the bedside and they are concerned about his anasarca.  He was started on a Lasix drip last night and he did diurese approximately a L yesterday.  They are concerned about his fluid retention and I will add Diuril to his Lasix to see if this can stimulate it further.  I did not readdress CRRT given his significant cardiomyopathy with severely decreased ejection fraction.  We discussed this yesterday and informed them that I felt he would be intolerant of renal replacement therapy..    Review of patient's allergies indicates:  No Known Allergies  Current Facility-Administered Medications   Medication Frequency    0.9%  NaCl infusion PRN    acetaminophen tablet 975 mg Q8H PRN    amiodarone tablet 400 mg Daily    apixaban tablet 5 mg BID    aspirin chewable tablet 81 mg Daily    chlorhexidine 0.12 % solution 15 mL BID    chlorothiazide (DIURIL) 250 mg in dextrose 5 % (D5W) 50 mL IVPB Q24H    dextrose 10% bolus 125 mL 125 mL PRN    dextrose 10% bolus 250 mL 250 mL PRN    DOBUtamine 1000 mg in D5W 250 mL infusion Continuous    EPINEPHrine (ADRENALIN) 30 mg in dextrose 5 % (D5W) 250 mL infusion Continuous    furosemide (Lasix) 200 mg in sodium chloride 0.9% SolP 100 mL continuous infusion (conc: 2 mg/mL) Continuous    methylPREDNISolone sodium succinate injection 40 mg TID    metoprolol tartrate (LOPRESSOR) split tablet 12.5 mg BID    mupirocin 2 % ointment BID    ondansetron injection 4 mg Q6H PRN    pantoprazole injection 40 mg Daily    piperacillin-tazobactam (ZOSYN) 4.5 g in dextrose 5 % in water (D5W) 100 mL IVPB (MB+) Q8H    polyethylene glycol packet 17 g Daily    propofol (DIPRIVAN) 10 mg/mL infusion Continuous    sodium chloride 0.9% flush 10 mL PRN    vasopressin (PITRESSIN) 0.2 Units/mL in dextrose 5% 100 mL infusion Continuous        Objective:     Vital Signs (Most Recent):  Temp: 99.1 °F (37.3 °C) (01/29/24 0900)  Pulse: 93 (01/29/24 0900)  Resp: (!) 25 (01/29/24 0900)  BP: 134/71 (01/29/24 0800)  SpO2: 95 % (01/29/24 0900) Vital Signs (24h Range):  Temp:  [98.8 °F (37.1 °C)-100 °F (37.8 °C)] 99.1 °F (37.3 °C)  Pulse:  [] 93  Resp:  [24-30] 25  SpO2:  [91 %-98 %] 95 %  BP: (116-134)/(57-71) 134/71  Arterial Line BP: ()/(47-75) 106/66     Weight: 84.2 kg (185 lb 10 oz) (01/29/24 0600)  Body mass index is 29.96 kg/m².  Body surface area is 1.98 meters squared.    I/O last 3 completed shifts:  In: 2792.6 [I.V.:2041.7; IV Piggyback:750.9]  Out: 1045 [Urine:1045]     Physical Exam  Vitals reviewed.   Constitutional:       Comments: Sedated, Debilitated male managed in the ICU on pressors and mechanical ventilation.  He does not respond to my examination.  De León catheter in place with yellow urine in his bag   Cardiovascular:      Rate and Rhythm: Normal rate and regular rhythm.   Pulmonary:      Comments: Mechanical airway noises auscultated anteriorly  Abdominal:      General: There is distension.      Palpations: Abdomen is soft.   Musculoskeletal:         General: Swelling present.          Significant Labs:  CBC:   Recent Labs   Lab 01/29/24 0414   WBC 10.82   RBC 4.63   HGB 10.3*   HCT 34.5*   *   MCV 75*   MCH 22.2*   MCHC 29.9*     CMP:   Recent Labs   Lab 01/29/24 0414   *   CALCIUM 9.2   ALBUMIN 3.0*   PROT 7.1   *   K 4.4   CO2 23   CL 84*   *   CREATININE 4.6*   ALKPHOS 177*   *   *   BILITOT 6.9*     All labs within the past 24 hours have been reviewed.

## 2024-01-29 NOTE — PLAN OF CARE
Plan of care reviewed with pt and pt's spouse and family at bedside.  Pt extubated to vapotherm this shift, see prev note, weaning as tolerated.  Remains on cont vaso, epi, dobutamine, and lasix infusions.  De León cath in use.  BM this shift.  Assisted in frequent turning/repositioning with pillow and wedge support, heels floated.

## 2024-01-29 NOTE — PROGRESS NOTES
[facility] ([facility])  Nephrology  Progress Note    Patient Name: Migel Garcia  MRN: [id number]  Admission Date: 1/26/2024  Hospital Length of Stay: 3 days  Attending Provider: Emir Rock MD   Primary Care Physician: Anuj Banks MD  Principal Problem:<principal problem not specified>    Subjective:     HPI:   Mr Garcia is a 52 yo male with severe NICM, with an EF <20% and  Pulmonary HTN  noted on his recent ECHO who presented to the hospital with chest pain.  He has previously been evaluated by the transplant team but does not want to pursue advanced support.  After his last hospital stay, he was discharged home with hospice and has been managed on a dobutamine infusion for over a year.  He was managed in the ICU and stabilized. He was Transferred to the floor in stable condition yesterday but then had an episode of SVT and then bradycardia progressing to PEA. His Defebrillator never fired. CPR lasted 6 min.   Was promptly intubation and transferred to the ICU on vasopressin and epi.   Pt is severely ill with  severe cardiogenic shock with MSOF.  Now with transaminitis due to shock liver and no urine output with progressing renal failure,. He has refractory hypoglycemia as well, all point to poor perfusion state.   The family understands he is a poor candidate for RRT. They also understand he would wish for chronic dialysis therapy.   In the past lasix infusion has worked for him per family and I will try this today. It is unlikely that he will respond significantly to this therapy.  I will follow with his response and discuss with family if we have any further options for treatment.       Interval History:  Patient is seen and examined in his room.  He remains critically ill and managed in the intensive care unit on mechanical ventilation and pressors.  His family the bedside and they are concerned about his anasarca.  He was started on a Lasix drip last night and he did diurese  approximately a L yesterday.  They are concerned about his fluid retention and I will add Diuril to his Lasix to see if this can stimulate it further.  I did not readdress CRRT given his significant cardiomyopathy with severely decreased ejection fraction.  We discussed this yesterday and informed them that I felt he would be intolerant of renal replacement therapy..    Review of patient's allergies indicates:  No Known Allergies  Current Facility-Administered Medications   Medication Frequency    0.9%  NaCl infusion PRN    acetaminophen tablet 975 mg Q8H PRN    amiodarone tablet 400 mg Daily    apixaban tablet 5 mg BID    aspirin chewable tablet 81 mg Daily    chlorhexidine 0.12 % solution 15 mL BID    chlorothiazide (DIURIL) 250 mg in dextrose 5 % (D5W) 50 mL IVPB Q24H    dextrose 10% bolus 125 mL 125 mL PRN    dextrose 10% bolus 250 mL 250 mL PRN    DOBUtamine 1000 mg in D5W 250 mL infusion Continuous    EPINEPHrine (ADRENALIN) 30 mg in dextrose 5 % (D5W) 250 mL infusion Continuous    furosemide (Lasix) 200 mg in sodium chloride 0.9% SolP 100 mL continuous infusion (conc: 2 mg/mL) Continuous    methylPREDNISolone sodium succinate injection 40 mg TID    metoprolol tartrate (LOPRESSOR) split tablet 12.5 mg BID    mupirocin 2 % ointment BID    ondansetron injection 4 mg Q6H PRN    pantoprazole injection 40 mg Daily    piperacillin-tazobactam (ZOSYN) 4.5 g in dextrose 5 % in water (D5W) 100 mL IVPB (MB+) Q8H    polyethylene glycol packet 17 g Daily    propofol (DIPRIVAN) 10 mg/mL infusion Continuous    sodium chloride 0.9% flush 10 mL PRN    vasopressin (PITRESSIN) 0.2 Units/mL in dextrose 5% 100 mL infusion Continuous       Objective:     Vital Signs (Most Recent):  Temp: 99.1 °F (37.3 °C) (01/29/24 0900)  Pulse: 93 (01/29/24 0900)  Resp: (!) 25 (01/29/24 0900)  BP: 134/71 (01/29/24 0800)  SpO2: 95 % (01/29/24 0900) Vital Signs (24h Range):  Temp:  [98.8 °F (37.1 °C)-100 °F (37.8 °C)] 99.1 °F (37.3 °C)  Pulse:   [] 93  Resp:  [24-30] 25  SpO2:  [91 %-98 %] 95 %  BP: (116-134)/(57-71) 134/71  Arterial Line BP: ()/(47-75) 106/66     Weight: 84.2 kg (185 lb 10 oz) (01/29/24 0600)  Body mass index is 29.96 kg/m².  Body surface area is 1.98 meters squared.    I/O last 3 completed shifts:  In: 2792.6 [I.V.:2041.7; IV Piggyback:750.9]  Out: 1045 [Urine:1045]     Physical Exam  Vitals reviewed.   Constitutional:       Comments: Sedated, Debilitated male managed in the ICU on pressors and mechanical ventilation.  He does not respond to my examination.  De León catheter in place with yellow urine in his bag   Cardiovascular:      Rate and Rhythm: Normal rate and regular rhythm.   Pulmonary:      Comments: Mechanical airway noises auscultated anteriorly  Abdominal:      General: There is distension.      Palpations: Abdomen is soft.   Musculoskeletal:         General: Swelling present.          Significant Labs:  CBC:   Recent Labs   Lab 01/29/24  0414   WBC 10.82   RBC 4.63   HGB 10.3*   HCT 34.5*   *   MCV 75*   MCH 22.2*   MCHC 29.9*     CMP:   Recent Labs   Lab 01/29/24  0414   *   CALCIUM 9.2   ALBUMIN 3.0*   PROT 7.1   *   K 4.4   CO2 23   CL 84*   *   CREATININE 4.6*   ALKPHOS 177*   *   *   BILITOT 6.9*     All labs within the past 24 hours have been reviewed.       Assessment/Plan:     Status post cardiac arrest  Patient was severe cardiomyopathy at base with defibrillator placement previously, now post arrest with multiorgan failure managed in the ICU.       Nonischemic cardiomyopathy with severe decreased ejection fraction and pulmonary hypertension  Patient previously declined workup for cardiac transplant.  He has been managed on dobutamine drip at home for over a year.  His most recent ejection fraction is in the range of 10% making him a poor candidate for renal replacement therapy.     Ventilatory failure  Patient managed on mechanical ventilation post arrest.  Managed  per ICU team.     Cardiogenic shock  Now managed in the ICU post arrest.  Blood pressure supported by pressors.  Wean as tolerated per ICU team     Status post previous ICD placement   His ICD apparently did not fire during his recent arrest     Acute on stage III chronic kidney disease  Patient's CKD is likely due to his pre renal state with his ejection fraction in the range of 10%.  He typically has very high BUN to creatinine ratio which is consistent with this.    His acute decline is due to shock associated with his recent cardiac arrest with ongoing hemodynamic instability.    His baseline cardiomyopathy limits his ability to be treated with renal replacement therapy and he will not tolerate dialysis in my opinion.  He is now on a Lasix drip and making some urine.  I will add Diuril and see if this help stimulate further diuresis.     Shock liver  Transaminitis and hypoglycemia seen.  Follow with hemodynamic support with fluids and pressors as tolerated.    Patrick Louis MD  Nephrology  [facility] ([facility])

## 2024-01-29 NOTE — ASSESSMENT & PLAN NOTE
Cardiology consult  No pulmonary edema on CxR   BNP elevated.   Will consult cardiology  Careful hydration  Will differ need for inotropic support to cardiology as we slowly hydrate him to preserve his renal function  High risk of deterioration  Appropriate for ICU admission    1/27. Stable overnight. No SOB or chest pain. No resp issues. On RA. Stable for transfer per cardiology. Appreciate the help. Will differ diuretics to cards. BUN / Cr slightly better.     1/28. See cardiac arrest  1/29 -on lasix drip   Back on small epi. On vaso / dobutamine  Cards following

## 2024-01-29 NOTE — PROGRESS NOTES
[facility] ([facility])  Cardiology  Progress Note    Patient Name: Migel Garcia  MRN: [id number]  Admission Date: 1/26/2024  Hospital Length of Stay: 3 days  Code Status: Full Code   Attending Physician: Emir Rock MD   Primary Care Physician: Anuj Banks MD  Expected Discharge Date:   Principal Problem:<principal problem not specified>    Subjective:   HPI:  51 YO M w/ history of long standing NICM (> 10 yrs), HTN, marijuana and tobacco use, CKD stage 3 admitted for CP.  CP is atypical sometimes pleuritic, worse on movements.  Cath 2022 [facility] nonobstructive/minimal ds. EKG afib with V rate 114. There was 1 episode of NSVT. There is a mild increase in troponin .056. Pt with chronic CHF sx NYHA class 3.   CXR wnl.  After multiple discussions explaining plan and goals of care with OHT team in [place] he does not want to undergo any further workup/procedures or increased HD support at this time ( OHT or LVAD)   Pt on home dobutrex.    Hospital Course:   1-28-24 Events noted. Pt s/p code last night, 6 minutes CPR no defibrillation. Full code per family.Patient now on Epi drip to maintain systolic BP as well as DBT. Patient intubated and sedated.Continue supportive care.Bilateral pleural effusions noted L > R, IV diuretics       1/29/24-Patient seen and examined with son at bedside. Remains critically ill on multiple pressors. Extremities cool. LFT's trending up. INR 3.2. Creatinine, nephrology following, on Lasix drip. Discussed poor prognosis with son at bedside.     I    Review of Systems   Unable to perform ROS: Intubated     Objective:     Vital Signs (Most Recent):  Temp: 99 °F (37.2 °C) (01/29/24 1116)  Pulse: 94 (01/29/24 1116)  Resp: (!) 24 (01/29/24 1116)  BP: 134/71 (01/29/24 0800)  SpO2: 100 % (01/29/24 1116) Vital Signs (24h Range):  Temp:  [98.8 °F (37.1 °C)-100 °F (37.8 °C)] 99 °F (37.2 °C)  Pulse:  [] 94  Resp:  [23-30] 24  SpO2:  [91 %-100 %] 100 %  BP:  (116-134)/(57-71) 134/71  Arterial Line BP: ()/(47-83) 100/67     Weight: 84.2 kg (185 lb 10 oz)  Body mass index is 29.96 kg/m².     SpO2: 100 %         Intake/Output Summary (Last 24 hours) at 1/29/2024 1126  Last data filed at 1/29/2024 1100  Gross per 24 hour   Intake 1748.56 ml   Output 1355 ml   Net 393.56 ml       Lines/Drains/Airways       Peripherally Inserted Central Catheter Line  Duration             PICC Double Lumen 01/19/24 10 days              Drain  Duration                  Urethral Catheter 01/28/24 0030 1 day              Arterial Line  Duration             Arterial Line 01/28/24 0103 Right Femoral 1 day              Peripheral Intravenous Line  Duration                  Peripheral IV - Single Lumen 01/28/24 1830 20 G Left Antecubital <1 day         Peripheral IV - Single Lumen 01/28/24 1835 20 G Anterior;Distal;Left Upper Arm <1 day                       Physical Exam  Vitals and nursing note reviewed.   Constitutional:       Appearance: He is ill-appearing.      Comments: Intubated, but awakens on vent   HENT:      Head: Normocephalic and atraumatic.   Eyes:      Pupils: Pupils are equal, round, and reactive to light.   Neck:      Vascular: JVD present.   Cardiovascular:      Rate and Rhythm: Rhythm irregularly irregular.      Heart sounds: Normal heart sounds, S1 normal and S2 normal. No murmur heard.     Comments: AICD site well-healed  Pulmonary:      Comments: Diminished BS  Abdominal:      General: There is distension.   Musculoskeletal:      Right lower leg: No edema.      Left lower leg: No edema.   Skin:     Comments: Extremities cool   Neurological:      Comments: Intubated but awakens on vent/responds to stimuli            Significant Labs: CMP   Recent Labs   Lab 01/28/24  0537 01/28/24  1227 01/29/24  0414   * 128* 126*   K 3.9 4.9 4.4   CL 87* 86* 84*   CO2 21* 21* 23    89 145*   * 107* 115*   CREATININE 3.8* 4.2* 4.6*   CALCIUM 8.5* 8.9 9.2   PROT 7.0  "7.0 7.1   ALBUMIN 3.1* 2.9* 3.0*   BILITOT 5.9* 5.9* 6.9*   ALKPHOS 210* 198* 177*   * 375* 564*   * 177* 336*   ANIONGAP 21* 21* 19*   , CBC   Recent Labs   Lab 01/28/24  0537 01/29/24  0414   WBC 13.53* 10.82   HGB 9.9* 10.3*   HCT 35.2* 34.5*   * 145*   , Troponin No results for input(s): "TROPONINI" in the last 48 hours., and All pertinent lab results from the last 24 hours have been reviewed.    Significant Imaging: Echocardiogram: Transthoracic echo (TTE) complete ([facility] Only):   Results for orders placed or performed during the hospital encounter of 12/05/22   Echo   Result Value Ref Range    BSA 2.06 m2    TDI SEPTAL 0.07 m/s    LV LATERAL E/E' RATIO 10.22 m/s    LV SEPTAL E/E' RATIO 13.14 m/s    LA WIDTH 3.80 cm    TDI LATERAL 0.09 m/s    LVIDd 5.70 3.5 - 6.0 cm    IVS 0.72 0.6 - 1.1 cm    Posterior Wall 0.93 0.6 - 1.1 cm    LVIDs 5.10 (A) 2.1 - 4.0 cm    FS 11 28 - 44 %    LA volume 78.94 cm3    Sinus 2.19 cm    STJ 2.04 cm    Ascending aorta 2.39 cm    LV mass 176.88 g    LA size 4.06 cm    RVDD 4.45 cm    TAPSE 1.44 cm    RV S' 4.35 cm/s    Left Ventricle Relative Wall Thickness 0.33 cm    AV mean gradient 3 mmHg    AV valve area 1.33 cm2    AV Velocity Ratio 0.53     AV index (prosthetic) 0.52     Mean e' 0.08 m/s    LVOT diameter 1.80 cm    LVOT area 2.5 cm2    LVOT peak alex 0.59 m/s    LVOT peak VTI 7.76 cm    Ao peak alex 1.11 m/s    Ao VTI 14.80 cm    Mr max alex 0.04 m/s    LVOT stroke volume 19.74 cm3    AV peak gradient 5 mmHg    E/E' ratio 11.50 m/s    MV Peak E Alex 0.92 m/s    TR Max Alex 3.56 m/s    LV Systolic Volume 101.20 mL    LV Systolic Volume Index 56.5 mL/m2    LV Diastolic Volume 135.44 mL    LV Diastolic Volume Index 75.66 mL/m2    LA Volume Index 44.1 mL/m2    LV Mass Index 99 g/m2    RA Major Axis 5.40 cm    Left Atrium Minor Axis 6.00 cm    Left Atrium Major Axis 6.04 cm    Triscuspid Valve Regurgitation Peak Gradient 51 mmHg    LA Volume Index (Mod) 36.2 mL/m2 "    LA volume (mod) 64.73 cm3    RA Width 3.93 cm    Right Atrial Pressure (from IVC) 15 mmHg    EF 20 %    TV resting pulmonary artery pressure 66 mmHg    Narrative    · The left ventricle is mildly enlarged with severely decreased systolic   function.  · The estimated ejection fraction is <20%.  · There is left ventricular global hypokinesis.  · Indeterminate left ventricular diastolic function.  · Mild right ventricular enlargement with moderately reduced right   ventricular systolic function.  · Biatrial enlargement.  · Mild mitral regurgitation.  · Mild tricuspid regurgitation.  · The estimated PA systolic pressure is 66 mmHg.  · There is pulmonary hypertension.  · Elevated central venous pressure (15 mmHg).  · Small posterior pericardial effusion.      , EKG: Reviewed, and X-Ray: CXR: X-Ray Chest 1 View (CXR):   Results for orders placed or performed during the hospital encounter of 01/26/24   X-Ray Chest 1 View    Narrative    EXAMINATION:  XR CHEST 1 VIEW    CLINICAL HISTORY:  <Diagnosis>, respiratory failure;    COMPARISON:  Chest, 01/28/2024    FINDINGS:  Endotracheal tube and nasogastric tube and right PICC catheter remain in good position.  Stable enlargement the cardiac silhouette.  There appears to be some mild congestion bilaterally which appears stable.  There is improving aeration of the medial right upper lobe.      Impression    Mild congestion bilaterally appears stable.  Improving aeration of the right upper lobe.      Electronically signed by: Dewayne Harrison MD  Date:    01/29/2024  Time:    10:51    and X-Ray Chest PA and Lateral (CXR): No results found for this visit on 01/26/24.  Assessment and Plan:   Patient with known severe end-stage CHF, on chronic dobutamine infusion, on multiple pressors.  Poor prognosis. Discussed with son at bedside.     Cardiac arrest  -Remains critically ill  -See plan above    Atrial fibrillation  -HR variable  -Hold Eliquis for know given liver  failure/auto-anticoagulation (INR 3.2)  -Repeat INR in AM    CKD (chronic kidney disease), stage III  -Creatinine 4.6, nephrology following    Acute on chronic combined systolic and diastolic heart failure  51 YO M w/ history of long standing NICM (> 10 yrs), HTN, marijuana and tobacco use, CKD stage 3 admitted for CP.  CP is atypical sometimes pleuritic, worse on movements.  Cath 2022 [facility] nonobstructive/minimal ds. EKG afib with V rate 114. There was 1 episode of NSVT. There is a mild increase in troponin .056. Pt with chronic CHF sx NYHA class 3.   CXR wnl.  After multiple discussions explaining plan and goals of care with OHT team in [place] he does not want to undergo any further workup/procedures or increased HD support at this time ( OHT or LVAD)   Pt on home dobutrex.    Pt with atypical CP ( nonobstructive ds by cath), will add ranexa, can add PPI. CHF mild continue IV diuretics, amiodarone added for NSVT. Pt can go to telemetry    1/29/24  -Patient remains critically ill, intubated on multiple pressors/inotropic support  -LFT's and creatinine trending up  -Diurese at tolerated  -Previously declined any further advanced HF workup in 2022  -Poor prognosis, Dr. Alvarado discussed with son at bedside  -Palliative consulted          VTE Risk Mitigation (From admission, onward)           Ordered     IP VTE HIGH RISK PATIENT  Once         01/26/24 1746     Place sequential compression device  Until discontinued         01/26/24 1746                    Jordyn Borjas PA-C  Cardiology  [facility] ([facility])

## 2024-01-29 NOTE — ASSESSMENT & PLAN NOTE
Advance Care Planning     Today a voluntary meeting took place: ICU    Patient Participation: Patient is unable to participate     Attendees (Name and  Relationship to patient): Son and wife.    Staff attendees (Name and  Role): Myself and Kelsey killian RN    ACP Conversation (General): Understanding of current condition       Code Status: Full Code    ACP Documents: None    Goals of care: The family endorses that what is most important right now is to focus on  REVERSING HIS CONDITION    Accordingly, we have decided that the best plan to meet the patient's goals includes continuing with treatment      Recommendations/  Follow-up tasks: The patient and health care agent were provided the following recommendations . They would to explore all aggressive measures.       Length of ACP   conversation in minutes: 35 minutes       Palliative consulted , had been on hospice on dobutamine

## 2024-01-29 NOTE — HOSPITAL COURSE
24-Patient seen and examined with son at bedside. Remains critically ill on multiple pressors. Extremities cool. LFT's trending up. INR 3.2. Creatinine, nephrology following, on Lasix drip. Discussed poor prognosis with son at bedside.     24-Patient seen and examined today with family at bedside. Remains on Vaso/Dobutamine. Complains of decreased hearing. Labs reviewed. Creatinine 5.3. Na 127, Cl 83. Platelets 97,000. INR 2.7., LFT's remain elevated. Discussed poor prognosis/end-stage CHF with patient and his family. Palliative on board.    24-Patient seen and examined today with family present. Ill-appearing with conversational dyspnea. Reportedly had bloody BM and hematuria overnight. Remains on . Diuretic switched to Bumex. Labs reviewed. Creatinine 6.7. Platelets down to 85,000. H/H 9.3/31.4. Palliative following, being d/c'd home with hospice.

## 2024-01-29 NOTE — PLAN OF CARE
Nutrition Recommendations 1/29/24:  1. Initiate pt onto Enteral nutrition, recommend Novasource renal via NG/OG tube, goal rate 35 mL/hr, starting at 10 mL/hr, then advance within 24 hrs if pt is tolerating or per MD/NP   -Formula at goal rate provides: 1680 kcals + 129 kcals from Propofol = total 1809 kcals/day (95% EEN), 76 g protein/day (73% EPN), 602 mL free formula water/day   -200 mL q4h free water flushes (1200 mL/day) = total from formula + FWF = 1802 mL water/day, per MD/NP   -Check Mg, K+, Na, Phos and Glu before and during initiation, correct as indicated   2. Weigh twice weekly  3. Collaboration with other providers     Goals:   1. Pt will be initiated onto EN within 24 hrs   2. Pt will tolerate and intake > 75% EEN and EPN prior to RD follow up    Hilaria Carty, Registration Eligible, Provisional LDN

## 2024-01-29 NOTE — PROGRESS NOTES
[facility] - Intensive Care ([facility])  Critical Care Medicine  Progress Note    Patient Name: Migel Garcia  MRN: [id number]  Admission Date: 1/26/2024  Hospital Length of Stay: 3 days  Code Status: Full Code  Attending Provider: Silvia Mae MD  Primary Care Provider: Anuj Banks MD   Principal Problem: <principal problem not specified>    Subjective:     HPI:  Patient presents with intermittant chest pain for the last 24-36hrs.  Hx of severe NICM, EF <20%, Pulmonary HTN - PAP 47/30.   He has been evaluated by the transplant team but does not want to pursue advanced HD Support.  He has been discharged home with hospice and has been on dobutamine infusion for over a year.        Hospital/ICU Course:  1/27. Up in a recliner  On RA. Hemodynamics stable.   1/28. Events of last night noted  Progressive decline and then cardiac arrest. Now on MV and pressors.   1/29 this morning patient was on epinephrine vaso and dobutamine.  He was on the vent.  Placed on pressure support however patient continued to bite on his ET tube.  Later in the day contacted secondary to cuff balloon.  Discussed reintubation or trial of extubation since on PS and alert / following commands min settings  Now on dobutamine/ vaso levo off / tolerating vapotherm     Interval History: 1/29 this morning patient was on epinephrine vaso and dobutamine.  He was on the vent.  Placed on pressure support however patient continued to bite on his ET tube.  Later in the day contacted secondary to cuff balloon.  Discussed reintubation or trial of extubation since on PS and alert / following commands min settings  Now on dobutamine/ vaso levo off / tolerating vapotherm/29 this morning patient was on epinephrine vaso and dobutamine.  He was on the vent.  Placed on pressure support however patient continued to bite on his ET tube.  Later in the day contacted secondary to cuff balloon.  Discussed reintubation or trial of extubation since on PS and alert  / following commands min settings  Now on dobutamine/ vaso levo off / tolerating vapotherm     Confused.  Wife at bedside and wants us to drain fluid.  Tried to explain that unable to obtain from the lungs unless it is pleural effusions.  She was asking for the abdomen to be drained as well.      Objective:     Vital Signs (Most Recent):  Temp: 97.7 °F (36.5 °C) (01/29/24 1545)  Pulse: 93 (01/29/24 1545)  Resp: (!) 26 (01/29/24 1545)  BP: 109/78 (01/29/24 1200)  SpO2: 98 % (01/29/24 1545) Vital Signs (24h Range):  Temp:  [97.7 °F (36.5 °C)-100 °F (37.8 °C)] 97.7 °F (36.5 °C)  Pulse:  [] 93  Resp:  [22-30] 26  SpO2:  [91 %-100 %] 98 %  BP: (109-134)/(57-78) 109/78  Arterial Line BP: ()/(52-83) 86/57     Weight: 84.2 kg (185 lb 10 oz)  Body mass index is 29.96 kg/m².      Intake/Output Summary (Last 24 hours) at 1/29/2024 1547  Last data filed at 1/29/2024 1400  Gross per 24 hour   Intake 1573.46 ml   Output 1610 ml   Net -36.54 ml        Physical Exam  Vitals and nursing note reviewed.   Constitutional:       General: He is not in acute distress.     Appearance: He is ill-appearing.   HENT:      Head: Normocephalic and atraumatic.   Eyes:      General:         Right eye: No discharge.         Left eye: No discharge.   Cardiovascular:      Rate and Rhythm: Normal rate and regular rhythm.   Pulmonary:      Effort: No respiratory distress.      Breath sounds: No wheezing or rales.   Abdominal:      Palpations: Abdomen is soft.      Tenderness: There is no abdominal tenderness.   Musculoskeletal:         General: Swelling present. No tenderness.      Cervical back: Neck supple.   Neurological:      Mental Status: He is alert.      Comments: Confused / will answer some questions            Review of Systems   Reason unable to perform ROS: unable to give.       Vents:  Vent Mode: A/C (01/29/24 0716)  Set Rate: 24 BPM (01/29/24 0716)  Vt Set: 400 mL (01/29/24 0716)  PEEP/CPAP: 5 cmH20 (01/29/24 0716)  Oxygen  Concentration (%): (S) 40 (01/29/24 1500)  Peak Airway Pressure: 30 cmH20 (01/29/24 0716)  Plateau Pressure: 24 cmH20 (01/29/24 0716)  Total Ve: 9.7 L/m (01/29/24 0716)  Negative Inspiratory Force (cm H2O): 0 (01/29/24 0716)  F/VT Ratio<105 (RSBI): (!) 59.55 (01/29/24 0716)    Lines/Drains/Airways       Peripherally Inserted Central Catheter Line  Duration             PICC Double Lumen 01/19/24 10 days              Drain  Duration                  Urethral Catheter 01/28/24 0030 1 day              Arterial Line  Duration             Arterial Line 01/28/24 0103 Right Femoral 1 day              Peripheral Intravenous Line  Duration                  Peripheral IV - Single Lumen 01/28/24 1830 20 G Left Antecubital <1 day         Peripheral IV - Single Lumen 01/28/24 1835 20 G Anterior;Distal;Left Upper Arm <1 day                    Significant Labs:    CBC/Anemia Profile:  Recent Labs   Lab 01/28/24  0537 01/29/24  0414   WBC 13.53* 10.82   HGB 9.9* 10.3*   HCT 35.2* 34.5*   * 145*   MCV 80* 75*   RDW 22.2* 21.7*        Chemistries:  Recent Labs   Lab 01/28/24  0537 01/28/24  1227 01/29/24  0414   * 128* 126*   K 3.9 4.9 4.4   CL 87* 86* 84*   CO2 21* 21* 23   * 107* 115*   CREATININE 3.8* 4.2* 4.6*   CALCIUM 8.5* 8.9 9.2   ALBUMIN 3.1* 2.9* 3.0*   PROT 7.0 7.0 7.1   BILITOT 5.9* 5.9* 6.9*   ALKPHOS 210* 198* 177*   * 177* 336*   * 375* 564*   MG 2.4 2.2 2.3   PHOS 6.5*  --  6.8*       All pertinent labs within the past 24 hours have been reviewed.    Significant Imaging:  I have reviewed all pertinent imaging results/findings within the past 24 hours.    ABG  Recent Labs   Lab 01/29/24  1102   PH 7.527*   PO2 215*   PCO2 33.8*   HCO3 28.0   BE 5*     Assessment/Plan:     Pulmonary  Acute hypoxemic respiratory failure  Patient with Hypoxic Respiratory failure which is Acute.  he is not on home oxygen. Supplemental oxygen was provided and noted- Vent Mode: A/C  Oxygen Concentration (%):   [] 40  Resp Rate Total:  [20 br/min-35 br/min] 23 br/min  Vt Set:  [400 mL] 400 mL  PEEP/CPAP:  [5 cmH20] 5 cmH20  Mean Airway Pressure:  [12 cmH20-15 cmH20] 12 cmH20    .   Signs/symptoms of respiratory failure include-  on vent . Contributing diagnoses includes - CHF Labs and images were reviewed. Patient Has recent ABG, which has been reviewed. Will treat underlying causes and adjust management of respiratory failure as follows-     See notes on min setting / bit down on cuff   Extubated to vapotherm , saturations ok   Low threshold for intubation     Aspiration pneumonia of left lung  Zosyn added.   Will consider a bronch.    Cardiac/Vascular  Cardiac arrest  1/28 Transferred to the floor in stable condition yesterday.  Had an episode of SVT and then bradycardia progressing to PEA.  Son was at the bedside. Defebrillator never fired.   Was promptly intubation and epi was initiated.   CPR lasted 6 min. He does wake up and follows commands. Although I told family with uremia and hepatic encephalopathy he may not consistently do that.     Now is severe cardiogenic shock with MSOF.  Transaminitis, no urine output, advance renal failure, uremia, shock liver and refractory hypoglycemia all point to poor perfusion state.   Cardiology saw the patient and he has refused use of assisted device in the past and shock call was not activated.  I also d/w Dr Richard and in this current exacerbation of severe chronic CMP he may not be the candidate for Impella device.  Nephrology has been consulted. He would be a poor candidate for RRT. In the past lasix infusion has worked for him per family but will differ that decision to nephrology.  Lactic acid was 5 and serial lactate ordered.     ICD (implantable cardioverter-defibrillator), single, in situ  Cardiology to follow    Acute on chronic combined systolic and diastolic heart failure  As above    1/28 On inotropic support.   1/29 cards following     HFrEF (heart failure with  reduced ejection fraction)  Cardiology consult  No pulmonary edema on CxR   BNP elevated.   Will consult cardiology  Careful hydration  Will differ need for inotropic support to cardiology as we slowly hydrate him to preserve his renal function  High risk of deterioration  Appropriate for ICU admission    1/27. Stable overnight. No SOB or chest pain. No resp issues. On RA. Stable for transfer per cardiology. Appreciate the help. Will differ diuretics to cards. BUN / Cr slightly better.     1/28. See cardiac arrest  1/29 -on lasix drip   Back on small epi. On vaso / dobutamine  Cards following      Renal/  CKD (chronic kidney disease), stage III  Careful hydration to get Cr below 2.    1/29  Significant worsening post cardiac arrest  Nephrology consult    1/29 per nephrology     Endocrine  Hypoglycemia  Sec to shock liver  On D10  Solumedrol added      GI  Shock liver  Follow labs     GERD (gastroesophageal reflux disease)  Protonix    Palliative Care  Goals of care, counseling/discussion  Advance Care Planning    Today a voluntary meeting took place: ICU    Patient Participation: Patient is unable to participate     Attendees (Name and  Relationship to patient): Son and wife.    Staff attendees (Name and  Role): Myself and Kelsey killian RN    ACP Conversation (General): Understanding of current condition       Code Status: Full Code    ACP Documents: None    Goals of care: The family endorses that what is most important right now is to focus on  REVERSING HIS CONDITION    Accordingly, we have decided that the best plan to meet the patient's goals includes continuing with treatment      Recommendations/  Follow-up tasks: The patient and health care agent were provided the following recommendations . They would to explore all aggressive measures.       Length of ACP   conversation in minutes: 35 minutes       Palliative consulted , had been on hospice on dobutamine          Critical Care Time: 45 minutes  Critical  secondary to Patient has a condition that poses threat to life and bodily function: pressors       Critical care was time spent personally by me on the following activities: development of treatment plan with patient or surrogate and bedside caregivers, discussions with consultants, evaluation of patient's response to treatment, examination of patient, ordering and performing treatments and interventions, ordering and review of laboratory studies, ordering and review of radiographic studies, pulse oximetry, re-evaluation of patient's condition. This critical care time did not overlap with that of any other provider or involve time for any procedures.     Silvia Mae MD  Critical Care Medicine  '[place] - Intensive Care ([facility])

## 2024-01-29 NOTE — CHAPLAIN
Initial visit with patient and family.  Visited with patient and family to determine ways that I might be of best support to them.  Pt and his family were all doing well at the time of my visit and currently had no needs.  They did ask for prayer and I took time to do this before leaving.  Spiritual care remains available as needed.    Chaplain Lavelle Judge M.Div., B[facility]

## 2024-01-29 NOTE — CONSULTS
[facility] - Intensive Care ([facility])  Adult Nutrition  Progress Note    SUMMARY       Recommendations    Recommendation/Intervention:   1. Initiate pt onto Enteral nutrition, recommend Novasource renal via NG/OG tube, goal rate 35 mL/hr, starting at 10 mL/hr, then advance within 24 hrs if pt is tolerating or per MD/NP   -Formula at goal rate provides: 1680 kcals + 129 kcals from Propofol = total 1809 kcals/day (95% EEN), 76 g protein/day (73% EPN), 602 mL free formula water/day   -200 mL q4h free water flushes (1200 mL/day) = total from formula + FWF = 1802 mL water/day, per MD/NP   -Check Mg, K+, Na, Phos and Glu before and during initiation, correct as indicated   2. Weigh twice weekly    Goals:   1. Pt will be initiated onto EN within 24 hrs   2. Pt will tolerate and intake > 75% EEN and EPN prior to RD follow up  Nutrition Goal Status: new  Communication of RD Recs: other (comment) (POC, sticky note, secure chat)    Assessment and Plan    Nutrition Problem  Inadequate oral intake     Related to (etiology):   Decreased ability to consume sufficient protein/energy     Signs and Symptoms (as evidenced by):   Intubated and sedated, NPO      Interventions/Recommendations (treatment strategy):  1. Enteral nutrition   2. Collaboration with other providers     Nutrition Diagnosis Status:   New       Malnutrition Assessment     Skin (Micronutrient): red, dry, cracked, edema (Quincy score = 12 (high risk)       Fluid Accumulation (Malnutrition):  (1+ trace)                         Reason for Assessment    Reason For Assessment: consult, new tube feeding (tube feedings rec)  Diagnosis:  (HFrEF (heart failure with reduced ejection fraction))  Relevant Medical History: CHF, GERD, CKD, ICD, Cardiac arrest, Hypoglycemia, Aspiration pneumonia of L lung  Hx: HTN, A Fib, V-tach, Heart failure  General Information Comments:   1/29/24: 53 y.o. Male admitted for HFrEF (heart failure with reduced ejection fraction). Pt currently  "intubated and sedated, NPO x 1 day, critically ill in the ICU. H&P noted that the pt presented to the ED w/ intermittant chest pain x 24-36hrs PTA, noted that the pt has been evaluated by the transplant team but does not want to pursue advanced HD Support and was discharged home with hospice and has been on dobutamine infusion for over a year. Nephrology Physician noted on 1/28 that the pt was stable and transferred to the floor on 1/27 but pt had an episode of SVT then bradycardia progressing to PEA d/t pt's defebrillator never fired, CPR x 6 min, pt was promptly intubated and transferred to the ICU on vasopressin and epi, noted that the pt is severely ill with severe cardiogenic shock with MSOF and now with transaminitis d/t shock liver and no urine output with progressing renal failure, pt's family understands he is a poor candidate for RRT, they stated that lasix infusion has worked for him in the past, "If he does not respond to IV Lasix drip, then our options are severely limited for renal replacement". Informed Pharmacy and RN of TF recs via secure chat, MD informed that the pt is currently extubated but may be re intubated. Reviewed chart: Propofol: 4.9; Total VE: 9.7; LBM 1/29; Skin: red, dry, cracked, excoriation; Quincy score: 12 (high risk); Edema: 1+ trace dependent. Labs, meds, weight reviewed. Weight charted 1/27/23 155 lbs, 1/29/24 185 lbs (BMI 29.96, Overweight), +30 lb wt gain x 1 yr. No NFPE warranted at this time, pt is in critical care and BMI > WNL, will perform at follow up if warranted. RD will continue to follow and monitor pt's nutritional status during admit.  Nutrition Discharge Planning: Cardiac diet + Boost plus as warranted    Nutrition Risk Screen    Nutrition Risk Screen: tube feeding or parenteral nutrition    Nutrition/Diet History    Spiritual, Cultural Beliefs, Temple Practices, Values that Affect Care: no  Food Allergies: NKFA  Factors Affecting Nutritional Intake: NPO, on " "mechanical ventilation    Anthropometrics    Temp: 99.1 °F (37.3 °C)  Height Method: Stated  Height: 5' 6" (167.6 cm)  Height (inches): 66 in  Weight Method: Bed Scale  Weight: 84.2 kg (185 lb 10 oz)  Weight (lb): 185.63 lb  Ideal Body Weight (IBW), Male: 142 lb  % Ideal Body Weight, Male (lb): 130.73 %  BMI (Calculated): 30  BMI Grade: 30 - 34.9- obesity - grade I     Wt Readings from Last 15 Encounters:   01/29/24 84.2 kg (185 lb 10 oz)   01/15/24 80.7 kg (177 lb 14.6 oz)   01/27/23 70.4 kg (155 lb 3.3 oz)   12/22/22 70.6 kg (155 lb 10.3 oz)   12/04/22 90.4 kg (199 lb 4.7 oz)     Lab/Procedures/Meds    Pertinent Labs Reviewed: reviewed  Pertinent Labs Comments: Na (L), Cl (L), Albumin (L), Glu (H), BUN (H), Cr (H), Anion gap (H), Phos (H), Total/Direct bilirubin (H), AST (H), ALT (H), Alk phos (H), eGFR 14  Pertinent Medications Reviewed: reviewed  Pertinent Medications Comments: polyethylene glycol, pantoprazole, Lopressor, methlprednisolone, chlorothiazide, aspirin, apixaban, amiodarone  BMP  Lab Results   Component Value Date     (L) 01/29/2024    K 4.4 01/29/2024    CL 84 (L) 01/29/2024    CO2 23 01/29/2024     (H) 01/29/2024    CREATININE 4.6 (H) 01/29/2024    CALCIUM 9.2 01/29/2024    ANIONGAP 19 (H) 01/29/2024    EGFRNORACEVR 14 (A) 01/29/2024     Lab Results   Component Value Date    CALCIUM 9.2 01/29/2024    PHOS 6.8 (H) 01/29/2024     Lab Results   Component Value Date    ALBUMIN 3.0 (L) 01/29/2024     Lab Results   Component Value Date     (H) 01/29/2024     (H) 01/29/2024    ALKPHOS 177 (H) 01/29/2024    BILITOT 6.9 (H) 01/29/2024     Recent Labs   Lab 01/29/24  0610   POCTGLUCOSE 159*     Lab Results   Component Value Date    HGBA1C 5.1 12/05/2022     Lab Results   Component Value Date    WBC 10.82 01/29/2024    HGB 10.3 (L) 01/29/2024    HCT 34.5 (L) 01/29/2024    MCV 75 (L) 01/29/2024     (L) 01/29/2024       Scheduled Meds:   amiodarone  400 mg Per OG tube Daily    " apixaban  5 mg Per OG tube BID    aspirin  81 mg Per OG tube Daily    chlorhexidine  15 mL Mouth/Throat BID    chlorothiazide (DIURIL) 250 mg in dextrose 5 % (D5W) 50 mL IVPB  250 mg Intravenous Q24H    etomidate        methylPREDNISolone sodium succinate injection  40 mg Intravenous TID    metoprolol tartrate  12.5 mg Per OG tube BID    mupirocin   Nasal BID    pantoprazole  40 mg Intravenous Daily    piperacillin-tazobactam (Zosyn) IV (PEDS and ADULTS) (extended infusion is not appropriate)  4.5 g Intravenous Q12H    polyethylene glycol  17 g Per NG tube Daily     Continuous Infusions:   DOBUTamine IV infusion (non-titrating) 5 mcg/kg/min (01/29/24 1000)    EPINEPHrine (ADRENALIN) 30 mg in dextrose 5 % (D5W) 250 mL infusion 0.14 mcg/kg/min (01/29/24 1000)    furosemide (Lasix) 200 mg in sodium chloride 0.9% SolP 100 mL continuous infusion (conc: 2 mg/mL) 10 mg/hr (01/29/24 1000)    propofoL 10 mcg/kg/min (01/29/24 1000)    vasopressin 0.04 Units/min (01/29/24 1000)     PRN Meds:.sodium chloride 0.9%, acetaminophen, dextrose 10%, dextrose 10%, etomidate, influenza, ondansetron, pneumoc 20-denita conj-dip cr(PF), sodium chloride 0.9%    Physical Findings/Assessment         Estimated/Assessed Needs    Weight Used For Calorie Calculations: 84.2 kg (185 lb 10 oz)  Energy Calorie Requirements (kcal): 1897 kcals ([facility] modified (Critical care-vent, Total VE: 9.7, Obese BMI ~30)  Energy Need Method: [facility] (modified)  Protein Requirements: 104-118 g (1.5-1.7 g/kg Adj BW (SHAUN, critical care, obese)  Weight Used For Protein Calculations: 69.4 kg (153 lb) (Adj BW)  Fluid Requirements (mL): 500 mL + total output (SHAUN)  Estimated Fluid Requirement Method: other (see comments)  RDA Method (mL): 1897  CHO Requirement: 237 g (1897 kcals/8)      Nutrition Prescription Ordered    Current Diet Order: NPO    Evaluation of Received Nutrient/Fluid Intake  I/O: (Net since admit)   1/29: +2702 mL    Enteral Calories (kcal):  0  Enteral Protein (gm): 0  Enteral (Free Water) Fluid (mL): 0  Free Water Flush Fluid (mL): 0  Other Calories (kcal): 129 (from Propofol)  Total Calories (kcal): 129  % Kcal Needs: 6%  % Protein Needs: 0%    Energy Calories Required: not meeting needs  Protein Required: not meeting needs  Fluid Required: meeting needs  Total Fluid Intake (mL): 1627.3  Tolerance:  (Currently no intake)  % Intake of Estimated Energy Needs: 0 - 25 %  % Meal Intake: NPO    Nutrition Risk    Level of Risk/Frequency of Follow-up: high (F/u x 2 weekly)     Monitor and Evaluation    Food and Nutrient Intake: energy intake, enteral nutrition intake  Food and Nutrient Adminstration: enteral and parenteral nutrition administration  Knowledge/Beliefs/Attitudes: food and nutrition knowledge/skill, beliefs and attitudes  Anthropometric Measurements: weight, weight change, body mass index  Biochemical Data, Medical Tests and Procedures: electrolyte and renal panel, gastrointestinal profile, glucose/endocrine profile, inflammatory profile     Nutrition Follow-Up    RD Follow-up?: Yes  Hilaria Carty, Registration Eligible, Provisional LDN

## 2024-01-29 NOTE — PT/OT/SLP EVAL
Speech Language Pathology Evaluation  Bedside Swallow    Patient Name:  Migel Garcia   MRN:  [id number]  Admitting Diagnosis: <principal problem not specified>    Recommendations:                 General Recommendations:  Dysphagia therapy/ongoing swallow evaluation  Diet recommendations:  NPO, NPO   Aspiration Precautions: Frequent oral care, Ice chips sparingly, and Strict aspiration precautions   General Precautions: Standard, aspiration  Communication strategies:  provide increased time to answer and +cognitive deficits    History:     Past Medical History:   Diagnosis Date    A-fib     GERD (gastroesophageal reflux disease)     Heart failure, unspecified     Hypertension     V-tach        Past Surgical History:   Procedure Laterality Date    COLONOSCOPY N/A 12/13/2022    Procedure: COLONOSCOPY;  Surgeon: Geovany Cole MD;  Location: [facility] ENDO ([address] [place] [address]);  Service: Endoscopy;  Laterality: N/A;    COLONOSCOPY N/A 12/12/2022    Procedure: COLONOSCOPY;  Surgeon: Geovany Cole MD;  Location: [facility] ENDO ([address] [place] [address]);  Service: Endoscopy;  Laterality: N/A;    PLACEMENT OF SWAN LESTER CATHETER WITH IMAGING GUIDANCE  12/22/2022    Procedure: INSERTION, CATHETER, SWAN-LESTER, WITH IMAGING GUIDANCE;  Surgeon: Brando Parada MD;  Location: [facility] CATH LAB;  Service: Cardiology;;    RIGHT HEART CATHETERIZATION Right 12/16/2022    Procedure: INSERTION, CATHETER, RIGHT HEART;  Surgeon: Brando Parada MD;  Location: [facility] CATH LAB;  Service: Cardiology;  Laterality: Right;    RIGHT HEART CATHETERIZATION Right 12/22/2022    Procedure: INSERTION, CATHETER, RIGHT HEART;  Surgeon: Brando Parada MD;  Location: [facility] CATH LAB;  Service: Cardiology;  Laterality: Right;       Social History: Patient lives with family at home in [place], [place].  Recently on Hospice at home and getting dobutamine infusion.    Prior Intubation HX:  1/28/24-1/29/24 s/p cardiac arrest.  Pt extubated (with potential for re-intubation)  after biting on tube, impacting balloon cuff.    Modified Barium Swallow: N/A    XR CHEST 1 VIEW     CLINICAL HISTORY:  <Diagnosis>, respiratory failure;     COMPARISON:  Chest, 01/28/2024     FINDINGS:  Endotracheal tube and nasogastric tube and right PICC catheter remain in good position.  Stable enlargement the cardiac silhouette.  There appears to be some mild congestion bilaterally which appears stable.  There is improving aeration of the medial right upper lobe.     Impression:     Mild congestion bilaterally appears stable.  Improving aeration of the right upper lobe.        Electronically signed by: Dewayne Harrison MD  Date:                                            01/29/2024  Time:                                           10:51    Prior diet: Pt consumes a regular diet.  Denied dysphagia hx.    Subjective     Pt seen bedside for ST swallowing evaluation post-extubation.  Sleeping upon arrival but easily arousable.  Pt is communicative but confused.  Decreased hearing noted.  No family present.  Patient goals: Unable to state     Pain/Comfort:  Pain Rating 1: 0/10  Pain Rating Post-Intervention 1: 0/10  Pain Rating 2: 0/10  Pain Rating Post-Intervention 2: 0/10    Respiratory Status:  Vapotherm    Objective:     Oral Musculature Evaluation  Oral Musculature: general weakness  Dentition: present and adequate  Secretion Management: adequate  Mucosal Quality: good  Volitional Cough: present  Volitional Swallow: present  Voice Prior to PO Intake: hoarse s/p extubation    Bedside Clinical Swallow Evaluation:   [place] Swallow Protocol:  [place] Swallow Protocol dictates patient remain NPO if fail screener (Suiter et al. 2014).  The [place] Swallow Protocol was administered. The patient was alert and provided the instructions prior to the beginning of the protocol. The patient consumed 3 oz before putting the cup down. Patient drank with consecutive swallows. Overt s/s of pharyngeal dysphagia included coughing/choking  after the swallow  and throat clearing.     Clinical Swallow Examination:   Of note, patient self-fed with hand/hand assist by SLP throughout evaluation. Patient presented with:     CONSISTENCY  NOTES   THIN (IDDSI 0) 3 oz water challenge   Cup/straw   Difficulty following directives for MAURILIO swallow.  Oral holding, filling oral cavity prior to deglutition.  Eventually able to complete sequential swallows.  +subtle throat clearing and immediate coughing following 2-3 oz consumption.   PUREE (IDDSI 4/Extremely Thick)   TSP/TBSP bites of pudding/applesauce  Pt c/o globus sensation.  Reduced manipulation/transit.  Subtle throat clearing post-deglutition.    SOLID (IDDSI 7/Regular) Bite of Julia Doone cookie    Deferred until re-assessment s/t dysphagia symptoms with previous consistencies     Thickened liquids were not used in this assessment. Jackelin (2018) reported that thickened liquids have no sound evidence at reducing the risk of pneumonia in patients with dysphagia and can cause harm by increasing their risk of dehydration. It also presents an increased risk of UTI, electrolyte imbalance, constipation, fecal impaction, cognitive impairment, functional decline and even death (Langmore, 2002; Eli, 2016).  Thickened liquids are associated with risks including dehydration, increased pharyngeal residue, potential interference with medication absorption, and decreased quality of life (Jose A, 2013). Thickened liquids are also more likely to be silently aspirated than thin liquids (Moe et al., 2018). This supports the assertion that we should confirm a patient requires thickened liquids with an instrumental swallow study prior to recommending them.    References:   Jose A GALINDO (2013). Thickening agents used for dysphagia management: Effect on bioavailability of water, medication and feelings of satiety. Nutrition Journal, 12, 54. https://doi.org/10.1186/0427-4970-06-54    MATEO Rosa, JAVIER Fuller, CANDICE Dumont, &  MATEO Gutiérrez (2018). Cough response to aspiration in thin and thick fluids during FEES in hospitalized inpatients. International journal of language & communication disorders, 53(5), 909-918. https://doi.org/10.1111/2774-4321.60053    INTERPRETATION AND RISK ASSESSMENT:  Clinical swallow evaluation (CSE) revealed oral phase characterized by lingual, labial, and buccal strength and range of motion reduced for lip closure, bolus preparation and propulsion. The patient had no anterior loss of the bolus with complete closure of the lips around the utensils. No residue remained in the oral cavity following the swallow. PPatient with overt clinical sign/symptoms of dysphagia during bedside CSE. Patient presents with a possibly inefficient swallow as indicated by weakness, reduced breath support and s/p extubation. Patient reported globus sensation on trials of solids. Contributing risk factors for dysphagia include cognitive deficits and deficits in respiratory-swallow coordination.     Clinical signs of oropharyngeal dysphagia, likely acute related to critical illness with multi-organ failure, PEA arrest/CPR and intubation. Swallowing prognosis is fair.      Compensatory Strategies  Aspiration precautions      Assessment:     Migel Garcia is a 53 y.o. male recently on hospice & year long dobutamine infusion with progressive functional decline s/t significant cardiac co-morbidities (severe NICM, EF <20% and  Pulmonary HTN)  was admitted to [facility] s/p severe cardiogenic shock, PEA arrest and respiratory failure requiring intubation 1/28/24-1/29/24 (biting ET tube, deflated cuff).  Palliative Medicine consulted.  Pt presents with suspected Dysphagia and Cognitive-Linguistic Impairment in the setting of cardiac arrest and intubation.  He is awake/alert with hoarse vocal quality and reduced breath support, requiring rest breaks during bedside CSE.  He exhibited overt s/s of dysphagia and recommended to remain NPO (except  ice chips/oral care) with ongoing assessment as cognition and respiratory status improves.  Pt is at increased risk of worsening swallow function/aspiration given multi-organ failure and severity of medical co-morbidities.    Goals:   Multidisciplinary Problems       SLP Goals          Problem: SLP    Goal Priority Disciplines Outcome   SLP Goal     SLP    Description: 1.  Pt will consume least restrictive PO diet without incident.                       Plan:     Patient to be seen:  2 x/week, 3 x/week   Plan of Care expires:  02/05/24  Plan of Care reviewed with:  patient   SLP Follow-Up:  Yes       Discharge recommendations:   (Pending acute progress and Palliative medicine meeting)   Barriers to Discharge:  None    Time Tracking:     SLP Treatment Date:   01/29/24  Speech Start Time:  1330  Speech Stop Time:  1400     Speech Total Time (min):  30 min    Billable Minutes: Eval Swallow and Oral Function 30 minutes    01/29/2024

## 2024-01-29 NOTE — CARE UPDATE
Pt extubated at 1050 without complication and placed on vapotherm 40L/100%. Spo2 100%. ABG completed and results given to MD.

## 2024-01-29 NOTE — ASSESSMENT & PLAN NOTE
Careful hydration to get Cr below 2.    1/29  Significant worsening post cardiac arrest  Nephrology consult    1/29 per nephrology

## 2024-01-29 NOTE — PROGRESS NOTES
Pharmacist Renal Dose Adjustment Note    Migel Garcia is a 53 y.o. male being treated with the medication Zosyn for pneumonia.     Patient Data:    Vital Signs (Most Recent):  Temp: 99.1 °F (37.3 °C) (01/29/24 1015)  Pulse: 102 (01/29/24 1015)  Resp: (!) 27 (01/29/24 1015)  BP: 134/71 (01/29/24 0800)  SpO2: (!) 94 % (01/29/24 1015) Vital Signs (72h Range):  Temp:  [91.2 °F (32.9 °C)-100 °F (37.8 °C)]   Pulse:  []   Resp:  [12-61]   BP: ()/(24-91)   SpO2:  [54 %-100 %]   Arterial Line BP: ()/(47-75)      Recent Labs   Lab 01/28/24  0537 01/28/24  1227 01/29/24  0414   CREATININE 3.8* 4.2* 4.6*     Serum creatinine: 4.6 mg/dL (H) 01/29/24 0414  Estimated creatinine clearance: 18.5 mL/min (A)    Per protocol for CrCl < 20 ml/min, dose will be reduced from 4.5 g IV every 8 hours to 4.5 g IV every 12 hours.     Pharmacist's Name: Katherine E Mcardle  Pharmacist's Extension: [phone]

## 2024-01-29 NOTE — SUBJECTIVE & OBJECTIVE
Interval History: 1/29 this morning patient was on epinephrine vaso and dobutamine.  He was on the vent.  Placed on pressure support however patient continued to bite on his ET tube.  Later in the day contacted secondary to cuff balloon.  Discussed reintubation or trial of extubation since on PS and alert / following commands min settings  Now on dobutamine/ vaso levo off / tolerating vapotherm/29 this morning patient was on epinephrine vaso and dobutamine.  He was on the vent.  Placed on pressure support however patient continued to bite on his ET tube.  Later in the day contacted secondary to cuff balloon.  Discussed reintubation or trial of extubation since on PS and alert / following commands min settings  Now on dobutamine/ vaso levo off / tolerating vapotherm     Confused.  Wife at bedside and wants us to drain fluid.  Tried to explain that unable to obtain from the lungs unless it is pleural effusions.  She was asking for the abdomen to be drained as well.      Objective:     Vital Signs (Most Recent):  Temp: 97.7 °F (36.5 °C) (01/29/24 1545)  Pulse: 93 (01/29/24 1545)  Resp: (!) 26 (01/29/24 1545)  BP: 109/78 (01/29/24 1200)  SpO2: 98 % (01/29/24 1545) Vital Signs (24h Range):  Temp:  [97.7 °F (36.5 °C)-100 °F (37.8 °C)] 97.7 °F (36.5 °C)  Pulse:  [] 93  Resp:  [22-30] 26  SpO2:  [91 %-100 %] 98 %  BP: (109-134)/(57-78) 109/78  Arterial Line BP: ()/(52-83) 86/57     Weight: 84.2 kg (185 lb 10 oz)  Body mass index is 29.96 kg/m².      Intake/Output Summary (Last 24 hours) at 1/29/2024 1547  Last data filed at 1/29/2024 1400  Gross per 24 hour   Intake 1573.46 ml   Output 1610 ml   Net -36.54 ml        Physical Exam  Vitals and nursing note reviewed.   Constitutional:       General: He is not in acute distress.     Appearance: He is ill-appearing.   HENT:      Head: Normocephalic and atraumatic.   Eyes:      General:         Right eye: No discharge.         Left eye: No discharge.   Cardiovascular:       Rate and Rhythm: Normal rate and regular rhythm.   Pulmonary:      Effort: No respiratory distress.      Breath sounds: No wheezing or rales.   Abdominal:      Palpations: Abdomen is soft.      Tenderness: There is no abdominal tenderness.   Musculoskeletal:         General: Swelling present. No tenderness.      Cervical back: Neck supple.   Neurological:      Mental Status: He is alert.      Comments: Confused / will answer some questions            Review of Systems   Reason unable to perform ROS: unable to give.       Vents:  Vent Mode: A/C (01/29/24 0716)  Set Rate: 24 BPM (01/29/24 0716)  Vt Set: 400 mL (01/29/24 0716)  PEEP/CPAP: 5 cmH20 (01/29/24 0716)  Oxygen Concentration (%): (S) 40 (01/29/24 1500)  Peak Airway Pressure: 30 cmH20 (01/29/24 0716)  Plateau Pressure: 24 cmH20 (01/29/24 0716)  Total Ve: 9.7 L/m (01/29/24 0716)  Negative Inspiratory Force (cm H2O): 0 (01/29/24 0716)  F/VT Ratio<105 (RSBI): (!) 59.55 (01/29/24 0716)    Lines/Drains/Airways       Peripherally Inserted Central Catheter Line  Duration             PICC Double Lumen 01/19/24 10 days              Drain  Duration                  Urethral Catheter 01/28/24 0030 1 day              Arterial Line  Duration             Arterial Line 01/28/24 0103 Right Femoral 1 day              Peripheral Intravenous Line  Duration                  Peripheral IV - Single Lumen 01/28/24 1830 20 G Left Antecubital <1 day         Peripheral IV - Single Lumen 01/28/24 1835 20 G Anterior;Distal;Left Upper Arm <1 day                    Significant Labs:    CBC/Anemia Profile:  Recent Labs   Lab 01/28/24  0537 01/29/24  0414   WBC 13.53* 10.82   HGB 9.9* 10.3*   HCT 35.2* 34.5*   * 145*   MCV 80* 75*   RDW 22.2* 21.7*        Chemistries:  Recent Labs   Lab 01/28/24  0537 01/28/24  1227 01/29/24  0414   * 128* 126*   K 3.9 4.9 4.4   CL 87* 86* 84*   CO2 21* 21* 23   * 107* 115*   CREATININE 3.8* 4.2* 4.6*   CALCIUM 8.5* 8.9 9.2   ALBUMIN  3.1* 2.9* 3.0*   PROT 7.0 7.0 7.1   BILITOT 5.9* 5.9* 6.9*   ALKPHOS 210* 198* 177*   * 177* 336*   * 375* 564*   MG 2.4 2.2 2.3   PHOS 6.5*  --  6.8*       All pertinent labs within the past 24 hours have been reviewed.    Significant Imaging:  I have reviewed all pertinent imaging results/findings within the past 24 hours.

## 2024-01-29 NOTE — NURSING
RN to bedside in response to vent alarm, pt not receiving volumes, audible cuff leak. Cuff not holding air. O2 sat currently holding >90%.  RT and MD called to bedside.  Per MD, pt extubated by RT to vapotherm.  Pt alert, holding good O2 sat, ABG drawn by RT.   Reorientation provided to pt. Pt alert and holding appropriate conversation. Son at bedside.

## 2024-01-29 NOTE — ASSESSMENT & PLAN NOTE
Patient with Hypoxic Respiratory failure which is Acute.  he is not on home oxygen. Supplemental oxygen was provided and noted- Vent Mode: A/C  Oxygen Concentration (%):  [] 40  Resp Rate Total:  [20 br/min-35 br/min] 23 br/min  Vt Set:  [400 mL] 400 mL  PEEP/CPAP:  [5 cmH20] 5 cmH20  Mean Airway Pressure:  [12 cmH20-15 cmH20] 12 cmH20    .   Signs/symptoms of respiratory failure include-  on vent . Contributing diagnoses includes - CHF Labs and images were reviewed. Patient Has recent ABG, which has been reviewed. Will treat underlying causes and adjust management of respiratory failure as follows-     See notes on min setting / bit down on cuff   Extubated to vapotherm , saturations ok   Low threshold for intubation

## 2024-01-29 NOTE — ASSESSMENT & PLAN NOTE
53 YO M w/ history of long standing NICM (> 10 yrs), HTN, marijuana and tobacco use, CKD stage 3 admitted for CP.  CP is atypical sometimes pleuritic, worse on movements.  Cath 2022 [facility] nonobstructive/minimal ds. EKG afib with V rate 114. There was 1 episode of NSVT. There is a mild increase in troponin .056. Pt with chronic CHF sx NYHA class 3.   CXR wnl.  After multiple discussions explaining plan and goals of care with OHT team in [place] he does not want to undergo any further workup/procedures or increased HD support at this time ( OHT or LVAD)   Pt on home dobutrex.    Pt with atypical CP ( nonobstructive ds by cath), will add ranexa, can add PPI. CHF mild continue IV diuretics, amiodarone added for NSVT. Pt can go to telemetry    1/29/24  -Patient remains critically ill, intubated on multiple pressors/inotropic support  -LFT's and creatinine trending up  -Diurese at tolerated  -Previously declined any further advanced HF workup in 2022  -Poor prognosis, Dr. Alvarado discussed with son at bedside  -Palliative consulted

## 2024-01-29 NOTE — ASSESSMENT & PLAN NOTE
-HR variable  -Hold Eliquis for know given liver failure/auto-anticoagulation (INR 3.2)  -Repeat INR in AM

## 2024-01-30 PROBLEM — Z51.5 ENCOUNTER FOR PALLIATIVE CARE: Status: ACTIVE | Noted: 2024-01-30

## 2024-01-30 LAB
ABO + RH BLD: ABNORMAL
ALBUMIN SERPL BCP-MCNC: 3 G/DL (ref 3.5–5.2)
ALBUMIN SERPL BCP-MCNC: 3 G/DL (ref 3.5–5.2)
ALP SERPL-CCNC: 139 U/L (ref 55–135)
ALP SERPL-CCNC: 139 U/L (ref 55–135)
ALT SERPL W/O P-5'-P-CCNC: 314 U/L (ref 10–44)
ALT SERPL W/O P-5'-P-CCNC: 314 U/L (ref 10–44)
AMMONIA PLAS-SCNC: 69 UMOL/L (ref 10–50)
ANION GAP SERPL CALC-SCNC: 22 MMOL/L (ref 8–16)
AST SERPL-CCNC: 317 U/L (ref 10–40)
AST SERPL-CCNC: 317 U/L (ref 10–40)
BASOPHILS # BLD AUTO: 0 K/UL (ref 0–0.2)
BASOPHILS NFR BLD: 0 % (ref 0–1.9)
BILIRUB DIRECT SERPL-MCNC: 5.6 MG/DL (ref 0.1–0.3)
BILIRUB SERPL-MCNC: 7.6 MG/DL (ref 0.1–1)
BILIRUB SERPL-MCNC: 7.6 MG/DL (ref 0.1–1)
BLD GP AB SCN CELLS X3 SERPL QL: ABNORMAL
BLOOD GROUP ANTIBODIES SERPL: NORMAL
BUN SERPL-MCNC: 123 MG/DL (ref 6–20)
CALCIUM SERPL-MCNC: 9.1 MG/DL (ref 8.7–10.5)
CHLORIDE SERPL-SCNC: 83 MMOL/L (ref 95–110)
CO2 SERPL-SCNC: 22 MMOL/L (ref 23–29)
CREAT SERPL-MCNC: 5.3 MG/DL (ref 0.5–1.4)
DIFFERENTIAL METHOD BLD: ABNORMAL
EOSINOPHIL # BLD AUTO: 0 K/UL (ref 0–0.5)
EOSINOPHIL NFR BLD: 0 % (ref 0–8)
ERYTHROCYTE [DISTWIDTH] IN BLOOD BY AUTOMATED COUNT: 21.5 % (ref 11.5–14.5)
EST. GFR  (NO RACE VARIABLE): 12 ML/MIN/1.73 M^2
GLUCOSE SERPL-MCNC: 120 MG/DL (ref 70–110)
HCT VFR BLD AUTO: 31.6 % (ref 40–54)
HGB BLD-MCNC: 9.3 G/DL (ref 14–18)
IMM GRANULOCYTES # BLD AUTO: 0.06 K/UL (ref 0–0.04)
IMM GRANULOCYTES NFR BLD AUTO: 0.7 % (ref 0–0.5)
INR PPP: 2.7 (ref 0.8–1.2)
LACTATE SERPL-SCNC: 3.7 MMOL/L (ref 0.5–2.2)
LYMPHOCYTES # BLD AUTO: 0.5 K/UL (ref 1–4.8)
LYMPHOCYTES NFR BLD: 5.1 % (ref 18–48)
MAGNESIUM SERPL-MCNC: 2.6 MG/DL (ref 1.6–2.6)
MCH RBC QN AUTO: 22 PG (ref 27–31)
MCHC RBC AUTO-ENTMCNC: 29.4 G/DL (ref 32–36)
MCV RBC AUTO: 75 FL (ref 82–98)
MONOCYTES # BLD AUTO: 0.3 K/UL (ref 0.3–1)
MONOCYTES NFR BLD: 3.4 % (ref 4–15)
NEUTROPHILS # BLD AUTO: 8.1 K/UL (ref 1.8–7.7)
NEUTROPHILS NFR BLD: 90.8 % (ref 38–73)
NRBC BLD-RTO: 2 /100 WBC
PHOSPHATE SERPL-MCNC: 10.2 MG/DL (ref 2.7–4.5)
PLATELET # BLD AUTO: 97 K/UL (ref 150–450)
PMV BLD AUTO: 10.4 FL (ref 9.2–12.9)
POCT GLUCOSE: 119 MG/DL (ref 70–110)
POCT GLUCOSE: 121 MG/DL (ref 70–110)
POCT GLUCOSE: 90 MG/DL (ref 70–110)
POTASSIUM SERPL-SCNC: 4.5 MMOL/L (ref 3.5–5.1)
PROT SERPL-MCNC: 7.4 G/DL (ref 6–8.4)
PROT SERPL-MCNC: 7.4 G/DL (ref 6–8.4)
PROTHROMBIN TIME: 27 SEC (ref 9–12.5)
RBC # BLD AUTO: 4.22 M/UL (ref 4.6–6.2)
SODIUM SERPL-SCNC: 127 MMOL/L (ref 136–145)
SPECIMEN OUTDATE: ABNORMAL
WBC # BLD AUTO: 8.9 K/UL (ref 3.9–12.7)

## 2024-01-30 PROCEDURE — 97110 THERAPEUTIC EXERCISES: CPT

## 2024-01-30 PROCEDURE — 27000221 HC OXYGEN, UP TO 24 HOURS

## 2024-01-30 PROCEDURE — 99498 ADVNCD CARE PLAN ADDL 30 MIN: CPT | Mod: GW,HPC,, | Performed by: NURSE PRACTITIONER

## 2024-01-30 PROCEDURE — 80053 COMPREHEN METABOLIC PANEL: CPT | Performed by: INTERNAL MEDICINE

## 2024-01-30 PROCEDURE — 97166 OT EVAL MOD COMPLEX 45 MIN: CPT

## 2024-01-30 PROCEDURE — 99497 ADVNCD CARE PLAN 30 MIN: CPT | Mod: 25,GW,HPC, | Performed by: NURSE PRACTITIONER

## 2024-01-30 PROCEDURE — 97535 SELF CARE MNGMENT TRAINING: CPT

## 2024-01-30 PROCEDURE — 84100 ASSAY OF PHOSPHORUS: CPT | Performed by: INTERNAL MEDICINE

## 2024-01-30 PROCEDURE — 94761 N-INVAS EAR/PLS OXIMETRY MLT: CPT

## 2024-01-30 PROCEDURE — 63600175 PHARM REV CODE 636 W HCPCS: Performed by: SPECIALIST

## 2024-01-30 PROCEDURE — 25000003 PHARM REV CODE 250: Performed by: SPECIALIST

## 2024-01-30 PROCEDURE — 83605 ASSAY OF LACTIC ACID: CPT | Performed by: INTERNAL MEDICINE

## 2024-01-30 PROCEDURE — 83735 ASSAY OF MAGNESIUM: CPT | Performed by: INTERNAL MEDICINE

## 2024-01-30 PROCEDURE — 99900035 HC TECH TIME PER 15 MIN (STAT)

## 2024-01-30 PROCEDURE — 97530 THERAPEUTIC ACTIVITIES: CPT

## 2024-01-30 PROCEDURE — 82140 ASSAY OF AMMONIA: CPT | Performed by: INTERNAL MEDICINE

## 2024-01-30 PROCEDURE — 85025 COMPLETE CBC W/AUTO DIFF WBC: CPT | Performed by: INTERNAL MEDICINE

## 2024-01-30 PROCEDURE — 97162 PT EVAL MOD COMPLEX 30 MIN: CPT

## 2024-01-30 PROCEDURE — 92526 ORAL FUNCTION THERAPY: CPT

## 2024-01-30 PROCEDURE — 63600175 PHARM REV CODE 636 W HCPCS: Performed by: NURSE PRACTITIONER

## 2024-01-30 PROCEDURE — 99223 1ST HOSP IP/OBS HIGH 75: CPT | Mod: GW,HPC,, | Performed by: NURSE PRACTITIONER

## 2024-01-30 PROCEDURE — 85610 PROTHROMBIN TIME: CPT | Performed by: INTERNAL MEDICINE

## 2024-01-30 PROCEDURE — 25000003 PHARM REV CODE 250: Performed by: INTERNAL MEDICINE

## 2024-01-30 PROCEDURE — C9113 INJ PANTOPRAZOLE SODIUM, VIA: HCPCS | Performed by: SPECIALIST

## 2024-01-30 PROCEDURE — 20000000 HC ICU ROOM

## 2024-01-30 PROCEDURE — 63600175 PHARM REV CODE 636 W HCPCS: Performed by: INTERNAL MEDICINE

## 2024-01-30 PROCEDURE — 80076 HEPATIC FUNCTION PANEL: CPT | Performed by: NURSE PRACTITIONER

## 2024-01-30 PROCEDURE — 99233 SBSQ HOSP IP/OBS HIGH 50: CPT | Mod: GW,HPC,, | Performed by: INTERNAL MEDICINE

## 2024-01-30 RX ORDER — AMIODARONE HYDROCHLORIDE 200 MG/1
400 TABLET ORAL DAILY
Status: DISCONTINUED | OUTPATIENT
Start: 2024-01-31 | End: 2024-01-31 | Stop reason: HOSPADM

## 2024-01-30 RX ORDER — SEVELAMER CARBONATE FOR ORAL SUSPENSION 800 MG/1
0.8 POWDER, FOR SUSPENSION ORAL
Status: DISCONTINUED | OUTPATIENT
Start: 2024-01-30 | End: 2024-01-31 | Stop reason: HOSPADM

## 2024-01-30 RX ORDER — PANTOPRAZOLE SODIUM 40 MG/1
40 TABLET, DELAYED RELEASE ORAL DAILY
Status: DISCONTINUED | OUTPATIENT
Start: 2024-01-31 | End: 2024-01-31 | Stop reason: HOSPADM

## 2024-01-30 RX ORDER — LACTULOSE 10 G/15ML
20 SOLUTION ORAL 2 TIMES DAILY
Status: DISCONTINUED | OUTPATIENT
Start: 2024-01-30 | End: 2024-01-31 | Stop reason: HOSPADM

## 2024-01-30 RX ORDER — ASPIRIN 81 MG/1
81 TABLET ORAL DAILY
Status: DISCONTINUED | OUTPATIENT
Start: 2024-01-31 | End: 2024-01-31 | Stop reason: HOSPADM

## 2024-01-30 RX ORDER — ACETAMINOPHEN 325 MG/1
650 TABLET ORAL EVERY 8 HOURS PRN
Status: DISCONTINUED | OUTPATIENT
Start: 2024-01-30 | End: 2024-01-31 | Stop reason: HOSPADM

## 2024-01-30 RX ORDER — LACTULOSE 10 G/15ML
20 SOLUTION ORAL 2 TIMES DAILY
Status: DISCONTINUED | OUTPATIENT
Start: 2024-01-30 | End: 2024-01-30

## 2024-01-30 RX ADMIN — CHLOROTHIAZIDE SODIUM 250 MG: 500 INJECTION, POWDER, LYOPHILIZED, FOR SOLUTION INTRAVENOUS at 11:01

## 2024-01-30 RX ADMIN — METHYLPREDNISOLONE SODIUM SUCCINATE 40 MG: 40 INJECTION, POWDER, FOR SOLUTION INTRAMUSCULAR; INTRAVENOUS at 09:01

## 2024-01-30 RX ADMIN — DOBUTAMINE HYDROCHLORIDE 5 MCG/KG/MIN: 400 INJECTION INTRAVENOUS at 06:01

## 2024-01-30 RX ADMIN — VASOPRESSIN 0.04 UNITS/MIN: 0.2 INJECTION INTRAVENOUS at 03:01

## 2024-01-30 RX ADMIN — MUPIROCIN: 20 OINTMENT TOPICAL at 09:01

## 2024-01-30 RX ADMIN — ASPIRIN 81 MG CHEWABLE TABLET 81 MG: 81 TABLET CHEWABLE at 09:01

## 2024-01-30 RX ADMIN — VASOPRESSIN 0.04 UNITS/MIN: 0.2 INJECTION INTRAVENOUS at 06:01

## 2024-01-30 RX ADMIN — BUMETANIDE 1 MG/HR: 0.25 INJECTION INTRAMUSCULAR; INTRAVENOUS at 11:01

## 2024-01-30 RX ADMIN — PIPERACILLIN AND TAZOBACTAM 4.5 G: 4; .5 INJECTION, POWDER, LYOPHILIZED, FOR SOLUTION INTRAVENOUS; PARENTERAL at 05:01

## 2024-01-30 RX ADMIN — SODIUM CHLORIDE: 9 INJECTION, SOLUTION INTRAVENOUS at 05:01

## 2024-01-30 RX ADMIN — PIPERACILLIN AND TAZOBACTAM 4.5 G: 4; .5 INJECTION, POWDER, LYOPHILIZED, FOR SOLUTION INTRAVENOUS; PARENTERAL at 04:01

## 2024-01-30 RX ADMIN — FUROSEMIDE 10 MG/HR: 10 INJECTION, SOLUTION INTRAMUSCULAR; INTRAVENOUS at 04:01

## 2024-01-30 RX ADMIN — AMIODARONE HYDROCHLORIDE 400 MG: 200 TABLET ORAL at 09:01

## 2024-01-30 RX ADMIN — LACTULOSE 20 G: 20 SOLUTION ORAL at 02:01

## 2024-01-30 RX ADMIN — PANTOPRAZOLE SODIUM 40 MG: 40 INJECTION, POWDER, FOR SOLUTION INTRAVENOUS at 09:01

## 2024-01-30 NOTE — ASSESSMENT & PLAN NOTE
"-Code status: Full code. Dicussed code status: full code vs DNR/I along with risks, benefits, and alternatives. Multiple discussions about code status in which we asked what his wishes would be if God call him home meaning heart stopped beating or he stopped breathing what would his wishes be and he stated "If God is ready for me then he is ready for me and I can't do anything to change that" and that he would want to be with Sourav naturally and peacefully but noted he is not ready to die wants to live as long as he can. Then Mrs. Whitaker asked if he wanted to be revived or for everyone to just let him go and then he stated he wanted to be revived because he wants to live.   -HCPOA: Surrogate: Spouse Julianne Garcia, son Bayron helps her with medical decisions.   -GOC: Focus on spending time at home, avoiding the hospital, remaining as independent as possible, symptom/pain control, quality of life, even if it means sacrificing a little time, and comfort and QOL. Accordingly, we have decided that the best plan to meet the patient's goals includes enrolling in hospice care with [facility] hospice with continuation of dobutamine infusion with plans to wean off at some point. Patient and family want to remain full code at this time with continued discussions with hospice regarding code status in which we discussed hospice philosophy but at this point they want to continue to discuss but want him to remain full code.  -See HPI for further details    "

## 2024-01-30 NOTE — CONSULTS
[facility] ([facility])  Palliative Medicine  Consult Note    Patient Name: Migel Garcia  MRN: [id number]  Admission Date: 1/26/2024  Hospital Length of Stay: 4 days  Code Status: Full Code   Attending Provider: Silvia Mae MD  Consulting Provider: Catina Ashby NP  Primary Care Physician: Anuj Banks MD  Principal Problem:<principal problem not specified>    Patient information was obtained from patient, spouse/SO, relative(s), past medical records, and primary team.      Inpatient consult to Palliative Care  Consult performed by: Catina Ashby NP  Consult ordered by: Silvia Mae MD        Assessment/Plan:     Cardiac/Vascular  HFrEF (heart failure with reduced ejection fraction)  -Cardiology following, discussed poor prognosis/end-stage CHF with patient and his family. In the past he was evaluated by the transplant team in which multiple discussions occurred with patient and family but did not want to pursue advanced HD Support or LVAD/OHT. Patient opted to d/c home with dobutamine infusion and manage care at home in which he has been on hospice with [facility]  as well over the past year. During hospital course patient experience progressive decline and then cardiac arrest in which post arrest with multiorgan failure managed in the ICU. He remains critically ill, on multiple pressors (vaso & dobutamine) and despiteDiscussed poor prognosis/end-stage CHF with patient and his family. this worsening labs including but not limited to LFTs, Cr., lactic acid, patient with s/s of dysphagia upon speech eval. He was extubated on 1/29/24, currently on NC. Patient with c/o hearing loss so lasix d/c and started on bumex. Patient followed by nephrology  in the setting of SHAUN on CKD with worsening Cr., in which it was noted with his baseline cardiomyopathy limits his ability to be treated with renal replacement therapy and ahe will not tolerate dialysis. Patient also  "verbalized today to multiple providers and family that he does not want to pursue dialysis, per chart review in the past he did not want dialysis as well.   -Family meeting today with patient, his wife Julianne, and two out of three sons Bayron and Denny. Ultimately, they elected to enroll in hospice again at home to focus on comfort and quality of life in which all of his goals are in alignment with hospice philosophy but at this time he will remain full code. Please see note for further details.     Palliative Care  Encounter for palliative care  -Code status: Full code. Dicussed code status: full code vs DNR/I along with risks, benefits, and alternatives. Multiple discussions about code status in which we asked what his wishes would be if God call him home meaning heart stopped beating or he stopped breathing what would his wishes be and he stated "If God is ready for me then he is ready for me and I can't do anything to change that" and that he would want to be with Sourav naturally and peacefully but noted he is not ready to die wants to live as long as he can. Then Mrs. Whitaker asked if he wanted to be revived or for everyone to just let him go and then he stated he wanted to be revived because he wants to live.   -HCPOA: Surrogate: Spouse Julianne Garcia, son Bayron helps her with medical decisions.   -GOC: Focus on spending time at home, avoiding the hospital, remaining as independent as possible, symptom/pain control, quality of life, even if it means sacrificing a little time, and comfort and QOL. Accordingly, we have decided that the best plan to meet the patient's goals includes enrolling in hospice care with [facility] with continuation of dobutamine infusion with plans to wean off at some point. Patient and family want to remain full code at this time with continued discussions with hospice regarding code status in which we discussed hospice philosophy but at this point they want to continue to discuss " but want him to remain full code.  -See HPI for further details          Thank you for your consult. I will follow-up with patient. Please contact us if you have any additional questions.    Subjective:     HPI:   Mr. Garcia who goes by Salazar is a pleasant 53 y.o. male who presented with intermittent chest pain for 24-36hrs prior to arrival. Hx of severe NICM, EF 10%  ICD on chronic home dobutamine, Pulmonary HTN - PAP 47/30. In the past he was evaluated by the transplant team in which multiple discussions occurred with patient and family but did not want to pursue advanced HD Support or LVAD/OHT. Patient opted to d/c home with dobutamine infusion and manage care at home in which he has been on [facility]  as well over the past year. During hospital course patient experience progressive decline and then cardiac arrest in which post arrest with multiorgan failure managed in the ICU. He remains critically ill, on multiple pressors (vaso & dobutamine) and despite this worsening labs including but not limited to LFTs, Cr., lactic acid, patient with s/s of dysphagia upon speech eval. He was extubated on 1/29/24, currently on NC. Patient with c/o hearing loss so lasix d/c and started on bumex. Patient followed by nephrology  in the setting of SHAUN on CKD with worsening Cr., in which it was noted with his baseline cardiomyopathy limits his ability to be treated with renal replacement therapy and ahe will not tolerate dialysis. Patient also verbalized today to multiple providers and family that he does not want to pursue dialysis, per chart review in the past he did not want dialysis as well. Palliative medicine consulted for goals of care and advance care planning.             Hospital Course:  No notes on file    Interval History: Upon examination, patient in bed ill appearing, tolerating supplemental O2, alert and oriented able to answer questions appropriately and tell his wishes but some  intermittent confusion noted. He is on vaso and dobutamine. Family meeting today with patient, his wife Julianne, and two out of three sons Bayron and Denny. Family with good understanding of patient's medical history and current medical condition: In the past he was evaluated by the transplant team in which multiple discussions occurred with patient and family but did not want to pursue advanced HD Support or LVAD/OHT. Patient opted to d/c home with dobutamine infusion and manage care at home in which he has been on hosp[facility] with [facility]  as well over the past year. During hospital course patient experience progressive decline and then cardiac arrest in which post arrest with multiorgan failure managed in the ICU. He remains critically ill, on multiple pressors (vaso & dobutamine) and despiteDiscussed poor prognosis/end-stage CHF with patient and his family. this worsening labs including but not limited to LFTs, Cr., lactic acid, patient with s/s of dysphagia upon speech eval. He was extubated on 1/29/24, currently on NC. Patient with c/o hearing loss so lasix d/c and started on bumex. Patient followed by nephrology  in the setting of SHAUN on CKD with worsening Cr., in which it was noted with his baseline cardiomyopathy limits his ability to be treated with renal replacement therapy and ahe will not tolerate dialysis. Patient also verbalized today to multiple providers and family that he does not want to pursue dialysis, per chart review in the past he did not want dialysis as well. We then dicussed goals of care and code status: full code vs DNR/I along with risks, benefits, and alternatives. We had extensive discussion about the above listed and ultimately patient wants to eat by mouth and would not want a peg tube despite signs of dysphagia patient enjoys food on his palate in which ST will continue to follow, patient wants to avoid the hospital and be at home with his grand son, have aggressive  "symptom management, focus on comfort and quality of life and thus patient and family elected to enroll in hospice again preference [facility]. Multiple discussions about code status in which we asked what his wishes would be if God call him home meaning heart stopped beating or he stopped breathing what would his wishes be and he stated "If God is ready for me then he is ready for me and I can't do anything to change that" and that he would want to be with Sourav naturally and peacefully but noted he is not ready to die wants to live as long as he can. Then Mrs. Whitaker asked if he wanted to be revived or for everyone to just let him go and then he stated he wanted to be revived because he wants to live. Family to continue these discussions in which hospice will continue, we discussed that inpatient hospice does not have an ICU or crash cart so they are aware of this and still want to bring him home with hospice care with continued GOC and ACP discussions. All questions and concerns addressed. Primary team updated.     Past Medical History:   Diagnosis Date    A-fib     GERD (gastroesophageal reflux disease)     Heart failure, unspecified     Hypertension     V-tach        Past Surgical History:   Procedure Laterality Date    COLONOSCOPY N/A 12/13/2022    Procedure: COLONOSCOPY;  Surgeon: Geovany Cole MD;  Location: [facility] ([address] [place] [address]);  Service: Endoscopy;  Laterality: N/A;    COLONOSCOPY N/A 12/12/2022    Procedure: COLONOSCOPY;  Surgeon: Geovany Cole MD;  Location: [facility] ENDO ([address] [place] [address]);  Service: Endoscopy;  Laterality: N/A;    PLACEMENT OF SWAN LESTER CATHETER WITH IMAGING GUIDANCE  12/22/2022    Procedure: INSERTION, CATHETER, SWAN-LESTER, WITH IMAGING GUIDANCE;  Surgeon: Brando Parada MD;  Location: [facility] CATH LAB;  Service: Cardiology;;    RIGHT HEART CATHETERIZATION Right 12/16/2022    Procedure: INSERTION, CATHETER, RIGHT HEART;  Surgeon: Brando Parada MD;  Location: [facility] CATH LAB;  " Service: Cardiology;  Laterality: Right;    RIGHT HEART CATHETERIZATION Right 12/22/2022    Procedure: INSERTION, CATHETER, RIGHT HEART;  Surgeon: Brando Parada MD;  Location: [facility] CATH LAB;  Service: Cardiology;  Laterality: Right;       Review of patient's allergies indicates:  No Known Allergies    Medications:  Continuous Infusions:   bumetanide (BUMEX) 25 mg in 100 mL infusion (conc: 0.25 mg/mL) 1 mg/hr (01/30/24 1200)    DOBUTamine IV infusion (non-titrating) 5 mcg/kg/min (01/30/24 1200)    vasopressin 0.04 Units/min (01/30/24 1200)     Scheduled Meds:   [START ON 1/31/2024] amiodarone  400 mg Oral Daily    [START ON 1/31/2024] aspirin  81 mg Oral Daily    chlorothiazide (DIURIL) 250 mg in dextrose 5 % (D5W) 50 mL IVPB  250 mg Intravenous Q24H    lactulose  20 g Oral BID    methylPREDNISolone sodium succinate injection  40 mg Intravenous Q12H    mupirocin   Nasal BID    [START ON 1/31/2024] pantoprazole  40 mg Oral Daily    piperacillin-tazobactam (Zosyn) IV (PEDS and ADULTS) (extended infusion is not appropriate)  4.5 g Intravenous Q12H    sevelamer carbonate  0.8 g Oral TID WM     PRN Meds:sodium chloride 0.9%, acetaminophen, dextrose 10%, dextrose 10%, influenza, ondansetron, pneumoc 20-denita conj-dip cr(PF), sodium chloride 0.9%    Family History    None       Tobacco Use    Smoking status: Some Days     Types: Cigarettes    Smokeless tobacco: Not on file   Substance and Sexual Activity    Alcohol use: Not Currently    Drug use: Not on file    Sexual activity: Not on file       Review of Systems   Reason unable to perform ROS: Limited due to intermittent confusion.   Genitourinary:  Positive for penile pain.        Located where the catheter is inserted    All other systems reviewed and are negative.    Objective:     Vital Signs (Most Recent):  Temp: 96.8 °F (36 °C) (01/30/24 1105)  Pulse: 91 (01/30/24 1100)  Resp: (!) 26 (01/30/24 1100)  BP: (!) 139/92 (01/30/24 1100)  SpO2: 97 % (01/30/24 1100)  Vital Signs (24h Range):  Temp:  [96.3 °F (35.7 °C)-98.2 °F (36.8 °C)] 96.8 °F (36 °C)  Pulse:  [] 91  Resp:  [18-33] 26  SpO2:  [92 %-100 %] 97 %  BP: ()/(53-92) 139/92  Arterial Line BP: ()/(52-76) 108/75     Weight: 84.4 kg (186 lb 1.1 oz)  Body mass index is 30.03 kg/m².       Physical Exam  Vitals and nursing note reviewed.   Constitutional:       Comments: Ill appearing    HENT:      Head: Normocephalic.      Nose: Nose normal.      Mouth/Throat:      Mouth: Mucous membranes are dry.   Eyes:      General:         Right eye: No discharge.         Left eye: No discharge.   Cardiovascular:      Rate and Rhythm: Normal rate.   Pulmonary:      Comments: Tolerating supplemental O2  Abdominal:      General: There is distension.   Musculoskeletal:         General: Swelling present.      Cervical back: Normal range of motion.      Right lower leg: Edema present.      Left lower leg: Edema present.      Comments: Anasarca    Skin:     General: Skin is warm and dry.   Neurological:      Mental Status: He is alert.      Comments: Intermittent confusion, but answered questions appropriatey            Review of Symptoms      Symptom Assessment (ESAS 0-10 Scale)  Pain:  0  Dyspnea:  0  Anxiety:  0  Nausea:  0  Depression:  0  Anorexia:  0  Fatigue:  0  Insomnia:  0  Restlessness:  0  Agitation:  0         Performance Status:  30    Living Arrangements:  Lives with spouse    Psychosocial/Cultural:   See Palliative Psychosocial Note: No  Patient lives with spouse, has three adult sons  **Primary  to Follow**  Palliative Care  Consult: No        Advance Care Planning   Advance Directives:     Decision Making:  Patient answered questions and Family answered questions  Goals of Care: The patient and family endorses that what is most important right now is to focus on spending time at home, avoiding the hospital, remaining as independent as possible, symptom/pain control, quality of  life, even if it means sacrificing a little time, and comfort and QOL     Accordingly, we have decided that the best plan to meet the patient's goals includes enrolling in hospice care with [facility] with continuation of dobutamine infusion with plans to wean off at some point. Patient and family want to remain full code at this time with continued discussions with hospice regarding code status in which we discussed hospice philosophy but at this point they want to continue to discuss but want him to remain full code.           Significant Labs: All pertinent labs within the past 24 hours have been reviewed.  CBC:   Recent Labs   Lab 01/30/24  0359   WBC 8.90   HGB 9.3*   HCT 31.6*   MCV 75*   PLT 97*     BMP:  Recent Labs   Lab 01/30/24  0359   *   *   K 4.5   CL 83*   CO2 22*   *   CREATININE 5.3*   CALCIUM 9.1   MG 2.6     LFT:  Lab Results   Component Value Date     (H) 01/30/2024     (H) 01/30/2024    ALKPHOS 139 (H) 01/30/2024    ALKPHOS 139 (H) 01/30/2024    BILITOT 7.6 (H) 01/30/2024    BILITOT 7.6 (H) 01/30/2024     Albumin:   Albumin   Date Value Ref Range Status   01/30/2024 3.0 (L) 3.5 - 5.2 g/dL Final   01/30/2024 3.0 (L) 3.5 - 5.2 g/dL Final     Protein:   Total Protein   Date Value Ref Range Status   01/30/2024 7.4 6.0 - 8.4 g/dL Final   01/30/2024 7.4 6.0 - 8.4 g/dL Final     Lactic acid:   Lab Results   Component Value Date    LACTATE 3.7 (HH) 01/30/2024    LACTATE 3.7 (HH) 01/29/2024       Significant Imaging: I have reviewed all pertinent imaging results/findings within the past 24 hours.    I spent face to face time and non-face to face time preparing to see the patient (eg, review of tests), Obtaining and/or reviewing separately obtained history, Documenting clinical information in the electronic or other health record, Independently interpreting results and communicating results to the patient/family/caregiver, or Care coordination.     > 46 minutes spent  in discussing ACP    Catina Ashby NP  Palliative Medicine  [facility] ([facility])

## 2024-01-30 NOTE — SUBJECTIVE & OBJECTIVE
Interval History: Upon examination, patient in bed ill appearing, weaned to RA, alert and oriented. We had extensive discussion on yesterday regarding goals of care and code status in which they elected to enroll in hospice care. Most of their goals were in alignment with hospice philosophy not wanting to pursue HD with worsening renal function and wanting to eat by mouth despite risk for aspiration but still wanted to be resuscitated in which we discussed the difference between wanting aggressive measures vs comfort and quality of life. Today I followed up with patient and family as my plan was again to discuss the fine line between home based palliative w/ home health vs hospice in regards to their goals and code status and what's important to the patient. Today I met with patient and family in addition PM nurse & SW, in addition to hospice and HBP liaison as the bedside. We had an extensive discussion  and family meeting regarding goals of care and advance care planning in the setting of End stage HF depending on dobutamine, SHAUN on CKD with worsening renal function not a candidate for HD and does wish to pursue even if offered, generalized weakness with functional decline, multi organ failure, concerns for dysphagia and aspiration but patient and family still want him to be able to eat by mouth. Ultimately family elected DNR/I code status to allow for natural and peaceful death and thus elected to enroll in hospice care at home with hopes of discharging home today in a stable fashion. All questions and concerns addressed. Primary team and cardiology updated.     Past Medical History:   Diagnosis Date    A-fib     GERD (gastroesophageal reflux disease)     Heart failure, unspecified     Hypertension     V-tach        Past Surgical History:   Procedure Laterality Date    COLONOSCOPY N/A 12/13/2022    Procedure: COLONOSCOPY;  Surgeon: Geovany Cole MD;  Location: [facility] ([address] [place] [address]);  Service: Endoscopy;   Laterality: N/A;    COLONOSCOPY N/A 12/12/2022    Procedure: COLONOSCOPY;  Surgeon: Geovany Cole MD;  Location: [facility] ([address] [place] [address]);  Service: Endoscopy;  Laterality: N/A;    PLACEMENT OF SWAN LESTER CATHETER WITH IMAGING GUIDANCE  12/22/2022    Procedure: INSERTION, CATHETER, SWAN-LESTER, WITH IMAGING GUIDANCE;  Surgeon: Brando Parada MD;  Location: [facility] CATH LAB;  Service: Cardiology;;    RIGHT HEART CATHETERIZATION Right 12/16/2022    Procedure: INSERTION, CATHETER, RIGHT HEART;  Surgeon: Brando Parada MD;  Location: [facility] CATH LAB;  Service: Cardiology;  Laterality: Right;    RIGHT HEART CATHETERIZATION Right 12/22/2022    Procedure: INSERTION, CATHETER, RIGHT HEART;  Surgeon: Brando Parada MD;  Location: [facility] CATH LAB;  Service: Cardiology;  Laterality: Right;       Review of patient's allergies indicates:  No Known Allergies    Medications:  Continuous Infusions:   bumetanide (BUMEX) 25 mg in 100 mL infusion (conc: 0.25 mg/mL) 1 mg/hr (01/30/24 1400)    DOBUTamine IV infusion (non-titrating) 5 mcg/kg/min (01/30/24 1400)    vasopressin 0.04 Units/min (01/30/24 1535)     Scheduled Meds:   [START ON 1/31/2024] amiodarone  400 mg Oral Daily    [START ON 1/31/2024] aspirin  81 mg Oral Daily    chlorothiazide (DIURIL) 250 mg in dextrose 5 % (D5W) 50 mL IVPB  250 mg Intravenous Q24H    lactulose  20 g Oral BID    methylPREDNISolone sodium succinate injection  40 mg Intravenous Q12H    mupirocin   Nasal BID    [START ON 1/31/2024] pantoprazole  40 mg Oral Daily    piperacillin-tazobactam (Zosyn) IV (PEDS and ADULTS) (extended infusion is not appropriate)  4.5 g Intravenous Q12H    sevelamer carbonate  0.8 g Oral TID WM     PRN Meds:sodium chloride 0.9%, acetaminophen, dextrose 10%, dextrose 10%, influenza, ondansetron, pneumoc 20-denita conj-dip cr(PF), sodium chloride 0.9%    Family History    None       Tobacco Use    Smoking status: Some Days     Types: Cigarettes    Smokeless tobacco: Not on  file   Substance and Sexual Activity    Alcohol use: Not Currently    Drug use: Not on file    Sexual activity: Not on file       Review of Systems   Reason unable to perform ROS: Limited due to intermittent confusion.   Gastrointestinal:  Positive for abdominal distention.   Genitourinary:         Located where the catheter is inserted    All other systems reviewed and are negative.    Objective:     Vital Signs (Most Recent):  Temp: 96.8 °F (36 °C) (01/30/24 1105)  Pulse: 92 (01/30/24 1400)  Resp: (!) 30 (01/30/24 1400)  BP: 99/74 (01/30/24 1400)  SpO2: (!) 92 % (01/30/24 1400) Vital Signs (24h Range):  Temp:  [96.3 °F (35.7 °C)-97.9 °F (36.6 °C)] 96.8 °F (36 °C)  Pulse:  [] 92  Resp:  [18-33] 30  SpO2:  [92 %-100 %] 92 %  BP: ()/(53-92) 99/74  Arterial Line BP: ()/(52-78) 104/70     Weight: 84.4 kg (186 lb 1.1 oz)  Body mass index is 30.03 kg/m².       Physical Exam  Vitals and nursing note reviewed.   Constitutional:       Comments: Ill appearing    HENT:      Head: Normocephalic.      Nose: Nose normal.      Mouth/Throat:      Mouth: Mucous membranes are dry.   Eyes:      General:         Right eye: No discharge.         Left eye: No discharge.   Cardiovascular:      Rate and Rhythm: Normal rate.   Pulmonary:      Comments: Tolerating supplemental O2  Abdominal:      General: There is distension.   Musculoskeletal:         General: Swelling present.      Cervical back: Normal range of motion.      Right lower leg: Edema present.      Left lower leg: Edema present.      Comments: Anasarca    Skin:     General: Skin is warm and dry.   Neurological:      Mental Status: He is alert.      Comments: Intermittent confusion, but answered questions appropriatey            Review of Symptoms      Symptom Assessment (ESAS 0-10 Scale)  Pain:  0  Dyspnea:  0  Anxiety:  0  Nausea:  0  Depression:  0  Anorexia:  0  Fatigue:  0  Insomnia:  0  Restlessness:  0  Agitation:  0         Performance Status:   30    Living Arrangements:  Lives with spouse    Psychosocial/Cultural:   See Palliative Psychosocial Note: No  Patient lives with spouse, has three adult sons  **Primary  to Follow**  Palliative Care  Consult: No        Advance Care Planning   Advance Directives:   [place]: Defer to hospice agency.    Do Not Resuscitate Status: Yes      Decision Making:  Patient answered questions and Family answered questions  Goals of Care: What is most important right now is to focus on spending time at home, avoiding the hospital, remaining as independent as possible, symptom/pain control, quality of life, even if it means sacrificing a little time, comfort and QOL. Accordingly, we have decided that the best plan to meet the patient's goals includes enrolling in hospice care w/ [facility]. DNR/I.          Significant Labs: All pertinent labs within the past 24 hours have been reviewed.  CBC:   Recent Labs   Lab 01/30/24  0359   WBC 8.90   HGB 9.3*   HCT 31.6*   MCV 75*   PLT 97*       BMP:  Recent Labs   Lab 01/30/24 0359   *   *   K 4.5   CL 83*   CO2 22*   *   CREATININE 5.3*   CALCIUM 9.1   MG 2.6       LFT:  Lab Results   Component Value Date     (H) 01/30/2024     (H) 01/30/2024    ALKPHOS 139 (H) 01/30/2024    ALKPHOS 139 (H) 01/30/2024    BILITOT 7.6 (H) 01/30/2024    BILITOT 7.6 (H) 01/30/2024     Albumin:   Albumin   Date Value Ref Range Status   01/30/2024 3.0 (L) 3.5 - 5.2 g/dL Final   01/30/2024 3.0 (L) 3.5 - 5.2 g/dL Final     Protein:   Total Protein   Date Value Ref Range Status   01/30/2024 7.4 6.0 - 8.4 g/dL Final   01/30/2024 7.4 6.0 - 8.4 g/dL Final     Lactic acid:   Lab Results   Component Value Date    LACTATE 3.7 (HH) 01/30/2024    LACTATE 3.7 (HH) 01/29/2024       Significant Imaging: I have reviewed all pertinent imaging results/findings within the past 24 hours.

## 2024-01-30 NOTE — SUBJECTIVE & OBJECTIVE
Review of Systems   Constitutional: Positive for malaise/fatigue.   HENT:  Positive for hearing loss.    Cardiovascular: Negative.    Respiratory:  Positive for shortness of breath.    Endocrine: Negative.    Hematologic/Lymphatic: Negative.    Skin: Negative.    Musculoskeletal: Negative.    Gastrointestinal:  Positive for bloating.   Genitourinary: Negative.    Neurological: Negative.    Psychiatric/Behavioral: Negative.     Allergic/Immunologic: Negative.      Objective:     Vital Signs (Most Recent):  Temp: 96.8 °F (36 °C) (01/30/24 1105)  Pulse: 91 (01/30/24 1100)  Resp: (!) 26 (01/30/24 1100)  BP: (!) 139/92 (01/30/24 1100)  SpO2: 97 % (01/30/24 1100) Vital Signs (24h Range):  Temp:  [96.3 °F (35.7 °C)-98.8 °F (37.1 °C)] 96.8 °F (36 °C)  Pulse:  [] 91  Resp:  [18-33] 26  SpO2:  [92 %-100 %] 97 %  BP: ()/(53-92) 139/92  Arterial Line BP: ()/(52-76) 108/75     Weight: 84.4 kg (186 lb 1.1 oz)  Body mass index is 30.03 kg/m².     SpO2: 97 %         Intake/Output Summary (Last 24 hours) at 1/30/2024 1328  Last data filed at 1/30/2024 1200  Gross per 24 hour   Intake 817.82 ml   Output 805 ml   Net 12.82 ml       Lines/Drains/Airways       Peripherally Inserted Central Catheter Line  Duration             PICC Double Lumen 01/19/24 11 days              Drain  Duration                  Urethral Catheter 01/28/24 0030 2 days              Arterial Line  Duration             Arterial Line 01/28/24 0103 Right Femoral 2 days              Peripheral Intravenous Line  Duration                  Peripheral IV - Single Lumen 01/28/24 1830 20 G Left Antecubital 1 day                       Physical Exam  Vitals and nursing note reviewed.   Constitutional:       Appearance: He is ill-appearing.      Comments: On supplemental O2   HENT:      Head: Normocephalic and atraumatic.      Ears:      Comments: [place]  Eyes:      Pupils: Pupils are equal, round, and reactive to light.   Neck:      Vascular: JVD present.  "  Cardiovascular:      Rate and Rhythm: Normal rate. Rhythm irregularly irregular.      Heart sounds: S1 normal and S2 normal. No murmur heard.  Pulmonary:      Effort: Pulmonary effort is normal.      Comments: Diminished BS at bases  Abdominal:      General: There is distension.   Musculoskeletal:      Right lower leg: No edema.      Left lower leg: No edema.   Neurological:      General: No focal deficit present.      Comments: Awake, alert, answers questions appropriately   Psychiatric:         Mood and Affect: Mood normal.            Significant Labs: CMP   Recent Labs   Lab 01/29/24 0414 01/30/24  0359   * 127*   K 4.4 4.5   CL 84* 83*   CO2 23 22*   * 120*   * 123*   CREATININE 4.6* 5.3*   CALCIUM 9.2 9.1   PROT 7.1 7.4  7.4   ALBUMIN 3.0* 3.0*  3.0*   BILITOT 6.9* 7.6*  7.6*   ALKPHOS 177* 139*  139*   * 317*  317*   * 314*  314*   ANIONGAP 19* 22*   , CBC   Recent Labs   Lab 01/29/24 0414 01/29/24 2238 01/30/24  0359   WBC 10.82 9.59 8.90   HGB 10.3* 9.3* 9.3*   HCT 34.5* 30.8* 31.6*   * 101* 97*   , Troponin No results for input(s): "TROPONINI" in the last 48 hours., and All pertinent lab results from the last 24 hours have been reviewed.    Significant Imaging: Echocardiogram: Transthoracic echo (TTE) complete ([facility] Only):   Results for orders placed or performed during the hospital encounter of 12/05/22   Echo   Result Value Ref Range    BSA 2.06 m2    TDI SEPTAL 0.07 m/s    LV LATERAL E/E' RATIO 10.22 m/s    LV SEPTAL E/E' RATIO 13.14 m/s    LA WIDTH 3.80 cm    TDI LATERAL 0.09 m/s    LVIDd 5.70 3.5 - 6.0 cm    IVS 0.72 0.6 - 1.1 cm    Posterior Wall 0.93 0.6 - 1.1 cm    LVIDs 5.10 (A) 2.1 - 4.0 cm    FS 11 28 - 44 %    LA volume 78.94 cm3    Sinus 2.19 cm    STJ 2.04 cm    Ascending aorta 2.39 cm    LV mass 176.88 g    LA size 4.06 cm    RVDD 4.45 cm    TAPSE 1.44 cm    RV S' 4.35 cm/s    Left Ventricle Relative Wall Thickness 0.33 cm    AV mean " gradient 3 mmHg    AV valve area 1.33 cm2    AV Velocity Ratio 0.53     AV index (prosthetic) 0.52     Mean e' 0.08 m/s    LVOT diameter 1.80 cm    LVOT area 2.5 cm2    LVOT peak alex 0.59 m/s    LVOT peak VTI 7.76 cm    Ao peak alex 1.11 m/s    Ao VTI 14.80 cm    Mr max alex 0.04 m/s    LVOT stroke volume 19.74 cm3    AV peak gradient 5 mmHg    E/E' ratio 11.50 m/s    MV Peak E Alex 0.92 m/s    TR Max Alex 3.56 m/s    LV Systolic Volume 101.20 mL    LV Systolic Volume Index 56.5 mL/m2    LV Diastolic Volume 135.44 mL    LV Diastolic Volume Index 75.66 mL/m2    LA Volume Index 44.1 mL/m2    LV Mass Index 99 g/m2    RA Major Axis 5.40 cm    Left Atrium Minor Axis 6.00 cm    Left Atrium Major Axis 6.04 cm    Triscuspid Valve Regurgitation Peak Gradient 51 mmHg    LA Volume Index (Mod) 36.2 mL/m2    LA volume (mod) 64.73 cm3    RA Width 3.93 cm    Right Atrial Pressure (from IVC) 15 mmHg    EF 20 %    TV resting pulmonary artery pressure 66 mmHg    Narrative    · The left ventricle is mildly enlarged with severely decreased systolic   function.  · The estimated ejection fraction is <20%.  · There is left ventricular global hypokinesis.  · Indeterminate left ventricular diastolic function.  · Mild right ventricular enlargement with moderately reduced right   ventricular systolic function.  · Biatrial enlargement.  · Mild mitral regurgitation.  · Mild tricuspid regurgitation.  · The estimated PA systolic pressure is 66 mmHg.  · There is pulmonary hypertension.  · Elevated central venous pressure (15 mmHg).  · Small posterior pericardial effusion.      , EKG: Reviewed, and X-Ray: CXR: X-Ray Chest 1 View (CXR):   Results for orders placed or performed during the hospital encounter of 01/26/24   X-Ray Chest 1 View    Narrative    EXAMINATION:  XR CHEST 1 VIEW    CLINICAL HISTORY:  <Diagnosis>, respiratory failure;    COMPARISON:  Chest, 01/28/2024    FINDINGS:  Endotracheal tube and nasogastric tube and right PICC catheter  remain in good position.  Stable enlargement the cardiac silhouette.  There appears to be some mild congestion bilaterally which appears stable.  There is improving aeration of the medial right upper lobe.      Impression    Mild congestion bilaterally appears stable.  Improving aeration of the right upper lobe.      Electronically signed by: Dewayne Harrison MD  Date:    01/29/2024  Time:    10:51    and X-Ray Chest PA and Lateral (CXR): No results found for this visit on 01/26/24.

## 2024-01-30 NOTE — HPI
Mr. Garcia who goes by Salazar is a pleasant 53 y.o. male who presented with intermittent chest pain for 24-36hrs prior to arrival. Hx of severe NICM, EF 10%  ICD on chronic home dobutamine, Pulmonary HTN - PAP 47/30. In the past he was evaluated by the transplant team in which multiple discussions occurred with patient and family but did not want to pursue advanced HD Support or LVAD/OHT. Patient opted to d/c home with dobutamine infusion and manage care at home in which he has been on hospice with [facility]  as well over the past year. During hospital course patient experience progressive decline and then cardiac arrest in which post arrest with multiorgan failure managed in the ICU. He remains critically ill, on multiple pressors (vaso & dobutamine) and despite this worsening labs including but not limited to LFTs, Cr., lactic acid, patient with s/s of dysphagia upon speech eval. He was extubated on 1/29/24, currently on NC. Patient with c/o hearing loss so lasix d/c and started on bumex. Patient followed by nephrology  in the setting of SHAUN on CKD with worsening Cr., in which it was noted with his baseline cardiomyopathy limits his ability to be treated with renal replacement therapy and ahe will not tolerate dialysis. Patient also verbalized today to multiple providers and family that he does not want to pursue dialysis, per chart review in the past he did not want dialysis as well. Palliative medicine consulted for goals of care and advance care planning.

## 2024-01-30 NOTE — ASSESSMENT & PLAN NOTE
Patient with Hypoxic Respiratory failure which is Acute.  he is not on home oxygen. Supplemental oxygen was provided and noted- Oxygen Concentration (%):  [40-50] 40    .   Signs/symptoms of respiratory failure include-  on vent . Contributing diagnoses includes - CHF Labs and images were reviewed. Patient Has recent ABG, which has been reviewed. Will treat underlying causes and adjust management of respiratory failure as follows-     Extubated on 1/29  On nasal cannula

## 2024-01-30 NOTE — CONSULTS
Referral sent to [facility] hospice. Preference obtained by Palliative team.

## 2024-01-30 NOTE — ASSESSMENT & PLAN NOTE
-HR variable  -Hold Eliquis for know given liver failure/auto-anticoagulation (INR 3.2)  -Repeat INR in AM    1/30/24  -Eliquis held given worsening thrombocytopenia/INR of 2.7

## 2024-01-30 NOTE — ASSESSMENT & PLAN NOTE
51 YO M w/ history of long standing NICM (> 10 yrs), HTN, marijuana and tobacco use, CKD stage 3 admitted for CP.  CP is atypical sometimes pleuritic, worse on movements.  Cath 2022 [facility] nonobstructive/minimal ds. EKG afib with V rate 114. There was 1 episode of NSVT. There is a mild increase in troponin .056. Pt with chronic CHF sx NYHA class 3.   CXR wnl.  After multiple discussions explaining plan and goals of care with OHT team in [place] he does not want to undergo any further workup/procedures or increased HD support at this time ( OHT or LVAD)   Pt on home dobutrex.    Pt with atypical CP ( nonobstructive ds by cath), will add ranexa, can add PPI. CHF mild continue IV diuretics, amiodarone added for NSVT. Pt can go to telemetry    1/29/24  -Patient remains critically ill, intubated on multiple pressors/inotropic support  -LFT's and creatinine trending up  -Diurese at tolerated  -Previously declined any further advanced HF workup in 2022  -Poor prognosis, Dr. Alvarado discussed with son at bedside  -Palliative consulted    1/30/24  -Remains critically ill on dobutamine/vasopressin with MSOF  -Supportive care  -Discussed poor prognosis  -Patient previously declined advanced HF workup/options, expressed this again today during our discussion  -Palliative consulted

## 2024-01-30 NOTE — PT/OT/SLP EVAL
"Occupational Therapy Evaluation and Treatment    Name: Migel Garcia  MRN: [id number]  Admitting Diagnosis: <principal problem not specified>  Recent Surgery: * No surgery found *      Recommendations:     Discharge Recommendations:  (TBD- anticipate 24/7 SPV and A)  Level of Assistance Recommended: 24 hours significant assistance  Discharge Equipment Recommendations: hospital bed, wheelchair  Barriers to discharge: None    Assessment:     Migel Garcia is a 53 y.o. male with a medical diagnosis of <principal problem not specified>. He presents with performance deficits affecting function including weakness, impaired endurance, impaired self care skills, impaired functional mobility, impaired balance, impaired cardiopulmonary response to activity, pain, edema, impaired fine motor.    Rehab Prognosis:  Questionable ; patient would benefit from acute OT services to address these deficits and reach maximum level of function.    Patient with complex medical history limiting overall rehab potential.    Plan:     Patient to be seen 2 x/week to address the above listed problems via therapeutic exercises, therapeutic activities, self-care/home management  Plan of Care Expires: 02/13/24  Plan of Care Reviewed with: patient    Subjective     Chief Complaint: Reported "I can try and do that."  Patient Comments/Goals: none reported  Pain/Comfort:  Pain Rating 1:  (c/o sore throat and chest pain with coughing, did not rate)  Pain Addressed 1:  (activity pacing)    Social History:  Living Environment: Patient lives with their spouse in a single story home with  a threshold step to enter.  Prior Level of Function: Prior to admission, patient was independent with household distance ambulation and mod I with ADLs.  Roles and Routines: Patient was driving and disabled prior to admission.  Equipment Used at Home: oxygen (used PRN)  DME owned (not currently used): rolling walker  Assistance Upon Discharge: family    Objective: "     Communicated with nurseKate, prior to session. Patient found supine with arterial line, blood pressure cuff, pulse ox (continuous), telemetry, jaffe catheter, PICC line, oxygen, peripheral IV upon OT entry to room.    General Precautions: Standard, fall, respiratory, hearing impaired   Orthopedic Precautions: N/A   Braces: N/A    Respiratory Status: Nasal cannula, flow 1 L/min (exchanged HF tubing for NC to improve patient comfort.)    Occupational Performance    Bed Mobility:   Rolling/Turning to Left with stand by assistance  Rolling/Turning to Right with stand by assistance  Scooting to HOB in supine: maximal assistance and of 2 persons- completed to improve positioning in bed.  Bridging with min A  Provided with v/c for technique and increased time to maximize active engagement in all mobility.  Education on engagement in bed mobility to decrease risk of skin breakdown.    HELD EOB and OOB due to femoral art line. Will progress as able.    Activities of Daily Living:  Grooming: maximal assistance  Upper Body Dressing: total assistance    Cognitive/Visual Perceptual:  Cognitive/Psychosocial Skills:    -     Oriented to: Person, Place, Time, Situation  -     Follows Commands/attention: Follows one-step commands    Physical Exam:  Upper Extremity Range of Motion:     -       Right Upper Extremity: WFL  -       Left Upper Extremity: WFL  Upper Extremity Strength:    -       Right Upper Extremity: grossly 4-/5  -       Left Upper Extremity: Deficits: grossly 4-/5   Strength:    -       Right Upper Extremity: Deficits: fair  -       Left Upper Extremity: Deficits: poor  Edema to L UE    AMPAC 6 Click ADL:  AMPAC Total Score: 15    Treatment & Education:  Therapist provided facilitation and instruction of proper body mechanics, energy conservation, and fall prevention strategies during tasks listed above  Patient educated on role of OT, POC, and goals for therapy  Patient educated on importance of OOB  activities with staff member assistance and sitting OOB majority of the day  Provided with red tband for initiation of resistive B UE strength HEP. Completed x10 reps, x3 planes of motion (limited on L UE 2/2 AC PIV). Max cueing for technique and pacing throughout. Will continue to progress as able.    Patient left supine with all lines intact, call button in reach, and RN notified.    GOALS:   Multidisciplinary Problems       Occupational Therapy Goals          Problem: Occupational Therapy    Goal Priority Disciplines Outcome Interventions   Occupational Therapy Goal     OT, PT/OT     Description: Goals to be met by: 2/13/24     Patient will increase functional independence with ADLs by performing:    UE Dressing with Set-up Assistance.  Grooming while supine with Set-up Assistance.  Increased functional strength in B UE grossly by 1/2 MM grade.                       History:     Past Medical History:   Diagnosis Date    A-fib     GERD (gastroesophageal reflux disease)     Heart failure, unspecified     Hypertension     V-tach          Past Surgical History:   Procedure Laterality Date    COLONOSCOPY N/A 12/13/2022    Procedure: COLONOSCOPY;  Surgeon: Geovany Cole MD;  Location: [facility] ([address] [place] [address]);  Service: Endoscopy;  Laterality: N/A;    COLONOSCOPY N/A 12/12/2022    Procedure: COLONOSCOPY;  Surgeon: Geovany Cole MD;  Location: [facility] ([address] [place] [address]);  Service: Endoscopy;  Laterality: N/A;    PLACEMENT OF SWAN LESTER CATHETER WITH IMAGING GUIDANCE  12/22/2022    Procedure: INSERTION, CATHETER, SWAN-LESTER, WITH IMAGING GUIDANCE;  Surgeon: Brando Parada MD;  Location: [facility] CATH LAB;  Service: Cardiology;;    RIGHT HEART CATHETERIZATION Right 12/16/2022    Procedure: INSERTION, CATHETER, RIGHT HEART;  Surgeon: Brando Parada MD;  Location: [facility] CATH LAB;  Service: Cardiology;  Laterality: Right;    RIGHT HEART CATHETERIZATION Right 12/22/2022    Procedure: INSERTION, CATHETER, RIGHT HEART;   Surgeon: Brando Parada MD;  Location: [facility] CATH LAB;  Service: Cardiology;  Laterality: Right;       Time Tracking:     OT Date of Treatment: 01/30/24  OT Start Time: 0700  OT Stop Time: 0725  OT Total Time (min): 25 min    Billable Minutes: Evaluation 15 and Therapeutic Exercise 10    Ana Hart OT  1/30/2024

## 2024-01-30 NOTE — PROGRESS NOTES
[facility] ([facility])  Nephrology  Progress Note    Patient Name: Migel Garcia  MRN: [id number]  Admission Date: 1/26/2024  Hospital Length of Stay: 4 days  Attending Provider: Silvia Mae MD   Primary Care Physician: Anuj Banks MD  Principal Problem:<principal problem not specified>    Subjective:     HPI:   Mr Garcia is a 52 yo male with severe NICM, with an EF <20% and  Pulmonary HTN  noted on his recent ECHO who presented to the hospital with chest pain.  He has previously been evaluated by the transplant team but does not want to pursue advanced support.  After his last hospital stay, he was discharged home with hospice and has been managed on a dobutamine infusion for over a year.  He was managed in the ICU and stabilized. He was Transferred to the floor in stable condition yesterday but then had an episode of SVT and then bradycardia progressing to PEA. His Defebrillator never fired. CPR lasted 6 min.   Was promptly intubation and transferred to the ICU on vasopressin and epi.   Pt is severely ill with  severe cardiogenic shock with MSOF.  Now with transaminitis due to shock liver and no urine output with progressing renal failure,. He has refractory hypoglycemia as well, all point to poor perfusion state.   The family understands he is a poor candidate for RRT. They also understand he would wish for chronic dialysis therapy.   In the past lasix infusion has worked for him per family and I will try this today. It is unlikely that he will respond significantly to this therapy.  I will follow with his response and discuss with family if we have any further options for treatment.       Interval History:  Patient was seen and examined in the ICU.  He remains critically ill.  He was extubated yesterday and has remained stable off the ventilator.  He remains on pressors to support his blood pressure.  He remains in shock with blood pressures in the 90s.  He is able to answer some  questions today but is confused.  He continues to suffer with anasarca in acute on chronic kidney injury with a rising creatinine.  He produced approximately 1.2 L of urine yesterday on a Lasix drip.  His Family understands that given his severe cardiomyopathy with ejection fraction in the range of 10%, he has not a candidate for renal replacement therapy.    Review of patient's allergies indicates:  No Known Allergies  Current Facility-Administered Medications   Medication Frequency    0.9%  NaCl infusion PRN    acetaminophen tablet 975 mg Q8H PRN    amiodarone tablet 400 mg Daily    aspirin chewable tablet 81 mg Daily    chlorothiazide (DIURIL) 250 mg in dextrose 5 % (D5W) 50 mL IVPB Q24H    dextrose 10% bolus 125 mL 125 mL PRN    dextrose 10% bolus 250 mL 250 mL PRN    DOBUtamine 1000 mg in D5W 250 mL infusion Continuous    EPINEPHrine (ADRENALIN) 30 mg in dextrose 5 % (D5W) 250 mL infusion Continuous    furosemide (Lasix) 200 mg in sodium chloride 0.9% SolP 100 mL continuous infusion (conc: 2 mg/mL) Continuous    influenza (QUADRIVALENT PF) vaccine 0.5 mL vaccine x 1 dose    methylPREDNISolone sodium succinate injection 40 mg TID    mupirocin 2 % ointment BID    ondansetron injection 4 mg Q6H PRN    pantoprazole injection 40 mg Daily    piperacillin-tazobactam (ZOSYN) 4.5 g in dextrose 5 % in water (D5W) 100 mL IVPB (MB+) Q12H    pneumoc 20-denita conj-dip cr(PF) (PREVNAR-20 (PF)) injection Syrg 0.5 mL vaccine x 1 dose    polyethylene glycol packet 17 g Daily    sodium chloride 0.9% flush 10 mL PRN    vasopressin (PITRESSIN) 0.2 Units/mL in dextrose 5% 100 mL infusion Continuous       Objective:     Vital Signs (Most Recent):  Temp: 96.4 °F (35.8 °C) (01/30/24 0739)  Pulse: 93 (01/30/24 0739)  Resp: (!) 22 (01/30/24 0739)  BP: (!) 107/56 (01/30/24 0600)  SpO2: 96 % (01/30/24 0739) Vital Signs (24h Range):  Temp:  [96.4 °F (35.8 °C)-99.1 °F (37.3 °C)] 96.4 °F (35.8 °C)  Pulse:  [] 93  Resp:  [18-33] 22  SpO2:   [92 %-100 %] 96 %  BP: ()/(55-78) 107/56  Arterial Line BP: ()/(52-83) 86/56     Weight: 84.4 kg (186 lb 1.1 oz) (01/30/24 0601)  Body mass index is 30.03 kg/m².  Body surface area is 1.98 meters squared.    I/O last 3 completed shifts:  In: 1737.1 [I.V.:1285.5; NG/GT:90; IV Piggyback:361.6]  Out: 2115 [Urine:2115]     Physical Exam  Vitals reviewed.   Constitutional:       Appearance: He is ill-appearing.   HENT:      Head: Normocephalic.   Cardiovascular:      Rate and Rhythm: Normal rate and regular rhythm.   Pulmonary:      Effort: Pulmonary effort is normal. No respiratory distress.   Abdominal:      General: There is distension.      Palpations: Abdomen is soft.   Musculoskeletal:         General: Swelling present.   Skin:     General: Skin is warm.          Significant Labs:  CBC:   Recent Labs   Lab 01/30/24  0359   WBC 8.90   RBC 4.22*   HGB 9.3*   HCT 31.6*   PLT 97*   MCV 75*   MCH 22.0*   MCHC 29.4*     CMP:   Recent Labs   Lab 01/30/24  0359   *   CALCIUM 9.1   ALBUMIN 3.0*  3.0*   PROT 7.4  7.4   *   K 4.5   CO2 22*   CL 83*   *   CREATININE 5.3*   ALKPHOS 139*  139*   *  314*   *  317*   BILITOT 7.6*  7.6*     All labs within the past 24 hours have been reviewed.     Significant Imaging:  X-Ray: Reviewed  Chest x-ray from yesterday shows a huge globular heart taking up his left chest with a right upper lobe infiltrate and small bilateral effusions  Assessment/Plan:     Status post cardiac arrest  Patient was severe cardiomyopathy at baseline with defibrillator placement previously, now post arrest with multiorgan failure managed in the ICU.       Nonischemic cardiomyopathy with severe decreased ejection fraction and pulmonary hypertension  After multiple discussions explaining plan and goals of care with OHT team in [place], he does not want to undergo any further workup/procedures or increased HD support at this time ( OHT or LVAD)   He is  managed at home on dobutrex.  His most recent ejection fraction is in the range of 10% making him a poor candidate for renal replacement therapy.     Ventilatory failure  Pt extubated yesterday and is stable at present.     Cardiogenic shock  managed in the ICU post arrest.  Blood pressure supported by pressors runnign in 80-90's.  Weaning as tolerated per ICU team     Status post previous ICD placement   His ICD apparently did not fire during his recent arrest     Acute on stage III chronic kidney disease  Patient's CKD is likely due to his pre renal state with his ejection fraction in the range of 10%.  He typically has very high BUN to creatinine ratio which is consistent with this.    His acute decline is due to shock associated with his recent cardiac arrest with ongoing hemodynamic instability.  His creatinine is worse again today, up to 5.3.  His baseline cardiomyopathy limits his ability to be treated with renal replacement therapy and he will not tolerate dialysis in my opinion.  He is now on a Lasix drip with Diuril dosing and made over a liter of urine yesterday.       Shock liver  Transaminitis with slight improvement  Follow with hemodynamic support with fluids and pressors as tolerated.    Patrick Louis MD  Nephrology  [facility] ([facility])

## 2024-01-30 NOTE — PT/OT/SLP EVAL
Physical Therapy Evaluation and Treatment    Patient Name:  Migel Garcia   MRN:  [id number]    Recommendations:     Discharge Recommendations:  (TBD PENDING FURTHER ASSESSMENT)   Discharge Equipment Recommendations:  (TBD)   Barriers to discharge: None    Assessment:     Migel Garcia is a 53 y.o. male admitted with a medical diagnosis of <principal problem not specified>.  He presents with the following impairments/functional limitations: weakness, impaired endurance, impaired functional mobility, gait instability, impaired balance, decreased safety awareness, decreased lower extremity function, decreased coordination, impaired cardiopulmonary response to activity.    Rehab Prognosis: Fair; patient would benefit from acute skilled PT services to address these deficits and reach maximum level of function.    Recent Surgery: * No surgery found *     Plan:     During this hospitalization, patient to be seen 3 x/week to address the identified rehab impairments via gait training, therapeutic activities, therapeutic exercises and progress toward the following goals:    Plan of Care Expires:  02/13/24    Subjective     Chief Complaint: CONCERNED ABOUT NEW HEARING DEFICIT  Patient/Family Comments/goals:   Pain/Comfort:  Pain Rating 1: 2/10  Location 1: chest (C/O DISCOMFORT WITH COUGH)    Patients cultural, spiritual, Temple conflicts given the current situation:      Living Environment:  PT LIVES WITH WIFE 1 STORY HOUSE 1 STEP TO ENTER, PT AMB IN HOME AND OUTSIDE OF HOME WITHOUT AD, DRIVES, DOES NOT WORK, INDEP WITH ADL'S  Prior to admission, patients level of function was INDEP.  Equipment used at home: oxygen.  DME owned (not currently used): none.  Upon discharge, patient will have assistance from WIFE.    Objective:     Communicated with NURSE ACEVEDO prior to session.  Patient found supine with arterial line, blood pressure cuff, pulse ox (continuous), telemetry, peripheral IV, oxygen, jaffe catheter, PICC line   "upon PT entry to room.    General Precautions: Standard, fall, respiratory, hearing impaired  Orthopedic Precautions:N/A   Braces: N/A  Respiratory Status: Nasal cannula, flow 1 L/min    Exams:  Cognitive Exam:  Patient is oriented to Person, Place, Time, and Situation  Sensation:    -       Intact  BLE  Skin Integrity/Edema:      -       Edema: Mild BUE  RLE ROM: WFL  RLE Strength: GROSSLY 3+/5  LLE ROM: WFL  LLE Strength: GROSSLY 3+/5    Functional Mobility:  Bed Mobility:     Rolling Left:  stand by assistance- FOR POSITION CHANGES, INCREASED TIME  Rolling Right: stand by assistance  Scooting: maximal assistance and of 2 persons-SUPINE SCOOT TOWARD HOB  Bridging: minimum assistance-FOR POSITION CHANGES  EOB AND OOB MOBILITY LIMITED BY LOW BP AND FEMORAL ARTERIAL LINE TO RLE  PT EDUCATED IN AND PERFORMED SUPINE BLE THEREX X 15 REPS: HIP FLEX/EXT, HIP ABD/ADD, QUAD SET, AP'S-REST BREAKS AS NEEDED    AM-PAC 6 CLICK MOBILITY  Total Score:9     Treatment & Education:  PT EDUCATION:  - ROLE OF P.T. AND POC IN ACUTE CARE HOSPITAL SETTING  - TO CONTINUE THERAPUETIC EXERCISES THROUGHOUT THE DAY TO INCREASE ACTIVITY TOLERANCE AND DECREASE RISK FOR PNEUMONIA AND BLOOD CLOTS: HIP FLEX/EXT, HIP ABD/ADD, QUAD SET, HEEL SLIDE, AP  - RISK FOR FALLS DUE TO GENERALIZED WEAKNESS, EDUCATED ON "CALL DON'T FALL", ENCOURAGED TO CALL FOR ASSISTANCE WITH ALL NEEDS     Patient left HOB elevated with all lines intact, call button in reach, bed alarm on, and NURSE notified.    GOALS:   Multidisciplinary Problems       Physical Therapy Goals          Problem: Physical Therapy    Goal Priority Disciplines Outcome Goal Variances Interventions   Physical Therapy Goal     PT, PT/OT      Description: LTG'S TO BE MET IN 14 DAYS (2-13-24)  PT WILL REQUIRE [facility]  FOR BED MOBILITY  PT WILL REQUIRE [facility] FOR BED<>CHAIR TF'S  PT WILL  FEET WITH RW AND KAY  PT WILL INC AMPAC SCORE BY 2 POINTS TO PROGRESS GROSS FUNC MOBILITY                   "       History:     Past Medical History:   Diagnosis Date    A-fib     GERD (gastroesophageal reflux disease)     Heart failure, unspecified     Hypertension     V-tach        Past Surgical History:   Procedure Laterality Date    COLONOSCOPY N/A 12/13/2022    Procedure: COLONOSCOPY;  Surgeon: Geovany Cole MD;  Location: [facility] ([address] [place] [address]);  Service: Endoscopy;  Laterality: N/A;    COLONOSCOPY N/A 12/12/2022    Procedure: COLONOSCOPY;  Surgeon: Geovany Cole MD;  Location: [facility] ([address] [place] [address]);  Service: Endoscopy;  Laterality: N/A;    PLACEMENT OF SWAN LESTER CATHETER WITH IMAGING GUIDANCE  12/22/2022    Procedure: INSERTION, CATHETER, SWAN-LESTER, WITH IMAGING GUIDANCE;  Surgeon: Brando Parada MD;  Location: [facility] CATH LAB;  Service: Cardiology;;    RIGHT HEART CATHETERIZATION Right 12/16/2022    Procedure: INSERTION, CATHETER, RIGHT HEART;  Surgeon: Brando Parada MD;  Location: [facility] CATH LAB;  Service: Cardiology;  Laterality: Right;    RIGHT HEART CATHETERIZATION Right 12/22/2022    Procedure: INSERTION, CATHETER, RIGHT HEART;  Surgeon: Brando Parada MD;  Location: [facility] CATH LAB;  Service: Cardiology;  Laterality: Right;       Time Tracking:     PT Received On: 01/30/24  PT Start Time: 0655     PT Stop Time: 0720  PT Total Time (min): 25 min     Billable Minutes: Evaluation 15 and Therapeutic Activity 10    01/30/2024

## 2024-01-30 NOTE — SUBJECTIVE & OBJECTIVE
Interval History: no acute events  On vaso dobutamine  Some problems with hearing, changed to bumex       Objective:     Vital Signs (Most Recent):  Temp: 96.8 °F (36 °C) (01/30/24 1105)  Pulse: 91 (01/30/24 1100)  Resp: (!) 26 (01/30/24 1100)  BP: (!) 139/92 (01/30/24 1100)  SpO2: 97 % (01/30/24 1100) Vital Signs (24h Range):  Temp:  [96.3 °F (35.7 °C)-99 °F (37.2 °C)] 96.8 °F (36 °C)  Pulse:  [] 91  Resp:  [18-33] 26  SpO2:  [92 %-100 %] 97 %  BP: ()/(53-92) 139/92  Arterial Line BP: ()/(52-76) 108/75     Weight: 84.4 kg (186 lb 1.1 oz)  Body mass index is 30.03 kg/m².      Intake/Output Summary (Last 24 hours) at 1/30/2024 1311  Last data filed at 1/30/2024 1100  Gross per 24 hour   Intake 768.65 ml   Output 745 ml   Net 23.65 ml        Physical Exam  Vitals and nursing note reviewed.   Constitutional:       General: He is not in acute distress.     Appearance: He is normal weight. He is ill-appearing.   HENT:      Head: Normocephalic and atraumatic.   Eyes:      General:         Right eye: No discharge.         Left eye: No discharge.   Cardiovascular:      Rate and Rhythm: Normal rate and regular rhythm.   Pulmonary:      Effort: No respiratory distress.      Breath sounds: No wheezing.   Abdominal:      Tenderness: There is no abdominal tenderness.      Comments: Slight distended   Musculoskeletal:         General: No swelling or tenderness.   Neurological:      Mental Status: He is alert.      Comments: Alert  / answer some questions            Review of Systems   Constitutional: Negative.    HENT: Negative.     Respiratory: Negative.     Gastrointestinal:         Bloating    Genitourinary: Negative.    Neurological: Negative.        Vents:  Vent Mode: A/C (01/29/24 0716)  Set Rate: 24 BPM (01/29/24 0716)  Vt Set: 400 mL (01/29/24 0716)  PEEP/CPAP: 5 cmH20 (01/29/24 0716)  Oxygen Concentration (%): (S) 40 (01/29/24 1500)  Peak Airway Pressure: 30 cmH20 (01/29/24 0716)  Plateau Pressure: 24  cmH20 (01/29/24 0716)  Total Ve: 9.7 L/m (01/29/24 0716)  Negative Inspiratory Force (cm H2O): 0 (01/29/24 0716)  F/VT Ratio<105 (RSBI): (!) 59.55 (01/29/24 0716)    Lines/Drains/Airways       Peripherally Inserted Central Catheter Line  Duration             PICC Double Lumen 01/19/24 11 days              Drain  Duration                  Urethral Catheter 01/28/24 0030 2 days              Arterial Line  Duration             Arterial Line 01/28/24 0103 Right Femoral 2 days              Peripheral Intravenous Line  Duration                  Peripheral IV - Single Lumen 01/28/24 1830 20 G Left Antecubital 1 day                    Significant Labs:    CBC/Anemia Profile:  Recent Labs   Lab 01/29/24 0414 01/29/24 2231 01/29/24 2238 01/30/24 0359   WBC 10.82  --  9.59 8.90   HGB 10.3*  --  9.3* 9.3*   HCT 34.5*  --  30.8* 31.6*   *  --  101* 97*   MCV 75*  --  75* 75*   RDW 21.7*  --  21.6* 21.5*   OCCULTBLOOD  --  Positive*  --   --         Chemistries:  Recent Labs   Lab 01/29/24 0414 01/30/24 0359   * 127*   K 4.4 4.5   CL 84* 83*   CO2 23 22*   * 123*   CREATININE 4.6* 5.3*   CALCIUM 9.2 9.1   ALBUMIN 3.0* 3.0*  3.0*   PROT 7.1 7.4  7.4   BILITOT 6.9* 7.6*  7.6*   ALKPHOS 177* 139*  139*   * 314*  314*   * 317*  317*   MG 2.3 2.6   PHOS 6.8* 10.2*       All pertinent labs within the past 24 hours have been reviewed.    Significant Imaging:  I have reviewed all pertinent imaging results/findings within the past 24 hours.

## 2024-01-30 NOTE — PLAN OF CARE
OT eval completed. Supine evaluation only due to femoral art line. Recommendations re: d/c pending further assessment, however, anticipate home with 24/7 supervision and A due to complex medical history.

## 2024-01-30 NOTE — SUBJECTIVE & OBJECTIVE
Interval History: Upon examination, patient in bed ill appearing, tolerating supplemental O2, alert and oriented able to answer questions appropriately and tell his wishes but some intermittent confusion noted. He is on vaso and dobutamine. Family meeting today with patient, his wife Julianne, and two out of three sons Bayron and Denny. Family with good understanding of patient's medical history and current medical condition: In the past he was evaluated by the transplant team in which multiple discussions occurred with patient and family but did not want to pursue advanced HD Support or LVAD/OHT. Patient opted to d/c home with dobutamine infusion and manage care at home in which he has been on hospice with [facility]  as well over the past year. During hospital course patient experience progressive decline and then cardiac arrest in which post arrest with multiorgan failure managed in the ICU. He remains critically ill, on multiple pressors (vaso & dobutamine) and despiteDiscussed poor prognosis/end-stage CHF with patient and his family. this worsening labs including but not limited to LFTs, Cr., lactic acid, patient with s/s of dysphagia upon speech eval. He was extubated on 1/29/24, currently on NC. Patient with c/o hearing loss so lasix d/c and started on bumex. Patient followed by nephrology  in the setting of SHAUN on CKD with worsening Cr., in which it was noted with his baseline cardiomyopathy limits his ability to be treated with renal replacement therapy and ahe will not tolerate dialysis. Patient also verbalized today to multiple providers and family that he does not want to pursue dialysis, per chart review in the past he did not want dialysis as well. We then dicussed goals of care and code status: full code vs DNR/I along with risks, benefits, and alternatives. We had extensive discussion about the above listed and ultimately patient wants to eat by mouth and would not want a peg tube  "despite signs of dysphagia patient enjoys food on his palate in which ST will continue to follow, patient wants to avoid the hospital and be at home with his grand son, have aggressive symptom management, focus on comfort and quality of life and thus patient and family elected to enroll in hospice again preference [facility]. Multiple discussions about code status in which we asked what his wishes would be if God call him home meaning heart stopped beating or he stopped breathing what would his wishes be and he stated "If God is ready for me then he is ready for me and I can't do anything to change that" and that he would want to be with Sourav naturally and peacefully but noted he is not ready to die wants to live as long as he can. Then Mrs. Whitaker asked if he wanted to be revived or for everyone to just let him go and then he stated he wanted to be revived because he wants to live. Family to continue these discussions in which hospice will continue, we discussed that inpatient hospice does not have an ICU or crash cart so they are aware of this and still want to bring him home with hospice care with continued GOC and ACP discussions. All questions and concerns addressed. Primary team updated.     Past Medical History:   Diagnosis Date    A-fib     GERD (gastroesophageal reflux disease)     Heart failure, unspecified     Hypertension     V-tach        Past Surgical History:   Procedure Laterality Date    COLONOSCOPY N/A 12/13/2022    Procedure: COLONOSCOPY;  Surgeon: Geovany Cole MD;  Location: [facility] ([address] [place] [address]);  Service: Endoscopy;  Laterality: N/A;    COLONOSCOPY N/A 12/12/2022    Procedure: COLONOSCOPY;  Surgeon: Geovany Cole MD;  Location: [facility] ([address] [place] [address]);  Service: Endoscopy;  Laterality: N/A;    PLACEMENT OF SWAN LESTER CATHETER WITH IMAGING GUIDANCE  12/22/2022    Procedure: INSERTION, CATHETER, SWAN-LESTER, WITH IMAGING GUIDANCE;  Surgeon: Brando Parada MD;  Location: [facility] CATH LAB;  " Service: Cardiology;;    RIGHT HEART CATHETERIZATION Right 12/16/2022    Procedure: INSERTION, CATHETER, RIGHT HEART;  Surgeon: Brando Parada MD;  Location: [facility] CATH LAB;  Service: Cardiology;  Laterality: Right;    RIGHT HEART CATHETERIZATION Right 12/22/2022    Procedure: INSERTION, CATHETER, RIGHT HEART;  Surgeon: Brando Parada MD;  Location: [facility] CATH LAB;  Service: Cardiology;  Laterality: Right;       Review of patient's allergies indicates:  No Known Allergies    Medications:  Continuous Infusions:   bumetanide (BUMEX) 25 mg in 100 mL infusion (conc: 0.25 mg/mL) 1 mg/hr (01/30/24 1200)    DOBUTamine IV infusion (non-titrating) 5 mcg/kg/min (01/30/24 1200)    vasopressin 0.04 Units/min (01/30/24 1200)     Scheduled Meds:   [START ON 1/31/2024] amiodarone  400 mg Oral Daily    [START ON 1/31/2024] aspirin  81 mg Oral Daily    chlorothiazide (DIURIL) 250 mg in dextrose 5 % (D5W) 50 mL IVPB  250 mg Intravenous Q24H    lactulose  20 g Oral BID    methylPREDNISolone sodium succinate injection  40 mg Intravenous Q12H    mupirocin   Nasal BID    [START ON 1/31/2024] pantoprazole  40 mg Oral Daily    piperacillin-tazobactam (Zosyn) IV (PEDS and ADULTS) (extended infusion is not appropriate)  4.5 g Intravenous Q12H    sevelamer carbonate  0.8 g Oral TID WM     PRN Meds:sodium chloride 0.9%, acetaminophen, dextrose 10%, dextrose 10%, influenza, ondansetron, pneumoc 20-denita conj-dip cr(PF), sodium chloride 0.9%    Family History    None       Tobacco Use    Smoking status: Some Days     Types: Cigarettes    Smokeless tobacco: Not on file   Substance and Sexual Activity    Alcohol use: Not Currently    Drug use: Not on file    Sexual activity: Not on file       Review of Systems   Reason unable to perform ROS: Limited due to intermittent confusion.   Genitourinary:  Positive for penile pain.        Located where the catheter is inserted    All other systems reviewed and are negative.    Objective:      Vital Signs (Most Recent):  Temp: 96.8 °F (36 °C) (01/30/24 1105)  Pulse: 91 (01/30/24 1100)  Resp: (!) 26 (01/30/24 1100)  BP: (!) 139/92 (01/30/24 1100)  SpO2: 97 % (01/30/24 1100) Vital Signs (24h Range):  Temp:  [96.3 °F (35.7 °C)-98.2 °F (36.8 °C)] 96.8 °F (36 °C)  Pulse:  [] 91  Resp:  [18-33] 26  SpO2:  [92 %-100 %] 97 %  BP: ()/(53-92) 139/92  Arterial Line BP: ()/(52-76) 108/75     Weight: 84.4 kg (186 lb 1.1 oz)  Body mass index is 30.03 kg/m².       Physical Exam  Vitals and nursing note reviewed.   Constitutional:       Comments: Ill appearing    HENT:      Head: Normocephalic.      Nose: Nose normal.      Mouth/Throat:      Mouth: Mucous membranes are dry.   Eyes:      General:         Right eye: No discharge.         Left eye: No discharge.   Cardiovascular:      Rate and Rhythm: Normal rate.   Pulmonary:      Comments: Tolerating supplemental O2  Abdominal:      General: There is distension.   Musculoskeletal:         General: Swelling present.      Cervical back: Normal range of motion.      Right lower leg: Edema present.      Left lower leg: Edema present.      Comments: Anasarca    Skin:     General: Skin is warm and dry.   Neurological:      Mental Status: He is alert.      Comments: Intermittent confusion, but answered questions appropriatey            Review of Symptoms      Symptom Assessment (ESAS 0-10 Scale)  Pain:  0  Dyspnea:  0  Anxiety:  0  Nausea:  0  Depression:  0  Anorexia:  0  Fatigue:  0  Insomnia:  0  Restlessness:  0  Agitation:  0         Performance Status:  30    Living Arrangements:  Lives with spouse    Psychosocial/Cultural:   See Palliative Psychosocial Note: No  Patient lives with spouse, has three adult sons  **Primary  to Follow**  Palliative Care  Consult: No        Advance Care Planning   Advance Directives:     Decision Making:  Patient answered questions and Family answered questions  Goals of Care: The patient and  family endorses that what is most important right now is to focus on spending time at home, avoiding the hospital, remaining as independent as possible, symptom/pain control, quality of life, even if it means sacrificing a little time, and comfort and QOL     Accordingly, we have decided that the best plan to meet the patient's goals includes enrolling in hospice care with [facility] with continuation of dobutamine infusion with plans to wean off at some point. Patient and family want to remain full code at this time with continued discussions with hospice regarding code status in which we discussed hospice philosophy but at this point they want to continue to discuss but want him to remain full code.           Significant Labs: All pertinent labs within the past 24 hours have been reviewed.  CBC:   Recent Labs   Lab 01/30/24  0359   WBC 8.90   HGB 9.3*   HCT 31.6*   MCV 75*   PLT 97*     BMP:  Recent Labs   Lab 01/30/24 0359   *   *   K 4.5   CL 83*   CO2 22*   *   CREATININE 5.3*   CALCIUM 9.1   MG 2.6     LFT:  Lab Results   Component Value Date     (H) 01/30/2024     (H) 01/30/2024    ALKPHOS 139 (H) 01/30/2024    ALKPHOS 139 (H) 01/30/2024    BILITOT 7.6 (H) 01/30/2024    BILITOT 7.6 (H) 01/30/2024     Albumin:   Albumin   Date Value Ref Range Status   01/30/2024 3.0 (L) 3.5 - 5.2 g/dL Final   01/30/2024 3.0 (L) 3.5 - 5.2 g/dL Final     Protein:   Total Protein   Date Value Ref Range Status   01/30/2024 7.4 6.0 - 8.4 g/dL Final   01/30/2024 7.4 6.0 - 8.4 g/dL Final     Lactic acid:   Lab Results   Component Value Date    LACTATE 3.7 (HH) 01/30/2024    LACTATE 3.7 (HH) 01/29/2024       Significant Imaging: I have reviewed all pertinent imaging results/findings within the past 24 hours.

## 2024-01-30 NOTE — PLAN OF CARE
No acute events overnight. VSS. Titration of critical gtts per order, see flow sheet. Weaned off Epi gtt per order, see flow sheet. Weaned O2 per RT. POC reviewed with patient and family. Safety and precautions maintained.

## 2024-01-30 NOTE — PROGRESS NOTES
Advance Care Planning   [facility] ([facility])  Palliative Care   Psychosocial Assessment    Patient Name: Migel Garcia  MRN: [id number]  Admission Date: 1/26/2024  Hospital Length of Stay: 4 days  Code Status: Full Code   Attending Provider: Silvia Mae MD  Palliative Care Provider: Catina Ashby NP  Primary Care Physician: Anuj Banks MD  Principal Problem:<principal problem not specified>    Reason for Referral: assistance with clarification of goals of care and hospice referral or discussion  Consult Order Date: 1/29/24  Primary CM/SW: Clarita VALENCIA    Present during Interview: patient, spouse/SO, relative(s), and past medical records.      Primary Language:English   Needed: no      Past Medical Situation:   PMH:   Past Medical History:   Diagnosis Date    A-fib     GERD (gastroesophageal reflux disease)     Heart failure, unspecified     Hypertension     V-tach      Mental Health/Substance Use History:n/a  Risk of Abuse, neglect or exploitation:   Current or Previous Trauma and/or evidence of PTSD:  Non-traditional Health practices:     Understanding of diagnosis and prognosis: Good, son is EMT/has good understanding and after discussion today family has better understanding of overall prognosis  Experience/Comfort level with health care system: Issues with past experiences have lead to discomfort/mistrust in medial staff. However family is appreciative of visit today and time taken to explain all of pt's medical problems     Patients Mental Status: alert, oriented    Socio-Economic Factors/Resources:  Address: [address] [place] [id number]  Phone Number: [phone] (home)     Marital Status:   Household composition: lives with spouse  Children: 3 sons    Patient/Family perceptions about Caregiving Needs; availability and capacity: Pt's wife and son, Bayron, has been helping care for him at home since he was discharged with hospice in  "2022. They report he did leave to live with his brother for a brief period due to wanting to live his life without restrictions to his diet and fluid intake. He ultimately returned after his condition declined while living with his brother. He has had [facility] and [facility]  and most recently was on home health after a stay at [facility]. Family understands at this point hospice is the recommendation to get pt back home, not home health. They are accepting of this and have chosen [facility]. Our conversation today took about 2 hours, and they family asked for some time to process things prior to talking with hospice.    Family Dynamics/Relationships: Pt and his wife, Julianne, have 3 son together. They are a close family. Pt's sons Bayron and Migel "Scdanielle" were present today. Bayron has an 18mo who pt would like to get home to see. Pt reports if he were to be in his final days, it is very important to be able to see his grandson before he dies.     Patient/Family Strengths/Resilience: Family support, strong beatriz    Patient/Family Coping Strategies: appropriate to situation    Activities of Daily Living: independent prior to admission  Support Systems-Family & Community (Home Health, HME etc): had both hospice and home health previously and will d/c with [facility]    Transportation:  yes    Work/Education History: disability  Self-Care Activities/Hobbies: Enjoys eating, being with family especially his grandson, casinos and a cruises     History: no    Financial Resources:Medicare      Advanced Care Planning & Legal Concerns:   Advanced Directives/Living Will: no  LaPOST/POLST: no   Planning:  no    Power of :  pt's son Bayron reports he is HCPOA      Emergency Contacts: Spouse, Julianne [phone] and son Bayron [phone]    Spirituality, Culture & Coping Mechanisms:  F- Beatriz and Belief: Scientologist     I - Importance: yes    C - Community/Culture Values:     A - " "Address in Care:  visits, prayer      Goals/Hopes/Expectations: be home with grandson and family, eat things he enjoys  Fears/Anxiety/Concerns: fear of death, not being around for his family "too young" to die        Preferences about EOL Environment: (own bed, family nearby, pets, music, etc)  Own bed, with family nearby    Complicated Bereavement Risk Assessment Tool (CBRAT)  Reference:  [facility] Clinical Practice Group (May 2016). Bereavement Risk Screening and Management Guidelines.  Retrieved from: http://www.[employer].com.au/wp-content/uploads//OEIIM-Avecemwzkql-Kzbwtlfjc-and-Management-Guideline-2016.pdf      Bereaved Client Characteristics   Under 18      yes  Was a Twin   no  Young Spouse   no  Elderly Spouse    no  Isolated    no  Lacks Meaningful Social Support   no  Dissatisfied with help available during illness   no  New to Financial Mylo no  New to Decision-Making   no    Illness  Inherited Disorder   no  Stigmatized Disease in the family/community   no  Lengthy/Burdensome   no     Bereaved Client's History of Loss   Cumulative Multiple Losses   no  Previous Mental Health Illnesses   no  Current Mental Health Illness   no  Other Significant Health Issues   no   Migrant/Refugee   no Death  Sudden or Unexpected   no  Traumatic Circumstances Associated with Death   no  Significant Cultural/Social Burdens as a result of Death   no   Relationship with   Profound Lifelong Partner   no  Highly Dependent    no  Antagonistic   no  Ambivalent   no  Deeply Connected   yes  Culturally Defined   no   Risk Factors Scores  0-2  Low  3-5  Moderate  5+  High  All persons scoring moderate to high presume to be at risk**    (** It is acknowledged that protective factors and resilience may outweigh apparent risk factors.      Total Risk Factors Score:   Low  Family meeting held today with pt's wife and son, Bayron, and this Scooter arrived later. " PM NP, myself and PM RN were present for meeting. Long discussion was had reviewing pt's history and past medical experiences as well discussion regarding his current medical problems and prognosis. Family is an agreement that what is best for pt now is to focus on his comfort and quality of life. We discussed transitioning to inpatient hospice to continue symptom management and get pt on a regime for home versus going home from ICU with the comfort of knowing pt would have hospice to support him and transition to inpatient if his symptoms weren't controlled in the home setting. Pt's goal is to be home with his family and to eat the things he enjoys. Given pt's history with [facility] and [facility] , family has decided to go with [facility]. Pt's wife was overwhelmed after our discussion today and asked for some time for them to talk as a family and get pt some lunch. PM will remain available and f/u as needed.         Discharge Planning Needs/Plan of Care:   CM to send referral to [facility] after family has had some time to process our meeting and pt has had time to eat.      ALONDRA Abdi, [facility]  Palliative Medicine  [phone]

## 2024-01-30 NOTE — ASSESSMENT & PLAN NOTE
Cardiology consult  No pulmonary edema on CxR   BNP elevated.   Will consult cardiology  Careful hydration  Will differ need for inotropic support to cardiology as we slowly hydrate him to preserve his renal function  High risk of deterioration  Appropriate for ICU admission    1/27. Stable overnight. No SOB or chest pain. No resp issues. On RA. Stable for transfer per cardiology. Appreciate the help. Will differ diuretics to cards. BUN / Cr slightly better.     1/28. See cardiac arrest  1/29 -on lasix drip   Back on small epi. On vaso / dobutamine  Cards following      1/30- on dobutamine/ bumex   Try wean vaso

## 2024-01-30 NOTE — ASSESSMENT & PLAN NOTE
-Cardiology following, discussed poor prognosis/end-stage CHF with patient and his family. In the past he was evaluated by the transplant team in which multiple discussions occurred with patient and family but did not want to pursue advanced HD Support or LVAD/OHT. Patient opted to d/c home with dobutamine infusion and manage care at home in which he has been on hospice with [facility] and [facility]  as well over the past year. During hospital course patient experience progressive decline and then cardiac arrest in which post arrest with multiorgan failure managed in the ICU. He remains critically ill, on multiple pressors (vaso & dobutamine) and despiteDiscussed poor prognosis/end-stage CHF with patient and his family. this worsening labs including but not limited to LFTs, Cr., lactic acid, patient with s/s of dysphagia upon speech eval. He was extubated on 1/29/24, currently on NC. Patient with c/o hearing loss so lasix d/c and started on bumex. Patient followed by nephrology  in the setting of SHAUN on CKD with worsening Cr., in which it was noted with his baseline cardiomyopathy limits his ability to be treated with renal replacement therapy and ahe will not tolerate dialysis. Patient also verbalized today to multiple providers and family that he does not want to pursue dialysis, per chart review in the past he did not want dialysis as well.   -Family meeting today with patient, his wife Julianne, and two out of three sons Bayron and Denny. Ultimately, they elected to enroll in hospice again at home to focus on comfort and quality of life in which all of his goals are in alignment with hospice philosophy but at this time he will remain full code. Please see note for further details.

## 2024-01-30 NOTE — PLAN OF CARE
P.T. EVAL COMPLETE.  PT CURRENTLY REQUIRES SBA FOR LATERAL ROLLING, KAY FOR BLE BRIDGING.  P.T. DC REC: TBD PENDING FURTHER ASSESSMENT

## 2024-01-30 NOTE — ASSESSMENT & PLAN NOTE
Careful hydration to get Cr below 2.    1/29  Significant worsening post cardiac arrest  Nephrology consult    1/29 per nephrology   1/30 per nephrology patient had stated would never want HD

## 2024-01-30 NOTE — PROGRESS NOTES
[facility] ([facility])  Cardiology  Progress Note    Patient Name: Migel Garcia  MRN: [id number]  Admission Date: 1/26/2024  Hospital Length of Stay: 4 days  Code Status: Full Code   Attending Physician: Silvia Mae MD   Primary Care Physician: Anuj Banks MD  Expected Discharge Date:   Principal Problem:<principal problem not specified>    Subjective:   HPI:  51 YO M w/ history of long standing NICM (> 10 yrs), HTN, marijuana and tobacco use, CKD stage 3 admitted for CP.  CP is atypical sometimes pleuritic, worse on movements.  Cath 2022 [facility] nonobstructive/minimal ds. EKG afib with V rate 114. There was 1 episode of NSVT. There is a mild increase in troponin .056. Pt with chronic CHF sx NYHA class 3.   CXR wnl.  After multiple discussions explaining plan and goals of care with OHT team in [place] he does not want to undergo any further workup/procedures or increased HD support at this time ( OHT or LVAD)   Pt on home dobutrex.    Hospital Course:   1/29/24-Patient seen and examined with son at bedside. Remains critically ill on multiple pressors. Extremities cool. LFT's trending up. INR 3.2. Creatinine, nephrology following, on Lasix drip. Discussed poor prognosis with son at bedside.     1/30/24-Patient seen and examined today with family at bedside. Remains on Vaso/Dobutamine. Complains of decreased hearing. Labs reviewed. Creatinine 5.3. Na 127, Cl 83. Platelets 97,000. INR 2.7., LFT's remain elevated. Discussed poor prognosis/end-stage CHF with patient and his family. Palliative on board.        Review of Systems   Constitutional: Positive for malaise/fatigue.   HENT:  Positive for hearing loss.    Cardiovascular: Negative.    Respiratory:  Positive for shortness of breath.    Endocrine: Negative.    Hematologic/Lymphatic: Negative.    Skin: Negative.    Musculoskeletal: Negative.    Gastrointestinal:  Positive for bloating.   Genitourinary: Negative.    Neurological:  Negative.    Psychiatric/Behavioral: Negative.     Allergic/Immunologic: Negative.      Objective:     Vital Signs (Most Recent):  Temp: 96.8 °F (36 °C) (01/30/24 1105)  Pulse: 91 (01/30/24 1100)  Resp: (!) 26 (01/30/24 1100)  BP: (!) 139/92 (01/30/24 1100)  SpO2: 97 % (01/30/24 1100) Vital Signs (24h Range):  Temp:  [96.3 °F (35.7 °C)-98.8 °F (37.1 °C)] 96.8 °F (36 °C)  Pulse:  [] 91  Resp:  [18-33] 26  SpO2:  [92 %-100 %] 97 %  BP: ()/(53-92) 139/92  Arterial Line BP: ()/(52-76) 108/75     Weight: 84.4 kg (186 lb 1.1 oz)  Body mass index is 30.03 kg/m².     SpO2: 97 %         Intake/Output Summary (Last 24 hours) at 1/30/2024 1328  Last data filed at 1/30/2024 1200  Gross per 24 hour   Intake 817.82 ml   Output 805 ml   Net 12.82 ml       Lines/Drains/Airways       Peripherally Inserted Central Catheter Line  Duration             PICC Double Lumen 01/19/24 11 days              Drain  Duration                  Urethral Catheter 01/28/24 0030 2 days              Arterial Line  Duration             Arterial Line 01/28/24 0103 Right Femoral 2 days              Peripheral Intravenous Line  Duration                  Peripheral IV - Single Lumen 01/28/24 1830 20 G Left Antecubital 1 day                       Physical Exam  Vitals and nursing note reviewed.   Constitutional:       Appearance: He is ill-appearing.      Comments: On supplemental O2   HENT:      Head: Normocephalic and atraumatic.      Ears:      Comments: Venetie IRA  Eyes:      Pupils: Pupils are equal, round, and reactive to light.   Neck:      Vascular: JVD present.   Cardiovascular:      Rate and Rhythm: Normal rate. Rhythm irregularly irregular.      Heart sounds: S1 normal and S2 normal. No murmur heard.  Pulmonary:      Effort: Pulmonary effort is normal.      Comments: Diminished BS at bases  Abdominal:      General: There is distension.   Musculoskeletal:      Right lower leg: No edema.      Left lower leg: No edema.   Neurological:       "General: No focal deficit present.      Comments: Awake, alert, answers questions appropriately   Psychiatric:         Mood and Affect: Mood normal.            Significant Labs: CMP   Recent Labs   Lab 01/29/24 0414 01/30/24  0359   * 127*   K 4.4 4.5   CL 84* 83*   CO2 23 22*   * 120*   * 123*   CREATININE 4.6* 5.3*   CALCIUM 9.2 9.1   PROT 7.1 7.4  7.4   ALBUMIN 3.0* 3.0*  3.0*   BILITOT 6.9* 7.6*  7.6*   ALKPHOS 177* 139*  139*   * 317*  317*   * 314*  314*   ANIONGAP 19* 22*   , CBC   Recent Labs   Lab 01/29/24 0414 01/29/24 2238 01/30/24  0359   WBC 10.82 9.59 8.90   HGB 10.3* 9.3* 9.3*   HCT 34.5* 30.8* 31.6*   * 101* 97*   , Troponin No results for input(s): "TROPONINI" in the last 48 hours., and All pertinent lab results from the last 24 hours have been reviewed.    Significant Imaging: Echocardiogram: Transthoracic echo (TTE) complete (Cupid Only):   Results for orders placed or performed during the hospital encounter of 12/05/22   Echo   Result Value Ref Range    BSA 2.06 m2    TDI SEPTAL 0.07 m/s    LV LATERAL E/E' RATIO 10.22 m/s    LV SEPTAL E/E' RATIO 13.14 m/s    LA WIDTH 3.80 cm    TDI LATERAL 0.09 m/s    LVIDd 5.70 3.5 - 6.0 cm    IVS 0.72 0.6 - 1.1 cm    Posterior Wall 0.93 0.6 - 1.1 cm    LVIDs 5.10 (A) 2.1 - 4.0 cm    FS 11 28 - 44 %    LA volume 78.94 cm3    Sinus 2.19 cm    STJ 2.04 cm    Ascending aorta 2.39 cm    LV mass 176.88 g    LA size 4.06 cm    RVDD 4.45 cm    TAPSE 1.44 cm    RV S' 4.35 cm/s    Left Ventricle Relative Wall Thickness 0.33 cm    AV mean gradient 3 mmHg    AV valve area 1.33 cm2    AV Velocity Ratio 0.53     AV index (prosthetic) 0.52     Mean e' 0.08 m/s    LVOT diameter 1.80 cm    LVOT area 2.5 cm2    LVOT peak alex 0.59 m/s    LVOT peak VTI 7.76 cm    Ao peak alex 1.11 m/s    Ao VTI 14.80 cm    Mr max alex 0.04 m/s    LVOT stroke volume 19.74 cm3    AV peak gradient 5 mmHg    E/E' ratio 11.50 m/s    MV Peak E Alex 0.92 m/s "    TR Max Alex 3.56 m/s    LV Systolic Volume 101.20 mL    LV Systolic Volume Index 56.5 mL/m2    LV Diastolic Volume 135.44 mL    LV Diastolic Volume Index 75.66 mL/m2    LA Volume Index 44.1 mL/m2    LV Mass Index 99 g/m2    RA Major Axis 5.40 cm    Left Atrium Minor Axis 6.00 cm    Left Atrium Major Axis 6.04 cm    Triscuspid Valve Regurgitation Peak Gradient 51 mmHg    LA Volume Index (Mod) 36.2 mL/m2    LA volume (mod) 64.73 cm3    RA Width 3.93 cm    Right Atrial Pressure (from IVC) 15 mmHg    EF 20 %    TV resting pulmonary artery pressure 66 mmHg    Narrative    · The left ventricle is mildly enlarged with severely decreased systolic   function.  · The estimated ejection fraction is <20%.  · There is left ventricular global hypokinesis.  · Indeterminate left ventricular diastolic function.  · Mild right ventricular enlargement with moderately reduced right   ventricular systolic function.  · Biatrial enlargement.  · Mild mitral regurgitation.  · Mild tricuspid regurgitation.  · The estimated PA systolic pressure is 66 mmHg.  · There is pulmonary hypertension.  · Elevated central venous pressure (15 mmHg).  · Small posterior pericardial effusion.      , EKG: Reviewed, and X-Ray: CXR: X-Ray Chest 1 View (CXR):   Results for orders placed or performed during the hospital encounter of 01/26/24   X-Ray Chest 1 View    Narrative    EXAMINATION:  XR CHEST 1 VIEW    CLINICAL HISTORY:  <Diagnosis>, respiratory failure;    COMPARISON:  Chest, 01/28/2024    FINDINGS:  Endotracheal tube and nasogastric tube and right PICC catheter remain in good position.  Stable enlargement the cardiac silhouette.  There appears to be some mild congestion bilaterally which appears stable.  There is improving aeration of the medial right upper lobe.      Impression    Mild congestion bilaterally appears stable.  Improving aeration of the right upper lobe.      Electronically signed by: Dewayne Harrison  MD  Date:    01/29/2024  Time:    10:51    and X-Ray Chest PA and Lateral (CXR): No results found for this visit on 01/26/24.  Assessment and Plan:   Patient remains critically ill with end-stage CHF/cardiogenic shock with MSOF. Continue supportive care. Palliative on board.    Cardiac arrest  -Remains critically ill  -See plan above    Atrial fibrillation  -HR variable  -Hold Eliquis for know given liver failure/auto-anticoagulation (INR 3.2)  -Repeat INR in AM    1/30/24  -Eliquis held given worsening thrombocytopenia/INR of 2.7     CKD (chronic kidney disease), stage III  -Creatinine 4.6, nephrology following    1/30/24  -Creatinine 5.3    Acute on chronic combined systolic and diastolic heart failure  51 YO M w/ history of long standing NICM (> 10 yrs), HTN, marijuana and tobacco use, CKD stage 3 admitted for CP.  CP is atypical sometimes pleuritic, worse on movements.  Cath 2022 [facility] nonobstructive/minimal ds. EKG afib with V rate 114. There was 1 episode of NSVT. There is a mild increase in troponin .056. Pt with chronic CHF sx NYHA class 3.   CXR wnl.  After multiple discussions explaining plan and goals of care with OHT team in [place] he does not want to undergo any further workup/procedures or increased HD support at this time ( OHT or LVAD)   Pt on home dobutrex.    Pt with atypical CP ( nonobstructive ds by cath), will add ranexa, can add PPI. CHF mild continue IV diuretics, amiodarone added for NSVT. Pt can go to telemetry    1/29/24  -Patient remains critically ill, intubated on multiple pressors/inotropic support  -LFT's and creatinine trending up  -Diurese at tolerated  -Previously declined any further advanced HF workup in 2022  -Poor prognosis, Dr. Alvarado discussed with son at bedside  -Palliative consulted    1/30/24  -Remains critically ill on dobutamine/vasopressin with MSOF  -Supportive care  -Discussed poor prognosis  -Patient previously declined advanced HF workup/options,  expressed this again today during our discussion  -Palliative consulted          VTE Risk Mitigation (From admission, onward)           Ordered     IP VTE HIGH RISK PATIENT  Once         01/26/24 1746     Place sequential compression device  Until discontinued         01/26/24 1746                    Jordyn Borjas PA-C  Cardiology  [facility] (Hospital)

## 2024-01-30 NOTE — SUBJECTIVE & OBJECTIVE
Interval History:  Patient was seen and examined in the ICU.  He remains critically ill.  He was extubated yesterday and has remained stable off the ventilator.  He remains on pressors to support his blood pressure.  He remains in shock with blood pressures in the 90s.  He is able to answer some questions today but is confused.  He continues to suffer with anasarca in acute on chronic kidney injury with a rising creatinine.  He produced approximately 1.2 L of urine yesterday on a Lasix drip.  His Family understands that given his severe cardiomyopathy with ejection fraction in the range of 10%, he has not a candidate for renal replacement therapy.    Review of patient's allergies indicates:  No Known Allergies  Current Facility-Administered Medications   Medication Frequency    0.9%  NaCl infusion PRN    acetaminophen tablet 975 mg Q8H PRN    amiodarone tablet 400 mg Daily    aspirin chewable tablet 81 mg Daily    chlorothiazide (DIURIL) 250 mg in dextrose 5 % (D5W) 50 mL IVPB Q24H    dextrose 10% bolus 125 mL 125 mL PRN    dextrose 10% bolus 250 mL 250 mL PRN    DOBUtamine 1000 mg in D5W 250 mL infusion Continuous    EPINEPHrine (ADRENALIN) 30 mg in dextrose 5 % (D5W) 250 mL infusion Continuous    furosemide (Lasix) 200 mg in sodium chloride 0.9% SolP 100 mL continuous infusion (conc: 2 mg/mL) Continuous    influenza (QUADRIVALENT PF) vaccine 0.5 mL vaccine x 1 dose    methylPREDNISolone sodium succinate injection 40 mg TID    mupirocin 2 % ointment BID    ondansetron injection 4 mg Q6H PRN    pantoprazole injection 40 mg Daily    piperacillin-tazobactam (ZOSYN) 4.5 g in dextrose 5 % in water (D5W) 100 mL IVPB (MB+) Q12H    pneumoc 20-denita conj-dip cr(PF) (PREVNAR-20 (PF)) injection Syrg 0.5 mL vaccine x 1 dose    polyethylene glycol packet 17 g Daily    sodium chloride 0.9% flush 10 mL PRN    vasopressin (PITRESSIN) 0.2 Units/mL in dextrose 5% 100 mL infusion Continuous       Objective:     Vital Signs (Most  Recent):  Temp: 96.4 °F (35.8 °C) (01/30/24 0739)  Pulse: 93 (01/30/24 0739)  Resp: (!) 22 (01/30/24 0739)  BP: (!) 107/56 (01/30/24 0600)  SpO2: 96 % (01/30/24 0739) Vital Signs (24h Range):  Temp:  [96.4 °F (35.8 °C)-99.1 °F (37.3 °C)] 96.4 °F (35.8 °C)  Pulse:  [] 93  Resp:  [18-33] 22  SpO2:  [92 %-100 %] 96 %  BP: ()/(55-78) 107/56  Arterial Line BP: ()/(52-83) 86/56     Weight: 84.4 kg (186 lb 1.1 oz) (01/30/24 0601)  Body mass index is 30.03 kg/m².  Body surface area is 1.98 meters squared.    I/O last 3 completed shifts:  In: 1737.1 [I.V.:1285.5; NG/GT:90; IV Piggyback:361.6]  Out: 2115 [Urine:2115]     Physical Exam  Vitals reviewed.   Constitutional:       Appearance: He is ill-appearing.   HENT:      Head: Normocephalic.   Cardiovascular:      Rate and Rhythm: Normal rate and regular rhythm.   Pulmonary:      Effort: Pulmonary effort is normal. No respiratory distress.   Abdominal:      General: There is distension.      Palpations: Abdomen is soft.   Musculoskeletal:         General: Swelling present.   Skin:     General: Skin is warm.          Significant Labs:  CBC:   Recent Labs   Lab 01/30/24 0359   WBC 8.90   RBC 4.22*   HGB 9.3*   HCT 31.6*   PLT 97*   MCV 75*   MCH 22.0*   MCHC 29.4*     CMP:   Recent Labs   Lab 01/30/24 0359   *   CALCIUM 9.1   ALBUMIN 3.0*  3.0*   PROT 7.4  7.4   *   K 4.5   CO2 22*   CL 83*   *   CREATININE 5.3*   ALKPHOS 139*  139*   *  314*   *  317*   BILITOT 7.6*  7.6*     All labs within the past 24 hours have been reviewed.     Significant Imaging:  X-Ray: Reviewed  Chest x-ray from yesterday shows a huge globular heart taking up his left chest with a right upper lobe infiltrate and small bilateral effusions

## 2024-01-30 NOTE — PLAN OF CARE
VSS this shift. A-line removed. Pt seen by PT/OT- see note. Family palliative meeting with pt- see note. De León catheter removed, urinal at bedside. Vasopressin gtt off. Bumex gtt initiated and lasix d/c'd d/t pt complaint of hearing loss. 1 BM noted this shift- blood noted in stool. Blood also noted in urine. Pt seen by SLT, diet advanced to full liquid, pt tolerating well. POC reviewed with pt and family at bedside. Safety precautions in place.

## 2024-01-31 VITALS
SYSTOLIC BLOOD PRESSURE: 108 MMHG | HEART RATE: 108 BPM | TEMPERATURE: 98 F | WEIGHT: 183.63 LBS | BODY MASS INDEX: 29.51 KG/M2 | OXYGEN SATURATION: 93 % | DIASTOLIC BLOOD PRESSURE: 81 MMHG | RESPIRATION RATE: 17 BRPM | HEIGHT: 66 IN

## 2024-01-31 LAB
ALBUMIN SERPL BCP-MCNC: 3.1 G/DL (ref 3.5–5.2)
ALBUMIN SERPL BCP-MCNC: 3.1 G/DL (ref 3.5–5.2)
ALP SERPL-CCNC: 115 U/L (ref 55–135)
ALP SERPL-CCNC: 115 U/L (ref 55–135)
ALT SERPL W/O P-5'-P-CCNC: 262 U/L (ref 10–44)
ALT SERPL W/O P-5'-P-CCNC: 262 U/L (ref 10–44)
AMMONIA PLAS-SCNC: 48 UMOL/L (ref 10–50)
ANION GAP SERPL CALC-SCNC: 25 MMOL/L (ref 8–16)
AST SERPL-CCNC: 172 U/L (ref 10–40)
AST SERPL-CCNC: 172 U/L (ref 10–40)
BACTERIA SPEC AEROBE CULT: ABNORMAL
BACTERIA SPEC AEROBE CULT: ABNORMAL
BASOPHILS # BLD AUTO: 0 K/UL (ref 0–0.2)
BASOPHILS NFR BLD: 0 % (ref 0–1.9)
BILIRUB DIRECT SERPL-MCNC: 5.4 MG/DL (ref 0.1–0.3)
BILIRUB SERPL-MCNC: 7.4 MG/DL (ref 0.1–1)
BILIRUB SERPL-MCNC: 7.4 MG/DL (ref 0.1–1)
BUN SERPL-MCNC: 152 MG/DL (ref 6–20)
CALCIUM SERPL-MCNC: 8.6 MG/DL (ref 8.7–10.5)
CHLORIDE SERPL-SCNC: 82 MMOL/L (ref 95–110)
CO2 SERPL-SCNC: 19 MMOL/L (ref 23–29)
CREAT SERPL-MCNC: 6.7 MG/DL (ref 0.5–1.4)
DIFFERENTIAL METHOD BLD: ABNORMAL
EOSINOPHIL # BLD AUTO: 0 K/UL (ref 0–0.5)
EOSINOPHIL NFR BLD: 0 % (ref 0–8)
ERYTHROCYTE [DISTWIDTH] IN BLOOD BY AUTOMATED COUNT: 22 % (ref 11.5–14.5)
EST. GFR  (NO RACE VARIABLE): 9 ML/MIN/1.73 M^2
GLUCOSE SERPL-MCNC: 119 MG/DL (ref 70–110)
GRAM STN SPEC: ABNORMAL
HCT VFR BLD AUTO: 31.4 % (ref 40–54)
HGB BLD-MCNC: 9.3 G/DL (ref 14–18)
IMM GRANULOCYTES # BLD AUTO: 0.05 K/UL (ref 0–0.04)
IMM GRANULOCYTES NFR BLD AUTO: 0.8 % (ref 0–0.5)
INR PPP: 2 (ref 0.8–1.2)
LYMPHOCYTES # BLD AUTO: 0.1 K/UL (ref 1–4.8)
LYMPHOCYTES NFR BLD: 1.8 % (ref 18–48)
MAGNESIUM SERPL-MCNC: 2.7 MG/DL (ref 1.6–2.6)
MCH RBC QN AUTO: 22.2 PG (ref 27–31)
MCHC RBC AUTO-ENTMCNC: 29.6 G/DL (ref 32–36)
MCV RBC AUTO: 75 FL (ref 82–98)
MONOCYTES # BLD AUTO: 0.3 K/UL (ref 0.3–1)
MONOCYTES NFR BLD: 4.8 % (ref 4–15)
NEUTROPHILS # BLD AUTO: 6 K/UL (ref 1.8–7.7)
NEUTROPHILS NFR BLD: 92.6 % (ref 38–73)
NRBC BLD-RTO: 10 /100 WBC
PATHOLOGIST INTERPRETATION AB/XM: NORMAL
PHOSPHATE SERPL-MCNC: 12.4 MG/DL (ref 2.7–4.5)
PLATELET # BLD AUTO: 85 K/UL (ref 150–450)
PMV BLD AUTO: 10.7 FL (ref 9.2–12.9)
POCT GLUCOSE: 126 MG/DL (ref 70–110)
POCT GLUCOSE: 144 MG/DL (ref 70–110)
POTASSIUM SERPL-SCNC: 4.9 MMOL/L (ref 3.5–5.1)
PROT SERPL-MCNC: 7.7 G/DL (ref 6–8.4)
PROT SERPL-MCNC: 7.7 G/DL (ref 6–8.4)
PROTHROMBIN TIME: 19.9 SEC (ref 9–12.5)
RBC # BLD AUTO: 4.19 M/UL (ref 4.6–6.2)
SODIUM SERPL-SCNC: 126 MMOL/L (ref 136–145)
WBC # BLD AUTO: 6.49 K/UL (ref 3.9–12.7)

## 2024-01-31 PROCEDURE — 99233 SBSQ HOSP IP/OBS HIGH 50: CPT | Mod: GW,HPC,, | Performed by: NURSE PRACTITIONER

## 2024-01-31 PROCEDURE — 63600175 PHARM REV CODE 636 W HCPCS: Performed by: SPECIALIST

## 2024-01-31 PROCEDURE — 25000003 PHARM REV CODE 250: Performed by: SPECIALIST

## 2024-01-31 PROCEDURE — 25000003 PHARM REV CODE 250: Performed by: INTERNAL MEDICINE

## 2024-01-31 PROCEDURE — 63600175 PHARM REV CODE 636 W HCPCS: Performed by: INTERNAL MEDICINE

## 2024-01-31 PROCEDURE — 85025 COMPLETE CBC W/AUTO DIFF WBC: CPT | Performed by: INTERNAL MEDICINE

## 2024-01-31 PROCEDURE — 0W9G3ZZ DRAINAGE OF PERITONEAL CAVITY, PERCUTANEOUS APPROACH: ICD-10-PCS | Performed by: RADIOLOGY

## 2024-01-31 PROCEDURE — 94761 N-INVAS EAR/PLS OXIMETRY MLT: CPT

## 2024-01-31 PROCEDURE — 99497 ADVNCD CARE PLAN 30 MIN: CPT | Mod: GW,HPC,, | Performed by: NURSE PRACTITIONER

## 2024-01-31 PROCEDURE — 84100 ASSAY OF PHOSPHORUS: CPT | Performed by: INTERNAL MEDICINE

## 2024-01-31 PROCEDURE — 36415 COLL VENOUS BLD VENIPUNCTURE: CPT | Performed by: INTERNAL MEDICINE

## 2024-01-31 PROCEDURE — 99498 ADVNCD CARE PLAN ADDL 30 MIN: CPT | Mod: GW,HPC,, | Performed by: NURSE PRACTITIONER

## 2024-01-31 PROCEDURE — 99233 SBSQ HOSP IP/OBS HIGH 50: CPT | Mod: QW,,, | Performed by: INTERNAL MEDICINE

## 2024-01-31 PROCEDURE — 99233 SBSQ HOSP IP/OBS HIGH 50: CPT | Mod: GW,HPC,, | Performed by: STUDENT IN AN ORGANIZED HEALTH CARE EDUCATION/TRAINING PROGRAM

## 2024-01-31 PROCEDURE — 80053 COMPREHEN METABOLIC PANEL: CPT | Performed by: INTERNAL MEDICINE

## 2024-01-31 PROCEDURE — 85610 PROTHROMBIN TIME: CPT | Performed by: INTERNAL MEDICINE

## 2024-01-31 PROCEDURE — 83735 ASSAY OF MAGNESIUM: CPT | Performed by: INTERNAL MEDICINE

## 2024-01-31 PROCEDURE — 82140 ASSAY OF AMMONIA: CPT | Performed by: INTERNAL MEDICINE

## 2024-01-31 RX ORDER — BUMETANIDE 1 MG/1
2 TABLET ORAL 2 TIMES DAILY
Qty: 120 TABLET | Refills: 11 | Status: SHIPPED | OUTPATIENT
Start: 2024-01-31 | End: 2024-01-31

## 2024-01-31 RX ORDER — BUMETANIDE 1 MG/1
2 TABLET ORAL 2 TIMES DAILY
Qty: 120 TABLET | Refills: 0 | Status: SHIPPED | OUTPATIENT
Start: 2024-01-31 | End: 2024-03-01

## 2024-01-31 RX ADMIN — MUPIROCIN: 20 OINTMENT TOPICAL at 09:01

## 2024-01-31 RX ADMIN — METHYLPREDNISOLONE SODIUM SUCCINATE 40 MG: 40 INJECTION, POWDER, FOR SOLUTION INTRAMUSCULAR; INTRAVENOUS at 09:01

## 2024-01-31 RX ADMIN — ASPIRIN 81 MG: 81 TABLET, COATED ORAL at 09:01

## 2024-01-31 RX ADMIN — CHLOROTHIAZIDE SODIUM 250 MG: 500 INJECTION, POWDER, LYOPHILIZED, FOR SOLUTION INTRAVENOUS at 10:01

## 2024-01-31 RX ADMIN — AMIODARONE HYDROCHLORIDE 400 MG: 200 TABLET ORAL at 09:01

## 2024-01-31 RX ADMIN — BUMETANIDE 1 MG/HR: 0.25 INJECTION INTRAMUSCULAR; INTRAVENOUS at 04:01

## 2024-01-31 RX ADMIN — SODIUM CHLORIDE: 9 INJECTION, SOLUTION INTRAVENOUS at 04:01

## 2024-01-31 RX ADMIN — PANTOPRAZOLE SODIUM 40 MG: 40 TABLET, DELAYED RELEASE ORAL at 09:01

## 2024-01-31 RX ADMIN — CEFTRIAXONE 2 G: 2 INJECTION, POWDER, FOR SOLUTION INTRAMUSCULAR; INTRAVENOUS at 10:01

## 2024-01-31 RX ADMIN — PIPERACILLIN AND TAZOBACTAM 4.5 G: 4; .5 INJECTION, POWDER, LYOPHILIZED, FOR SOLUTION INTRAVENOUS; PARENTERAL at 04:01

## 2024-01-31 RX ADMIN — SEVELAMER CARBONATE 0.8 G: 800 POWDER, FOR SUSPENSION ORAL at 07:01

## 2024-01-31 NOTE — ASSESSMENT & PLAN NOTE
-HR variable  -Hold Eliquis for know given liver failure/auto-anticoagulation (INR 3.2)  -Repeat INR in AM    1/30/24  -Eliquis held given worsening thrombocytopenia/INR of 2.7     1/31/24  -HR in low 100's  -AC held given worsening thrombocytopenia; hematuria/bloody BM documented by nurse overnight

## 2024-01-31 NOTE — PLAN OF CARE
No acute events overnight. VSS. Dobutamine and Bumex gtt per order. Weaned to room air. BM x3 overnight; held lactulose per order. POC reviewed with patient and family. Safety and fall precautions maintained.    Billing Type: [employer] Bill For Surgical Tray: no Performing Laboratory: [id number] Expected Date Of Service: 10/05/2023

## 2024-01-31 NOTE — ASSESSMENT & PLAN NOTE
-Cardiology following, discussed poor prognosis/end-stage CHF with patient and his family. In the past he was evaluated by the transplant team in which multiple discussions occurred with patient and family but did not want to pursue advanced HD Support or LVAD/OHT. Patient opted to d/c home with dobutamine infusion and manage care at home in which he has been on hospice with [facility]  as well over the past year. During hospital course patient experience progressive decline and then cardiac arrest in which post arrest with multiorgan failure managed in the ICU. He remains critically ill, on multiple pressors weaned off vaso & epi on dobutamine. Discussed poor prognosis/end-stage CHF with patient and his family. With worsening labs including but not limited to LFTs, Cr., lactic acid, patient with s/s of dysphagia upon speech eval. He was extubated on 1/29/24, currently on NC. Patient with c/o hearing loss so lasix d/c and started on bumex. Patient followed by nephrology  in the setting of SHAUN on CKD with worsening Cr., in which it was noted with his baseline cardiomyopathy limits his ability to be treated with renal replacement therapy and ahe will not tolerate dialysis. Patient also verbalized today to multiple providers and family that he does not want to pursue dialysis, per chart review in the past he did not want dialysis as well.   -Follow up family meeting today with patient, his wife Julianne, and two out of three sons Bayron and Denny. Extensive discussion HBP vs hospice. Ultimately, they elected to enroll in hospice and changed coded status to DNR/I to allow for natural and peaceful death.   - Please see note for further details.

## 2024-01-31 NOTE — INTERVAL H&P NOTE
The patient has been examined and the H&P has been reviewed:    I concur with the findings and no changes have occurred since H&P was written.        Active Hospital Problems    Diagnosis  POA    Encounter for palliative care [Z51.5]  Not Applicable    Shock liver [K72.00]  Yes     Follow labs       Acute hypoxemic respiratory failure [J96.01]  Yes    Cardiac arrest [I46.9]  No    Hypoglycemia [E16.2]  No    Aspiration pneumonia of left lung [J69.0]  No    Goals of care, counseling/discussion [Z71.89]  Not Applicable    Atrial fibrillation [I48.91]  Yes    HFrEF (heart failure with reduced ejection fraction) [I50.20]  Yes    Acute on chronic combined systolic and diastolic heart failure [I50.43]  Yes    GERD (gastroesophageal reflux disease) [K21.9]  Yes    CKD (chronic kidney disease), stage III [N18.30]  Yes    ICD (implantable cardioverter-defibrillator), single, in situ [Z95.810]  Yes      Resolved Hospital Problems   No resolved problems to display.

## 2024-01-31 NOTE — ASSESSMENT & PLAN NOTE
Patient with Hypoxic Respiratory failure which is Acute.  he is not on home oxygen. Supplemental oxygen was provided and noted-      .   Signs/symptoms of respiratory failure include-  on vent . Contributing diagnoses includes - CHF Labs and images were reviewed. Patient Has recent ABG, which has been reviewed. Will treat underlying causes and adjust management of respiratory failure as follows-     Extubated on 1/29  On nasal cannula     1/31 room air

## 2024-01-31 NOTE — PLAN OF CARE
01/31/24 1421   Post-Acute Status   Post-Acute Authorization Hospice   Hospice Status Set-up Complete/Auth obtained   Discharge Delays (!) Medication Delivery   Discharge Plan   Discharge Plan A Hospice/home     Consents signed with [facility]. DME ordered to be delivered to the home. Dobutamine and pump to be delivered to bedside around 1530. Matt ordered for a 1700 .

## 2024-01-31 NOTE — OP NOTE
Pre Op Diagnosis: ascites     Post Op Diagnosis: same    Procedure:  para     Procedure performed by: Juliocesar BLACKWELL, Ant BRIONES     Written Informed Consent Obtained: Yes     Specimen Removed:  yes     Estimated Blood Loss:  minimal     Findings: Local anesthesia and moderate sedation were used.     The patient tolerated the procedure well and there were no complications.      Disposition:  F/U in clinic    Discharge instructions:  Light activity for 24 hours.  Remove band aid in 24 hours.  No baths (showers are appropriate).    F/U with ordering physician    Sterile technique was performed in the lower abdomen, lidocaine was used as a local anesthetic.   1.5 liters of dark chyna fluid removed for therapeutic purposes.  Pt tolerated the procedure well without immediate complications.  Please see radiologist report for details. F/u with PCP and/or ordering physician.

## 2024-01-31 NOTE — PROGRESS NOTES
[facility] ([facility])  Palliative Medicine  Progress Note    Patient Name: Migel Garcia  MRN: [id number]  Admission Date: 1/26/2024  Hospital Length of Stay: 5 days  Code Status: DNR   Attending Provider: Contreras Lee MD  Consulting Provider: Catina Ashby NP  Primary Care Physician: Anuj Banks MD  Principal Problem:<principal problem not specified>    Patient information was obtained from patient, spouse/SO, relative(s), past medical records, and primary team.      Assessment/Plan:     Cardiac/Vascular  HFrEF (heart failure with reduced ejection fraction)  -Cardiology following, discussed poor prognosis/end-stage CHF with patient and his family. In the past he was evaluated by the transplant team in which multiple discussions occurred with patient and family but did not want to pursue advanced HD Support or LVAD/OHT. Patient opted to d/c home with dobutamine infusion and manage care at home in which he has been on hospice with [facility]  as well over the past year. During hospital course patient experience progressive decline and then cardiac arrest in which post arrest with multiorgan failure managed in the ICU. He remains critically ill, on multiple pressors weaned off vaso & epi on dobutamine. Discussed poor prognosis/end-stage CHF with patient and his family. With worsening labs including but not limited to LFTs, Cr., lactic acid, patient with s/s of dysphagia upon speech eval. He was extubated on 1/29/24, currently on NC. Patient with c/o hearing loss so lasix d/c and started on bumex. Patient followed by nephrology  in the setting of SHAUN on CKD with worsening Cr., in which it was noted with his baseline cardiomyopathy limits his ability to be treated with renal replacement therapy and ahe will not tolerate dialysis. Patient also verbalized today to multiple providers and family that he does not want to pursue dialysis, per chart review in the past he did not want  dialysis as well.   -Follow up family meeting today with patient, his wife Julianne, and two out of three sons Bayron and Denny. Extensive discussion HBP vs hospice. Ultimately, they elected to enroll in hospice and changed coded status to DNR/I to allow for natural and peaceful death.   - Please see note for further details.     Palliative Care  Encounter for palliative care  -Code status: DNR/I, defer [place] to hospice.    -HCPOA: Surrogate: Spouse Julianne Garcia, son Bayron helps her with medical decisions.   -GOC: Focus on spending time at home, avoiding the hospital, remaining as independent as possible, symptom/pain control, quality of life, even if it means sacrificing a little time, and comfort and QOL. Accordingly, we have decided that the best plan to meet the patient's goals includes enrolling in hospice care with [facility] with continuation of dobutamine infusion with plans to wean off at some point. Plans for discharge home today with hospice care.   -See HPI for further details          I will follow-up with patient. Please contact us if you have any additional questions.    Subjective:     Chief Complaint:   Chief Complaint   Patient presents with    Chest Pain     Intermittent since last night; hx of a-fib; on dobutamine infusion       HPI:   Mr. Garcia who goes by [facility] is a pleasant 53 y.o. male who presented with intermittent chest pain for 24-36hrs prior to arrival. Hx of severe NICM, EF 10%  ICD on chronic home dobutamine, Pulmonary HTN - PAP 47/30. In the past he was evaluated by the transplant team in which multiple discussions occurred with patient and family but did not want to pursue advanced HD Support or LVAD/OHT. Patient opted to d/c home with dobutamine infusion and manage care at home in which he has been on hospice with [facility]  as well over the past year. During hospital course patient experience progressive decline and then cardiac arrest in which post arrest  with multiorgan failure managed in the ICU. He remains critically ill, on multiple pressors (vaso & dobutamine) and despite this worsening labs including but not limited to LFTs, Cr., lactic acid, patient with s/s of dysphagia upon speech eval. He was extubated on 1/29/24, currently on NC. Patient with c/o hearing loss so lasix d/c and started on bumex. Patient followed by nephrology  in the setting of SHAUN on CKD with worsening Cr., in which it was noted with his baseline cardiomyopathy limits his ability to be treated with renal replacement therapy and ahe will not tolerate dialysis. Patient also verbalized today to multiple providers and family that he does not want to pursue dialysis, per chart review in the past he did not want dialysis as well. Palliative medicine consulted for goals of care and advance care planning.             Hospital Course:  No notes on file    Interval History: Upon examination, patient in bed ill appearing, weaned to RA, alert and oriented. We had extensive discussion on yesterday regarding goals of care and code status in which they elected to enroll in hospice care. Most of their goals were in alignment with hospice philosophy not wanting to pursue HD with worsening renal function and wanting to eat by mouth despite risk for aspiration but still wanted to be resuscitated in which we discussed the difference between wanting aggressive measures vs comfort and quality of life. Today I followed up with patient and family as my plan was again to discuss the fine line between home based palliative w/ home health vs hospice in regards to their goals and code status and what's important to the patient. Today I met with patient and family in addition PM nurse & SW, in addition to hospice and HBP liaison as the bedside. We had an extensive discussion  and family meeting regarding goals of care and advance care planning in the setting of End stage HF depending on dobutamine, SHAUN on CKD with  worsening renal function not a candidate for HD and does wish to pursue even if offered, generalized weakness with functional decline, multi organ failure, concerns for dysphagia and aspiration but patient and family still want him to be able to eat by mouth. Ultimately family elected DNR/I code status to allow for natural and peaceful death and thus elected to enroll in hospice care at home with hopes of discharging home today in a stable fashion. All questions and concerns addressed. Primary team and cardiology updated.     Past Medical History:   Diagnosis Date    A-fib     GERD (gastroesophageal reflux disease)     Heart failure, unspecified     Hypertension     V-tach        Past Surgical History:   Procedure Laterality Date    COLONOSCOPY N/A 12/13/2022    Procedure: COLONOSCOPY;  Surgeon: Geovany Cole MD;  Location: [facility] ENDO (2ND FLR);  Service: Endoscopy;  Laterality: N/A;    COLONOSCOPY N/A 12/12/2022    Procedure: COLONOSCOPY;  Surgeon: Geoavny Cole MD;  Location: [facility] ENDO (2ND FLR);  Service: Endoscopy;  Laterality: N/A;    PLACEMENT OF SWAN LESTER CATHETER WITH IMAGING GUIDANCE  12/22/2022    Procedure: INSERTION, CATHETER, SWAN-LESTER, WITH IMAGING GUIDANCE;  Surgeon: Brando Parada MD;  Location: [facility] CATH LAB;  Service: Cardiology;;    RIGHT HEART CATHETERIZATION Right 12/16/2022    Procedure: INSERTION, CATHETER, RIGHT HEART;  Surgeon: Brando Parada MD;  Location: [facility] CATH LAB;  Service: Cardiology;  Laterality: Right;    RIGHT HEART CATHETERIZATION Right 12/22/2022    Procedure: INSERTION, CATHETER, RIGHT HEART;  Surgeon: Brando Parada MD;  Location: [facility] CATH LAB;  Service: Cardiology;  Laterality: Right;       Review of patient's allergies indicates:  No Known Allergies    Medications:  Continuous Infusions:   bumetanide (BUMEX) 25 mg in 100 mL infusion (conc: 0.25 mg/mL) 1 mg/hr (01/30/24 1400)    DOBUTamine IV infusion (non-titrating) 5 mcg/kg/min (01/30/24 1400)     vasopressin 0.04 Units/min (01/30/24 1535)     Scheduled Meds:   [START ON 1/31/2024] amiodarone  400 mg Oral Daily    [START ON 1/31/2024] aspirin  81 mg Oral Daily    chlorothiazide (DIURIL) 250 mg in dextrose 5 % (D5W) 50 mL IVPB  250 mg Intravenous Q24H    lactulose  20 g Oral BID    methylPREDNISolone sodium succinate injection  40 mg Intravenous Q12H    mupirocin   Nasal BID    [START ON 1/31/2024] pantoprazole  40 mg Oral Daily    piperacillin-tazobactam (Zosyn) IV (PEDS and ADULTS) (extended infusion is not appropriate)  4.5 g Intravenous Q12H    sevelamer carbonate  0.8 g Oral TID WM     PRN Meds:sodium chloride 0.9%, acetaminophen, dextrose 10%, dextrose 10%, influenza, ondansetron, pneumoc 20-denita conj-dip cr(PF), sodium chloride 0.9%    Family History    None       Tobacco Use    Smoking status: Some Days     Types: Cigarettes    Smokeless tobacco: Not on file   Substance and Sexual Activity    Alcohol use: Not Currently    Drug use: Not on file    Sexual activity: Not on file       Review of Systems   Reason unable to perform ROS: Limited due to intermittent confusion.   Gastrointestinal:  Positive for abdominal distention.   Genitourinary:         Located where the catheter is inserted    All other systems reviewed and are negative.    Objective:     Vital Signs (Most Recent):  Temp: 96.8 °F (36 °C) (01/30/24 1105)  Pulse: 92 (01/30/24 1400)  Resp: (!) 30 (01/30/24 1400)  BP: 99/74 (01/30/24 1400)  SpO2: (!) 92 % (01/30/24 1400) Vital Signs (24h Range):  Temp:  [96.3 °F (35.7 °C)-97.9 °F (36.6 °C)] 96.8 °F (36 °C)  Pulse:  [] 92  Resp:  [18-33] 30  SpO2:  [92 %-100 %] 92 %  BP: ()/(53-92) 99/74  Arterial Line BP: ()/(52-78) 104/70     Weight: 84.4 kg (186 lb 1.1 oz)  Body mass index is 30.03 kg/m².       Physical Exam  Vitals and nursing note reviewed.   Constitutional:       Comments: Ill appearing    HENT:      Head: Normocephalic.      Nose: Nose normal.      Mouth/Throat:       Mouth: Mucous membranes are dry.   Eyes:      General:         Right eye: No discharge.         Left eye: No discharge.   Cardiovascular:      Rate and Rhythm: Normal rate.   Pulmonary:      Comments: Tolerating supplemental O2  Abdominal:      General: There is distension.   Musculoskeletal:         General: Swelling present.      Cervical back: Normal range of motion.      Right lower leg: Edema present.      Left lower leg: Edema present.      Comments: Anasarca    Skin:     General: Skin is warm and dry.   Neurological:      Mental Status: He is alert.      Comments: Intermittent confusion, but answered questions appropriatey            Review of Symptoms      Symptom Assessment (ESAS 0-10 Scale)  Pain:  0  Dyspnea:  0  Anxiety:  0  Nausea:  0  Depression:  0  Anorexia:  0  Fatigue:  0  Insomnia:  0  Restlessness:  0  Agitation:  0         Performance Status:  30    Living Arrangements:  Lives with spouse    Psychosocial/Cultural:   See Palliative Psychosocial Note: No  Patient lives with spouse, has three adult sons  **Primary  to Follow**  Palliative Care  Consult: No        Advance Care Planning  Advance Directives:   [place]: Defer to hospice agency.    Do Not Resuscitate Status: Yes      Decision Making:  Patient answered questions and Family answered questions  Goals of Care: What is most important right now is to focus on spending time at home, avoiding the hospital, remaining as independent as possible, symptom/pain control, quality of life, even if it means sacrificing a little time, comfort and QOL. Accordingly, we have decided that the best plan to meet the patient's goals includes enrolling in hospice care w/ [facility]. DNR/I.          Significant Labs: All pertinent labs within the past 24 hours have been reviewed.  CBC:   Recent Labs   Lab 01/30/24  0359   WBC 8.90   HGB 9.3*   HCT 31.6*   MCV 75*   PLT 97*       BMP:  Recent Labs   Lab 01/30/24  0359   *   NA  127*   K 4.5   CL 83*   CO2 22*   *   CREATININE 5.3*   CALCIUM 9.1   MG 2.6       LFT:  Lab Results   Component Value Date     (H) 01/30/2024     (H) 01/30/2024    ALKPHOS 139 (H) 01/30/2024    ALKPHOS 139 (H) 01/30/2024    BILITOT 7.6 (H) 01/30/2024    BILITOT 7.6 (H) 01/30/2024     Albumin:   Albumin   Date Value Ref Range Status   01/30/2024 3.0 (L) 3.5 - 5.2 g/dL Final   01/30/2024 3.0 (L) 3.5 - 5.2 g/dL Final     Protein:   Total Protein   Date Value Ref Range Status   01/30/2024 7.4 6.0 - 8.4 g/dL Final   01/30/2024 7.4 6.0 - 8.4 g/dL Final     Lactic acid:   Lab Results   Component Value Date    LACTATE 3.7 (HH) 01/30/2024    LACTATE 3.7 (HH) 01/29/2024       Significant Imaging: I have reviewed all pertinent imaging results/findings within the past 24 hours.    I spent face to face time and non-face to face time preparing to see the patient (eg, review of tests), Obtaining and/or reviewing separately obtained history, Documenting clinical information in the electronic or other health record, Independently interpreting results and communicating results to the patient/family/caregiver, or Care coordination.     > 46 minutes spent in discussing ACP    Catina Ashby NP  Palliative Medicine  [facility] - Intensive [facility] ([facility])

## 2024-01-31 NOTE — SUBJECTIVE & OBJECTIVE
Interval History: no acute events   Wants home hospice but still full code  Patient on room air   Wife upset I told patient that       Objective:     Vital Signs (Most Recent):  Temp: 97.8 °F (36.6 °C) (01/31/24 0700)  Pulse: 98 (01/31/24 0800)  Resp: (!) 24 (01/31/24 0800)  BP: 116/69 (01/31/24 0800)  SpO2: 96 % (01/31/24 0800) Vital Signs (24h Range):  Temp:  [96.8 °F (36 °C)-97.8 °F (36.6 °C)] 97.8 °F (36.6 °C)  Pulse:  [] 98  Resp:  [24-30] 24  SpO2:  [92 %-100 %] 96 %  BP: ()/(49-92) 116/69  Arterial Line BP: ()/(65-78) 107/71     Weight: 83.3 kg (183 lb 10.3 oz)  Body mass index is 29.64 kg/m².      Intake/Output Summary (Last 24 hours) at 1/31/2024 0953  Last data filed at 1/31/2024 0600  Gross per 24 hour   Intake 559.61 ml   Output 235 ml   Net 324.61 ml        Physical Exam  Vitals and nursing note reviewed.   Constitutional:       General: He is not in acute distress.     Appearance: He is ill-appearing.   HENT:      Head: Normocephalic and atraumatic.   Eyes:      General:         Right eye: No discharge.         Left eye: No discharge.   Cardiovascular:      Rate and Rhythm: Normal rate and regular rhythm.      Heart sounds: No murmur heard.  Pulmonary:      Effort: No respiratory distress.      Breath sounds: No wheezing or rales.   Abdominal:      General: There is distension.      Tenderness: There is no abdominal tenderness.   Musculoskeletal:         General: Swelling present. No tenderness.      Cervical back: Neck supple.   Neurological:      General: No focal deficit present.      Mental Status: He is alert and oriented to person, place, and time.         Review of Systems   Constitutional:  Positive for appetite change.   HENT: Negative.     Cardiovascular: Negative.    Gastrointestinal:  Positive for abdominal distention.   Genitourinary: Negative.    Musculoskeletal: Negative.    Neurological: Negative.    Hematological: Negative.        Vents:  Vent Mode: A/C (01/29/24  0716)  Set Rate: 24 BPM (01/29/24 0716)  Vt Set: 400 mL (01/29/24 0716)  PEEP/CPAP: 5 cmH20 (01/29/24 0716)  Oxygen Concentration (%): (S) 40 (01/29/24 1500)  Peak Airway Pressure: 30 cmH20 (01/29/24 0716)  Plateau Pressure: 24 cmH20 (01/29/24 0716)  Total Ve: 9.7 L/m (01/29/24 0716)  Negative Inspiratory Force (cm H2O): 0 (01/29/24 0716)  F/VT Ratio<105 (RSBI): (!) 59.55 (01/29/24 0716)    Lines/Drains/Airways       Peripherally Inserted Central Catheter Line  Duration             PICC Double Lumen 01/19/24 12 days              Drain  Duration             Male External Urinary Catheter 01/30/24 1905 <1 day                    Significant Labs:    CBC/Anemia Profile:  Recent Labs   Lab 01/29/24 2231 01/29/24 2238 01/30/24 0359 01/31/24  0555   WBC  --  9.59 8.90 6.49   HGB  --  9.3* 9.3* 9.3*   HCT  --  30.8* 31.6* 31.4*   PLT  --  101* 97* 85*   MCV  --  75* 75* 75*   RDW  --  21.6* 21.5* 22.0*   OCCULTBLOOD Positive*  --   --   --         Chemistries:  Recent Labs   Lab 01/30/24 0359 01/31/24  0555   * 126*   K 4.5 4.9   CL 83* 82*   CO2 22* 19*   * 152*   CREATININE 5.3* 6.7*   CALCIUM 9.1 8.6*   ALBUMIN 3.0*  3.0* 3.1*  3.1*   PROT 7.4  7.4 7.7  7.7   BILITOT 7.6*  7.6* 7.4*  7.4*   ALKPHOS 139*  139* 115  115   *  314* 262*  262*   *  317* 172*  172*   MG 2.6 2.7*   PHOS 10.2* 12.4*       All pertinent labs within the past 24 hours have been reviewed.    Significant Imaging:  I have reviewed all pertinent imaging results/findings within the past 24 hours.

## 2024-01-31 NOTE — ASSESSMENT & PLAN NOTE
-Creatinine 4.6, nephrology following    1/30/24  -Creatinine 5.3    1/31/24  -Creatinine 6.7, nephrology following

## 2024-01-31 NOTE — ASSESSMENT & PLAN NOTE
53 YO M w/ history of long standing NICM (> 10 yrs), HTN, marijuana and tobacco use, CKD stage 3 admitted for CP.  CP is atypical sometimes pleuritic, worse on movements.  Cath 2022 [facility] nonobstructive/minimal ds. EKG afib with V rate 114. There was 1 episode of NSVT. There is a mild increase in troponin .056. Pt with chronic CHF sx NYHA class 3.   CXR wnl.  After multiple discussions explaining plan and goals of care with OHT team in [place] he does not want to undergo any further workup/procedures or increased HD support at this time ( OHT or LVAD)   Pt on home dobutrex.    Pt with atypical CP ( nonobstructive ds by cath), will add ranexa, can add PPI. CHF mild continue IV diuretics, amiodarone added for NSVT. Pt can go to telemetry    1/29/24  -Patient remains critically ill, intubated on multiple pressors/inotropic support  -LFT's and creatinine trending up  -Diurese at tolerated  -Previously declined any further advanced HF workup in 2022  -Poor prognosis, Dr. Alvarado discussed with son at bedside  -Palliative consulted    1/30/24  -Remains critically ill on dobutamine/vasopressin with MSOF  -Supportive care  -Discussed poor prognosis  -Patient previously declined advanced HF workup/options, expressed this again today during our discussion  -Palliative consulted      1/31/24  -Critically ill  -Creatinine up to 6.7, overloaded on exam  -Not candidate for HD  -Poor prognosis  -Palliative on board, being dc'd home with hospice

## 2024-01-31 NOTE — HPI
Mr. Garcia, a 53-year-old male hospice patient with a significant history of severe non-ischemic cardiomyopathy, an ejection fraction  of 10% (on a long-term dobutamine infusion), and chronic pulmonary disease who was initially admitted to the ICU after presenting with intermittent chest pain. His hospital course included a period of sudden decline leading to cardiac arrest. Subsequently, he required mechanical ventilation and pressors. His management included epinephrine, vasopressors, and continued dobutamine, with attempts to wean off mechanical ventilation, facing challenges like biting on the endotracheal tube. Despite the complexities, he transitioned from pressor support to less invasive respiratory support and was eventually able to breathe on room air with dobutamine and diuretics, expressing a desire for home hospice care. Orders for transfer to telemetry floor was placed with hospital medicine consulted to assume care.  At the time of our encounter, palliative care at bedside facilitating conversations regarding patient's code status and potential transfer to home-based palliative care versus hospice care.

## 2024-01-31 NOTE — PROGRESS NOTES
[facility] - [facility] Care ([facility])  Cardiology  Progress Note    Patient Name: Migel Garcia  MRN: [id number]  Admission Date: 2024  Hospital Length of Stay: 5 days  Code Status: DNR   Attending Physician: Contreras Lee MD   Primary Care Physician: Anuj Banks MD  Expected Discharge Date:   Principal Problem:<principal problem not specified>    Subjective:   HPI:  53 YO M w/ history of long standing NICM (> 10 yrs), HTN, marijuana and tobacco use, CKD stage 3 admitted for CP.  CP is atypical sometimes pleuritic, worse on movements.  Cath  [facility] nonobstructive/minimal ds. EKG afib with V rate 114. There was 1 episode of NSVT. There is a mild increase in troponin .056. Pt with chronic CHF sx NYHA class 3.   CXR wnl.  After multiple discussions explaining plan and goals of care with OHT team in [place] he does not want to undergo any further workup/procedures or increased HD support at this time ( OHT or LVAD)   Pt on home dobutrex.    Hospital Course:   24-Patient seen and examined with son at bedside. Remains critically ill on multiple pressors. Extremities cool. LFT's trending up. INR 3.2. Creatinine, nephrology following, on Lasix drip. Discussed poor prognosis with son at bedside.     24-Patient seen and examined today with family at bedside. Remains on Vaso/Dobutamine. Complains of decreased hearing. Labs reviewed. Creatinine 5.3. Na 127, Cl 83. Platelets 97,000. INR 2.7., LFT's remain elevated. Discussed poor prognosis/end-stage CHF with patient and his family. Palliative on board.    24-Patient seen and examined today with family present. Ill-appearing with conversational dyspnea. Reportedly had bloody BM and hematuria overnight. Remains on . Diuretic switched to Bumex. Labs reviewed. Creatinine 6.7. Platelets down to 85,000. H/H 9.3/31.4. Palliative following, being d/c'd home with hospice.      Review of Systems   Constitutional: Positive for malaise/fatigue.    HENT: Negative.     Cardiovascular:  Positive for dyspnea on exertion.   Respiratory:  Positive for shortness of breath.    Endocrine: Negative.    Hematologic/Lymphatic: Negative.    Skin: Negative.    Musculoskeletal: Negative.    Gastrointestinal:  Positive for bloating.   Genitourinary: Negative.    Neurological: Negative.    Psychiatric/Behavioral: Negative.     Allergic/Immunologic: Negative.      Objective:     Vital Signs (Most Recent):  Temp: 98 °F (36.7 °C) (01/31/24 1100)  Pulse: (!) 113 (01/31/24 1145)  Resp: (!) 27 (01/31/24 1145)  BP: 105/69 (01/31/24 1100)  SpO2: 98 % (01/31/24 1145) Vital Signs (24h Range):  Temp:  [97.4 °F (36.3 °C)-98 °F (36.7 °C)] 98 °F (36.7 °C)  Pulse:  [] 113  Resp:  [24-33] 27  SpO2:  [92 %-100 %] 98 %  BP: ()/(49-74) 105/69  Arterial Line BP: ()/(65-71) 107/71     Weight: 83.3 kg (183 lb 10.3 oz)  Body mass index is 29.64 kg/m².     SpO2: 98 %         Intake/Output Summary (Last 24 hours) at 1/31/2024 1323  Last data filed at 1/31/2024 1117  Gross per 24 hour   Intake 597.37 ml   Output 305 ml   Net 292.37 ml       Lines/Drains/Airways       Peripherally Inserted Central Catheter Line  Duration             PICC Double Lumen 01/19/24 12 days              Drain  Duration             Male External Urinary Catheter 01/30/24 1905 <1 day                       Physical Exam  Vitals and nursing note reviewed.   Constitutional:       General: He is not in acute distress.     Appearance: He is well-developed. He is ill-appearing. He is not diaphoretic.      Comments: +conversational dyspnea   HENT:      Head: Normocephalic and atraumatic.   Eyes:      General:         Right eye: No discharge.         Left eye: No discharge.      Pupils: Pupils are equal, round, and reactive to light.   Neck:      Vascular: JVD present.      Comments: +JVD    Cardiovascular:      Rate and Rhythm: Normal rate and regular rhythm.      Heart sounds: Normal heart sounds, S1 normal and S2  "normal. No murmur heard.  Pulmonary:      Effort: Pulmonary effort is normal. No respiratory distress.      Breath sounds: Normal breath sounds. No wheezing.   Abdominal:      General: There is distension.   Musculoskeletal:      Left lower leg: No edema.   Skin:     General: Skin is warm and dry.      Findings: No erythema.   Neurological:      Mental Status: He is alert.      Comments: Awake, alert during exam   Psychiatric:         Behavior: Behavior normal.            Significant Labs: CMP   Recent Labs   Lab 01/30/24  0359 01/31/24  0555   * 126*   K 4.5 4.9   CL 83* 82*   CO2 22* 19*   * 119*   * 152*   CREATININE 5.3* 6.7*   CALCIUM 9.1 8.6*   PROT 7.4  7.4 7.7  7.7   ALBUMIN 3.0*  3.0* 3.1*  3.1*   BILITOT 7.6*  7.6* 7.4*  7.4*   ALKPHOS 139*  139* 115  115   *  317* 172*  172*   *  314* 262*  262*   ANIONGAP 22* 25*   , CBC   Recent Labs   Lab 01/29/24 2238 01/30/24  0359 01/31/24  0555   WBC 9.59 8.90 6.49   HGB 9.3* 9.3* 9.3*   HCT 30.8* 31.6* 31.4*   * 97* 85*   , Troponin No results for input(s): "TROPONINI" in the last 48 hours., and All pertinent lab results from the last 24 hours have been reviewed.    Significant Imaging: Echocardiogram: Transthoracic echo (TTE) complete ([facility] Only):   Results for orders placed or performed during the hospital encounter of 12/05/22   Echo   Result Value Ref Range    BSA 2.06 m2    TDI SEPTAL 0.07 m/s    LV LATERAL E/E' RATIO 10.22 m/s    LV SEPTAL E/E' RATIO 13.14 m/s    LA WIDTH 3.80 cm    TDI LATERAL 0.09 m/s    LVIDd 5.70 3.5 - 6.0 cm    IVS 0.72 0.6 - 1.1 cm    Posterior Wall 0.93 0.6 - 1.1 cm    LVIDs 5.10 (A) 2.1 - 4.0 cm    FS 11 28 - 44 %    LA volume 78.94 cm3    Sinus 2.19 cm    STJ 2.04 cm    Ascending aorta 2.39 cm    LV mass 176.88 g    LA size 4.06 cm    RVDD 4.45 cm    TAPSE 1.44 cm    RV S' 4.35 cm/s    Left Ventricle Relative Wall Thickness 0.33 cm    AV mean gradient 3 mmHg    AV valve area " 1.33 cm2    AV Velocity Ratio 0.53     AV index (prosthetic) 0.52     Mean e' 0.08 m/s    LVOT diameter 1.80 cm    LVOT area 2.5 cm2    LVOT peak alex 0.59 m/s    LVOT peak VTI 7.76 cm    Ao peak alex 1.11 m/s    Ao VTI 14.80 cm    Mr max alex 0.04 m/s    LVOT stroke volume 19.74 cm3    AV peak gradient 5 mmHg    E/E' ratio 11.50 m/s    MV Peak E Alex 0.92 m/s    TR Max Alex 3.56 m/s    LV Systolic Volume 101.20 mL    LV Systolic Volume Index 56.5 mL/m2    LV Diastolic Volume 135.44 mL    LV Diastolic Volume Index 75.66 mL/m2    LA Volume Index 44.1 mL/m2    LV Mass Index 99 g/m2    RA Major Axis 5.40 cm    Left Atrium Minor Axis 6.00 cm    Left Atrium Major Axis 6.04 cm    Triscuspid Valve Regurgitation Peak Gradient 51 mmHg    LA Volume Index (Mod) 36.2 mL/m2    LA volume (mod) 64.73 cm3    RA Width 3.93 cm    Right Atrial Pressure (from IVC) 15 mmHg    EF 20 %    TV resting pulmonary artery pressure 66 mmHg    Narrative    · The left ventricle is mildly enlarged with severely decreased systolic   function.  · The estimated ejection fraction is <20%.  · There is left ventricular global hypokinesis.  · Indeterminate left ventricular diastolic function.  · Mild right ventricular enlargement with moderately reduced right   ventricular systolic function.  · Biatrial enlargement.  · Mild mitral regurgitation.  · Mild tricuspid regurgitation.  · The estimated PA systolic pressure is 66 mmHg.  · There is pulmonary hypertension.  · Elevated central venous pressure (15 mmHg).  · Small posterior pericardial effusion.       and EKG: Reviewed  Assessment and Plan:   Patient remains critically ill with end-stage CHF/MSOF. Palliative on board, being d/c'd home with hospice. See prior detailed notes from admission to [facility] in 2022.    Cardiac arrest  -Remains critically ill  -See plan above    Atrial fibrillation  -HR variable  -Hold Eliquis for know given liver failure/auto-anticoagulation (INR 3.2)  -Repeat INR in  AM    1/30/24  -Eliquis held given worsening thrombocytopenia/INR of 2.7     1/31/24  -HR in low 100's  -AC held given worsening thrombocytopenia; hematuria/bloody BM documented by nurse overnight      CKD (chronic kidney disease), stage III  -Creatinine 4.6, nephrology following    1/30/24  -Creatinine 5.3    1/31/24  -Creatinine 6.7, nephrology following    Acute on chronic combined systolic and diastolic heart failure  51 YO M w/ history of long standing NICM (> 10 yrs), HTN, marijuana and tobacco use, CKD stage 3 admitted for CP.  CP is atypical sometimes pleuritic, worse on movements.  Cath 2022 [facility] nonobstructive/minimal ds. EKG afib with V rate 114. There was 1 episode of NSVT. There is a mild increase in troponin .056. Pt with chronic CHF sx NYHA class 3.   CXR wnl.  After multiple discussions explaining plan and goals of care with OHT team in [place] he does not want to undergo any further workup/procedures or increased HD support at this time ( OHT or LVAD)   Pt on home dobutrex.    Pt with atypical CP ( nonobstructive ds by cath), will add ranexa, can add PPI. CHF mild continue IV diuretics, amiodarone added for NSVT. Pt can go to telemetry    1/29/24  -Patient remains critically ill, intubated on multiple pressors/inotropic support  -LFT's and creatinine trending up  -Diurese at tolerated  -Previously declined any further advanced HF workup in 2022  -Poor prognosis, Dr. Alvarado discussed with son at bedside  -Palliative consulted    1/30/24  -Remains critically ill on dobutamine/vasopressin with MSOF  -Supportive care  -Discussed poor prognosis  -Patient previously declined advanced HF workup/options, expressed this again today during our discussion  -Palliative consulted      1/31/24  -Critically ill  -Creatinine up to 6.7, overloaded on exam  -Not candidate for HD  -Poor prognosis  -Palliative on board, being dc'd home with hospice        VTE Risk Mitigation (From admission, onward)            Ordered     IP VTE HIGH RISK PATIENT  Once         01/26/24 1746     Place sequential compression device  Until discontinued         01/26/24 1746                    Jordyn Borjas PA-C  Cardiology  [facility] - [facility] ([facility])

## 2024-01-31 NOTE — SUBJECTIVE & OBJECTIVE
Interval History:  Patient seen and examined in the intensive care unit.  He remains critically ill.  We have been able to wean his pressors somewhat over yesterday.  Because of his hearing loss, his Lasix drip was changed to Bumex drip.  His urine output has decreased a little bit since the transition.  His creatinine continues to rise however.  His transaminase levels are normalizing.  He was stable post extubation and perhaps is a little more alert today.  There was discussion about possibly tapping his ascites seen on recent ultrasound due to his family's concern of increased abdominal girth.    Review of patient's allergies indicates:  No Known Allergies  Current Facility-Administered Medications   Medication Frequency    0.9%  NaCl infusion PRN    acetaminophen tablet 650 mg Q8H PRN    amiodarone tablet 400 mg Daily    aspirin EC tablet 81 mg Daily    bumetanide (BUMEX) 25 mg in 100 mL infusion (conc: 0.25 mg/mL) Continuous    chlorothiazide (DIURIL) 250 mg in dextrose 5 % (D5W) 50 mL IVPB Q24H    dextrose 10% bolus 125 mL 125 mL PRN    dextrose 10% bolus 250 mL 250 mL PRN    DOBUtamine 1000 mg in D5W 250 mL infusion Continuous    influenza (QUADRIVALENT PF) vaccine 0.5 mL vaccine x 1 dose    lactulose 20 gram/30 mL solution Soln 20 g BID    methylPREDNISolone sodium succinate injection 40 mg Q12H    ondansetron injection 4 mg Q6H PRN    pantoprazole EC tablet 40 mg Daily    piperacillin-tazobactam (ZOSYN) 4.5 g in dextrose 5 % in water (D5W) 100 mL IVPB (MB+) Q12H    pneumoc 20-denita conj-dip cr(PF) (PREVNAR-20 (PF)) injection Syrg 0.5 mL vaccine x 1 dose    sevelamer carbonate pwpk 0.8 g TID WM    sodium chloride 0.9% flush 10 mL PRN    vasopressin (PITRESSIN) 0.2 Units/mL in dextrose 5% 100 mL infusion Continuous       Objective:     Vital Signs (Most Recent):  Temp: 97.8 °F (36.6 °C) (01/31/24 0700)  Pulse: 98 (01/31/24 0800)  Resp: (!) 24 (01/31/24 0800)  BP: 116/69 (01/31/24 0800)  SpO2: 96 % (01/31/24  0800) Vital Signs (24h Range):  Temp:  [96.8 °F (36 °C)-97.8 °F (36.6 °C)] 97.8 °F (36.6 °C)  Pulse:  [] 98  Resp:  [24-30] 24  SpO2:  [92 %-100 %] 96 %  BP: ()/(49-92) 116/69  Arterial Line BP: ()/(65-78) 107/71     Weight: 83.3 kg (183 lb 10.3 oz) (01/31/24 0600)  Body mass index is 29.64 kg/m².  Body surface area is 1.97 meters squared.    I/O last 3 completed shifts:  In: 1017.4 [I.V.:711.4; IV Piggyback:306]  Out: 595 [Urine:595]     Physical Exam  Vitals reviewed.   Constitutional:       Comments: Critically ill male, seen and examined in the ICU, case discussed with Dr. Treadwell, patient remains hard of hearing but perhaps his this is a little better today.   Cardiovascular:      Rate and Rhythm: Normal rate and regular rhythm.   Pulmonary:      Effort: Pulmonary effort is normal.      Breath sounds: Normal breath sounds.   Abdominal:      Palpations: Abdomen is soft.   Musculoskeletal:         General: Swelling present.   Neurological:      Mental Status: He is alert.          Significant Labs:  CBC:   Recent Labs   Lab 01/31/24  0555   WBC 6.49   RBC 4.19*   HGB 9.3*   HCT 31.4*   PLT 85*   MCV 75*   MCH 22.2*   MCHC 29.6*     CMP:   Recent Labs   Lab 01/31/24  0555   *   CALCIUM 8.6*   ALBUMIN 3.1*  3.1*   PROT 7.7  7.7   *   K 4.9   CO2 19*   CL 82*   *   CREATININE 6.7*   ALKPHOS 115  115   *  262*   *  172*   BILITOT 7.4*  7.4*     All labs within the past 24 hours have been reviewed.     Significant Imaging:  Moderate ascites within the right upper and lower quadrants. Electronically signed by: Laura Knight Date: 01/30/2024 Time: 16:50

## 2024-01-31 NOTE — CONSULTS
[facility] liaison to meet with family for consents int he next hour. Home Dobutamine ordered. Once there is an ETA of when that will be delivered, [facility] will be set up. Will need to be a CCU due to Dobutamine gtt.

## 2024-01-31 NOTE — PT/OT/SLP PROGRESS
Physical Therapy      Patient Name:  Migel Garcia   MRN:  [id number]    OK TO D/C PT FROM P.T. SERVICES PER DR. PATIÑO, PT TO D/C WITH HOSPICE    Clarita Jasso, PT  1/31/2024  5909

## 2024-01-31 NOTE — HOSPITAL COURSE
"Mr. Garcia, a 53-year-old male hospice patient with a significant history of severe non-ischemic cardiomyopathy, an ejection fraction  of 10% (on a long-term dobutamine infusion), and chronic pulmonary disease who was initially admitted to the ICU after presenting with intermittent chest pain. His hospital course included a period of sudden decline leading to cardiac arrest. Subsequently, he required mechanical ventilation and pressors. His management included epinephrine, vasopressors, and continued dobutamine, with attempts to wean off mechanical ventilation, facing challenges like biting on the endotracheal tube. Despite the complexities, he transitioned from pressor support to less invasive respiratory support and was eventually able to breathe on room air with dobutamine and diuretics, expressing a desire for home hospice care. Orders for transfer to telemetry floor was placed with hospital medicine consulted to assume care.  At the time of our encounter, palliative care at bedside facilitating conversations regarding patient's code status and potential transfer to home-based palliative care versus hospice care. Family decided on transitioning to home hospice. Orders for hospital bed and DME placed. DC orders placed on 1/31/2024.    /64   Pulse (!) 119   Temp 98 °F (36.7 °C) (Oral)   Resp (!) 22   Ht 5' 6" (1.676 m)   Wt 83.3 kg (183 lb 10.3 oz)   SpO2 95%   BMI 29.64 kg/m²     PHYSICAL EXAM  Vitals Reviewed  GEN: No acute distress, pleasant, body habitus normal  HEENT: diminished hearing, atraumatic and normocephalic  CARDS: regular rate and rhythm, no m/g, pulses palpable in LE  PULM: breathing comfortably on room air, chest symmetric, nonlabored, no abnormal breath sounds on auscultation  ABD: nontender, nondistended, soft, no organomegaly, BS+  Neuro: Alert and oriented x3, CN's I-IX grossly intact, sensation and motor intact; follows directions and answers questions appropriately    "

## 2024-01-31 NOTE — ASSESSMENT & PLAN NOTE
Careful hydration to get Cr below 2.    1/29  Significant worsening post cardiac arrest  Nephrology consult    1/29 per nephrology   1/30 per nephrology patient had stated would never want HD  1/31-per nephrology

## 2024-01-31 NOTE — ASSESSMENT & PLAN NOTE
Cardiology consult  No pulmonary edema on CxR   BNP elevated.   Will consult cardiology  Careful hydration  Will differ need for inotropic support to cardiology as we slowly hydrate him to preserve his renal function  High risk of deterioration  Appropriate for ICU admission    1/27. Stable overnight. No SOB or chest pain. No resp issues. On RA. Stable for transfer per cardiology. Appreciate the help. Will differ diuretics to cards. BUN / Cr slightly better.     1/28. See cardiac arrest  1/29 -on lasix drip   Back on small epi. On vaso / dobutamine  Cards following      1/30- on dobutamine/ bumex   Try wean vaso    1/31-on dobutamine and bumex

## 2024-01-31 NOTE — PROGRESS NOTES
[facility] ([facility])  Nephrology  Progress Note    Patient Name: Migel Garcia  MRN: [id number]  Admission Date: 1/26/2024  Hospital Length of Stay: 5 days  Attending Provider: Silvia Mae MD   Primary Care Physician: Anuj Banks MD  Principal Problem:<principal problem not specified>    Subjective:     HPI:   Mr Garcia is a 52 yo male with severe NICM, with an EF <20% and  Pulmonary HTN  noted on his recent ECHO who presented to the hospital with chest pain.  He has previously been evaluated by the transplant team but does not want to pursue advanced support.  After his last hospital stay, he was discharged home with hospice and has been managed on a dobutamine infusion for over a year.  He was managed in the ICU and stabilized. He was Transferred to the floor in stable condition yesterday but then had an episode of SVT and then bradycardia progressing to PEA. His Defebrillator never fired. CPR lasted 6 min.   Was promptly intubation and transferred to the ICU on vasopressin and epi.   Pt is severely ill with  severe cardiogenic shock with MSOF.  Now with transaminitis due to shock liver and no urine output with progressing renal failure,. He has refractory hypoglycemia as well, all point to poor perfusion state.   The family understands he is a poor candidate for RRT. They also understand he would wish for chronic dialysis therapy.   In the past lasix infusion has worked for him per family and I will try this today. It is unlikely that he will respond significantly to this therapy.  I will follow with his response and discuss with family if we have any further options for treatment.       Interval History:  Patient seen and examined in the intensive care unit.  He remains critically ill.  We have been able to wean his pressors somewhat over yesterday.  Because of his hearing loss, his Lasix drip was changed to Bumex drip.  His urine output has decreased a little bit since the  transition.  His creatinine continues to rise however.  His transaminase levels are normalizing.  He was stable post extubation and perhaps is a little more alert today.  There was discussion about possibly tapping his ascites seen on recent ultrasound due to his family's concern of increased abdominal girth.    Review of patient's allergies indicates:  No Known Allergies  Current Facility-Administered Medications   Medication Frequency    0.9%  NaCl infusion PRN    acetaminophen tablet 650 mg Q8H PRN    amiodarone tablet 400 mg Daily    aspirin EC tablet 81 mg Daily    bumetanide (BUMEX) 25 mg in 100 mL infusion (conc: 0.25 mg/mL) Continuous    chlorothiazide (DIURIL) 250 mg in dextrose 5 % (D5W) 50 mL IVPB Q24H    dextrose 10% bolus 125 mL 125 mL PRN    dextrose 10% bolus 250 mL 250 mL PRN    DOBUtamine 1000 mg in D5W 250 mL infusion Continuous    influenza (QUADRIVALENT PF) vaccine 0.5 mL vaccine x 1 dose    lactulose 20 gram/30 mL solution Soln 20 g BID    methylPREDNISolone sodium succinate injection 40 mg Q12H    ondansetron injection 4 mg Q6H PRN    pantoprazole EC tablet 40 mg Daily    piperacillin-tazobactam (ZOSYN) 4.5 g in dextrose 5 % in water (D5W) 100 mL IVPB (MB+) Q12H    pneumoc 20-denita conj-dip cr(PF) (PREVNAR-20 (PF)) injection Syrg 0.5 mL vaccine x 1 dose    sevelamer carbonate pwpk 0.8 g TID WM    sodium chloride 0.9% flush 10 mL PRN    vasopressin (PITRESSIN) 0.2 Units/mL in dextrose 5% 100 mL infusion Continuous       Objective:     Vital Signs (Most Recent):  Temp: 97.8 °F (36.6 °C) (01/31/24 0700)  Pulse: 98 (01/31/24 0800)  Resp: (!) 24 (01/31/24 0800)  BP: 116/69 (01/31/24 0800)  SpO2: 96 % (01/31/24 0800) Vital Signs (24h Range):  Temp:  [96.8 °F (36 °C)-97.8 °F (36.6 °C)] 97.8 °F (36.6 °C)  Pulse:  [] 98  Resp:  [24-30] 24  SpO2:  [92 %-100 %] 96 %  BP: ()/(49-92) 116/69  Arterial Line BP: ()/(65-78) 107/71     Weight: 83.3 kg (183 lb 10.3 oz) (01/31/24 0600)  Body mass  index is 29.64 kg/m².  Body surface area is 1.97 meters squared.    I/O last 3 completed shifts:  In: 1017.4 [I.V.:711.4; IV Piggyback:306]  Out: 595 [Urine:595]     Physical Exam  Vitals reviewed.   Constitutional:       Comments: Critically ill male, seen and examined in the ICU, case discussed with Dr. Treadwell, patient remains hard of hearing but perhaps his this is a little better today.   Cardiovascular:      Rate and Rhythm: Normal rate and regular rhythm.   Pulmonary:      Effort: Pulmonary effort is normal.      Breath sounds: Normal breath sounds.   Abdominal:      Palpations: Abdomen is soft.   Musculoskeletal:         General: Swelling present.   Neurological:      Mental Status: He is alert.          Significant Labs:  CBC:   Recent Labs   Lab 01/31/24  0555   WBC 6.49   RBC 4.19*   HGB 9.3*   HCT 31.4*   PLT 85*   MCV 75*   MCH 22.2*   MCHC 29.6*     CMP:   Recent Labs   Lab 01/31/24  0555   *   CALCIUM 8.6*   ALBUMIN 3.1*  3.1*   PROT 7.7  7.7   *   K 4.9   CO2 19*   CL 82*   *   CREATININE 6.7*   ALKPHOS 115  115   *  262*   *  172*   BILITOT 7.4*  7.4*     All labs within the past 24 hours have been reviewed.     Significant Imaging:  Moderate ascites within the right upper and lower quadrants. Electronically signed by: Laura Knight Date: 01/30/2024 Time: 16:50   Assessment/Plan:     Status post cardiac arrest  Patient has severe cardiomyopathy at baseline with defibrillator placement previously, now post arrest with multiorgan failure managed in the ICU.       Nonischemic cardiomyopathy with severe decreased ejection fraction and pulmonary hypertension  After multiple discussions explaining plan and goals of care with OHT team in [place], he does not want to undergo any further workup/procedures  ( OHT or LVAD)   He is managed at home on dobutrex.  His most recent ejection fraction is in the range of 10% making him a poor candidate for renal  replacement therapy.     Ventilatory failure  Pt extubated and remains stable.     Cardiogenic shock  managed in the ICU post arrest.  Blood pressure supported by pressors runnign in 80-90's.    Able to wean pressors yesterday . On DB and vasopressin.     Status post previous ICD placement   His ICD apparently did not fire during his recent arrest     Acute on stage III chronic kidney disease  Patient's CKD is likely due to his pre renal state with his ejection fraction in the range of 10%.  He typically has very high BUN to creatinine ratio which is consistent with this.    His acute decline is due to shock associated with his recent cardiac arrest with ongoing hemodynamic instability.  His creatinine is worse again today, up to 6.7  His baseline cardiomyopathy limits his ability to be treated with renal replacement therapy and he will not tolerate dialysis in my opinion.  He is now on a bumex drip with Diuril dosing. His UO dropped yesterday.     Shock liver  Transaminitis with cont slight improvement  Follow with hemodynamic support with pressors as tolerated.    Anasarca with ascites.  Follow with decision regarding tap today.      Patrick Louis MD  Nephrology  [facility] ([facility])

## 2024-01-31 NOTE — PT/OT/SLP PROGRESS
Occupational Therapy      Patient Name:  Migel Garcia   MRN:  [id number]    OK TO D/C PT FROM [facility] PER DR. PATIÑO, PATIENT TO D/C WITH HOSPICE     Ana Hart, OT  1/31/2024  [id number]

## 2024-01-31 NOTE — SUBJECTIVE & OBJECTIVE
Review of Systems   Constitutional: Positive for malaise/fatigue.   HENT: Negative.     Cardiovascular:  Positive for dyspnea on exertion.   Respiratory:  Positive for shortness of breath.    Endocrine: Negative.    Hematologic/Lymphatic: Negative.    Skin: Negative.    Musculoskeletal: Negative.    Gastrointestinal:  Positive for bloating.   Genitourinary: Negative.    Neurological: Negative.    Psychiatric/Behavioral: Negative.     Allergic/Immunologic: Negative.      Objective:     Vital Signs (Most Recent):  Temp: 98 °F (36.7 °C) (01/31/24 1100)  Pulse: (!) 113 (01/31/24 1145)  Resp: (!) 27 (01/31/24 1145)  BP: 105/69 (01/31/24 1100)  SpO2: 98 % (01/31/24 1145) Vital Signs (24h Range):  Temp:  [97.4 °F (36.3 °C)-98 °F (36.7 °C)] 98 °F (36.7 °C)  Pulse:  [] 113  Resp:  [24-33] 27  SpO2:  [92 %-100 %] 98 %  BP: ()/(49-74) 105/69  Arterial Line BP: ()/(65-71) 107/71     Weight: 83.3 kg (183 lb 10.3 oz)  Body mass index is 29.64 kg/m².     SpO2: 98 %         Intake/Output Summary (Last 24 hours) at 1/31/2024 1323  Last data filed at 1/31/2024 1117  Gross per 24 hour   Intake 597.37 ml   Output 305 ml   Net 292.37 ml       Lines/Drains/Airways       Peripherally Inserted Central Catheter Line  Duration             PICC Double Lumen 01/19/24 12 days              Drain  Duration             Male External Urinary Catheter 01/30/24 1905 <1 day                       Physical Exam  Vitals and nursing note reviewed.   Constitutional:       General: He is not in acute distress.     Appearance: He is well-developed. He is ill-appearing. He is not diaphoretic.      Comments: +conversational dyspnea   HENT:      Head: Normocephalic and atraumatic.   Eyes:      General:         Right eye: No discharge.         Left eye: No discharge.      Pupils: Pupils are equal, round, and reactive to light.   Neck:      Vascular: JVD present.      Comments: +JVD    Cardiovascular:      Rate and Rhythm: Normal rate and  "regular rhythm.      Heart sounds: Normal heart sounds, S1 normal and S2 normal. No murmur heard.  Pulmonary:      Effort: Pulmonary effort is normal. No respiratory distress.      Breath sounds: Normal breath sounds. No wheezing.   Abdominal:      General: There is distension.   Musculoskeletal:      Left lower leg: No edema.   Skin:     General: Skin is warm and dry.      Findings: No erythema.   Neurological:      Mental Status: He is alert.      Comments: Awake, alert during exam   Psychiatric:         Behavior: Behavior normal.            Significant Labs: CMP   Recent Labs   Lab 01/30/24  0359 01/31/24  0555   * 126*   K 4.5 4.9   CL 83* 82*   CO2 22* 19*   * 119*   * 152*   CREATININE 5.3* 6.7*   CALCIUM 9.1 8.6*   PROT 7.4  7.4 7.7  7.7   ALBUMIN 3.0*  3.0* 3.1*  3.1*   BILITOT 7.6*  7.6* 7.4*  7.4*   ALKPHOS 139*  139* 115  115   *  317* 172*  172*   *  314* 262*  262*   ANIONGAP 22* 25*   , CBC   Recent Labs   Lab 01/29/24 2238 01/30/24  0359 01/31/24  0555   WBC 9.59 8.90 6.49   HGB 9.3* 9.3* 9.3*   HCT 30.8* 31.6* 31.4*   * 97* 85*   , Troponin No results for input(s): "TROPONINI" in the last 48 hours., and All pertinent lab results from the last 24 hours have been reviewed.    Significant Imaging: Echocardiogram: Transthoracic echo (TTE) complete ([facility] Only):   Results for orders placed or performed during the hospital encounter of 12/05/22   Echo   Result Value Ref Range    BSA 2.06 m2    TDI SEPTAL 0.07 m/s    LV LATERAL E/E' RATIO 10.22 m/s    LV SEPTAL E/E' RATIO 13.14 m/s    LA WIDTH 3.80 cm    TDI LATERAL 0.09 m/s    LVIDd 5.70 3.5 - 6.0 cm    IVS 0.72 0.6 - 1.1 cm    Posterior Wall 0.93 0.6 - 1.1 cm    LVIDs 5.10 (A) 2.1 - 4.0 cm    FS 11 28 - 44 %    LA volume 78.94 cm3    Sinus 2.19 cm    STJ 2.04 cm    Ascending aorta 2.39 cm    LV mass 176.88 g    LA size 4.06 cm    RVDD 4.45 cm    TAPSE 1.44 cm    RV S' 4.35 cm/s    Left Ventricle " Relative Wall Thickness 0.33 cm    AV mean gradient 3 mmHg    AV valve area 1.33 cm2    AV Velocity Ratio 0.53     AV index (prosthetic) 0.52     Mean e' 0.08 m/s    LVOT diameter 1.80 cm    LVOT area 2.5 cm2    LVOT peak alex 0.59 m/s    LVOT peak VTI 7.76 cm    Ao peak alex 1.11 m/s    Ao VTI 14.80 cm    Mr max alex 0.04 m/s    LVOT stroke volume 19.74 cm3    AV peak gradient 5 mmHg    E/E' ratio 11.50 m/s    MV Peak E Alex 0.92 m/s    TR Max Alex 3.56 m/s    LV Systolic Volume 101.20 mL    LV Systolic Volume Index 56.5 mL/m2    LV Diastolic Volume 135.44 mL    LV Diastolic Volume Index 75.66 mL/m2    LA Volume Index 44.1 mL/m2    LV Mass Index 99 g/m2    RA Major Axis 5.40 cm    Left Atrium Minor Axis 6.00 cm    Left Atrium Major Axis 6.04 cm    Triscuspid Valve Regurgitation Peak Gradient 51 mmHg    LA Volume Index (Mod) 36.2 mL/m2    LA volume (mod) 64.73 cm3    RA Width 3.93 cm    Right Atrial Pressure (from IVC) 15 mmHg    EF 20 %    TV resting pulmonary artery pressure 66 mmHg    Narrative    · The left ventricle is mildly enlarged with severely decreased systolic   function.  · The estimated ejection fraction is <20%.  · There is left ventricular global hypokinesis.  · Indeterminate left ventricular diastolic function.  · Mild right ventricular enlargement with moderately reduced right   ventricular systolic function.  · Biatrial enlargement.  · Mild mitral regurgitation.  · Mild tricuspid regurgitation.  · The estimated PA systolic pressure is 66 mmHg.  · There is pulmonary hypertension.  · Elevated central venous pressure (15 mmHg).  · Small posterior pericardial effusion.       and EKG: Reviewed

## 2024-01-31 NOTE — SUBJECTIVE & OBJECTIVE
Past Medical History:   Diagnosis Date    A-fib     GERD (gastroesophageal reflux disease)     Heart failure, unspecified     Hypertension     V-tach        Past Surgical History:   Procedure Laterality Date    COLONOSCOPY N/A 12/13/2022    Procedure: COLONOSCOPY;  Surgeon: Geovany Cole MD;  Location: [facility] ENDO (2ND FLR);  Service: Endoscopy;  Laterality: N/A;    COLONOSCOPY N/A 12/12/2022    Procedure: COLONOSCOPY;  Surgeon: Geovany Cole MD;  Location: [facility] ENDO (2ND FLR);  Service: Endoscopy;  Laterality: N/A;    PLACEMENT OF SWAN LESTER CATHETER WITH IMAGING GUIDANCE  12/22/2022    Procedure: INSERTION, CATHETER, SWAN-LESTER, WITH IMAGING GUIDANCE;  Surgeon: Brando Parada MD;  Location: [facility] CATH LAB;  Service: Cardiology;;    RIGHT HEART CATHETERIZATION Right 12/16/2022    Procedure: INSERTION, CATHETER, RIGHT HEART;  Surgeon: Brando Parada MD;  Location: [facility] CATH LAB;  Service: Cardiology;  Laterality: Right;    RIGHT HEART CATHETERIZATION Right 12/22/2022    Procedure: INSERTION, CATHETER, RIGHT HEART;  Surgeon: Brando Parada MD;  Location: [facility] CATH LAB;  Service: Cardiology;  Laterality: Right;       Review of patient's allergies indicates:  No Known Allergies    No current facility-administered medications on file prior to encounter.     Current Outpatient Medications on File Prior to Encounter   Medication Sig    amiodarone (PACERONE) 200 MG Tab Take 1 tablet (200 mg total) by mouth once daily.    apixaban (ELIQUIS) 5 mg Tab Take 1 tablet (5 mg total) by mouth 2 (two) times daily.    aspirin 81 MG Chew Take 81 mg by mouth once daily.    BIDIL 20-37.5 mg Tab Take 1 tablet by mouth 3 (three) times daily.    bumetanide (BUMEX) 1 MG tablet Take 3 tablets (3 mg total) by mouth 2 (two) times a day.    DOBUTamine (DOBUTREX) 250 mg/250 mL (1 mg/mL) infusion Inject 2.5 mcg/kg/min into the vein continuous.    eplerenone (INSPRA) 50 MG Tab Take 50 mg by mouth once daily.    metOLazone  (ZAROXOLYN) 5 MG tablet Take 5 mg by mouth once daily.    metoprolol tartrate (LOPRESSOR) 25 MG tablet Take 12.5 mg by mouth 2 (two) times daily.    potassium chloride SA (K-DUR,KLOR-CON) 20 MEQ tablet Take 20 mEq by mouth once daily.    ondansetron (ZOFRAN-ODT) 4 MG TbDL Take 4 mg by mouth every 6 (six) hours as needed.     Family History    None       Tobacco Use    Smoking status: Some Days     Types: Cigarettes    Smokeless tobacco: Not on file   Substance and Sexual Activity    Alcohol use: Not Currently    Drug use: Not on file    Sexual activity: Not on file     Review of Systems   Constitutional:  Negative for chills, fatigue and fever.   HENT:  Positive for hearing loss. Negative for congestion, sinus pressure, sinus pain and trouble swallowing.    Eyes:  Negative for photophobia, pain and visual disturbance.   Respiratory:  Negative for cough, shortness of breath and wheezing.    Cardiovascular:  Negative for chest pain and palpitations.   Gastrointestinal:  Negative for abdominal distention, constipation, diarrhea, nausea and vomiting.   Genitourinary:  Negative for difficulty urinating, flank pain and hematuria.   Musculoskeletal:  Negative for arthralgias, gait problem and joint swelling.   Skin:  Negative for rash and wound.   Neurological:  Negative for dizziness, seizures, light-headedness and headaches.   Psychiatric/Behavioral:  Negative for confusion and suicidal ideas.    All other systems reviewed and are negative.    Objective:     Vital Signs (Most Recent):  Temp: 97.8 °F (36.6 °C) (01/31/24 0700)  Pulse: 102 (01/31/24 1030)  Resp: (!) 28 (01/31/24 1030)  BP: 104/74 (01/31/24 1030)  SpO2: (!) 93 % (01/31/24 1030) Vital Signs (24h Range):  Temp:  [97.4 °F (36.3 °C)-97.8 °F (36.6 °C)] 97.8 °F (36.6 °C)  Pulse:  [] 102  Resp:  [24-30] 28  SpO2:  [92 %-100 %] 93 %  BP: ()/(49-74) 104/74  Arterial Line BP: ()/(65-78) 107/71     Weight: 83.3 kg (183 lb 10.3 oz)  Body mass index is  "29.64 kg/m².     Physical Exam  Constitutional:       Appearance: He is normal weight.   HENT:      Head: Normocephalic and atraumatic.      Right Ear: Decreased hearing noted.      Left Ear: Decreased hearing noted.   Eyes:      Extraocular Movements: Extraocular movements intact.      Conjunctiva/sclera: Conjunctivae normal.      Pupils: Pupils are equal, round, and reactive to light.   Cardiovascular:      Rate and Rhythm: Normal rate and regular rhythm.      Pulses: Normal pulses.      Heart sounds: No murmur heard.  Pulmonary:      Effort: Pulmonary effort is normal. No respiratory distress.      Breath sounds: Normal breath sounds.   Abdominal:      General: Abdomen is flat. There is no distension.      Palpations: Abdomen is soft.   Neurological:      General: No focal deficit present.      Mental Status: He is alert and oriented to person, place, and time. Mental status is at baseline.   Psychiatric:         Mood and Affect: Mood normal.         Behavior: Behavior normal.          Significant Labs: BMP:   Recent Labs   Lab 01/31/24  0555   *   *   K 4.9   CL 82*   CO2 19*   *   CREATININE 6.7*   CALCIUM 8.6*   MG 2.7*     CBC:   Recent Labs   Lab 01/29/24  2238 01/30/24  0359 01/31/24  0555   WBC 9.59 8.90 6.49   HGB 9.3* 9.3* 9.3*   HCT 30.8* 31.6* 31.4*   * 97* 85*     CMP:   Recent Labs   Lab 01/30/24  0359 01/31/24  0555   * 126*   K 4.5 4.9   CL 83* 82*   CO2 22* 19*   * 119*   * 152*   CREATININE 5.3* 6.7*   CALCIUM 9.1 8.6*   PROT 7.4  7.4 7.7  7.7   ALBUMIN 3.0*  3.0* 3.1*  3.1*   BILITOT 7.6*  7.6* 7.4*  7.4*   ALKPHOS 139*  139* 115  115   *  317* 172*  172*   *  314* 262*  262*   ANIONGAP 22* 25*     Cardiac Markers: No results for input(s): "CKMB", "MYOGLOBIN", "BNP", "TROPISTAT" in the last 48 hours.  Coagulation:   Recent Labs   Lab 01/31/24  0555   INR 2.0*     Lactic Acid:   Recent Labs   Lab 01/30/24  0359   LACTATE " 3.7*     Magnesium:   Recent Labs   Lab 01/30/24  0359 01/31/24  0555   MG 2.6 2.7*           Significant Imaging: I have reviewed all pertinent imaging results/findings within the past 24 hours.  Imaging Results              X-Ray Chest AP Portable (Final result)  Result time 01/26/24 14:34:03      Final result by Agapito Elise MD (01/26/24 14:34:03)                   Impression:      Cardiomegaly.      Electronically signed by: Agapito Elise  Date:    01/26/2024  Time:    14:34               Narrative:    EXAMINATION:  XR CHEST AP PORTABLE    CLINICAL HISTORY:  CHF;    FINDINGS:  Comparison: None available.    Left-sided cardiac conduction device.    Cardiomegaly.  The lungs are clear.  No pneumothorax or pleural effusion.  No acute osseous finding.

## 2024-01-31 NOTE — CARE UPDATE
"HPI:   Mr. Garcia, a 53-year-old male hospice patient with a significant history of severe non-ischemic cardiomyopathy, an ejection fraction  of 10% (on a long-term dobutamine infusion), and chronic pulmonary disease who was initially admitted to the ICU after presenting with intermittent chest pain. His hospital course included a period of sudden decline leading to cardiac arrest. Subsequently, he required mechanical ventilation and pressors. His management included epinephrine, vasopressors, and continued dobutamine, with attempts to wean off mechanical ventilation, facing challenges like biting on the endotracheal tube. Despite the complexities, he transitioned from pressor support to less invasive respiratory support and was eventually able to breathe on room air with dobutamine and diuretics, expressing a desire for home hospice care. Orders for transfer to telemetry floor was placed with hospital medicine consulted to assume care.  At the time of our encounter, palliative care at bedside facilitating conversations regarding patient's code status and potential transfer to home-based palliative care versus hospice care.      /69   Pulse (!) 113   Temp 98 °F (36.7 °C) (Oral)   Resp (!) 27   Ht 5' 6" (1.676 m)   Wt 83.3 kg (183 lb 10.3 oz)   SpO2 98%   BMI 29.64 kg/m²     Will follow up with Palliative care discussion.     Full note to follow.    Contreras Lee MD  Hospital Medicine    "

## 2024-01-31 NOTE — PT/OT/SLP PROGRESS
Speech Language Pathology Treatment    Patient Name:  Migel Garcia   MRN:  [id number]  Admitting Diagnosis: <principal problem not specified>    Recommendations:                 General Recommendations:  Dysphagia therapy/ongoing swallow assessment  Diet recommendations:  Liquid Diet Level: Full liquids, Thin liquids - IDDSI Level 0   Aspiration Precautions: Feed only when awake/alert, Frequent oral care, HOB to 90 degrees, Monitor for s/s of aspiration, Remain upright 30 minutes post meal, Small bites/sips, and Standard aspiration precautions   General Precautions: Standard, aspiration  Communication strategies:   hearing loss    Subjective     Pt seen bedside for ST.  Multiple family members at bedside.  No c/o pain. Coughing with  difficulty orally excreting phlegm at times.  Pt/family met with palliative medicine team today.  Patient goals: To consume a strawberry shake     Pain/Comfort:  Pain Rating 1: 0/10  Pain Rating Post-Intervention 1: 0/10  Pain Rating 2: 0/10  Pain Rating Post-Intervention 2: 0/10    Respiratory Status:  NC    Objective:     Has the patient been evaluated by SLP for swallowing?   Yes  Keep patient NPO? No   Current Respiratory Status: NC    Clinical Swallow Examination:   Of note, patient self-fed with hand/hand assist by SLP throughout evaluation. Patient presented with:      CONSISTENCY   NOTES   THIN (IDDSI 0) 3 oz water challenge   Cup/straw    Improved sequential swallows.  No immediate coughing/choking or vocal changes following intake.  Frequent belching post deglutition, concerning for ?Reflux and/or GI dysfunction. Coughing present at end of bedside CSE and also noted in the absence of po intake.   PUREE (IDDSI 4/Extremely Thick)    TSP/TBSP bites of pudding/applesauce  No overt s/s of dysphagia.  Coughing present at end of bedside CSE and also noted in the absence of po intake.   SOLID (IDDSI 7/Regular) Bite of Julia Doone cookie     Reduced efficiency, prolonged mastication.   Coughing present at end of bedside CSE and also noted in the absence of po intake.      Thickened liquids were not used in this assessment. OGo (2018) reported that thickened liquids have no sound evidence at reducing the risk of pneumonia in patients with dysphagia and can cause harm by increasing their risk of dehydration. It also presents an increased risk of UTI, electrolyte imbalance, constipation, fecal impaction, cognitive impairment, functional decline and even death ([place], 2002; [place], 2016).  Thickened liquids are associated with risks including dehydration, increased pharyngeal residue, potential interference with medication absorption, and decreased quality of life (Jose A, 2013). Thickened liquids are also more likely to be silently aspirated than thin liquids (Moe et al., 2018). This supports the assertion that we should confirm a patient requires thickened liquids with an instrumental swallow study prior to recommending them.     References:   Jose A GALINDO (2013). Thickening agents used for dysphagia management: Effect on bioavailability of water, medication and feelings of satiety. Nutrition Journal, 12, 54. https://doi.org/10.1186/3142-9628-64-54     MATEO Rosa, JAVIER Fuller, CANDICE Dumont, & MATEO Gutiérrez (2018). Cough response to aspiration in thin and thick fluids during FEES in hospitalized inpatients. International journal of language & communication disorders, 53(5), 909-918. https://doi.org/10.1111/7654-8681.59691     INTERPRETATION AND RISK ASSESSMENT:  Clinical swallow evaluation (CSE) revealed oral phase characterized by lingual, labial, and buccal strength and range of motion reduced for lip closure, bolus preparation and propulsion. The patient had no anterior loss of the bolus with complete closure of the lips around the utensils. No residue remained in the oral cavity following the swallow. Patient without immediate s/s of aspiration; however, frequent belching present post  deglutition and coughing noted at end of CSE.  Contributing risk factors for dysphagia include cognitive deficits, medical co-morbidities, previously intubation and deficits in respiratory-swallow coordination.      Clinical signs of oropharyngeal dysphagia, likely acute related to critical illness with multi-organ failure, PEA arrest/CPR and intubation. Swallowing prognosis is guarded.        Compensatory Strategies  Aspiration precautions       Assessment:     Migel Garcia is a 53 y.o. male recently on hospice & year long dobutamine infusion with progressive functional decline s/t significant cardiac co-morbidities (severe NICM, EF <20% and  Pulmonary HTN)  was admitted to [facility] ICU s/p severe cardiogenic shock, PEA arrest and respiratory failure requiring intubation 1/28/24-1/29/24 (biting ET tube, deflated cuff).  Palliative Medicine consulted.  Pt presents with suspected Dysphagia and Cognitive-Linguistic Impairment in the setting of cardiac arrest and intubation.  He is awake/alert with hoarse vocal quality and reduced endurance/intake during bedside CSE.  No immediate s/s of aspiration present; however, he exhibited frequent belching post-deglutition, impulsivity  and delayed coughing at the end of CSE.  Nonproductive cough also present in the absence of po intake.  Reduced efficiency with solids.  Recommended to initiate full liquid diet (IDDSI 0-thin liquids) to maximize comfort and quality of life per pt wishes with plan to transition home with hospice.  ST to f/u for ongoing assessment, education and diet advancement as tolerated.  Pt remains at increased risk of worsening dysphagia/ aspiration given multi-organ failure and severity of medical co-morbidities.       Goals:   Multidisciplinary Problems       SLP Goals          Problem: SLP    Goal Priority Disciplines Outcome   SLP Goal     SLP Ongoing, Progressing   Description: 1.  Pt will consume least restrictive PO diet without incident.                        Plan:     Patient to be seen:  2 x/week, 3 x/week   Plan of Care expires:  02/05/24  Plan of Care reviewed with:  patient, spouse, son   SLP Follow-Up:  Yes       Discharge recommendations:   (Pending home with hospice)   Barriers to Discharge:  None    Time Tracking:     SLP Treatment Date:   01/30/24  Speech Start Time:  1130  Speech Stop Time:  1200     Speech Total Time (min):  30 min    Billable Minutes: Treatment Swallowing Dysfunction 15 minutes and Self Care/Home Management Training 15 minutes    01/30/2024

## 2024-01-31 NOTE — ASSESSMENT & PLAN NOTE
-Code status: DNR/I, defer [place] to hospice.    -HCPOA: Surrogate: Spouse Julianne Garcia, son Bayron helps her with medical decisions.   -GOC: Focus on spending time at home, avoiding the hospital, remaining as independent as possible, symptom/pain control, quality of life, even if it means sacrificing a little time, and comfort and QOL. Accordingly, we have decided that the best plan to meet the patient's goals includes enrolling in hospice care with [facility] with continuation of dobutamine infusion with plans to wean off at some point. Plans for discharge home today with hospice care.   -See HPI for further details

## 2024-01-31 NOTE — PROGRESS NOTES
[facility] - Intensive Care ([facility])  Critical Care Medicine  Progress Note    Patient Name: Migel Garcia  MRN: [id number]  Admission Date: 1/26/2024  Hospital Length of Stay: 5 days  Code Status: Full Code  Attending Provider: Contreras Lee MD  Primary Care Provider: Anuj Banks MD   Principal Problem: <principal problem not specified>    Subjective:     HPI:  Patient presents with intermittant chest pain for the last 24-36hrs.  Hx of severe NICM, EF <20%, Pulmonary HTN - PAP 47/30.   He has been evaluated by the transplant team but does not want to pursue advanced HD Support.  He has been discharged home with hospice and has been on dobutamine infusion for over a year.        Hospital/ICU Course:  1/27. Up in a recliner  On RA. Hemodynamics stable.   1/28. Events of last night noted  Progressive decline and then cardiac arrest. Now on MV and pressors.   1/29 this morning patient was on epinephrine vaso and dobutamine.  He was on the vent.  Placed on pressure support however patient continued to bite on his ET tube.  Later in the day contacted secondary to cuff balloon.  Discussed reintubation or trial of extubation since on PS and alert / following commands min settings  Now on dobutamine/ vaso levo off / tolerating vapotherm   1/30-on nasal cannula   On vaso and dobutamine   1/31-on dobutamine 5 and bumex , off pressors on room air    Interval History: no acute events   Wants home hospice but still full code  Patient on room air   Wife upset I told patient that       Objective:     Vital Signs (Most Recent):  Temp: 97.8 °F (36.6 °C) (01/31/24 0700)  Pulse: 98 (01/31/24 0800)  Resp: (!) 24 (01/31/24 0800)  BP: 116/69 (01/31/24 0800)  SpO2: 96 % (01/31/24 0800) Vital Signs (24h Range):  Temp:  [96.8 °F (36 °C)-97.8 °F (36.6 °C)] 97.8 °F (36.6 °C)  Pulse:  [] 98  Resp:  [24-30] 24  SpO2:  [92 %-100 %] 96 %  BP: ()/(49-92) 116/69  Arterial Line BP: ()/(65-78) 107/71     Weight: 83.3 kg  (183 lb 10.3 oz)  Body mass index is 29.64 kg/m².      Intake/Output Summary (Last 24 hours) at 1/31/2024 0953  Last data filed at 1/31/2024 0600  Gross per 24 hour   Intake 559.61 ml   Output 235 ml   Net 324.61 ml        Physical Exam  Vitals and nursing note reviewed.   Constitutional:       General: He is not in acute distress.     Appearance: He is ill-appearing.   HENT:      Head: Normocephalic and atraumatic.   Eyes:      General:         Right eye: No discharge.         Left eye: No discharge.   Cardiovascular:      Rate and Rhythm: Normal rate and regular rhythm.      Heart sounds: No murmur heard.  Pulmonary:      Effort: No respiratory distress.      Breath sounds: No wheezing or rales.   Abdominal:      General: There is distension.      Tenderness: There is no abdominal tenderness.   Musculoskeletal:         General: Swelling present. No tenderness.      Cervical back: Neck supple.   Neurological:      General: No focal deficit present.      Mental Status: He is alert and oriented to person, place, and time.         Review of Systems   Constitutional:  Positive for appetite change.   HENT: Negative.     Cardiovascular: Negative.    Gastrointestinal:  Positive for abdominal distention.   Genitourinary: Negative.    Musculoskeletal: Negative.    Neurological: Negative.    Hematological: Negative.        Vents:  Vent Mode: A/C (01/29/24 0716)  Set Rate: 24 BPM (01/29/24 0716)  Vt Set: 400 mL (01/29/24 0716)  PEEP/CPAP: 5 cmH20 (01/29/24 0716)  Oxygen Concentration (%): (S) 40 (01/29/24 1500)  Peak Airway Pressure: 30 cmH20 (01/29/24 0716)  Plateau Pressure: 24 cmH20 (01/29/24 0716)  Total Ve: 9.7 L/m (01/29/24 0716)  Negative Inspiratory Force (cm H2O): 0 (01/29/24 0716)  F/VT Ratio<105 (RSBI): (!) 59.55 (01/29/24 0716)    Lines/Drains/Airways       Peripherally Inserted Central Catheter Line  Duration             PICC Double Lumen 01/19/24 12 days              Drain  Duration             Male External  Urinary Catheter 01/30/24 1905 <1 day                    Significant Labs:    CBC/Anemia Profile:  Recent Labs   Lab 01/29/24  2231 01/29/24  2238 01/30/24 0359 01/31/24  0555   WBC  --  9.59 8.90 6.49   HGB  --  9.3* 9.3* 9.3*   HCT  --  30.8* 31.6* 31.4*   PLT  --  101* 97* 85*   MCV  --  75* 75* 75*   RDW  --  21.6* 21.5* 22.0*   OCCULTBLOOD Positive*  --   --   --         Chemistries:  Recent Labs   Lab 01/30/24 0359 01/31/24  0555   * 126*   K 4.5 4.9   CL 83* 82*   CO2 22* 19*   * 152*   CREATININE 5.3* 6.7*   CALCIUM 9.1 8.6*   ALBUMIN 3.0*  3.0* 3.1*  3.1*   PROT 7.4  7.4 7.7  7.7   BILITOT 7.6*  7.6* 7.4*  7.4*   ALKPHOS 139*  139* 115  115   *  314* 262*  262*   *  317* 172*  172*   MG 2.6 2.7*   PHOS 10.2* 12.4*       All pertinent labs within the past 24 hours have been reviewed.    Significant Imaging:  I have reviewed all pertinent imaging results/findings within the past 24 hours.     ABG  Recent Labs   Lab 01/29/24  1102   PH 7.527*   PO2 215*   PCO2 33.8*   HCO3 28.0   BE 5*     Assessment/Plan:     Pulmonary  Acute hypoxemic respiratory failure  Patient with Hypoxic Respiratory failure which is Acute.  he is not on home oxygen. Supplemental oxygen was provided and noted-      .   Signs/symptoms of respiratory failure include-  on vent . Contributing diagnoses includes - CHF Labs and images were reviewed. Patient Has recent ABG, which has been reviewed. Will treat underlying causes and adjust management of respiratory failure as follows-     Extubated on 1/29  On nasal cannula     1/31 room air     Aspiration pneumonia of left lung  Proteus sputum on zosyn, will change to rocephin 3 more days for total of 7    Cardiac/Vascular  Cardiac arrest  1/28 Transferred to the floor in stable condition yesterday.  Had an episode of SVT and then bradycardia progressing to PEA.  Son was at the bedside. Defebrillator never fired.   Was promptly intubation and epi was  initiated.   CPR lasted 6 min. He does wake up and follows commands. Although I told family with uremia and hepatic encephalopathy he may not consistently do that.     Now is severe cardiogenic shock with MSOF.  Transaminitis, no urine output, advance renal failure, uremia, shock liver and refractory hypoglycemia all point to poor perfusion state.   Cardiology saw the patient and he has refused use of assisted device in the past and shock call was not activated.  I also d/w Dr Richard and in this current exacerbation of severe chronic CMP he may not be the candidate for Impella device.  Nephrology has been consulted. He would be a poor candidate for RRT. In the past lasix infusion has worked for him per family but will differ that decision to nephrology.  Lactic acid was 5 and serial lactate ordered.     ICD (implantable cardioverter-defibrillator), single, in situ  Cardiology to follow    Acute on chronic combined systolic and diastolic heart failure  As above    1/28 On inotropic support.   1/29 cards following     HFrEF (heart failure with reduced ejection fraction)  Cardiology consult  No pulmonary edema on CxR   BNP elevated.   Will consult cardiology  Careful hydration  Will differ need for inotropic support to cardiology as we slowly hydrate him to preserve his renal function  High risk of deterioration  Appropriate for ICU admission    1/27. Stable overnight. No SOB or chest pain. No resp issues. On RA. Stable for transfer per cardiology. Appreciate the help. Will differ diuretics to cards. BUN / Cr slightly better.     1/28. See cardiac arrest  1/29 -on lasix drip   Back on small epi. On vaso / dobutamine  Cards following      1/30- on dobutamine/ bumex   Try wean vaso    1/31-on dobutamine and bumex     Renal/  CKD (chronic kidney disease), stage III  Careful hydration to get Cr below 2.    1/29  Significant worsening post cardiac arrest  Nephrology consult    1/29 per nephrology   1/30 per nephrology  patient had stated would never want HD  1/31-per nephrology     Endocrine  Hypoglycemia  Sec to shock liver  On D10  Solumedrol added      GI  Shock liver  Follow labs     GERD (gastroesophageal reflux disease)  Protonix    Palliative Care  Goals of care, counseling/discussion  Advance Care Planning    Today a voluntary meeting took place: ICU    Patient Participation: Patient is unable to participate     Attendees (Name and  Relationship to patient): Son and wife.    Staff attendees (Name and  Role): Myself and Kelsey killian RN    ACP Conversation (General): Understanding of current condition       Code Status: Full Code    ACP Documents: None    Goals of care: The family endorses that what is most important right now is to focus on  REVERSING HIS CONDITION    Accordingly, we have decided that the best plan to meet the patient's goals includes continuing with treatment      Recommendations/  Follow-up tasks: The patient and health care agent were provided the following recommendations . They would to explore all aggressive measures.       Length of ACP   conversation in minutes: 35 minutes      Palliative consulted , had been on hospice on dobutamine          Will transfer to floor  Would recommend cont discussions with palliative to address DNR status  Ask med to see   And sign off at that time      Silvia Mae MD  Critical Care Medicine  [facility] - Intensive Care ([facility])

## 2024-02-01 NOTE — NURSING
[facility] here and taking patient home with [facility]. Wife present and gathered all personal belongings. Patient has home DME iv pump with him with dobutamine infusing.

## 2024-02-02 ENCOUNTER — TELEPHONE (OUTPATIENT)
Dept: PALLIATIVE MEDICINE | Facility: HOSPITAL | Age: 54
End: 2024-02-02
Payer: MEDICARE

## 2024-02-02 NOTE — TELEPHONE ENCOUNTER
Updated by [facility] that Mr. Lincoln revoked hospice and passed away last night. [facility] was not updated until this morning. Family requesting prompt removal of hospice DME.

## 2024-02-05 PROBLEM — R57.0 CARDIOGENIC SHOCK: Status: ACTIVE | Noted: 2024-02-05

## 2024-02-05 NOTE — DISCHARGE SUMMARY
[facility] - [facility] ([facility] Medicine  Discharge Summary      Patient Name: Migel Garcia  MRN: [id number]  [facility]: [id number]  Patient Class: IP- Inpatient  Admission Date: 1/26/2024  Hospital Length of Stay: 5 days  Discharge Date and Time: 1/31/2024  6:54 PM  Attending Physician: Jacquie att. providers found   Discharging Provider: Contreras Lee MD  Primary Care Provider: Anuj Banks MD    Primary Care Team: [facility] MEDICINE D    HPI:   Mr. Garcia, a 53-year-old male hospice patient with a significant history of severe non-ischemic cardiomyopathy, an ejection fraction  of 10% (on a long-term dobutamine infusion), and chronic pulmonary disease who was initially admitted to the ICU after presenting with intermittent chest pain. His hospital course included a period of sudden decline leading to cardiac arrest. Subsequently, he required mechanical ventilation and pressors. His management included epinephrine, vasopressors, and continued dobutamine, with attempts to wean off mechanical ventilation, facing challenges like biting on the endotracheal tube. Despite the complexities, he transitioned from pressor support to less invasive respiratory support and was eventually able to breathe on room air with dobutamine and diuretics, expressing a desire for home hospice care. Orders for transfer to telemetry floor was placed with hospital medicine consulted to assume care.  At the time of our encounter, palliative care at bedside facilitating conversations regarding patient's code status and potential transfer to home-based palliative care versus hospice care.    * No surgery found *      Hospital Course:   Mr. Garcia, a 53-year-old male hospice patient with a significant history of severe non-ischemic cardiomyopathy, an ejection fraction  of 10% (on a long-term dobutamine infusion), and chronic pulmonary disease who was initially admitted to the ICU after presenting with intermittent chest pain. His  "hospital course included a period of sudden decline leading to cardiac arrest. Subsequently, he required mechanical ventilation and pressors. His management included epinephrine, vasopressors, and continued dobutamine, with attempts to wean off mechanical ventilation, facing challenges like biting on the endotracheal tube. Despite the complexities, he transitioned from pressor support to less invasive respiratory support and was eventually able to breathe on room air with dobutamine and diuretics, expressing a desire for home hospice care. Orders for transfer to telemetry floor was placed with hospital medicine consulted to assume care.  At the time of our encounter, palliative care at bedside facilitating conversations regarding patient's code status and potential transfer to home-based palliative care versus hospice care. Family decided on transitioning to home hospice. Orders for hospital bed and DME placed. DC orders placed on 1/31/2024.    /64   Pulse (!) 119   Temp 98 °F (36.7 °C) (Oral)   Resp (!) 22   Ht 5' 6" (1.676 m)   Wt 83.3 kg (183 lb 10.3 oz)   SpO2 95%   BMI 29.64 kg/m²     PHYSICAL EXAM  Vitals Reviewed  GEN: No acute distress, pleasant, body habitus normal  HEENT: diminished hearing, atraumatic and normocephalic  CARDS: regular rate and rhythm, no m/g, pulses palpable in LE  PULM: breathing comfortably on room air, chest symmetric, nonlabored, no abnormal breath sounds on auscultation  ABD: nontender, nondistended, soft, no organomegaly, BS+  Neuro: Alert and oriented x3, CN's I-IX grossly intact, sensation and motor intact; follows directions and answers questions appropriately       Goals of Care Treatment Preferences:  Code Status: DNR          What is most important right now is to focus on spending time at home, avoiding the hospital, remaining as independent as possible, symptom/pain control, quality of life, even if it means sacrificing a little time, comfort and QOL .  " Accordingly, we have decided that the best plan to meet the patient's goals includes enrolling in hospice care.      Consults:   Consults (From admission, onward)          Status Ordering Provider     Inpatient consult to Social Work  Once        Provider:  (Not yet assigned)    Completed SABINE PATIÑO     Inpatient consult to Social Work  Once        Provider:  (Not yet assigned)    Completed TRISTEN VIRGEN     Inpatient consult to Social Work  Once        Provider:  (Not yet assigned)    Completed TRISTEN VIRGEN     Inpatient consult to Palliative Care  Once        Provider:  (Not yet assigned)    Completed DAO VARELA     Inpatient consult to Registered Dietitian/Nutritionist  Once        Provider:  (Not yet assigned)    Completed DOMENICO CHAHAL     Inpatient consult to Cardiology  Once        Provider:  Orestes Garcia MD    Completed MELISSA BASSETT            No new Assessment & Plan notes have been filed under this hospital service since the last note was generated.  Service: Hospital Medicine    Final Active Diagnoses:    Diagnosis Date Noted POA    PRINCIPAL PROBLEM:  HFrEF (heart failure with reduced ejection fraction) [I50.20] 12/05/2022 Yes    Encounter for palliative care [Z51.5] 01/30/2024 Not Applicable    Shock liver [K72.00] 01/29/2024 Yes    Acute hypoxemic respiratory failure [J96.01] 01/29/2024 Yes    Cardiac arrest [I46.9] 01/28/2024 No    Hypoglycemia [E16.2] 01/28/2024 No    Aspiration pneumonia of left lung [J69.0] 01/28/2024 No    Goals of care, counseling/discussion [Z71.89] 01/28/2024 Not Applicable    Atrial fibrillation [I48.91] 12/06/2022 Yes    Acute on chronic combined systolic and diastolic heart failure [I50.43] 12/05/2022 Yes    GERD (gastroesophageal reflux disease) [K21.9] 12/05/2022 Yes    CKD (chronic kidney disease), stage III [N18.30] 12/05/2022 Yes    ICD (implantable cardioverter-defibrillator), single, in situ [Z95.810] 12/05/2022 Yes     "  Problems Resolved During this Admission:       Discharged Condition: good    Disposition: Hospice/Home    Follow Up:   Follow-up Information       [facility] Follow up.    Contact information:  [address], [address]  [facility] [address] [phone]                         Patient Instructions:      HOSPITAL BED FOR HOME USE     Order Specific Question Answer Comments   Type: Electric    Length of need (1-99 months): 3    Does patient have medical equipment at home? oxygen    Height: 5' 6" (1.676 m)    Weight: 83.3 kg (183 lb 10.3 oz)    Please check all that apply: Patient requires positioning of the body in ways not feasible in an ordinary bed due to a medical condition which is expected to last at least one month.    Please check all that apply: Patient requires, for the alleviation of pain, positioning of the body in ways not feasible in an ordinary bed.    Please check all that apply: Patient requires a bed height different than a fixed height hospital bed to permit transfers to chair, wheelchair, or standing.    Please check all that apply: Patient requires the head of bed to be elevated more than 30 degrees most of the time due to congestive heart failure, chronic pulmonary disease, or aspiration.  Pillows and wedges have been considered and ruled out.    Please check all that apply: Patient requires frequent changes in body position and/or has an immediate need for a change in body position.        Significant Diagnostic Studies: Labs: All labs within the past 24 hours have been reviewed    Pending Diagnostic Studies:       None           Medications:  Reconciled Home Medications:      Medication List        CHANGE how you take these medications      bumetanide 1 MG tablet  Commonly known as: BUMEX  Take 2 tablets (2 mg total) by mouth 2 (two) times a day.  What changed: how much to take            CONTINUE taking these medications      amiodarone 200 MG Tab  Commonly " known as: PACERONE  Take 1 tablet (200 mg total) by mouth once daily.     apixaban 5 mg Tab  Commonly known as: ELIQUIS  Take 1 tablet (5 mg total) by mouth 2 (two) times daily.     aspirin 81 MG Chew  Take 81 mg by mouth once daily.     BiDiL 20-37.5 mg Tab  Generic drug: isosorbide-hydrALAZINE 20-37.5 mg  Take 1 tablet by mouth 3 (three) times daily.     DOBUTamine 250 mg/250 mL (1 mg/mL) infusion  Commonly known as: DOBUTREX  Inject 2.5 mcg/kg/min into the vein continuous.     eplerenone 50 MG Tab  Commonly known as: INSPRA  Take 50 mg by mouth once daily.     potassium chloride SA 20 MEQ tablet  Commonly known as: K-DUR,KLOR-CON  Take 20 mEq by mouth once daily.            STOP taking these medications      metOLazone 5 MG tablet  Commonly known as: ZAROXOLYN     metoprolol tartrate 25 MG tablet  Commonly known as: LOPRESSOR     ondansetron 4 MG Tbdl  Commonly known as: ZOFRAN-ODT              Indwelling Lines/Drains at time of discharge:   Lines/Drains/Airways       Peripherally Inserted Central Catheter Line  Duration             PICC Double Lumen 01/19/24 17 days                    Time spent on the discharge of patient: 35 minutes  of time spent on discharge including examining patient, providing discharge instructions, arranging follow-up and documentation.  Contreras Lee MD  Department of Hospital Medicine  '[facility] - Intensive Care ([facility])

## 2024-02-07 PROBLEM — R79.89 TROPONIN I ABOVE REFERENCE RANGE: Status: ACTIVE | Noted: 2024-02-07

## 2024-02-07 PROBLEM — R06.02 SHORTNESS OF BREATH: Status: ACTIVE | Noted: 2024-02-07

## 2024-02-19 NOTE — PHYSICIAN QUERY
PT Name: Migel Garcia  MR #: [id number]     DOCUMENTATION CLARIFICATION     CDS/: Charisse Cochran RN CDIS               Contact information: [email or web address]    This form is a permanent document in the medical record.    Query Date: February 19, 2024    By submitting this query, we are merely seeking further clarification of documentation. Please utilize your independent clinical judgment when addressing the question(s) below.    The Medical Record reflects the following:     Indicators   Supporting Clinical Findings Location in Medical Record   x Lab Value(s)  Latest Reference Range & Units 01/26/24 15:12 01/27/24 04:15 01/28/24 01:20 01/28/24 05:37 01/28/24 12:27   Potassium 3.5 - 5.1 mmol/L 3.0 (L) 3.2 (L) 4.8 3.9 4.9    Labs    x Treatment/Medication potassium chloride SA CR tablet 50 mEq  Dose: 50 mEq  Freq: Once Route: Oral  Start: 01/26/24 2041 End: 01/26/24 2050    potassium chloride SA CR tablet 20 mEq  Dose: 20 mEq  Freq: Once Route: Oral  Start: 01/27/24 0830 End: 01/27/24 0939   MAR           MAR       Other       Provider, please specify the diagnosis or diagnoses that correspond(s) to the above indicators. Ko all that apply:    [ x ] Hypokalemia   [   ] Other electrolyte disturbance (please specify): __________       Please document in your progress notes daily for the duration of treatment until resolved, and include in your discharge summary.    Form No. [id number]

## 2024-02-19 NOTE — PHYSICIAN QUERY
PT Name: Migel Garcia  MR #: [id number]     DOCUMENTATION CLARIFICATION     CDS/: Charisse Cochran RN CDIS              Contact information: [email or web address]    This form is a permanent document in the medical record.     Query Date: February 19, 2024    By submitting this query, we are merely seeking further clarification of documentation.  Please utilize your independent clinical judgment when addressing the question(s) below.    The Medical Record contains the following   Indicators Supporting Clinical Findings Location in Medical Record   x Heart Failure documented HFrEF (heart failure with reduced ejection fraction)     Acute on chronic combined systolic and diastolic heart failure   Discharge summary     Discharge summary    x BNP  Latest Reference Range & Units 01/26/24 15:12   BNP 0 - 99 pg/mL 1,460 (H)    Labs    x EF/Echo The left ventricle is mildly enlarged with severely decreased systolic function.  The estimated ejection fraction is <20%.  There is left ventricular global hypokinesis.  Indeterminate left ventricular diastolic function.  Mild right ventricular enlargement with moderately reduced right ventricular systolic function.  Biatrial enlargement.  Mild mitral regurgitation.  Mild tricuspid regurgitation.  The estimated PA systolic pressure is 66 mmHg.  There is pulmonary hypertension.  Elevated central venous pressure (15 mmHg).  Small posterior pericardial effusion. ECHO 12/20/2022(previous encounter)   x Radiology findings Cardiomegaly.  CXR 1/26   x Subjective/Objective Respiratory Conditions Pulmonary:      Effort: Respiratory distress present.      Breath sounds: Rhonchi present.   Pulm  note 1/28     Recent/Current MI      Heart Transplant, LVAD      Edema, JVD      Ascites     x Diuretics/Meds furosemide injection 80 mg  Dose: 80 mg  Freq: Once Route: IV  Start: 01/28/24 1445 End: 01/28/24 1418    bumetanide (BUMEX) 25 mg in 100 mL infusion (conc: 0.25 mg/mL)  Rate: 4 mL/hr Dose:  1 mg/hr  Freq: Continuous Route: IV  Start: 01/30/24 1100 End: 01/31/24 2055    chlorothiazide (DIURIL) 250 mg in dextrose 5 % (D5W) 50 mL IVPB  Dose: 250 mg  Freq: Every 24 hours (non-standard times) Route: IV  Start: 01/29/24 1030 End: 01/31/24 2055    DOBUtamine 1000 mg in D5W 250 mL infusion  Rate: 6.1 mL/hr Dose: 5 mcg/kg/min  Weight Dosing Info: 81.6 kg  Freq: Continuous Route: IV  Start: 01/26/24 2045 End: 01/31/24 2055    furosemide (Lasix) 200 mg in sodium chloride 0.9% SolP 100 mL continuous infusion (conc: 2 mg/mL)  Rate: 5 mL/hr Dose: 10 mg/hr  Freq: Continuous Route: IV  Start: 01/28/24 1500 End: 01/30/24 1044    MAR           MAR             MAR             MAR             MAR     Other Treatment      Other       Heart failure is a clinical diagnosis which includes symptomatic fluid retention, elevated intracardiac pressures, and/or the inability of the heart to deliver adequate blood flow.    Heart Failure with reduced Ejection Fraction (HFrEF) or Systolic Heart Failure (loses ability to contract normally, EF is <40%)    Heart Failure with preserved Ejection Fraction (HFpEF) or Diastolic Heart Failure (stiff ventricles, does not relax properly, EF is >50%)     Heart Failure with Combined Systolic and Diastolic Failure (stiff ventricles, does not relax properly and EF is <50%)    Mid-range or mildly reduced ejection fraction (HFmrEF) is classified as systolic heart failure.  Congestive heart failure with a recovered EF is classified as Diastolic Heart Failure.  Common clues to acute exacerbation:  Rapidly progressive symptoms (w/in 2 weeks of presentation), using IV diuretics, using supplemental O2, pulmonary edema on Xray, new or worsening pleural effusion, +JVD or other signs of volume overload, MI w/in 4 weeks, and/or BNP >500  The clinical guidelines noted are only system guidelines, and do not replace the providers clinical judgment.    Provider, please clarify the TYPE of HEART failure     [  x  ]  Acute on Chronic Systolic Heart Failure (HFrEF or HFmrEF) - worsening of CHF signs/symptoms in preexisting CHF   [   ]  Acute on Chronic Combined Systolic and Diastolic Heart Failure - worsening of CHF signs/symptoms in preexisting CHF   [   ]  Other (please specify): ___________________________________           Please document in your progress notes daily for the duration of treatment until resolved and include in your discharge summary.    References:  American Heart Association editorial staff. (2017, May). Ejection Fraction Heart Failure Measurement. American Heart Association. https://www.heart.org/en/health-topics/heart-failure/diagnosing-heart-failure/ejection-fraction-heart-failure-measurement#:~:text=Ejection%20fraction%20(EF)%20is%20a,pushed%20out%20with%20each%20heartbeat  OMAR Amezcua (2020, December 15). Heart failure with preserved ejection fraction: Clinical manifestations and diagnosis. [employer]. https://www.[employer]/contents/heart-failure-with-preserved-ejection-fraction-clinical-manifestations-and-diagnosis.  ICD-10-CM/PCS Coding Clinic Third Quarter ICD-10, Effective with discharges: September 8, 2020 [facility] § Heart failure with mid-range or mildly reduced ejection fraction (2020).  ICD-10-CM/PCS Coding Clinic Third Quarter ICD-10, Effective with discharges: September 8, 2020 [facility] § Heart failure with recovered ejection fraction (2020).  Form No. [id number]

## 2024-08-23 NOTE — PROGRESS NOTES
[facility] - Intensive Care ([facility])  Critical Care Medicine  Progress Note    Patient Name: Migel Garcia  MRN: [id number]  Admission Date: 1/26/2024  Hospital Length of Stay: 4 days  Code Status: Full Code  Attending Provider: Silvia Mae MD  Primary Care Provider: Anuj Banks MD   Principal Problem: <principal problem not specified>    Subjective:     HPI:  Patient presents with intermittant chest pain for the last 24-36hrs.  Hx of severe NICM, EF <20%, Pulmonary HTN - PAP 47/30.   He has been evaluated by the transplant team but does not want to pursue advanced HD Support.  He has been discharged home with hospice and has been on dobutamine infusion for over a year.        Hospital/ICU Course:  1/27. Up in a recliner  On RA. Hemodynamics stable.   1/28. Events of last night noted  Progressive decline and then cardiac arrest. Now on MV and pressors.   1/29 this morning patient was on epinephrine vaso and dobutamine.  He was on the vent.  Placed on pressure support however patient continued to bite on his ET tube.  Later in the day contacted secondary to cuff balloon.  Discussed reintubation or trial of extubation since on PS and alert / following commands min settings  Now on dobutamine/ vaso levo off / tolerating vapotherm   1/30-on nasal cannula   On vaso and dobutamine     Interval History: no acute events  On vaso dobutamine  Some problems with hearing, changed to bumex       Objective:     Vital Signs (Most Recent):  Temp: 96.8 °F (36 °C) (01/30/24 1105)  Pulse: 91 (01/30/24 1100)  Resp: (!) 26 (01/30/24 1100)  BP: (!) 139/92 (01/30/24 1100)  SpO2: 97 % (01/30/24 1100) Vital Signs (24h Range):  Temp:  [96.3 °F (35.7 °C)-99 °F (37.2 °C)] 96.8 °F (36 °C)  Pulse:  [] 91  Resp:  [18-33] 26  SpO2:  [92 %-100 %] 97 %  BP: ()/(53-92) 139/92  Arterial Line BP: ()/(52-76) 108/75     Weight: 84.4 kg (186 lb 1.1 oz)  Body mass index is 30.03 kg/m².      Intake/Output Summary (Last 24  hours) at 1/30/2024 1311  Last data filed at 1/30/2024 1100  Gross per 24 hour   Intake 768.65 ml   Output 745 ml   Net 23.65 ml        Physical Exam  Vitals and nursing note reviewed.   Constitutional:       General: He is not in acute distress.     Appearance: He is normal weight. He is ill-appearing.   HENT:      Head: Normocephalic and atraumatic.   Eyes:      General:         Right eye: No discharge.         Left eye: No discharge.   Cardiovascular:      Rate and Rhythm: Normal rate and regular rhythm.   Pulmonary:      Effort: No respiratory distress.      Breath sounds: No wheezing.   Abdominal:      Tenderness: There is no abdominal tenderness.      Comments: Slight distended   Musculoskeletal:         General: No swelling or tenderness.   Neurological:      Mental Status: He is alert.      Comments: Alert  / answer some questions            Review of Systems   Constitutional: Negative.    HENT: Negative.     Respiratory: Negative.     Gastrointestinal:         Bloating    Genitourinary: Negative.    Neurological: Negative.        Vents:  Vent Mode: A/C (01/29/24 0716)  Set Rate: 24 BPM (01/29/24 0716)  Vt Set: 400 mL (01/29/24 0716)  PEEP/CPAP: 5 cmH20 (01/29/24 0716)  Oxygen Concentration (%): (S) 40 (01/29/24 1500)  Peak Airway Pressure: 30 cmH20 (01/29/24 0716)  Plateau Pressure: 24 cmH20 (01/29/24 0716)  Total Ve: 9.7 L/m (01/29/24 0716)  Negative Inspiratory Force (cm H2O): 0 (01/29/24 0716)  F/VT Ratio<105 (RSBI): (!) 59.55 (01/29/24 0716)    Lines/Drains/Airways       Peripherally Inserted Central Catheter Line  Duration             PICC Double Lumen 01/19/24 11 days              Drain  Duration                  Urethral Catheter 01/28/24 0030 2 days              Arterial Line  Duration             Arterial Line 01/28/24 0103 Right Femoral 2 days              Peripheral Intravenous Line  Duration                  Peripheral IV - Single Lumen 01/28/24 1830 20 G Left Antecubital 1 day                     Significant Labs:    CBC/Anemia Profile:  Recent Labs   Lab 01/29/24  0414 01/29/24  2231 01/29/24 2238 01/30/24  0359   WBC 10.82  --  9.59 8.90   HGB 10.3*  --  9.3* 9.3*   HCT 34.5*  --  30.8* 31.6*   *  --  101* 97*   MCV 75*  --  75* 75*   RDW 21.7*  --  21.6* 21.5*   OCCULTBLOOD  --  Positive*  --   --         Chemistries:  Recent Labs   Lab 01/29/24  0414 01/30/24  0359   * 127*   K 4.4 4.5   CL 84* 83*   CO2 23 22*   * 123*   CREATININE 4.6* 5.3*   CALCIUM 9.2 9.1   ALBUMIN 3.0* 3.0*  3.0*   PROT 7.1 7.4  7.4   BILITOT 6.9* 7.6*  7.6*   ALKPHOS 177* 139*  139*   * 314*  314*   * 317*  317*   MG 2.3 2.6   PHOS 6.8* 10.2*       All pertinent labs within the past 24 hours have been reviewed.    Significant Imaging:  I have reviewed all pertinent imaging results/findings within the past 24 hours.    ABG  Recent Labs   Lab 01/29/24  1102   PH 7.527*   PO2 215*   PCO2 33.8*   HCO3 28.0   BE 5*     Assessment/Plan:     Pulmonary  Acute hypoxemic respiratory failure  Patient with Hypoxic Respiratory failure which is Acute.  he is not on home oxygen. Supplemental oxygen was provided and noted- Oxygen Concentration (%):  [40-50] 40    .   Signs/symptoms of respiratory failure include-  on vent . Contributing diagnoses includes - CHF Labs and images were reviewed. Patient Has recent ABG, which has been reviewed. Will treat underlying causes and adjust management of respiratory failure as follows-     Extubated on 1/29  On nasal cannula     Aspiration pneumonia of left lung  Proteus sputum on zosyn    Cardiac/Vascular  Cardiac arrest  1/28 Transferred to the floor in stable condition yesterday.  Had an episode of SVT and then bradycardia progressing to PEA.  Son was at the bedside. Defebrillator never fired.   Was promptly intubation and epi was initiated.   CPR lasted 6 min. He does wake up and follows commands. Although I told family with uremia and hepatic encephalopathy  he may not consistently do that.     Now is severe cardiogenic shock with MSOF.  Transaminitis, no urine output, advance renal failure, uremia, shock liver and refractory hypoglycemia all point to poor perfusion state.   Cardiology saw the patient and he has refused use of assisted device in the past and shock call was not activated.  I also d/w Dr Richard and in this current exacerbation of severe chronic CMP he may not be the candidate for Impella device.  Nephrology has been consulted. He would be a poor candidate for RRT. In the past lasix infusion has worked for him per family but will differ that decision to nephrology.  Lactic acid was 5 and serial lactate ordered.     ICD (implantable cardioverter-defibrillator), single, in situ  Cardiology to follow    Acute on chronic combined systolic and diastolic heart failure  As above    1/28 On inotropic support.   1/29 cards following     HFrEF (heart failure with reduced ejection fraction)  Cardiology consult  No pulmonary edema on CxR   BNP elevated.   Will consult cardiology  Careful hydration  Will differ need for inotropic support to cardiology as we slowly hydrate him to preserve his renal function  High risk of deterioration  Appropriate for ICU admission    1/27. Stable overnight. No SOB or chest pain. No resp issues. On RA. Stable for transfer per cardiology. Appreciate the help. Will differ diuretics to cards. BUN / Cr slightly better.     1/28. See cardiac arrest  1/29 -on lasix drip   Back on small epi. On vaso / dobutamine  Cards following      1/30- on dobutamine/ bumex   Try wean vaso    Renal/  CKD (chronic kidney disease), stage III  Careful hydration to get Cr below 2.    1/29  Significant worsening post cardiac arrest  Nephrology consult    1/29 per nephrology   1/30 per nephrology patient had stated would never want HD    Endocrine  Hypoglycemia  Sec to shock liver  On D10  Solumedrol added      GI  Shock liver  Follow labs     GERD  (gastroesophageal reflux disease)  Protonix    Palliative Care  Goals of care, counseling/discussion  Advance Care Planning    Today a voluntary meeting took place: ICU    Patient Participation: Patient is unable to participate     Attendees (Name and  Relationship to patient): Son and wife.    Staff attendees (Name and  Role): Myself and Kelsey killian RN    ACP Conversation (General): Understanding of current condition       Code Status: Full Code    ACP Documents: None    Goals of care: The family endorses that what is most important right now is to focus on  REVERSING HIS CONDITION    Accordingly, we have decided that the best plan to meet the patient's goals includes continuing with treatment      Recommendations/  Follow-up tasks: The patient and health care agent were provided the following recommendations . They would to explore all aggressive measures.       Length of ACP   conversation in minutes: 35 minutes      Palliative consulted , had been on hospice on dobutamine        Diet per speech   On eliquis with inr     Critical Care Time: 34 minutes  Critical secondary to Patient has a condition that poses threat to life and bodily function: cardiogenic shock / pressors/       Critical care was time spent personally by me on the following activities: development of treatment plan with patient or surrogate and bedside caregivers, discussions with consultants, evaluation of patient's response to treatment, examination of patient, ordering and performing treatments and interventions, ordering and review of laboratory studies, ordering and review of radiographic studies, pulse oximetry, re-evaluation of patient's condition. This critical care time did not overlap with that of any other provider or involve time for any procedures.     Silvia Mae MD  Critical Care Medicine  [facility] - Intensive [facility])   no

## (undated) DEVICE — KIT PROBE COVER WITH GEL

## (undated) DEVICE — SHEATH INTRODUCER 7FR 11CM

## (undated) DEVICE — TRAY CATH LAB OMC

## (undated) DEVICE — CATH SWAN GANZ STND 7FR

## (undated) DEVICE — CATH SWAN GANZ 8FR HEP FREE

## (undated) DEVICE — TRAY PICC CENTRAL LINE DRSNG

## (undated) DEVICE — KIT MICROINTRODUCE MINI 5X10CM

## (undated) DEVICE — STOPCOCK 3-WAY

## (undated) DEVICE — DRESSING TRANS 4X4 TEGADERM

## (undated) DEVICE — TRANSDUCER ADULT DISP

## (undated) DEVICE — INTRODUCER RX PSI KIT 8.5 FR